# Patient Record
Sex: FEMALE | Race: WHITE | NOT HISPANIC OR LATINO | Employment: OTHER | URBAN - METROPOLITAN AREA
[De-identification: names, ages, dates, MRNs, and addresses within clinical notes are randomized per-mention and may not be internally consistent; named-entity substitution may affect disease eponyms.]

---

## 2019-08-26 ENCOUNTER — TELEPHONE (OUTPATIENT)
Dept: FAMILY MEDICINE | Age: 84
End: 2019-08-26

## 2019-12-25 ENCOUNTER — HOSPITAL ENCOUNTER (OUTPATIENT)
Facility: HOSPITAL | Age: 84
Setting detail: OBSERVATION
LOS: 2 days | Discharge: HOME | End: 2019-12-27
Attending: EMERGENCY MEDICINE | Admitting: HOSPITALIST
Payer: MEDICARE

## 2019-12-25 ENCOUNTER — APPOINTMENT (OUTPATIENT)
Dept: CT IMAGING | Facility: HOSPITAL | Age: 84
End: 2019-12-25
Attending: EMERGENCY MEDICINE
Payer: MEDICARE

## 2019-12-25 DIAGNOSIS — R77.8 ELEVATED TROPONIN: ICD-10-CM

## 2019-12-25 DIAGNOSIS — R47.81 SLURRED SPEECH: Primary | ICD-10-CM

## 2019-12-25 DIAGNOSIS — I10 BENIGN ESSENTIAL HTN: ICD-10-CM

## 2019-12-25 DIAGNOSIS — Z86.73 HISTORY OF CVA (CEREBROVASCULAR ACCIDENT): ICD-10-CM

## 2019-12-25 PROBLEM — R79.89 ELEVATED TROPONIN: Status: ACTIVE | Noted: 2019-12-25

## 2019-12-25 PROCEDURE — 99220 INITIAL OBSERVATION CARE,LEVL III: CPT | Performed by: HOSPITALIST

## 2019-12-25 PROCEDURE — 70498 CT ANGIOGRAPHY NECK: CPT | Performed by: EMERGENCY MEDICINE

## 2019-12-25 PROCEDURE — 70496 CT ANGIOGRAPHY HEAD: CPT | Performed by: EMERGENCY MEDICINE

## 2019-12-25 PROCEDURE — 70450 CT HEAD/BRAIN W/O DYE: CPT | Performed by: EMERGENCY MEDICINE

## 2019-12-25 RX ORDER — ASPIRIN 300 MG
300 SUPPOSITORY, RECTAL RECTAL DAILY
Status: DISCONTINUED | OUTPATIENT
Start: 2019-12-25 | End: 2019-12-26

## 2019-12-25 RX ORDER — CLOPIDOGREL BISULFATE 75 MG/1
75 TABLET ORAL DAILY
COMMUNITY

## 2019-12-25 RX ORDER — ATENOLOL 50 MG/1
50 TABLET ORAL DAILY
COMMUNITY

## 2019-12-25 RX ORDER — METOCLOPRAMIDE HYDROCHLORIDE 5 MG/ML
5 INJECTION INTRAMUSCULAR; INTRAVENOUS EVERY 8 HOURS PRN
Status: DISCONTINUED | OUTPATIENT
Start: 2019-12-25 | End: 2019-12-27

## 2019-12-25 RX ORDER — LABETALOL HYDROCHLORIDE 5 MG/ML
10 INJECTION, SOLUTION INTRAVENOUS EVERY 10 MIN PRN
Status: DISCONTINUED | OUTPATIENT
Start: 2019-12-25 | End: 2019-12-27

## 2019-12-25 RX ORDER — ACETAMINOPHEN 325 MG/1
650 TABLET ORAL EVERY 4 HOURS PRN
Status: DISCONTINUED | OUTPATIENT
Start: 2019-12-25 | End: 2019-12-27

## 2019-12-25 RX ORDER — HYDROCHLOROTHIAZIDE 50 MG/1
50 TABLET ORAL DAILY
COMMUNITY

## 2019-12-25 RX ORDER — PHENYLEPHRINE HCL IN 0.9% NACL 50MG/250ML
PLASTIC BAG, INJECTION (ML) INTRAVENOUS CONTINUOUS PRN
Status: DISCONTINUED | OUTPATIENT
Start: 2019-12-25 | End: 2019-12-27

## 2019-12-25 RX ORDER — ONDANSETRON 2 MG/ML
4 INJECTION INTRAMUSCULAR; INTRAVENOUS EVERY 6 HOURS PRN
Status: DISCONTINUED | OUTPATIENT
Start: 2019-12-25 | End: 2019-12-27

## 2019-12-25 RX ORDER — ACETAMINOPHEN 650 MG/1
650 SUPPOSITORY RECTAL EVERY 4 HOURS PRN
Status: DISCONTINUED | OUTPATIENT
Start: 2019-12-25 | End: 2019-12-27

## 2019-12-25 RX ORDER — SENNOSIDES 8.6 MG
17.2 TABLET ORAL NIGHTLY
Status: DISCONTINUED | OUTPATIENT
Start: 2019-12-25 | End: 2019-12-27

## 2019-12-25 RX ORDER — ASPIRIN 300 MG
300 SUPPOSITORY, RECTAL RECTAL ONCE
Status: COMPLETED | OUTPATIENT
Start: 2019-12-25 | End: 2019-12-25

## 2019-12-25 RX ORDER — ATORVASTATIN CALCIUM 80 MG/1
80 TABLET, FILM COATED ORAL NIGHTLY
Status: DISCONTINUED | OUTPATIENT
Start: 2019-12-25 | End: 2019-12-27

## 2019-12-25 RX ORDER — FOLIC ACID 1 MG/1
2 TABLET ORAL DAILY
COMMUNITY

## 2019-12-25 RX ORDER — PANTOPRAZOLE SODIUM 40 MG/1
40 TABLET, DELAYED RELEASE ORAL
COMMUNITY

## 2019-12-25 RX ORDER — POLYETHYLENE GLYCOL 3350 17 G/17G
17 POWDER, FOR SOLUTION ORAL DAILY PRN
Status: DISCONTINUED | OUTPATIENT
Start: 2019-12-25 | End: 2019-12-27

## 2019-12-25 RX ORDER — SODIUM CHLORIDE 9 MG/ML
INJECTION, SOLUTION INTRAVENOUS CONTINUOUS
Status: ACTIVE | OUTPATIENT
Start: 2019-12-25 | End: 2019-12-26

## 2019-12-25 RX ORDER — DOCUSATE SODIUM 100 MG/1
100 CAPSULE, LIQUID FILLED ORAL 2 TIMES DAILY
Status: DISCONTINUED | OUTPATIENT
Start: 2019-12-25 | End: 2019-12-27

## 2019-12-25 RX ORDER — SODIUM PHOSPHATE, DIBASIC AND SODIUM PHOSPHATE, MONOBASIC 7; 19 G/133ML; G/133ML
1 ENEMA RECTAL ONCE AS NEEDED
Status: DISCONTINUED | OUTPATIENT
Start: 2019-12-25 | End: 2019-12-27

## 2019-12-25 RX ORDER — LOSARTAN POTASSIUM 50 MG/1
100 TABLET ORAL DAILY
COMMUNITY

## 2019-12-25 RX ORDER — LOSARTAN POTASSIUM 100 MG/1
100 TABLET ORAL DAILY
Status: DISCONTINUED | OUTPATIENT
Start: 2019-12-25 | End: 2019-12-27

## 2019-12-25 RX ORDER — FOLIC ACID 1 MG/1
2 TABLET ORAL DAILY
Status: DISCONTINUED | OUTPATIENT
Start: 2019-12-25 | End: 2019-12-27

## 2019-12-25 RX ORDER — ASPIRIN 325 MG
325 TABLET ORAL DAILY
Status: DISCONTINUED | OUTPATIENT
Start: 2019-12-25 | End: 2019-12-26

## 2019-12-25 RX ORDER — CLOPIDOGREL BISULFATE 75 MG/1
75 TABLET ORAL DAILY
Status: DISCONTINUED | OUTPATIENT
Start: 2019-12-25 | End: 2019-12-27

## 2019-12-25 RX ORDER — SODIUM CHLORIDE 9 MG/ML
INJECTION, SOLUTION INTRAVENOUS CONTINUOUS
Status: DISCONTINUED | OUTPATIENT
Start: 2019-12-25 | End: 2019-12-27

## 2019-12-25 RX ORDER — POTASSIUM CITRATE 10 MEQ/1
10 TABLET, EXTENDED RELEASE ORAL DAILY
COMMUNITY

## 2019-12-25 RX ORDER — ATENOLOL 50 MG/1
50 TABLET ORAL
Status: DISCONTINUED | OUTPATIENT
Start: 2019-12-26 | End: 2019-12-27

## 2019-12-25 RX ORDER — BISACODYL 10 MG
10 SUPPOSITORY, RECTAL RECTAL
Status: DISCONTINUED | OUTPATIENT
Start: 2019-12-25 | End: 2019-12-27

## 2019-12-25 RX ORDER — PANTOPRAZOLE SODIUM 40 MG/1
40 TABLET, DELAYED RELEASE ORAL
Status: DISCONTINUED | OUTPATIENT
Start: 2019-12-26 | End: 2019-12-27

## 2019-12-25 RX ORDER — HEPARIN SODIUM 5000 [USP'U]/ML
5000 INJECTION, SOLUTION INTRAVENOUS; SUBCUTANEOUS EVERY 12 HOURS SCHEDULED
Status: DISCONTINUED | OUTPATIENT
Start: 2019-12-25 | End: 2019-12-27

## 2019-12-26 ENCOUNTER — APPOINTMENT (OUTPATIENT)
Dept: CV DIAGNOSTICS | Facility: HOSPITAL | Age: 84
End: 2019-12-26
Attending: INTERNAL MEDICINE
Payer: MEDICARE

## 2019-12-26 ENCOUNTER — APPOINTMENT (OUTPATIENT)
Dept: MRI IMAGING | Facility: HOSPITAL | Age: 84
End: 2019-12-26
Attending: Other
Payer: MEDICARE

## 2019-12-26 PROCEDURE — 95816 EEG AWAKE AND DROWSY: CPT | Performed by: OTHER

## 2019-12-26 PROCEDURE — 99203 OFFICE O/P NEW LOW 30 MIN: CPT | Performed by: INTERNAL MEDICINE

## 2019-12-26 PROCEDURE — 70551 MRI BRAIN STEM W/O DYE: CPT | Performed by: OTHER

## 2019-12-26 PROCEDURE — 99225 SUBSEQUENT OBSERVATION CARE: CPT | Performed by: HOSPITALIST

## 2019-12-26 PROCEDURE — 99223 1ST HOSP IP/OBS HIGH 75: CPT | Performed by: OTHER

## 2019-12-26 PROCEDURE — 93306 TTE W/DOPPLER COMPLETE: CPT | Performed by: INTERNAL MEDICINE

## 2019-12-26 RX ORDER — MAGNESIUM SULFATE HEPTAHYDRATE 40 MG/ML
2 INJECTION, SOLUTION INTRAVENOUS ONCE
Status: COMPLETED | OUTPATIENT
Start: 2019-12-26 | End: 2019-12-26

## 2019-12-26 RX ORDER — HYDRALAZINE HYDROCHLORIDE 20 MG/ML
10 INJECTION INTRAMUSCULAR; INTRAVENOUS EVERY 4 HOURS PRN
Status: DISCONTINUED | OUTPATIENT
Start: 2019-12-26 | End: 2019-12-27

## 2019-12-26 RX ORDER — HYDRALAZINE HYDROCHLORIDE 20 MG/ML
5 INJECTION INTRAMUSCULAR; INTRAVENOUS ONCE
Status: COMPLETED | OUTPATIENT
Start: 2019-12-26 | End: 2019-12-26

## 2019-12-26 RX ORDER — FAMOTIDINE 10 MG/ML
20 INJECTION, SOLUTION INTRAVENOUS DAILY
Status: DISCONTINUED | OUTPATIENT
Start: 2019-12-26 | End: 2019-12-26

## 2019-12-26 RX ORDER — FAMOTIDINE 20 MG/1
20 TABLET ORAL DAILY
Status: DISCONTINUED | OUTPATIENT
Start: 2019-12-26 | End: 2019-12-26

## 2019-12-26 RX ORDER — ASPIRIN 81 MG/1
81 TABLET, CHEWABLE ORAL DAILY
Status: DISCONTINUED | OUTPATIENT
Start: 2019-12-26 | End: 2019-12-27

## 2019-12-26 NOTE — CM/SW NOTE
12/26/19 1200   CM/SW Referral Data   Referral Source Physician   Reason for Referral Discharge planning;Protocol order set   Specify order set Stroke   Informant Patient   Patient Info   Patient's Mental Status Alert;Oriented   Patient's Home Environme

## 2019-12-26 NOTE — CONSULTS
[facility]    PATIENT'S NAME: Kathryn Wheeler   ATTENDING PHYSICIAN: ZACHARY Pitt: Yazan Purdy M.D.    PATIENT ACCOUNT#:   [de-identified]    LOCATION:  [address] [place] [address]  MEDICAL RECORD #:   [id number]       DATE OF BIR nonsmoker. She is active day to day using a cane. PAST MEDICAL HISTORY:  History of cerebrovascular accident ? 2012 with some left-sided weakness. History of esophageal reflux. History of nephrolithiasis. Hypertension.   Diabetes mellitus, diet contro has been no history of seizure disorder. He does have diabetic neuropathy. ENDOCRINE:  Patient has no history of thyroid disease. She does have diabetes which presently is being managed with diet alone.       PHYSICAL EXAMINATION:    GENERAL:  A pleasant disease. ECG personally reviewed from 12/25 shows apparent accelerated junctional rhythm. P-waves are not clearly distinguished. There are nonspecific ST-T wave changes. IMPRESSION:    1.    Acute cerebrovascular accident manifest by slurred speec

## 2019-12-26 NOTE — PLAN OF CARE
Pt is alert and oriented x4, NSR, on room air. Speech slightly slurred, delayed responses, NIH 1. Echo complete, EF 65-70%, pulm pressure slightly increased. EEG complete, results pending. BP elevated this AM, much better this after. MRI pending.  Pt and fa

## 2019-12-26 NOTE — CONSULTS
[address]  Neurology Consultation    Date of consult: 12/26/2019    Reason for consult: altered mental status    HPI: Joan Mart is a 81year old female with past medical history of previous CVA dementia GERD who presents to emergency room a 17.2 mg, 17.2 mg, Oral, Nightly  docusate sodium (COLACE) cap 100 mg, 100 mg, Oral, BID  PEG 3350 (MIRALAX) powder packet 17 g, 17 g, Oral, Daily PRN  magnesium hydroxide (MILK OF MAGNESIA) 400 MG/5ML suspension 30 mL, 30 mL, Oral, Daily PRN  bisacodyl (DU repetition, comprehension normal  Speech: no dysarthria  CN: II-XII    pupils 2-3 mm equal and reactive to light  III, IV, VI extraocular movements intact, no nystagmus, no ptosis  V face sensation normal  VII face symmetry  VIII hearing normal  IX, X, XI

## 2019-12-26 NOTE — PROGRESS NOTES
12/26/19 0911   Clinical Encounter Type   Visited With Patient   Routine Visit   (Responded to request/consult)   Patient's Supportive Strategies/Resources Identified Jamee's family support that includes her spouse, daughter and two sons.  Explored her in

## 2019-12-26 NOTE — PLAN OF CARE
Received pt from ER, oriented to [address],  and daughter at bedside. Admission navigator completed. Pt seen by Cardiology on the floor. Stroke protocol/admission orders placed by hospitalist. No BP parameter specified.      On neuro assessment

## 2019-12-26 NOTE — ED INITIAL ASSESSMENT (HPI)
Pt presents to ed with difficulty speaking and slurred speech that started 45 minutes pta. Pt has hx of previous cva. On arrival pt is a&ox4, moves all extremities well, resps easy.

## 2019-12-26 NOTE — PAYOR COMM NOTE
--------------  ADMISSION REVIEW     Payor: [employer] MEDICARE ADV PPO  Subscriber #:  [id number]  Authorization Number: [id number]    ED Provider Notes             Patient Seen in: [facility] Emergency Department      History   Patient presents with:  Str 12/25/19 2131 Temporal   SpO2 12/25/19 2110 99 %   O2 Device 12/25/19 2110 None (Room air)       Current:/74 (BP Location: Right arm)   Pulse 65   Temp 97.6 °F (36.4 °C) (Oral)   Resp 19   Ht 154.9 cm (5' 1\")   Wt 74.9 kg   SpO2 97%   BMI 31.20 kg/m (*)     All other components within normal limits   POCT GLUCOSE - Abnormal; Notable for the following components:    POC Glucose 160 (*)     All other components within normal limits   POCT GLUCOSE - Abnormal; Notable for the following components:    POC 12/25/2019 at 21:40                                         CT STROKE BRAIN (NO IV)(CPT=70450) (Final result)   Result time 12/25/19 21:26:23   Final result by Keegan Solano DO (12/25/19 46:39:56)                Impression:    CONCLUSION:    1.  No acute pr Clinical Impression:  Slurred speech  (primary encounter diagnosis)  Benign essential HTN  History of CVA (cerebrovascular accident)  Elevated troponin    Disposition:  Admit  12/25/2019  9:33 pm                 H&P - H&P Note          Chief Complaint: 0.97       Recent Labs   Lab 12/25/19 2109   *   BUN 24*   CREATSERUM 1.57*   GFRAA 35*   GFRNAA 30*   CA 9.0   ALB 3.9      K 3.3*      CO2 30.0   ALKPHO 55   AST 20   ALT 26   BILT 0.4   TP 7.5       CrCl cannot be calculated (Unknown Units Subcutaneous (Right Lower Abdomen) Blank Rose, RN      hydrALAzine HCl (APRESOLINE) injection 10 mg     Date Action Dose Route User    12/26/2019 0442 Given 10 mg Intravenous Marilee Vega RN      hydrALAzine HCl (APRESOLINE) injection 5 mg     D monitoring  Avoid hypotensive events for a better cerebral perfusion  MRI brain  echocardigram  Add ASA 81 mg  Cont plavix 75 mg  Pt is allergic to statin  DVT prophylaxis  EEG  PT/OT/speech            12/26 HOSPITALIST       1. Acute CVA vs TIA  1.  Stroke

## 2019-12-26 NOTE — ED PROVIDER NOTES
Patient Seen in: [facility] Emergency Department      History   Patient presents with:  Stroke    Stated Complaint: stroke    HPI    81-year-old female with a prior history of a stroke, history gastroesophageal reflux, history of hypertension,presen (36.7 °C)   Temp src 12/25/19 2131 Temporal   SpO2 12/25/19 2110 99 %   O2 Device 12/25/19 2110 None (Room air)       Current:/74 (BP Location: Right arm)   Pulse 65   Temp 97.6 °F (36.4 °C) (Oral)   Resp 19   Ht 154.9 cm (5' 1\")   Wt 74.9 kg   SpO2 Non HDL Chol 231 (*)     All other components within normal limits   POCT GLUCOSE - Abnormal; Notable for the following components:    POC Glucose 160 (*)     All other components within normal limits   POCT GLUCOSE - Abnormal; Notable for the following co hours.  Read back was performed.        Dictated by: Cami Abdul DO on 12/25/2019 at 21:30       Approved by:  Cami Abdul DO on 12/25/2019 at 21:40                                         CT STROKE BRAIN (NO IV)(CPT=70450) (Final result)   Result time     No evidence for fracture or osseous abnormality. OTHER:             None.                             Patient was brought back to the examination room immediately. The patient was then placed on a cardiac monitor and pulse oximetry was applied.   Maranda Noted POA    * (Principal) Slurred speech R47.81 12/25/2019 Unknown    Benign essential HTN I10 12/25/2019     Elevated troponin R79.89 12/25/2019     History of CVA (cerebrovascular accident) Z86.73 12/25/2019

## 2019-12-26 NOTE — PHYSICAL THERAPY NOTE
Per stroke protocol and stroke committee recommendation, patient is on bedrest for 24 hrs from ADT time of 2102 on 12/25. PT will evaluate the patient once activity level is upgraded.

## 2019-12-26 NOTE — SLP NOTE
ADULT SWALLOWING EVALUATION    ASSESSMENT    ASSESSMENT/OVERALL IMPRESSION:  Orders were received for a bedside swallowing evaluation per stroke protocol. Patient admitted following acute onset of speech difficulty.  Contacted at the bedside and reports spe Results:   CONCLUSION:    CT of Brain:  CONCLUSION:    1. No acute process noted. 2. Mild to moderate diffuse atrophy and white matter disease consistent with chronic small vessel ischemic changes.   3. 2 small, remote lacunar infarcts noted within the lef please contact Hilario Durham

## 2019-12-26 NOTE — OCCUPATIONAL THERAPY NOTE
Per stroke protocol and stroke committee recommendation, patient is on bedrest for 24 hrs from ADT time of 2102 on 12/25. OT will evaluate the patient once activity level is upgraded.

## 2019-12-26 NOTE — PLAN OF CARE
Problem: Impaired Swallowing  Goal: Minimize aspiration risk  Description  Interventions:  - Patient should be alert and upright for all feedings (90 degrees preferred)  - Offer food and liquids at a slow rate  - No straws  - Encourage small bites of trisha

## 2019-12-26 NOTE — H&P
[facility]  History and Physical     Armando Grey Patient Status:  Inpatient    1935 MRN [id number]   [facility] 7NE-A Attending Manuel Perea MD   Hosp Day # 0 PCP PHYSICIAN NONSTAFF     Chief Complaint: Slurred speech ER 10 MEQ (1080 MG) Oral Tab CR, Take 10 mEq by mouth daily. , Disp: , Rfl:   hydrochlorothiazide 50 MG Oral Tab, Take 50 mg by mouth daily. , Disp: , Rfl:         Review of Systems:   A comprehensive 14 point review of systems was completed.     Pertinent po status: full  · Mathis: no    Plan of care discussed with patient and ED physician    José Miguel Garcia MD  20/02/5669          **Certification      PHYSICIAN Certification of Need for Inpatient Hospitalization - Initial Certification    Patient will require inpa

## 2019-12-26 NOTE — PROCEDURES
Date of Procedure: 12/26/2019    Procedure: EEG (ELECTROENCEPHALOGRAM)     HX: PT IS AN 85 YO FEMALE WHO PRESENTED TO ED ON 12/25/2019 POST NEAR SYNCOPAL EPISODE AT HOME. PT BEGAN TO HAVE AMS AND SLURRED SPEECH AROUND 2015 YESTERDAY.  SYMPTOMS RESOLVED UPON

## 2019-12-26 NOTE — CONSULTS
Cardiology Consult Manish Johnson)   #[id number]  [birth year]    81year old female with a remote CVA presents with slurred speech and R sided weakness    IMPRESSION:  Acute CVA  HTN-suboptimal control per the pt.   DM-diet controlled  Diabetic neuropathy  Dys

## 2019-12-26 NOTE — PROGRESS NOTES
Pharmacy Note: Renal dose adjustment for Metoclopramide (Reglan)  Kole Cai has been prescribed Metoclopramide (Reglan) 10 mg every 8 hours scheduled. Estimated Creatinine Clearance: 20.5 mL/min (A) (based on SCr of 1.57 mg/dL (H)).     Her calculat

## 2019-12-26 NOTE — ED NOTES
Pt presents with ED with family. Pt A/ox4. Speech with delayed responses but clear. Pt WEBB x4, right upper extremity weaker than left but has full ROM.  No facial droop noted

## 2019-12-27 VITALS
HEIGHT: 61 IN | HEART RATE: 62 BPM | DIASTOLIC BLOOD PRESSURE: 60 MMHG | OXYGEN SATURATION: 100 % | TEMPERATURE: 98 F | RESPIRATION RATE: 17 BRPM | WEIGHT: 165.13 LBS | BODY MASS INDEX: 31.18 KG/M2 | SYSTOLIC BLOOD PRESSURE: 171 MMHG

## 2019-12-27 PROCEDURE — 99217 OBSERVATION CARE DISCHARGE: CPT | Performed by: HOSPITALIST

## 2019-12-27 PROCEDURE — 99233 SBSQ HOSP IP/OBS HIGH 50: CPT | Performed by: OTHER

## 2019-12-27 PROCEDURE — 99214 OFFICE O/P EST MOD 30 MIN: CPT | Performed by: INTERNAL MEDICINE

## 2019-12-27 RX ORDER — AMLODIPINE BESYLATE 2.5 MG/1
2.5 TABLET ORAL DAILY
Status: DISCONTINUED | OUTPATIENT
Start: 2019-12-27 | End: 2019-12-27

## 2019-12-27 RX ORDER — AMLODIPINE BESYLATE 2.5 MG/1
2.5 TABLET ORAL DAILY
Qty: 30 TABLET | Refills: 3 | Status: SHIPPED | OUTPATIENT
Start: 2019-12-28

## 2019-12-27 RX ORDER — ATORVASTATIN CALCIUM 80 MG/1
80 TABLET, FILM COATED ORAL NIGHTLY
Qty: 30 TABLET | Refills: 3 | Status: SHIPPED | OUTPATIENT
Start: 2019-12-27

## 2019-12-27 RX ORDER — ASPIRIN 81 MG/1
81 TABLET, CHEWABLE ORAL DAILY
Qty: 30 TABLET | Refills: 3 | Status: SHIPPED | OUTPATIENT
Start: 2019-12-28

## 2019-12-27 NOTE — PAYOR COMM NOTE
--------------  STATUS CHANGED TO OBSERVATION ON 12/27/19 PER ORDER BELOW    PLACE IN OBSERVATION (Order #[id number]) on 12/27/19    CONTINUED STAY REVIEW    Payor: [employer] MEDICARE ADV PPO  Subscriber #:  [id number]  Authorization Number: [id number]    Admit Given 100 mg Oral Blank Rose, RN      Pantoprazole Sodium (PROTONIX) EC tab 40 mg     Date Action Dose Route User    12/27/2019 0549 Given 40 mg Oral Brii Lynch RN      0.9% NaCl infusion     Date Action Dose Route User    12/26/2019 2024 New Bag (none

## 2019-12-27 NOTE — PLAN OF CARE
Assumed care of patient at 1500  Neuro check performed at 1700  Patient's chronic cough seemed more productive, noted clear sputum  Patient resting comfortably in bed with no acute issues to report  Report given to charge nurse

## 2019-12-27 NOTE — OCCUPATIONAL THERAPY NOTE
OCCUPATIONAL THERAPY EVALUATION - INPATIENT     Room Number: 0603/3158-L  Evaluation Date: 12/27/2019  Type of Evaluation: Initial  Presenting Problem: CVA    Physician Order: IP Consult to Occupational Therapy  Reason for Therapy: ADL/IADL Dysfunction and lives with her  in [place]. They are visiting their daughter here for holidays. Pt and  are planning to go to their second home in [place] on 1/3/20. They spend 4 months in the winter in [place].   Independent with ADL, except for socks and nato Movement: Symmetrical  Coordination - Finger Opposition: Other (Comment)(difficulty processing instructions)       ACTIVITY TOLERANCE                         O2 SATURATIONS                ACTIVITIES OF DAILY LIVING ASSESSMENT  AM-PAC ‘6-Clicks’ Inpatient D occupational profile noted above. Functional outcome measures completed include AM-PAC, ROM, MMT, and PHQ 9.  In this OT evaluation patient presents with the following performance deficits: impaired safety awareness, impaired ability to process multi-step i reeducation; Fine motor coordination activities; Compensatory technique education  Rehab Potential : Good  Frequency (Obs): Daily  Number of Visits to Meet Established Goals: 5    ADL Goals   Patient will perform grooming: with modified independent and while

## 2019-12-27 NOTE — PROGRESS NOTES
[address]  Neurology Progress Note    Kole Cai Patient Status:  Inpatient    1935 MRN [id number]   [facility] 7NE-A Attending Yunior Torre MD   Hosp Day # 2 PCP PHYSICIAN NONSTAFF         Subjective:   Kole Cai No other potential active process. Otherwise, age related changes in the brain are seen as above. CTA brain/neck  CONCLUSION:    1.  Mild atherosclerotic calcifications along the carotid bulbs extending into the proximal internal carotid arteries bilate Sun[place]  12/27/2019  8:12 AM  Spectra [id number]    Neurology Attending Addendum:  I have seen independently, reviewed history, labs and imaging independent of NP and agree with above note with following additions:   S: no acute issues overnight; patient feel which does not show decreased signal on ADC map images. Generally, acute infarct shows increased signal on DWI, and decreased signal on ADC images.   Features are NOT typical for  ACUTE infarct, may reflect subacute infarct, with differential also includin Component Value Date    CREATSERUM 1.20 (H) 12/26/2019    CREATSERUM 1.57 (H) 12/25/2019       CBC:    Lab Results   Component Value Date    WBC 9.0 12/25/2019     Lab Results   Component Value Date    HGB 13.6 12/25/2019      Lab Results   Component Deya normotensive - management as per cardiology / PCP - goal for neurology long term <130/80  - PT/OT/speech therapy - no needs   - smoking cessation education   - SCDs for DVT ppx    Ok for d/c from neurology view with plan of care as noted above and close fo

## 2019-12-27 NOTE — PLAN OF CARE
Patient is alert and oriented, with delayed responses. Continues to report mild left sided weaknesses which patients reports as baseline. Denies pain or nausea. NSR/SB on telemetry. Oxygen saturation remains above 90% on room air.   Patient refused to h arrhythmias or at baseline  Description  INTERVENTIONS:  - Continuous cardiac monitoring, monitor vital signs, obtain 12 lead EKG if indicated  - Evaluate effectiveness of antiarrhythmic and heart rate control medications as ordered  - Initiate emergency m

## 2019-12-27 NOTE — SLP NOTE
SPEECH/LANGUAGE/COGNITIVE EVALUATION - INPATIENT    Admission Date: 12/25/2019  Evaluation Date: 12/27/19    Reason for Referral: Stroke protocol    ASSESSMENT & PLAN   ASSESSMENT & IMPRESSION    Completed [place] Cognitive Assessment (MoCA) completed for the findings and goals. FOLLOW UP  Treatment Plan/Recommendations: No further skilled therapy.    Number of Visits to Meet Established Goals: 2  Follow Up Needed: No  SLP Follow-up Date: 12/27/19    Thank you for your referral.  If you have any questio

## 2019-12-27 NOTE — PHYSICAL THERAPY NOTE
PHYSICAL THERAPY EVALUATION - INPATIENT     Room Number: 8457/2570-L  Evaluation Date: 12/27/2019  Type of Evaluation: Initial  Physician Order: PT Eval and Treat    Presenting Problem: stroke 12/25  Reason for Therapy: Mobility Dysfunction and Disch SURGERY     • REMOVAL GALLBLADDER     • TOTAL ABDOM HYSTERECTOMY         HOME SITUATION  Type of Home: Condo   Home Layout: One level  Stairs to Enter : 0     Stairs to [employer]: 0       Lives With: Spouse  Drives: Yes  Patient Owned Equipment: Rony April sheets and blankets)?: None   -   Sitting down on and standing up from a chair with arms (e.g., wheelchair, bedside commode, etc.): None   -   Moving from lying on back to sitting on the side of the bed?: None   How much help from another person does the p Patient is a 81year old female admitted on 12/25/2019 for slurred speech. Pertinent comorbidities and personal factors impacting therapy includeHx of CVA with residual L sided weakness, HTN, hyperlipidemia, and diabetes.  In this PT evaluation, the kandi

## 2019-12-27 NOTE — PROGRESS NOTES
[facility]  Cardiology Progress Note    Subjective:  No chest pain or shortness of breath.    md exam: no hx of palpitations. No menjivar, cp. May have had stress test in jan.  Had cva with left sided weakness 8 years ago on vacation in [facility]. Was put findings of this critical value study were discussed with Dr. Esthela Waggoner on 12/25/2019 at 2140 hours. ECHO: 12/26/19:  1. Left ventricle: The cavity size was normal. Wall thickness was normal.     Systolic function was vigorous.  The estimated ejection fraction · Continue asa/plavix  · Patient is asking about going to her home in [place] in 1 week. She would like to follow up with an event monitor once she arrives there. She actually lives in [facility] and snowbirds to [place] for the winter time.  She was here

## 2019-12-27 NOTE — PROGRESS NOTES
[facility]  Progress Note     Laquita Ellis Patient Status:  Inpatient    1935 MRN [id number]   [facility] 7NE-A Attending Jackie Ahn MD   Hosp Day # 2 PCP PHYSICIAN NONSTAFF     Chief Complaint: slurred speeach    S: Antonio Perkins Blocker   • Clopidogrel Bisulfate  75 mg Oral Daily   • folic acid  2 mg Oral Daily   • losartan Potassium  100 mg Oral Daily   • Pantoprazole Sodium  40 mg Oral QAM AC   • atorvastatin  80 mg Oral Nightly   • Senna  17.2 mg Oral Nightly   • docusate sodwilliamu

## 2019-12-27 NOTE — PLAN OF CARE
Problem: Diabetes/Glucose Control  Goal: Glucose maintained within prescribed range  Description  INTERVENTIONS:  - Monitor Blood Glucose as ordered  - Assess for signs and symptoms of hyperglycemia and hypoglycemia  - Administer ordered medications to m arrhythmias  - Monitor electrolytes and administer replacement therapy as ordered  Outcome: Adequate for Discharge     Problem: RESPIRATORY - ADULT  Goal: Achieves optimal ventilation and oxygenation  Description  INTERVENTIONS:  - Assess for changes in re for Discharge  Goal: Maintain proper alignment of affected body part  Description  INTERVENTIONS:  - Support and protect limb and body alignment per provider's orders  - Instruct and reinforce with patient and family use of appropriate assistive device and Promote sitting position while performing ADLs such as feeding, grooming, and bathing  - Educate and encourage patient/family in tolerated functional activity level and precautions during self-care  - Encourage patient to incorporate impaired side during d Consider post-discharge preferences of patient/family/discharge partner  - Complete POLST form as appropriate  - Assess patient's ability to be responsible for managing their own health  - Refer to Case Management Department for coordinating discharge plan

## 2019-12-28 NOTE — PLAN OF CARE
NURSING DISCHARGE NOTE    Discharged Home via Wheelchair. Accompanied by Support staff  Belongings Taken by patient/family. BP in the 160s, hospitalist notified, ok for dc. IV removed. Dc instructions and f/u appts discussed with pt and family.  All

## 2019-12-28 NOTE — DISCHARGE SUMMARY
[facility] HOSPITALIST  DISCHARGE SUMMARY     [address] Patient Status:  Observation    1935 MRN [id number]   [facility] 7NE-A Attending No att. providers found   Hosp Day # 2 PCP PHYSICIAN NONSTAFF     Date of Admission: 2019 descriptions):  • As above    Lab/Test results pending at Discharge:   · none    Consultants:  • Neurology, cardiology    Discharge Medication List:     Discharge Medications      START taking these medications      Instructions Prescription details   [place] doctor [place]  please follow up with your established internist that you see when you are living down in [place] during the winter months. follow up in 1 month.  we receommend that you go for a 30 day event monitor at that time and also a lexiscan stres

## 2019-12-30 ENCOUNTER — TELEPHONE (OUTPATIENT)
Dept: CARDIOLOGY | Age: 84
End: 2019-12-30

## 2019-12-30 RX ORDER — AMLODIPINE BESYLATE 2.5 MG/1
2.5 TABLET ORAL DAILY
Qty: 30 TABLET | Refills: 3 | Status: SHIPPED | OUTPATIENT
Start: 2019-12-30 | End: 2020-08-11 | Stop reason: SDUPTHER

## 2019-12-30 RX ORDER — ATORVASTATIN CALCIUM 80 MG/1
80 TABLET, FILM COATED ORAL NIGHTLY
Qty: 30 TABLET | Refills: 3 | Status: SHIPPED | OUTPATIENT
Start: 2019-12-30 | End: 2020-09-30 | Stop reason: SDUPTHER

## 2019-12-31 NOTE — PAYOR COMM NOTE
--------------  DISCHARGE REVIEW    Payor: [employer] MEDICARE ADV PPO  Subscriber #:  [id number]  Authorization Number: [id number]    Admit date: 12/25/19  Admit time:  2255  Discharge Date: 12/27/2019  7:00 PM     Admitting Physician: MD Brian Anderson outpatient in [place] where she resides during the winter for an event monitor as well as a stress test.  She was discharged on dual antiplatelet therapy with aspirin and Plavix for 21 days and then will switch to monotherapy.   She was also discharged on L MG Tbec  Commonly known as:  PROTONIX      Take 40 mg by mouth 2 (two) times daily before meals. Refills:  0     Potassium Citrate ER 10 MEQ (1080 MG) Tbcr  Commonly known as:  UROCIT-K      Take 10 mEq by mouth daily.    Refills:  0     VITAMIN D OR

## 2020-01-01 ENCOUNTER — EXTERNAL RECORD (OUTPATIENT)
Dept: OTHER | Age: 85
End: 2020-01-01

## 2020-01-02 RX ORDER — AMLODIPINE BESYLATE 2.5 MG/1
2.5 TABLET ORAL DAILY
Qty: 90 TABLET | Refills: 0 | OUTPATIENT
Start: 2020-01-02

## 2020-01-02 RX ORDER — ATORVASTATIN CALCIUM 80 MG/1
TABLET, FILM COATED ORAL
Qty: 90 TABLET | Refills: 0 | OUTPATIENT
Start: 2020-01-02

## 2020-06-22 ENCOUNTER — OFFICE VISIT (OUTPATIENT)
Dept: FAMILY MEDICINE | Age: 85
End: 2020-06-22

## 2020-06-22 VITALS
HEIGHT: 61 IN | OXYGEN SATURATION: 99 % | HEART RATE: 68 BPM | BODY MASS INDEX: 30.25 KG/M2 | WEIGHT: 160.2 LBS | DIASTOLIC BLOOD PRESSURE: 74 MMHG | SYSTOLIC BLOOD PRESSURE: 136 MMHG

## 2020-06-22 DIAGNOSIS — N18.30 CKD (CHRONIC KIDNEY DISEASE) STAGE 3, GFR 30-59 ML/MIN (CMD): ICD-10-CM

## 2020-06-22 DIAGNOSIS — G89.29 CHRONIC RIGHT-SIDED LOW BACK PAIN WITHOUT SCIATICA: ICD-10-CM

## 2020-06-22 DIAGNOSIS — M54.50 CHRONIC RIGHT-SIDED LOW BACK PAIN WITHOUT SCIATICA: ICD-10-CM

## 2020-06-22 DIAGNOSIS — J30.1 NON-SEASONAL ALLERGIC RHINITIS DUE TO POLLEN: ICD-10-CM

## 2020-06-22 DIAGNOSIS — I48.91 ATRIAL FIBRILLATION, UNSPECIFIED TYPE (CMD): Primary | ICD-10-CM

## 2020-06-22 DIAGNOSIS — I10 BENIGN ESSENTIAL HTN: ICD-10-CM

## 2020-06-22 DIAGNOSIS — E11.22 CONTROLLED TYPE 2 DIABETES MELLITUS WITH STAGE 3 CHRONIC KIDNEY DISEASE, WITHOUT LONG-TERM CURRENT USE OF INSULIN (CMD): ICD-10-CM

## 2020-06-22 DIAGNOSIS — N18.30 CONTROLLED TYPE 2 DIABETES MELLITUS WITH STAGE 3 CHRONIC KIDNEY DISEASE, WITHOUT LONG-TERM CURRENT USE OF INSULIN (CMD): ICD-10-CM

## 2020-06-22 PROBLEM — M51.37 DEGENERATION OF INTERVERTEBRAL DISC OF LUMBOSACRAL REGION: Status: ACTIVE | Noted: 2017-08-22

## 2020-06-22 PROBLEM — Z90.49 S/P LAPAROSCOPIC CHOLECYSTECTOMY: Status: ACTIVE | Noted: 2018-06-28

## 2020-06-22 PROBLEM — G89.4 CHRONIC PAIN DISORDER: Status: ACTIVE | Noted: 2017-08-22

## 2020-06-22 PROBLEM — M51.379 DEGENERATION OF INTERVERTEBRAL DISC OF LUMBOSACRAL REGION: Status: ACTIVE | Noted: 2017-08-22

## 2020-06-22 PROBLEM — M41.9 SCOLIOSIS/KYPHOSCOLIOSIS: Status: ACTIVE | Noted: 2017-08-22

## 2020-06-22 PROBLEM — I63.9 CVA (CEREBRAL VASCULAR ACCIDENT) (CMD): Status: ACTIVE | Noted: 2020-06-22

## 2020-06-22 PROCEDURE — 99204 OFFICE O/P NEW MOD 45 MIN: CPT | Performed by: FAMILY MEDICINE

## 2020-06-22 RX ORDER — LORATADINE 10 MG/1
10 TABLET ORAL DAILY
Qty: 90 TABLET | Refills: 0 | Status: SHIPPED | OUTPATIENT
Start: 2020-06-22 | End: 2020-08-03 | Stop reason: ALTCHOICE

## 2020-06-22 RX ORDER — FLUTICASONE PROPIONATE 50 MCG
1 SPRAY, SUSPENSION (ML) NASAL DAILY
Qty: 16 G | Refills: 3 | Status: ON HOLD | OUTPATIENT
Start: 2020-06-22 | End: 2021-06-17 | Stop reason: HOSPADM

## 2020-06-22 RX ORDER — LOSARTAN POTASSIUM 100 MG/1
100 TABLET ORAL DAILY
Status: ON HOLD | COMMUNITY
Start: 2019-12-02 | End: 2021-06-14

## 2020-06-22 RX ORDER — FLUTICASONE PROPIONATE 50 MCG
SPRAY, SUSPENSION (ML) NASAL
Qty: 48 G | OUTPATIENT
Start: 2020-06-22

## 2020-06-22 RX ORDER — CETIRIZINE HYDROCHLORIDE 10 MG/1
10 TABLET ORAL DAILY
COMMUNITY

## 2020-06-22 ASSESSMENT — PATIENT HEALTH QUESTIONNAIRE - PHQ9
CLINICAL INTERPRETATION OF PHQ2 SCORE: NO FURTHER SCREENING NEEDED
SUM OF ALL RESPONSES TO PHQ9 QUESTIONS 1 AND 2: 0
CLINICAL INTERPRETATION OF PHQ9 SCORE: NO FURTHER SCREENING NEEDED
SUM OF ALL RESPONSES TO PHQ9 QUESTIONS 1 AND 2: 0
2. FEELING DOWN, DEPRESSED OR HOPELESS: NOT AT ALL
1. LITTLE INTEREST OR PLEASURE IN DOING THINGS: NOT AT ALL

## 2020-07-17 ENCOUNTER — TELEPHONE (OUTPATIENT)
Dept: FAMILY MEDICINE | Age: 85
End: 2020-07-17

## 2020-07-17 DIAGNOSIS — Z20.822 COVID-19 RULED OUT BY LABORATORY TESTING: Primary | ICD-10-CM

## 2020-08-03 ENCOUNTER — OFFICE VISIT (OUTPATIENT)
Dept: CARDIOLOGY | Age: 85
End: 2020-08-03
Attending: FAMILY MEDICINE

## 2020-08-03 VITALS
HEIGHT: 61 IN | WEIGHT: 157.63 LBS | SYSTOLIC BLOOD PRESSURE: 132 MMHG | OXYGEN SATURATION: 99 % | RESPIRATION RATE: 12 BRPM | BODY MASS INDEX: 29.76 KG/M2 | DIASTOLIC BLOOD PRESSURE: 68 MMHG | HEART RATE: 64 BPM

## 2020-08-03 DIAGNOSIS — N18.30 CKD (CHRONIC KIDNEY DISEASE) STAGE 3, GFR 30-59 ML/MIN (CMD): ICD-10-CM

## 2020-08-03 DIAGNOSIS — I63.9 CEREBROVASCULAR ACCIDENT (CVA), UNSPECIFIED MECHANISM (CMD): ICD-10-CM

## 2020-08-03 DIAGNOSIS — I48.91 ATRIAL FIBRILLATION, UNSPECIFIED TYPE (CMD): Primary | ICD-10-CM

## 2020-08-03 DIAGNOSIS — I10 BENIGN ESSENTIAL HTN: ICD-10-CM

## 2020-08-03 PROCEDURE — 99214 OFFICE O/P EST MOD 30 MIN: CPT | Performed by: INTERNAL MEDICINE

## 2020-08-11 DIAGNOSIS — I10 BENIGN ESSENTIAL HTN: Primary | ICD-10-CM

## 2020-08-11 RX ORDER — AMLODIPINE BESYLATE 2.5 MG/1
2.5 TABLET ORAL DAILY
Qty: 30 TABLET | Refills: 2 | Status: SHIPPED | OUTPATIENT
Start: 2020-08-11 | End: 2020-08-13

## 2020-08-11 RX ORDER — POTASSIUM CITRATE 10 MEQ/1
10 TABLET, EXTENDED RELEASE ORAL DAILY
Qty: 90 TABLET | Refills: 0 | Status: SHIPPED | OUTPATIENT
Start: 2020-08-11 | End: 2020-12-01 | Stop reason: SDUPTHER

## 2020-08-13 RX ORDER — AMLODIPINE BESYLATE 5 MG/1
5 TABLET ORAL DAILY
Qty: 90 TABLET | Refills: 3 | Status: SHIPPED | OUTPATIENT
Start: 2020-08-13 | End: 2020-12-01 | Stop reason: ALTCHOICE

## 2020-09-23 ENCOUNTER — TELEPHONE (OUTPATIENT)
Dept: CARDIOLOGY | Age: 85
End: 2020-09-23

## 2020-09-23 ENCOUNTER — LAB SERVICES (OUTPATIENT)
Dept: LAB | Age: 85
End: 2020-09-23

## 2020-09-23 DIAGNOSIS — I48.91 ATRIAL FIBRILLATION, UNSPECIFIED TYPE (CMD): ICD-10-CM

## 2020-09-23 DIAGNOSIS — I48.91 ATRIAL FIBRILLATION, UNSPECIFIED TYPE (CMD): Primary | ICD-10-CM

## 2020-09-23 LAB
ANION GAP SERPL CALC-SCNC: 15 MMOL/L (ref 10–20)
BASOPHILS # BLD: 0.1 K/MCL (ref 0–0.3)
BASOPHILS NFR BLD: 1 %
BUN SERPL-MCNC: 22 MG/DL (ref 6–20)
BUN/CREAT SERPL: 19 (ref 7–25)
CALCIUM SERPL-MCNC: 9.6 MG/DL (ref 8.4–10.2)
CHLORIDE SERPL-SCNC: 100 MMOL/L (ref 98–107)
CO2 SERPL-SCNC: 29 MMOL/L (ref 21–32)
CREAT SERPL-MCNC: 1.15 MG/DL (ref 0.51–0.95)
DEPRECATED RDW RBC: 39.1 FL (ref 39–50)
EOSINOPHIL # BLD: 0.1 K/MCL (ref 0.1–0.5)
EOSINOPHIL NFR BLD: 1 %
ERYTHROCYTE [DISTWIDTH] IN BLOOD: 12.8 % (ref 11–15)
FASTING DURATION TIME PATIENT: ABNORMAL H
GFR SERPLBLD BASED ON 1.73 SQ M-ARVRAT: 44 ML/MIN/1.73M2
GLUCOSE SERPL-MCNC: 113 MG/DL (ref 65–99)
HCT VFR BLD CALC: 44.1 % (ref 36–46.5)
HGB BLD-MCNC: 14.2 G/DL (ref 12–15.5)
IMM GRANULOCYTES # BLD AUTO: 0 K/MCL (ref 0–0.2)
IMM GRANULOCYTES # BLD: 0 %
LYMPHOCYTES # BLD: 3.4 K/MCL (ref 1–4)
LYMPHOCYTES NFR BLD: 34 %
MCH RBC QN AUTO: 27.2 PG (ref 26–34)
MCHC RBC AUTO-ENTMCNC: 32.2 G/DL (ref 32–36.5)
MCV RBC AUTO: 84.3 FL (ref 78–100)
MONOCYTES # BLD: 0.6 K/MCL (ref 0.3–0.9)
MONOCYTES NFR BLD: 6 %
NEUTROPHILS # BLD: 5.7 K/MCL (ref 1.8–7.7)
NEUTROPHILS NFR BLD: 58 %
NRBC BLD MANUAL-RTO: 0 /100 WBC
PLATELET # BLD AUTO: 399 K/MCL (ref 140–450)
POTASSIUM SERPL-SCNC: 3.6 MMOL/L (ref 3.4–5.1)
RBC # BLD: 5.23 MIL/MCL (ref 4–5.2)
SODIUM SERPL-SCNC: 140 MMOL/L (ref 135–145)
WBC # BLD: 9.9 K/MCL (ref 4.2–11)

## 2020-09-23 PROCEDURE — 36415 COLL VENOUS BLD VENIPUNCTURE: CPT | Performed by: CLINICAL MEDICAL LABORATORY

## 2020-09-23 PROCEDURE — 85025 COMPLETE CBC W/AUTO DIFF WBC: CPT | Performed by: CLINICAL MEDICAL LABORATORY

## 2020-09-23 PROCEDURE — 80048 BASIC METABOLIC PNL TOTAL CA: CPT | Performed by: CLINICAL MEDICAL LABORATORY

## 2020-09-29 ENCOUNTER — LAB SERVICES (OUTPATIENT)
Dept: LAB | Age: 85
End: 2020-09-29

## 2020-09-29 DIAGNOSIS — I48.91 ATRIAL FIBRILLATION, UNSPECIFIED TYPE (CMD): ICD-10-CM

## 2020-09-29 DIAGNOSIS — N18.30 CKD (CHRONIC KIDNEY DISEASE) STAGE 3, GFR 30-59 ML/MIN (CMD): ICD-10-CM

## 2020-09-29 DIAGNOSIS — E11.22 CONTROLLED TYPE 2 DIABETES MELLITUS WITH STAGE 3 CHRONIC KIDNEY DISEASE, WITHOUT LONG-TERM CURRENT USE OF INSULIN (CMD): ICD-10-CM

## 2020-09-29 DIAGNOSIS — Z20.822 COVID-19 RULED OUT BY LABORATORY TESTING: ICD-10-CM

## 2020-09-29 DIAGNOSIS — N18.30 CONTROLLED TYPE 2 DIABETES MELLITUS WITH STAGE 3 CHRONIC KIDNEY DISEASE, WITHOUT LONG-TERM CURRENT USE OF INSULIN (CMD): ICD-10-CM

## 2020-09-29 LAB
ALBUMIN SERPL-MCNC: 3.7 G/DL (ref 3.6–5.1)
ALBUMIN/GLOB SERPL: 1 {RATIO} (ref 1–2.4)
ALP SERPL-CCNC: 66 UNITS/L (ref 45–117)
ALT SERPL-CCNC: 20 UNITS/L
ANION GAP SERPL CALC-SCNC: 13 MMOL/L (ref 10–20)
AST SERPL-CCNC: 13 UNITS/L
BILIRUB SERPL-MCNC: 0.6 MG/DL (ref 0.2–1)
BUN SERPL-MCNC: 15 MG/DL (ref 6–20)
BUN/CREAT SERPL: 15 (ref 7–25)
CALCIUM SERPL-MCNC: 9.7 MG/DL (ref 8.4–10.2)
CHLORIDE SERPL-SCNC: 101 MMOL/L (ref 98–107)
CHOLEST SERPL-MCNC: 276 MG/DL
CHOLEST/HDLC SERPL: 5.3 {RATIO}
CO2 SERPL-SCNC: 30 MMOL/L (ref 21–32)
CREAT SERPL-MCNC: 1.02 MG/DL (ref 0.51–0.95)
CREAT UR-MCNC: 77.5 MG/DL
FASTING DURATION TIME PATIENT: ABNORMAL H
FASTING DURATION TIME PATIENT: ABNORMAL H
GFR SERPLBLD BASED ON 1.73 SQ M-ARVRAT: 51 ML/MIN/1.73M2
GLOBULIN SER-MCNC: 3.8 G/DL (ref 2–4)
GLUCOSE SERPL-MCNC: 123 MG/DL (ref 65–99)
HBA1C MFR BLD: 7.3 % (ref 4.5–5.6)
HDLC SERPL-MCNC: 52 MG/DL
LDLC SERPL CALC-MCNC: 191 MG/DL
MAGNESIUM SERPL-MCNC: 1.5 MG/DL (ref 1.7–2.4)
MICROALBUMIN UR-MCNC: 0.61 MG/DL
MICROALBUMIN/CREAT UR: 7.9 MG/G
NONHDLC SERPL-MCNC: 224 MG/DL
POTASSIUM SERPL-SCNC: 3.5 MMOL/L (ref 3.4–5.1)
PROT SERPL-MCNC: 7.5 G/DL (ref 6.4–8.2)
SODIUM SERPL-SCNC: 140 MMOL/L (ref 135–145)
T4 FREE SERPL-MCNC: 1.1 NG/DL (ref 0.8–1.5)
TRIGL SERPL-MCNC: 167 MG/DL
TSH SERPL-ACNC: 5.12 MCUNITS/ML (ref 0.35–5)

## 2020-09-29 PROCEDURE — 84443 ASSAY THYROID STIM HORMONE: CPT | Performed by: CLINICAL MEDICAL LABORATORY

## 2020-09-29 PROCEDURE — 36415 COLL VENOUS BLD VENIPUNCTURE: CPT | Performed by: CLINICAL MEDICAL LABORATORY

## 2020-09-29 PROCEDURE — 80061 LIPID PANEL: CPT | Performed by: CLINICAL MEDICAL LABORATORY

## 2020-09-29 PROCEDURE — 82570 ASSAY OF URINE CREATININE: CPT | Performed by: CLINICAL MEDICAL LABORATORY

## 2020-09-29 PROCEDURE — 83036 HEMOGLOBIN GLYCOSYLATED A1C: CPT | Performed by: CLINICAL MEDICAL LABORATORY

## 2020-09-29 PROCEDURE — 84439 ASSAY OF FREE THYROXINE: CPT | Performed by: CLINICAL MEDICAL LABORATORY

## 2020-09-29 PROCEDURE — 83735 ASSAY OF MAGNESIUM: CPT | Performed by: CLINICAL MEDICAL LABORATORY

## 2020-09-29 PROCEDURE — 82043 UR ALBUMIN QUANTITATIVE: CPT | Performed by: CLINICAL MEDICAL LABORATORY

## 2020-09-29 PROCEDURE — 80053 COMPREHEN METABOLIC PANEL: CPT | Performed by: CLINICAL MEDICAL LABORATORY

## 2020-09-30 ENCOUNTER — TELEPHONE (OUTPATIENT)
Dept: FAMILY MEDICINE | Age: 85
End: 2020-09-30

## 2020-09-30 DIAGNOSIS — N18.30 CONTROLLED TYPE 2 DIABETES MELLITUS WITH STAGE 3 CHRONIC KIDNEY DISEASE, WITHOUT LONG-TERM CURRENT USE OF INSULIN (CMD): Primary | ICD-10-CM

## 2020-09-30 DIAGNOSIS — E11.22 CONTROLLED TYPE 2 DIABETES MELLITUS WITH STAGE 3 CHRONIC KIDNEY DISEASE, WITHOUT LONG-TERM CURRENT USE OF INSULIN (CMD): Primary | ICD-10-CM

## 2020-09-30 DIAGNOSIS — R79.89 ELEVATED TSH: ICD-10-CM

## 2020-09-30 DIAGNOSIS — Z86.73 HISTORY OF CVA (CEREBROVASCULAR ACCIDENT): ICD-10-CM

## 2020-09-30 DIAGNOSIS — E83.42 HYPOMAGNESEMIA: Primary | ICD-10-CM

## 2020-09-30 RX ORDER — ATORVASTATIN CALCIUM 80 MG/1
80 TABLET, FILM COATED ORAL NIGHTLY
Qty: 90 TABLET | Refills: 1 | Status: SHIPPED | OUTPATIENT
Start: 2020-09-30 | End: 2020-12-16 | Stop reason: ALTCHOICE

## 2020-11-03 ENCOUNTER — APPOINTMENT (OUTPATIENT)
Dept: CARDIOLOGY | Age: 85
End: 2020-11-03

## 2020-11-09 RX ORDER — AMLODIPINE BESYLATE 2.5 MG/1
2.5 TABLET ORAL DAILY
Qty: 30 TABLET | Refills: 2 | OUTPATIENT
Start: 2020-11-09

## 2020-11-20 ENCOUNTER — TELEPHONE (OUTPATIENT)
Dept: FAMILY MEDICINE | Age: 85
End: 2020-11-20

## 2020-11-24 ENCOUNTER — LAB SERVICES (OUTPATIENT)
Dept: LAB | Age: 85
End: 2020-11-24

## 2020-11-24 DIAGNOSIS — R79.89 ELEVATED TSH: ICD-10-CM

## 2020-11-24 DIAGNOSIS — E11.22 CONTROLLED TYPE 2 DIABETES MELLITUS WITH STAGE 3 CHRONIC KIDNEY DISEASE, WITHOUT LONG-TERM CURRENT USE OF INSULIN (CMD): ICD-10-CM

## 2020-11-24 DIAGNOSIS — N18.30 CONTROLLED TYPE 2 DIABETES MELLITUS WITH STAGE 3 CHRONIC KIDNEY DISEASE, WITHOUT LONG-TERM CURRENT USE OF INSULIN (CMD): ICD-10-CM

## 2020-11-24 DIAGNOSIS — Z86.73 HISTORY OF CVA (CEREBROVASCULAR ACCIDENT): ICD-10-CM

## 2020-11-24 LAB — HBA1C MFR BLD: 7.1 % (ref 4.5–5.6)

## 2020-11-24 PROCEDURE — 84439 ASSAY OF FREE THYROXINE: CPT | Performed by: CLINICAL MEDICAL LABORATORY

## 2020-11-24 PROCEDURE — 84443 ASSAY THYROID STIM HORMONE: CPT | Performed by: CLINICAL MEDICAL LABORATORY

## 2020-11-24 PROCEDURE — 83036 HEMOGLOBIN GLYCOSYLATED A1C: CPT | Performed by: CLINICAL MEDICAL LABORATORY

## 2020-11-24 PROCEDURE — 80061 LIPID PANEL: CPT | Performed by: CLINICAL MEDICAL LABORATORY

## 2020-11-24 PROCEDURE — 36415 COLL VENOUS BLD VENIPUNCTURE: CPT | Performed by: INTERNAL MEDICINE

## 2020-11-25 LAB
CHOLEST SERPL-MCNC: 279 MG/DL
CHOLEST/HDLC SERPL: 6.2 {RATIO}
FASTING DURATION TIME PATIENT: 12 HOURS
HDLC SERPL-MCNC: 45 MG/DL
LDLC SERPL CALC-MCNC: 194 MG/DL
NONHDLC SERPL-MCNC: 234 MG/DL
T4 FREE SERPL-MCNC: 1.1 NG/DL (ref 0.8–1.5)
TRIGL SERPL-MCNC: 200 MG/DL
TSH SERPL-ACNC: 7.07 MCUNITS/ML (ref 0.35–5)

## 2020-12-01 ENCOUNTER — OFFICE VISIT (OUTPATIENT)
Dept: FAMILY MEDICINE | Age: 85
End: 2020-12-01

## 2020-12-01 VITALS
OXYGEN SATURATION: 98 % | BODY MASS INDEX: 29.77 KG/M2 | SYSTOLIC BLOOD PRESSURE: 138 MMHG | HEIGHT: 61 IN | HEART RATE: 66 BPM | WEIGHT: 157.7 LBS | DIASTOLIC BLOOD PRESSURE: 74 MMHG

## 2020-12-01 DIAGNOSIS — N18.30 CONTROLLED TYPE 2 DIABETES MELLITUS WITH STAGE 3 CHRONIC KIDNEY DISEASE, WITHOUT LONG-TERM CURRENT USE OF INSULIN (CMD): ICD-10-CM

## 2020-12-01 DIAGNOSIS — K59.1 FUNCTIONAL DIARRHEA: ICD-10-CM

## 2020-12-01 DIAGNOSIS — E11.22 CONTROLLED TYPE 2 DIABETES MELLITUS WITH STAGE 3 CHRONIC KIDNEY DISEASE, WITHOUT LONG-TERM CURRENT USE OF INSULIN (CMD): ICD-10-CM

## 2020-12-01 DIAGNOSIS — E03.8 SUBCLINICAL HYPOTHYROIDISM: ICD-10-CM

## 2020-12-01 DIAGNOSIS — Z00.00 MEDICARE ANNUAL WELLNESS VISIT, SUBSEQUENT: Primary | ICD-10-CM

## 2020-12-01 DIAGNOSIS — N18.31 STAGE 3A CHRONIC KIDNEY DISEASE (CMD): ICD-10-CM

## 2020-12-01 DIAGNOSIS — I48.91 ATRIAL FIBRILLATION, UNSPECIFIED TYPE (CMD): ICD-10-CM

## 2020-12-01 PROCEDURE — G0439 PPPS, SUBSEQ VISIT: HCPCS | Performed by: FAMILY MEDICINE

## 2020-12-01 RX ORDER — TRAMADOL HYDROCHLORIDE 50 MG/1
50 TABLET ORAL 2 TIMES DAILY PRN
COMMUNITY
End: 2020-12-01 | Stop reason: ALTCHOICE

## 2020-12-01 RX ORDER — AMLODIPINE BESYLATE 5 MG/1
5 TABLET ORAL DAILY
Status: ON HOLD | COMMUNITY
End: 2021-06-17 | Stop reason: HOSPADM

## 2020-12-01 RX ORDER — LEVOTHYROXINE SODIUM 0.03 MG/1
25 TABLET ORAL DAILY
Qty: 90 TABLET | Refills: 0 | Status: SHIPPED | OUTPATIENT
Start: 2020-12-01 | End: 2021-02-26

## 2020-12-01 RX ORDER — POTASSIUM CITRATE 10 MEQ/1
10 TABLET, EXTENDED RELEASE ORAL DAILY
Qty: 90 TABLET | Refills: 1 | Status: SHIPPED | OUTPATIENT
Start: 2020-12-01 | End: 2021-06-01 | Stop reason: SDUPTHER

## 2020-12-01 SDOH — HEALTH STABILITY: MENTAL HEALTH: HOW OFTEN DO YOU HAVE A DRINK CONTAINING ALCOHOL?: NEVER

## 2020-12-01 ASSESSMENT — PATIENT HEALTH QUESTIONNAIRE - PHQ9
SUM OF ALL RESPONSES TO PHQ9 QUESTIONS 1 AND 2: 0
1. LITTLE INTEREST OR PLEASURE IN DOING THINGS: SEVERAL DAYS
1. LITTLE INTEREST OR PLEASURE IN DOING THINGS: NOT AT ALL
CLINICAL INTERPRETATION OF PHQ9 SCORE: NO FURTHER SCREENING NEEDED
SUM OF ALL RESPONSES TO PHQ9 QUESTIONS 1 AND 2: 2
CLINICAL INTERPRETATION OF PHQ2 SCORE: NO FURTHER SCREENING NEEDED
SUM OF ALL RESPONSES TO PHQ9 QUESTIONS 1 AND 2: 2
CLINICAL INTERPRETATION OF PHQ2 SCORE: NO FURTHER SCREENING NEEDED
2. FEELING DOWN, DEPRESSED OR HOPELESS: NOT AT ALL
2. FEELING DOWN, DEPRESSED OR HOPELESS: SEVERAL DAYS

## 2020-12-16 ENCOUNTER — OFFICE VISIT (OUTPATIENT)
Dept: CARDIOLOGY | Age: 85
End: 2020-12-16

## 2020-12-16 VITALS
WEIGHT: 160.6 LBS | OXYGEN SATURATION: 97 % | HEIGHT: 61 IN | DIASTOLIC BLOOD PRESSURE: 56 MMHG | BODY MASS INDEX: 30.32 KG/M2 | HEART RATE: 52 BPM | SYSTOLIC BLOOD PRESSURE: 110 MMHG | RESPIRATION RATE: 12 BRPM

## 2020-12-16 DIAGNOSIS — I35.9 AORTIC VALVE DISEASE: Primary | ICD-10-CM

## 2020-12-16 DIAGNOSIS — I63.9 CEREBROVASCULAR ACCIDENT (CVA), UNSPECIFIED MECHANISM (CMD): ICD-10-CM

## 2020-12-16 DIAGNOSIS — I48.91 ATRIAL FIBRILLATION, UNSPECIFIED TYPE (CMD): ICD-10-CM

## 2020-12-16 PROCEDURE — 99214 OFFICE O/P EST MOD 30 MIN: CPT | Performed by: INTERNAL MEDICINE

## 2020-12-16 RX ORDER — UBIDECARENONE 100 MG
200 CAPSULE ORAL DAILY
Qty: 30 CAPSULE | Refills: 0 | Status: ON HOLD | COMMUNITY
Start: 2020-12-16 | End: 2021-06-14 | Stop reason: ALTCHOICE

## 2020-12-16 RX ORDER — ROSUVASTATIN CALCIUM 10 MG/1
10 TABLET, COATED ORAL DAILY
Qty: 90 TABLET | Refills: 1 | Status: SHIPPED | OUTPATIENT
Start: 2020-12-16 | End: 2021-06-18 | Stop reason: SDUPTHER

## 2020-12-22 ENCOUNTER — HOSPITAL ENCOUNTER (OUTPATIENT)
Dept: CV DIAGNOSTICS | Age: 85
Discharge: HOME | End: 2020-12-22
Attending: INTERNAL MEDICINE

## 2020-12-22 DIAGNOSIS — I35.9 AORTIC VALVE DISEASE: ICD-10-CM

## 2020-12-22 LAB
AORTIC VALVE AREA: 1.8 CM2
ASCENDING AORTA (AAD): 2.7 CM
AV MEAN GRADIENT (AVMG): 4.3 MMHG
AV PEAK GRADIENT (AVPG): 8 MMHG
AV PEAK VELOCITY (AVPV): 1.4 M/S
DOP CALC LVOT PEAK VEL (LVOTPV): 0.8 M/S
EST RIGHT VENT SYSTOLIC PRESSURE BY TRICUSPID REGURGITATION JET (RVSP): 27.3 MMHG
INTERVENTRICULAR SEPTUM IN END DIASTOLE (IVSD): 1.3 CM
LEFT INTERNAL DIMENSION IN SYSTOLE (LVSD): 2.9 CM
LEFT VENTRICULAR INTERNAL DIMENSION IN DIASTOLE (LVDD): 3.5 CM
LEFT VENTRICULAR POSTERIOR WALL IN END DIASTOLE (LVPW): 1.2 CM
LV EF: 75 %
LVOT VTI (LVOTVTI): 15.7 CM
MV E TISSUE VEL LAT (MELV): 12 CM/S
MV E TISSUE VEL MED (MESV): 8.7 CM/S
MV E WAVE VEL/E TISSUE VEL MED(MSR): 9.2
MV PEAK E VELOCITY (MVPEV): 0.8 M/S
RV TISSUE DOPPLER FREE WALL HEART RATE (RVSTV): 0.1 M/S
TRICUSPID VALVE PEAK REGURGITATION VELOCITY (TRPV): 2.2 M/S
TV ESTIMATED RIGHT ARTERIAL PRESSURE (RAP): 8 MMHG

## 2020-12-22 PROCEDURE — 93306 TTE W/DOPPLER COMPLETE: CPT | Performed by: INTERNAL MEDICINE

## 2020-12-22 PROCEDURE — 93307 TTE W/O DOPPLER COMPLETE: CPT

## 2020-12-24 ENCOUNTER — TELEPHONE (OUTPATIENT)
Dept: CARDIOLOGY | Age: 85
End: 2020-12-24

## 2020-12-29 ENCOUNTER — APPOINTMENT (OUTPATIENT)
Dept: CV DIAGNOSTICS | Age: 85
End: 2020-12-29
Attending: INTERNAL MEDICINE

## 2021-02-26 DIAGNOSIS — E03.8 SUBCLINICAL HYPOTHYROIDISM: ICD-10-CM

## 2021-02-26 RX ORDER — LEVOTHYROXINE SODIUM 25 MCG
TABLET ORAL
Qty: 90 TABLET | Refills: 1 | Status: ON HOLD | OUTPATIENT
Start: 2021-02-26 | End: 2021-06-14

## 2021-04-30 ENCOUNTER — NURSE TRIAGE (OUTPATIENT)
Dept: FAMILY MEDICINE | Age: 86
End: 2021-04-30

## 2021-05-11 ENCOUNTER — OFFICE VISIT (OUTPATIENT)
Dept: CARDIOLOGY | Age: 86
End: 2021-05-11

## 2021-05-11 VITALS
RESPIRATION RATE: 12 BRPM | HEART RATE: 84 BPM | DIASTOLIC BLOOD PRESSURE: 78 MMHG | WEIGHT: 157.19 LBS | BODY MASS INDEX: 29.68 KG/M2 | SYSTOLIC BLOOD PRESSURE: 130 MMHG | OXYGEN SATURATION: 98 % | HEIGHT: 61 IN

## 2021-05-11 DIAGNOSIS — I48.91 ATRIAL FIBRILLATION, UNSPECIFIED TYPE (CMD): Primary | ICD-10-CM

## 2021-05-11 PROCEDURE — 93000 ELECTROCARDIOGRAM COMPLETE: CPT | Performed by: INTERNAL MEDICINE

## 2021-05-11 PROCEDURE — 99214 OFFICE O/P EST MOD 30 MIN: CPT | Performed by: INTERNAL MEDICINE

## 2021-05-11 RX ORDER — FUROSEMIDE 40 MG/1
TABLET ORAL
Qty: 30 TABLET | Refills: 0 | Status: SHIPPED | OUTPATIENT
Start: 2021-05-11 | End: 2021-06-01 | Stop reason: ALTCHOICE

## 2021-05-12 LAB
ATRIAL RATE (BPM): 49
QRS-INTERVAL (MSEC): 88
QT-INTERVAL (MSEC): 386
QTC: 464
R AXIS (DEGREES): 41
REPORT TEXT: NORMAL
T AXIS (DEGREES): 60
VENTRICULAR RATE EKG/MIN (BPM): 87

## 2021-06-01 ENCOUNTER — OFFICE VISIT (OUTPATIENT)
Dept: FAMILY MEDICINE | Age: 86
End: 2021-06-01

## 2021-06-01 ENCOUNTER — IMAGING SERVICES (OUTPATIENT)
Dept: GENERAL RADIOLOGY | Age: 86
End: 2021-06-01
Attending: FAMILY MEDICINE

## 2021-06-01 VITALS
SYSTOLIC BLOOD PRESSURE: 126 MMHG | OXYGEN SATURATION: 97 % | DIASTOLIC BLOOD PRESSURE: 78 MMHG | HEART RATE: 134 BPM | WEIGHT: 162 LBS | TEMPERATURE: 97.7 F | BODY MASS INDEX: 30.61 KG/M2

## 2021-06-01 DIAGNOSIS — R60.0 PEDAL EDEMA: ICD-10-CM

## 2021-06-01 DIAGNOSIS — N18.30 CONTROLLED TYPE 2 DIABETES MELLITUS WITH STAGE 3 CHRONIC KIDNEY DISEASE, WITHOUT LONG-TERM CURRENT USE OF INSULIN (CMD): ICD-10-CM

## 2021-06-01 DIAGNOSIS — J01.91 ACUTE RECURRENT SINUSITIS, UNSPECIFIED LOCATION: ICD-10-CM

## 2021-06-01 DIAGNOSIS — I48.91 ATRIAL FIBRILLATION, UNSPECIFIED TYPE (CMD): Primary | ICD-10-CM

## 2021-06-01 DIAGNOSIS — Z00.00 HEALTH MAINTENANCE EXAMINATION: ICD-10-CM

## 2021-06-01 DIAGNOSIS — E11.22 CONTROLLED TYPE 2 DIABETES MELLITUS WITH STAGE 3 CHRONIC KIDNEY DISEASE, WITHOUT LONG-TERM CURRENT USE OF INSULIN (CMD): ICD-10-CM

## 2021-06-01 DIAGNOSIS — E03.8 SUBCLINICAL HYPOTHYROIDISM: ICD-10-CM

## 2021-06-01 PROCEDURE — 71046 X-RAY EXAM CHEST 2 VIEWS: CPT | Performed by: RADIOLOGY

## 2021-06-01 PROCEDURE — 99214 OFFICE O/P EST MOD 30 MIN: CPT | Performed by: FAMILY MEDICINE

## 2021-06-01 RX ORDER — CIPROFLOXACIN 500 MG/1
500 TABLET, FILM COATED ORAL 2 TIMES DAILY
COMMUNITY
Start: 2021-03-24 | End: 2021-06-08 | Stop reason: ALTCHOICE

## 2021-06-01 RX ORDER — FUROSEMIDE 20 MG/1
TABLET ORAL
Qty: 100 TABLET | Refills: 3 | Status: SHIPPED | OUTPATIENT
Start: 2021-06-01 | End: 2021-06-08 | Stop reason: DRUGHIGH

## 2021-06-01 RX ORDER — POTASSIUM CITRATE 10 MEQ/1
10 TABLET, EXTENDED RELEASE ORAL DAILY
Qty: 90 TABLET | Refills: 1 | Status: ON HOLD | OUTPATIENT
Start: 2021-06-01 | End: 2021-06-17 | Stop reason: HOSPADM

## 2021-06-01 RX ORDER — CODEINE PHOSPHATE AND GUAIFENESIN 10; 100 MG/5ML; MG/5ML
5-10 SOLUTION ORAL
COMMUNITY
Start: 2021-03-24 | End: 2021-06-08 | Stop reason: ALTCHOICE

## 2021-06-01 RX ORDER — AZITHROMYCIN 250 MG/1
TABLET, FILM COATED ORAL
Qty: 6 TABLET | Refills: 0 | Status: SHIPPED | OUTPATIENT
Start: 2021-06-01 | End: 2021-06-08 | Stop reason: ALTCHOICE

## 2021-06-01 RX ORDER — OXYBUTYNIN CHLORIDE 5 MG/1
TABLET ORAL
Status: ON HOLD | COMMUNITY
Start: 2021-03-24 | End: 2021-06-17 | Stop reason: HOSPADM

## 2021-06-02 ENCOUNTER — LAB SERVICES (OUTPATIENT)
Dept: LAB | Age: 86
End: 2021-06-02

## 2021-06-02 DIAGNOSIS — Z00.00 HEALTH MAINTENANCE EXAMINATION: ICD-10-CM

## 2021-06-02 DIAGNOSIS — I63.9 CEREBROVASCULAR ACCIDENT (CVA), UNSPECIFIED MECHANISM (CMD): ICD-10-CM

## 2021-06-02 LAB
ALBUMIN SERPL-MCNC: 3.8 G/DL (ref 3.6–5.1)
ALBUMIN/GLOB SERPL: 1.2 {RATIO} (ref 1–2.4)
ALP SERPL-CCNC: 54 UNITS/L (ref 45–117)
ALT SERPL-CCNC: 18 UNITS/L
ANION GAP SERPL CALC-SCNC: 18 MMOL/L (ref 10–20)
AST SERPL-CCNC: 18 UNITS/L
BILIRUB CONJ SERPL-MCNC: 0.2 MG/DL (ref 0–0.2)
BILIRUB SERPL-MCNC: 0.8 MG/DL (ref 0.2–1)
BUN SERPL-MCNC: 15 MG/DL (ref 6–20)
BUN/CREAT SERPL: 13 (ref 7–25)
CALCIUM SERPL-MCNC: 9.3 MG/DL (ref 8.4–10.2)
CHLORIDE SERPL-SCNC: 107 MMOL/L (ref 98–107)
CHOLEST SERPL-MCNC: 133 MG/DL
CHOLEST/HDLC SERPL: 2.6 {RATIO}
CO2 SERPL-SCNC: 26 MMOL/L (ref 21–32)
CREAT SERPL-MCNC: 1.13 MG/DL (ref 0.51–0.95)
FASTING DURATION TIME PATIENT: 12 HOURS
FASTING DURATION TIME PATIENT: 12 HOURS
GFR SERPLBLD BASED ON 1.73 SQ M-ARVRAT: 45 ML/MIN/1.73M2
GLOBULIN SER-MCNC: 3.1 G/DL (ref 2–4)
GLUCOSE SERPL-MCNC: 168 MG/DL (ref 65–99)
HBA1C MFR BLD: 7.6 % (ref 4.5–5.6)
HDLC SERPL-MCNC: 52 MG/DL
LDLC SERPL CALC-MCNC: 57 MG/DL
NONHDLC SERPL-MCNC: 81 MG/DL
POTASSIUM SERPL-SCNC: 3.8 MMOL/L (ref 3.4–5.1)
PROT SERPL-MCNC: 6.9 G/DL (ref 6.4–8.2)
SODIUM SERPL-SCNC: 147 MMOL/L (ref 135–145)
TRIGL SERPL-MCNC: 118 MG/DL
TSH SERPL-ACNC: 4.11 MCUNITS/ML (ref 0.35–5)

## 2021-06-02 PROCEDURE — 80061 LIPID PANEL: CPT | Performed by: CLINICAL MEDICAL LABORATORY

## 2021-06-02 PROCEDURE — 82248 BILIRUBIN DIRECT: CPT | Performed by: INTERNAL MEDICINE

## 2021-06-02 PROCEDURE — 84443 ASSAY THYROID STIM HORMONE: CPT | Performed by: CLINICAL MEDICAL LABORATORY

## 2021-06-02 PROCEDURE — 36415 COLL VENOUS BLD VENIPUNCTURE: CPT | Performed by: INTERNAL MEDICINE

## 2021-06-02 PROCEDURE — 80053 COMPREHEN METABOLIC PANEL: CPT | Performed by: INTERNAL MEDICINE

## 2021-06-02 PROCEDURE — 83036 HEMOGLOBIN GLYCOSYLATED A1C: CPT | Performed by: CLINICAL MEDICAL LABORATORY

## 2021-06-03 ENCOUNTER — TELEPHONE (OUTPATIENT)
Dept: CARDIOLOGY | Age: 86
End: 2021-06-03

## 2021-06-04 ENCOUNTER — ADVANCED DIRECTIVES (OUTPATIENT)
Dept: HEALTH INFORMATION MANAGEMENT | Age: 86
End: 2021-06-04

## 2021-06-04 ENCOUNTER — HOSPITAL ENCOUNTER (OUTPATIENT)
Dept: CV DIAGNOSTICS | Age: 86
Discharge: HOME | End: 2021-06-04
Attending: FAMILY MEDICINE

## 2021-06-04 ENCOUNTER — TELEPHONE (OUTPATIENT)
Dept: FAMILY MEDICINE | Age: 86
End: 2021-06-04

## 2021-06-04 DIAGNOSIS — N18.31 STAGE 3A CHRONIC KIDNEY DISEASE (CMD): ICD-10-CM

## 2021-06-04 DIAGNOSIS — I48.91 ATRIAL FIBRILLATION, UNSPECIFIED TYPE (CMD): ICD-10-CM

## 2021-06-04 DIAGNOSIS — E11.22 CONTROLLED TYPE 2 DIABETES MELLITUS WITH STAGE 3 CHRONIC KIDNEY DISEASE, WITHOUT LONG-TERM CURRENT USE OF INSULIN (CMD): Primary | ICD-10-CM

## 2021-06-04 DIAGNOSIS — N18.30 CONTROLLED TYPE 2 DIABETES MELLITUS WITH STAGE 3 CHRONIC KIDNEY DISEASE, WITHOUT LONG-TERM CURRENT USE OF INSULIN (CMD): Primary | ICD-10-CM

## 2021-06-04 PROCEDURE — 93225 XTRNL ECG REC<48 HRS REC: CPT

## 2021-06-06 DIAGNOSIS — E11.22 CONTROLLED TYPE 2 DIABETES MELLITUS WITH STAGE 3 CHRONIC KIDNEY DISEASE, WITHOUT LONG-TERM CURRENT USE OF INSULIN (CMD): ICD-10-CM

## 2021-06-06 DIAGNOSIS — N18.30 CONTROLLED TYPE 2 DIABETES MELLITUS WITH STAGE 3 CHRONIC KIDNEY DISEASE, WITHOUT LONG-TERM CURRENT USE OF INSULIN (CMD): ICD-10-CM

## 2021-06-07 RX ORDER — LINAGLIPTIN 5 MG/1
TABLET, FILM COATED ORAL
Qty: 90 TABLET | Refills: 0 | OUTPATIENT
Start: 2021-06-07

## 2021-06-08 ENCOUNTER — OFFICE VISIT (OUTPATIENT)
Dept: CARDIOLOGY | Age: 86
End: 2021-06-08

## 2021-06-08 VITALS
HEART RATE: 104 BPM | WEIGHT: 156.86 LBS | SYSTOLIC BLOOD PRESSURE: 110 MMHG | HEIGHT: 61 IN | OXYGEN SATURATION: 99 % | DIASTOLIC BLOOD PRESSURE: 66 MMHG | RESPIRATION RATE: 24 BRPM | BODY MASS INDEX: 29.61 KG/M2

## 2021-06-08 DIAGNOSIS — E11.22 CONTROLLED TYPE 2 DIABETES MELLITUS WITH STAGE 3 CHRONIC KIDNEY DISEASE, WITHOUT LONG-TERM CURRENT USE OF INSULIN (CMD): ICD-10-CM

## 2021-06-08 DIAGNOSIS — I48.91 ATRIAL FIBRILLATION, UNSPECIFIED TYPE (CMD): Primary | ICD-10-CM

## 2021-06-08 DIAGNOSIS — N18.30 CONTROLLED TYPE 2 DIABETES MELLITUS WITH STAGE 3 CHRONIC KIDNEY DISEASE, WITHOUT LONG-TERM CURRENT USE OF INSULIN (CMD): ICD-10-CM

## 2021-06-08 PROCEDURE — 99215 OFFICE O/P EST HI 40 MIN: CPT | Performed by: INTERNAL MEDICINE

## 2021-06-08 PROCEDURE — 93000 ELECTROCARDIOGRAM COMPLETE: CPT | Performed by: INTERNAL MEDICINE

## 2021-06-08 RX ORDER — METOPROLOL TARTRATE 50 MG/1
50 TABLET, FILM COATED ORAL 2 TIMES DAILY
Qty: 180 TABLET | Refills: 3 | Status: ON HOLD | OUTPATIENT
Start: 2021-06-08 | End: 2021-06-17 | Stop reason: HOSPADM

## 2021-06-08 RX ORDER — FUROSEMIDE 20 MG/1
20 TABLET ORAL DAILY
Qty: 92 TABLET | Refills: 3 | Status: ON HOLD | OUTPATIENT
Start: 2021-06-08 | End: 2021-06-17 | Stop reason: HOSPADM

## 2021-06-08 RX ORDER — ATENOLOL 50 MG/1
50 TABLET ORAL DAILY
Qty: 90 TABLET | Refills: 3 | Status: SHIPPED | OUTPATIENT
Start: 2021-06-08 | End: 2021-06-08 | Stop reason: ALTCHOICE

## 2021-06-09 LAB
ATRIAL RATE (BPM): 67
QRS-INTERVAL (MSEC): 82
QT-INTERVAL (MSEC): 360
QTC: 509
R AXIS (DEGREES): 71
REPORT TEXT: NORMAL
T AXIS (DEGREES): 98
VENTRICULAR RATE EKG/MIN (BPM): 120

## 2021-06-14 ENCOUNTER — HOSPITAL ENCOUNTER (INPATIENT)
Age: 86
LOS: 2 days | Discharge: HOME | DRG: 308 | End: 2021-06-17
Attending: EMERGENCY MEDICINE | Admitting: HOSPITALIST

## 2021-06-14 ENCOUNTER — APPOINTMENT (OUTPATIENT)
Dept: GENERAL RADIOLOGY | Age: 86
DRG: 308 | End: 2021-06-14
Attending: EMERGENCY MEDICINE

## 2021-06-14 ENCOUNTER — APPOINTMENT (OUTPATIENT)
Dept: CV DIAGNOSTICS | Age: 86
DRG: 308 | End: 2021-06-14
Attending: HOSPITALIST

## 2021-06-14 DIAGNOSIS — Z79.01 ANTICOAGULANT LONG-TERM USE: ICD-10-CM

## 2021-06-14 DIAGNOSIS — I50.33 ACUTE ON CHRONIC DIASTOLIC HEART FAILURE (CMD): ICD-10-CM

## 2021-06-14 DIAGNOSIS — I48.91 ATRIAL FIBRILLATION, UNSPECIFIED TYPE (CMD): ICD-10-CM

## 2021-06-14 DIAGNOSIS — E83.42 HYPOMAGNESEMIA: ICD-10-CM

## 2021-06-14 DIAGNOSIS — J81.0 ACUTE PULMONARY EDEMA (CMD): ICD-10-CM

## 2021-06-14 DIAGNOSIS — I48.91 ATRIAL FIBRILLATION WITH RAPID VENTRICULAR RESPONSE (CMD): Primary | ICD-10-CM

## 2021-06-14 LAB
ALBUMIN SERPL-MCNC: 3.8 G/DL (ref 3.6–5.1)
ALBUMIN/GLOB SERPL: 1 {RATIO} (ref 1–2.4)
ALP SERPL-CCNC: 58 UNITS/L (ref 45–117)
ALT SERPL-CCNC: 16 UNITS/L
ANION GAP SERPL CALC-SCNC: 14 MMOL/L (ref 10–20)
APPEARANCE UR: CLEAR
AST SERPL-CCNC: 18 UNITS/L
BACTERIA #/AREA URNS HPF: ABNORMAL /HPF
BASOPHILS # BLD: 0 K/MCL (ref 0–0.3)
BASOPHILS NFR BLD: 1 %
BILIRUB SERPL-MCNC: 1.1 MG/DL (ref 0.2–1)
BILIRUB UR QL STRIP: NEGATIVE
BUN SERPL-MCNC: 11 MG/DL (ref 6–20)
BUN/CREAT SERPL: 11 (ref 7–25)
CALCIUM SERPL-MCNC: 9.5 MG/DL (ref 8.4–10.2)
CHLORIDE SERPL-SCNC: 104 MMOL/L (ref 98–107)
CO2 SERPL-SCNC: 30 MMOL/L (ref 21–32)
COLOR UR: YELLOW
CREAT SERPL-MCNC: 0.98 MG/DL (ref 0.51–0.95)
CREAT SERPL-MCNC: 1 MG/DL (ref 0.51–0.95)
D DIMER PPP FEU-MCNC: 0.48 MG/L (FEU)
DEPRECATED RDW RBC: 44.8 FL (ref 39–50)
EOSINOPHIL # BLD: 0.2 K/MCL (ref 0–0.5)
EOSINOPHIL NFR BLD: 3 %
ERYTHROCYTE [DISTWIDTH] IN BLOOD: 14.1 % (ref 11–15)
FASTING DURATION TIME PATIENT: ABNORMAL H
GFR SERPLBLD BASED ON 1.73 SQ M-ARVRAT: 52 ML/MIN/1.73M2
GFR SERPLBLD BASED ON 1.73 SQ M-ARVRAT: 53 ML/MIN/1.73M2
GLOBULIN SER-MCNC: 3.7 G/DL (ref 2–4)
GLUCOSE BLDC GLUCOMTR-MCNC: 118 MG/DL (ref 70–99)
GLUCOSE BLDC GLUCOMTR-MCNC: 128 MG/DL (ref 70–99)
GLUCOSE BLDC GLUCOMTR-MCNC: 144 MG/DL (ref 70–99)
GLUCOSE SERPL-MCNC: 152 MG/DL (ref 65–99)
GLUCOSE UR STRIP-MCNC: NEGATIVE MG/DL
HCT VFR BLD CALC: 42.5 % (ref 36–46.5)
HGB BLD-MCNC: 13.1 G/DL (ref 12–15.5)
HGB UR QL STRIP: NEGATIVE
HYALINE CASTS #/AREA URNS LPF: ABNORMAL /LPF
IMM GRANULOCYTES # BLD AUTO: 0 K/MCL (ref 0–0.2)
IMM GRANULOCYTES # BLD: 0 %
KETONES UR STRIP-MCNC: NEGATIVE MG/DL
LEUKOCYTE ESTERASE UR QL STRIP: ABNORMAL
LYMPHOCYTES # BLD: 2.5 K/MCL (ref 1–4)
LYMPHOCYTES NFR BLD: 31 %
MAGNESIUM SERPL-MCNC: 1.8 MG/DL (ref 1.7–2.4)
MCH RBC QN AUTO: 26.7 PG (ref 26–34)
MCHC RBC AUTO-ENTMCNC: 30.8 G/DL (ref 32–36.5)
MCV RBC AUTO: 86.7 FL (ref 78–100)
MONOCYTES # BLD: 0.6 K/MCL (ref 0.3–0.9)
MONOCYTES NFR BLD: 7 %
NEUTROPHILS # BLD: 4.6 K/MCL (ref 1.8–7.7)
NEUTROPHILS NFR BLD: 58 %
NITRITE UR QL STRIP: NEGATIVE
NRBC BLD MANUAL-RTO: 0 /100 WBC
NT-PROBNP SERPL-MCNC: 2319 PG/ML
PH UR STRIP: 7 [PH] (ref 5–7)
PLATELET # BLD AUTO: 301 K/MCL (ref 140–450)
POTASSIUM SERPL-SCNC: 3.4 MMOL/L (ref 3.4–5.1)
PROT SERPL-MCNC: 7.5 G/DL (ref 6.4–8.2)
PROT UR STRIP-MCNC: NEGATIVE MG/DL
RAINBOW EXTRA TUBES HOLD SPECIMEN: NORMAL
RBC # BLD: 4.9 MIL/MCL (ref 4–5.2)
RBC #/AREA URNS HPF: ABNORMAL /HPF
SODIUM SERPL-SCNC: 145 MMOL/L (ref 135–145)
SP GR UR STRIP: 1.01 (ref 1–1.03)
SQUAMOUS #/AREA URNS HPF: ABNORMAL /HPF
TROPONIN I SERPL HS-MCNC: 0.03 NG/ML
UROBILINOGEN UR STRIP-MCNC: 0.2 MG/DL
WBC # BLD: 7.9 K/MCL (ref 4.2–11)
WBC #/AREA URNS HPF: ABNORMAL /HPF

## 2021-06-14 PROCEDURE — 84484 ASSAY OF TROPONIN QUANT: CPT | Performed by: EMERGENCY MEDICINE

## 2021-06-14 PROCEDURE — 10002801 HB RX 250 W/O HCPCS: Performed by: HOSPITALIST

## 2021-06-14 PROCEDURE — 99220 INITIAL OBSERVATION CARE,LEVL III: CPT | Performed by: INTERNAL MEDICINE

## 2021-06-14 PROCEDURE — 82565 ASSAY OF CREATININE: CPT

## 2021-06-14 PROCEDURE — 99220 INITIAL OBSERVATION CARE,LEVL III: CPT | Performed by: HOSPITALIST

## 2021-06-14 PROCEDURE — 93005 ELECTROCARDIOGRAM TRACING: CPT | Performed by: EMERGENCY MEDICINE

## 2021-06-14 PROCEDURE — 85379 FIBRIN DEGRADATION QUANT: CPT | Performed by: EMERGENCY MEDICINE

## 2021-06-14 PROCEDURE — G0378 HOSPITAL OBSERVATION PER HR: HCPCS

## 2021-06-14 PROCEDURE — 81001 URINALYSIS AUTO W/SCOPE: CPT | Performed by: EMERGENCY MEDICINE

## 2021-06-14 PROCEDURE — 10002803 HB RX 637: Performed by: HOSPITALIST

## 2021-06-14 PROCEDURE — 10002803 HB RX 637: Performed by: INTERNAL MEDICINE

## 2021-06-14 PROCEDURE — 10002800 HB RX 250 W HCPCS: Performed by: HOSPITALIST

## 2021-06-14 PROCEDURE — 83735 ASSAY OF MAGNESIUM: CPT | Performed by: EMERGENCY MEDICINE

## 2021-06-14 PROCEDURE — 10002807 HB RX 258: Performed by: INTERNAL MEDICINE

## 2021-06-14 PROCEDURE — 96366 THER/PROPH/DIAG IV INF ADDON: CPT

## 2021-06-14 PROCEDURE — 93306 TTE W/DOPPLER COMPLETE: CPT | Performed by: INTERNAL MEDICINE

## 2021-06-14 PROCEDURE — 80053 COMPREHEN METABOLIC PANEL: CPT | Performed by: EMERGENCY MEDICINE

## 2021-06-14 PROCEDURE — 71045 X-RAY EXAM CHEST 1 VIEW: CPT | Performed by: RADIOLOGY

## 2021-06-14 PROCEDURE — 96376 TX/PRO/DX INJ SAME DRUG ADON: CPT

## 2021-06-14 PROCEDURE — 71045 X-RAY EXAM CHEST 1 VIEW: CPT

## 2021-06-14 PROCEDURE — 87086 URINE CULTURE/COLONY COUNT: CPT | Performed by: EMERGENCY MEDICINE

## 2021-06-14 PROCEDURE — 83880 ASSAY OF NATRIURETIC PEPTIDE: CPT | Performed by: EMERGENCY MEDICINE

## 2021-06-14 PROCEDURE — 93307 TTE W/O DOPPLER COMPLETE: CPT

## 2021-06-14 PROCEDURE — 96365 THER/PROPH/DIAG IV INF INIT: CPT

## 2021-06-14 PROCEDURE — 96375 TX/PRO/DX INJ NEW DRUG ADDON: CPT

## 2021-06-14 PROCEDURE — 10002800 HB RX 250 W HCPCS: Performed by: EMERGENCY MEDICINE

## 2021-06-14 PROCEDURE — 10002801 HB RX 250 W/O HCPCS: Performed by: EMERGENCY MEDICINE

## 2021-06-14 PROCEDURE — 85025 COMPLETE CBC W/AUTO DIFF WBC: CPT | Performed by: EMERGENCY MEDICINE

## 2021-06-14 PROCEDURE — 10002801 HB RX 250 W/O HCPCS

## 2021-06-14 PROCEDURE — 99291 CRITICAL CARE FIRST HOUR: CPT | Performed by: EMERGENCY MEDICINE

## 2021-06-14 PROCEDURE — 10002800 HB RX 250 W HCPCS: Performed by: INTERNAL MEDICINE

## 2021-06-14 PROCEDURE — 99285 EMERGENCY DEPT VISIT HI MDM: CPT

## 2021-06-14 PROCEDURE — 82962 GLUCOSE BLOOD TEST: CPT

## 2021-06-14 RX ORDER — HUMAN INSULIN 100 [IU]/ML
INJECTION, SOLUTION SUBCUTANEOUS
Status: DISCONTINUED | OUTPATIENT
Start: 2021-06-14 | End: 2021-06-17 | Stop reason: HOSPADM

## 2021-06-14 RX ORDER — ONDANSETRON 2 MG/ML
4 INJECTION INTRAMUSCULAR; INTRAVENOUS ONCE
Status: COMPLETED | OUTPATIENT
Start: 2021-06-14 | End: 2021-06-14

## 2021-06-14 RX ORDER — 0.9 % SODIUM CHLORIDE 0.9 %
2 VIAL (ML) INJECTION EVERY 12 HOURS SCHEDULED
Status: DISCONTINUED | OUTPATIENT
Start: 2021-06-14 | End: 2021-06-14

## 2021-06-14 RX ORDER — ROSUVASTATIN CALCIUM 10 MG/1
10 TABLET, COATED ORAL DAILY
Status: DISCONTINUED | OUTPATIENT
Start: 2021-06-14 | End: 2021-06-17 | Stop reason: HOSPADM

## 2021-06-14 RX ORDER — LORAZEPAM 2 MG/ML
0.5 INJECTION INTRAMUSCULAR ONCE
Status: COMPLETED | OUTPATIENT
Start: 2021-06-14 | End: 2021-06-14

## 2021-06-14 RX ORDER — ACETAMINOPHEN 500 MG
1000 TABLET ORAL DAILY PRN
COMMUNITY

## 2021-06-14 RX ORDER — METOPROLOL TARTRATE 1 MG/ML
5 INJECTION, SOLUTION INTRAVENOUS ONCE
Status: COMPLETED | OUTPATIENT
Start: 2021-06-14 | End: 2021-06-14

## 2021-06-14 RX ORDER — ONDANSETRON 2 MG/ML
4 INJECTION INTRAMUSCULAR; INTRAVENOUS EVERY 6 HOURS PRN
Status: DISCONTINUED | OUTPATIENT
Start: 2021-06-14 | End: 2021-06-17 | Stop reason: HOSPADM

## 2021-06-14 RX ORDER — ADENOSINE 3 MG/ML
INJECTION, SOLUTION INTRAVENOUS
Status: DISCONTINUED
Start: 2021-06-14 | End: 2021-06-14 | Stop reason: WASHOUT

## 2021-06-14 RX ORDER — LEVOTHYROXINE SODIUM 0.03 MG/1
25 TABLET ORAL DAILY
Status: DISCONTINUED | OUTPATIENT
Start: 2021-06-14 | End: 2021-06-17 | Stop reason: HOSPADM

## 2021-06-14 RX ORDER — DEXTROSE MONOHYDRATE 25 G/50ML
12.5 INJECTION, SOLUTION INTRAVENOUS PRN
Status: DISCONTINUED | OUTPATIENT
Start: 2021-06-14 | End: 2021-06-17 | Stop reason: HOSPADM

## 2021-06-14 RX ORDER — FLUTICASONE PROPIONATE 50 MCG
1 SPRAY, SUSPENSION (ML) NASAL DAILY PRN
COMMUNITY
End: 2023-09-21

## 2021-06-14 RX ORDER — PANTOPRAZOLE SODIUM 40 MG/1
40 TABLET, DELAYED RELEASE ORAL 2 TIMES DAILY
Status: DISCONTINUED | OUTPATIENT
Start: 2021-06-14 | End: 2021-06-17 | Stop reason: HOSPADM

## 2021-06-14 RX ORDER — NICOTINE POLACRILEX 4 MG
30 LOZENGE BUCCAL PRN
Status: DISCONTINUED | OUTPATIENT
Start: 2021-06-14 | End: 2021-06-17 | Stop reason: HOSPADM

## 2021-06-14 RX ORDER — ACETAMINOPHEN 325 MG/1
650 TABLET ORAL EVERY 4 HOURS PRN
Status: DISCONTINUED | OUTPATIENT
Start: 2021-06-14 | End: 2021-06-17 | Stop reason: HOSPADM

## 2021-06-14 RX ORDER — DILTIAZEM HYDROCHLORIDE 5 MG/ML
INJECTION INTRAVENOUS
Status: COMPLETED
Start: 2021-06-14 | End: 2021-06-14

## 2021-06-14 RX ORDER — FLUTICASONE PROPIONATE 50 MCG
1 SPRAY, SUSPENSION (ML) NASAL DAILY
Status: DISCONTINUED | OUTPATIENT
Start: 2021-06-14 | End: 2021-06-14

## 2021-06-14 RX ORDER — DEXTROSE MONOHYDRATE 25 G/50ML
25 INJECTION, SOLUTION INTRAVENOUS PRN
Status: DISCONTINUED | OUTPATIENT
Start: 2021-06-14 | End: 2021-06-17 | Stop reason: HOSPADM

## 2021-06-14 RX ORDER — FUROSEMIDE 10 MG/ML
40 INJECTION INTRAMUSCULAR; INTRAVENOUS
Status: DISCONTINUED | OUTPATIENT
Start: 2021-06-14 | End: 2021-06-14

## 2021-06-14 RX ORDER — NICOTINE POLACRILEX 4 MG
15 LOZENGE BUCCAL PRN
Status: DISCONTINUED | OUTPATIENT
Start: 2021-06-14 | End: 2021-06-17 | Stop reason: HOSPADM

## 2021-06-14 RX ORDER — FUROSEMIDE 10 MG/ML
40 INJECTION INTRAMUSCULAR; INTRAVENOUS ONCE
Status: COMPLETED | OUTPATIENT
Start: 2021-06-14 | End: 2021-06-14

## 2021-06-14 RX ORDER — LEVOTHYROXINE SODIUM 0.03 MG/1
50 TABLET ORAL DAILY
COMMUNITY
End: 2021-11-16

## 2021-06-14 RX ADMIN — PANTOPRAZOLE SODIUM 40 MG: 40 TABLET, DELAYED RELEASE ORAL at 13:02

## 2021-06-14 RX ADMIN — ONDANSETRON 4 MG: 2 INJECTION INTRAMUSCULAR; INTRAVENOUS at 07:41

## 2021-06-14 RX ADMIN — PANTOPRAZOLE SODIUM 40 MG: 40 TABLET, DELAYED RELEASE ORAL at 20:59

## 2021-06-14 RX ADMIN — Medication 10 MG/HR: at 07:20

## 2021-06-14 RX ADMIN — METOROPROLOL TARTRATE 5 MG: 5 INJECTION, SOLUTION INTRAVENOUS at 07:40

## 2021-06-14 RX ADMIN — FUROSEMIDE 5 MG/HR: 10 INJECTION, SOLUTION INTRAMUSCULAR; INTRAVENOUS at 16:43

## 2021-06-14 RX ADMIN — LEVOTHYROXINE SODIUM 25 MCG: 0.03 TABLET ORAL at 13:02

## 2021-06-14 RX ADMIN — DILTIAZEM HYDROCHLORIDE 60 MG: 30 TABLET, FILM COATED ORAL at 21:00

## 2021-06-14 RX ADMIN — ROSUVASTATIN CALCIUM 10 MG: 10 TABLET, FILM COATED ORAL at 20:59

## 2021-06-14 RX ADMIN — DILTIAZEM HYDROCHLORIDE 18 MG: 5 INJECTION INTRAVENOUS at 07:21

## 2021-06-14 RX ADMIN — FUROSEMIDE 40 MG: 10 INJECTION, SOLUTION INTRAVENOUS at 08:31

## 2021-06-14 RX ADMIN — DILTIAZEM HYDROCHLORIDE 60 MG: 30 TABLET, FILM COATED ORAL at 15:56

## 2021-06-14 RX ADMIN — APIXABAN 5 MG: 5 TABLET, FILM COATED ORAL at 20:58

## 2021-06-14 RX ADMIN — LORAZEPAM 0.5 MG: 2 INJECTION INTRAMUSCULAR; INTRAVENOUS at 07:43

## 2021-06-14 RX ADMIN — FUROSEMIDE 40 MG: 10 INJECTION, SOLUTION INTRAVENOUS at 12:14

## 2021-06-14 ASSESSMENT — PATIENT HEALTH QUESTIONNAIRE - PHQ9
CLINICAL INTERPRETATION OF PHQ9 SCORE: NO FURTHER SCREENING NEEDED
CLINICAL INTERPRETATION OF PHQ2 SCORE: NO FURTHER SCREENING NEEDED
SUM OF ALL RESPONSES TO PHQ9 QUESTIONS 1 AND 2: 0
1. LITTLE INTEREST OR PLEASURE IN DOING THINGS: NOT AT ALL
IS PATIENT ABLE TO COMPLETE PHQ2 OR PHQ9: YES
SUM OF ALL RESPONSES TO PHQ9 QUESTIONS 1 AND 2: 0
2. FEELING DOWN, DEPRESSED OR HOPELESS: NOT AT ALL

## 2021-06-14 ASSESSMENT — COGNITIVE AND FUNCTIONAL STATUS - GENERAL
DO YOU HAVE SERIOUS DIFFICULTY WALKING OR CLIMBING STAIRS: NO
ARE YOU DEAF OR DO YOU HAVE SERIOUS DIFFICULTY  HEARING: NO
ARE YOU BLIND OR DO YOU HAVE SERIOUS DIFFICULTY SEEING, EVEN WHEN WEARING GLASSES: NO
DO YOU HAVE DIFFICULTY DRESSING OR BATHING: NO
BECAUSE OF A PHYSICAL, MENTAL, OR EMOTIONAL CONDITION, DO YOU HAVE SERIOUS DIFFICULTY CONCENTRATING, REMEMBERING OR MAKING DECISIONS: NO
BECAUSE OF A PHYSICAL, MENTAL, OR EMOTIONAL CONDITION, DO YOU HAVE DIFFICULTY DOING ERRANDS ALONE: NO

## 2021-06-14 ASSESSMENT — ENCOUNTER SYMPTOMS
HEADACHES: 1
COUGH: 1
VOMITING: 0
DIARRHEA: 0
ABDOMINAL PAIN: 0
FATIGUE: 0
ADENOPATHY: 0
SYNCOPE: 0
CHILLS: 0
DIZZINESS: 1
WEAKNESS: 1
SPUTUM PRODUCTION: 0
DIAPHORESIS: 0
NAUSEA: 1
SORE THROAT: 0
EYE PAIN: 0
BACK PAIN: 0
SHORTNESS OF BREATH: 1
FEVER: 0

## 2021-06-14 ASSESSMENT — LIFESTYLE VARIABLES
HOW OFTEN DO YOU HAVE 6 OR MORE DRINKS ON ONE OCCASION: NEVER
HOW MANY STANDARD DRINKS CONTAINING ALCOHOL DO YOU HAVE ON A TYPICAL DAY: 0,1 OR 2
HOW OFTEN DO YOU HAVE A DRINK CONTAINING ALCOHOL: 2 TO 4 TIMES PER MONTH
AUDIT-C TOTAL SCORE: 2
ALCOHOL_USE_STATUS: NO OR LOW RISK WITH VALIDATED TOOL

## 2021-06-14 ASSESSMENT — ACTIVITIES OF DAILY LIVING (ADL)
RECENT_DECLINE_ADL: NO
CHRONIC_PAIN_PRESENT: NO
ADL_SHORT_OF_BREATH: NO
ADL_SCORE: 12
ADL_BEFORE_ADMISSION: INDEPENDENT

## 2021-06-14 ASSESSMENT — PAIN DESCRIPTION - PAIN TYPE: TYPE: CHEST PAIN

## 2021-06-14 ASSESSMENT — PAIN SCALES - GENERAL
PAINLEVEL_OUTOF10: 0
PAINLEVEL_OUTOF10: 5
PAINLEVEL_OUTOF10: 0

## 2021-06-14 ASSESSMENT — COLUMBIA-SUICIDE SEVERITY RATING SCALE - C-SSRS
1. WITHIN THE PAST MONTH, HAVE YOU WISHED YOU WERE DEAD OR WISHED YOU COULD GO TO SLEEP AND NOT WAKE UP?: NO
6. HAVE YOU EVER DONE ANYTHING, STARTED TO DO ANYTHING, OR PREPARED TO DO ANYTHING TO END YOUR LIFE?: NO
2. HAVE YOU ACTUALLY HAD ANY THOUGHTS OF KILLING YOURSELF?: NO
IS THE PATIENT ABLE TO COMPLETE C-SSRS: YES

## 2021-06-15 LAB
ANION GAP SERPL CALC-SCNC: 11 MMOL/L (ref 10–20)
AORTIC VALVE AREA: 1.5 CM2
ATRIAL RATE (BPM): 170
AV MEAN GRADIENT (AVMG): 5.3 MMHG
AV PEAK GRADIENT (AVPG): 8.6 MMHG
AV PEAK VELOCITY (AVPV): 1.5 M/S
BACTERIA UR CULT: NORMAL
BUN SERPL-MCNC: 14 MG/DL (ref 6–20)
BUN/CREAT SERPL: 12 (ref 7–25)
CALCIUM SERPL-MCNC: 8.5 MG/DL (ref 8.4–10.2)
CHLORIDE SERPL-SCNC: 103 MMOL/L (ref 98–107)
CO2 SERPL-SCNC: 31 MMOL/L (ref 21–32)
CREAT SERPL-MCNC: 1.2 MG/DL (ref 0.51–0.95)
DOP CALC LVOT PEAK VEL (LVOTPV): 0.8 M/S
EST RIGHT VENT SYSTOLIC PRESSURE BY TRICUSPID REGURGITATION JET (RVSP): 31.7 MMHG
FASTING DURATION TIME PATIENT: ABNORMAL H
GFR SERPLBLD BASED ON 1.73 SQ M-ARVRAT: 42 ML/MIN/1.73M2
GLUCOSE BLDC GLUCOMTR-MCNC: 132 MG/DL (ref 70–99)
GLUCOSE BLDC GLUCOMTR-MCNC: 138 MG/DL (ref 70–99)
GLUCOSE BLDC GLUCOMTR-MCNC: 142 MG/DL (ref 70–99)
GLUCOSE BLDC GLUCOMTR-MCNC: 186 MG/DL (ref 70–99)
GLUCOSE SERPL-MCNC: 146 MG/DL (ref 65–99)
INTERVENTRICULAR SEPTUM IN END DIASTOLE (IVSD): 1 CM
LEFT INTERNAL DIMENSION IN SYSTOLE (LVSD): 3.6 CM
LEFT VENTRICULAR INTERNAL DIMENSION IN DIASTOLE (LVDD): 4.5 CM
LEFT VENTRICULAR POSTERIOR WALL IN END DIASTOLE (LVPW): 1.2 CM
LV EF: 45 %
LVOT VTI (LVOTVTI): 17.4 CM
MAGNESIUM SERPL-MCNC: 1.5 MG/DL (ref 1.7–2.4)
MV E TISSUE VEL LAT (MELV): 11.9 CM/S
MV E TISSUE VEL MED (MESV): 9 CM/S
MV E WAVE VEL/E TISSUE VEL MED(MSR): 14.2
MV PEAK E VELOCITY (MVPEV): 1.3 M/S
POTASSIUM SERPL-SCNC: 3.1 MMOL/L (ref 3.4–5.1)
PV PEAK VELOCITY (PVPV): 0.6 M/S
QRS-INTERVAL (MSEC): 74
QT-INTERVAL (MSEC): 290
QTC: 469
R AXIS (DEGREES): 44
RAINBOW EXTRA TUBES HOLD SPECIMEN: NORMAL
REPORT TEXT: NORMAL
RV TISSUE DOPPLER FREE WALL HEART RATE (RVSTV): 0.1 M/S
SODIUM SERPL-SCNC: 142 MMOL/L (ref 135–145)
T AXIS (DEGREES): -118
TRICUSPID VALVE PEAK REGURGITATION VELOCITY (TRPV): 2.4 M/S
TV ESTIMATED RIGHT ARTERIAL PRESSURE (RAP): 8 MMHG
VENTRICULAR RATE EKG/MIN (BPM): 157

## 2021-06-15 PROCEDURE — 96372 THER/PROPH/DIAG INJ SC/IM: CPT | Performed by: HOSPITALIST

## 2021-06-15 PROCEDURE — 99232 SBSQ HOSP IP/OBS MODERATE 35: CPT | Performed by: HOSPITALIST

## 2021-06-15 PROCEDURE — 10002801 HB RX 250 W/O HCPCS: Performed by: HOSPITALIST

## 2021-06-15 PROCEDURE — 10002807 HB RX 258: Performed by: INTERNAL MEDICINE

## 2021-06-15 PROCEDURE — 83735 ASSAY OF MAGNESIUM: CPT | Performed by: HOSPITALIST

## 2021-06-15 PROCEDURE — 10002800 HB RX 250 W HCPCS: Performed by: HOSPITALIST

## 2021-06-15 PROCEDURE — 10002803 HB RX 637: Performed by: INTERNAL MEDICINE

## 2021-06-15 PROCEDURE — 10000008 HB ROOM CHARGE ICU OR CCU

## 2021-06-15 PROCEDURE — 10002800 HB RX 250 W HCPCS: Performed by: INTERNAL MEDICINE

## 2021-06-15 PROCEDURE — 80048 BASIC METABOLIC PNL TOTAL CA: CPT | Performed by: HOSPITALIST

## 2021-06-15 PROCEDURE — G0378 HOSPITAL OBSERVATION PER HR: HCPCS

## 2021-06-15 PROCEDURE — 10002803 HB RX 637: Performed by: HOSPITALIST

## 2021-06-15 PROCEDURE — 99233 SBSQ HOSP IP/OBS HIGH 50: CPT | Performed by: INTERNAL MEDICINE

## 2021-06-15 PROCEDURE — 82962 GLUCOSE BLOOD TEST: CPT

## 2021-06-15 RX ORDER — POTASSIUM CHLORIDE 20 MEQ/1
40 TABLET, EXTENDED RELEASE ORAL 2 TIMES DAILY WITH MEALS
Status: COMPLETED | OUTPATIENT
Start: 2021-06-15 | End: 2021-06-15

## 2021-06-15 RX ORDER — ATENOLOL 25 MG/1
12.5 TABLET ORAL EVERY 12 HOURS SCHEDULED
Status: DISCONTINUED | OUTPATIENT
Start: 2021-06-15 | End: 2021-06-17 | Stop reason: HOSPADM

## 2021-06-15 RX ADMIN — MAGNESIUM SULFATE HEPTAHYDRATE 2 G: 40 INJECTION, SOLUTION INTRAVENOUS at 09:18

## 2021-06-15 RX ADMIN — POTASSIUM CHLORIDE 40 MEQ: 1500 TABLET, EXTENDED RELEASE ORAL at 17:22

## 2021-06-15 RX ADMIN — APIXABAN 5 MG: 5 TABLET, FILM COATED ORAL at 20:51

## 2021-06-15 RX ADMIN — ROSUVASTATIN CALCIUM 10 MG: 10 TABLET, FILM COATED ORAL at 20:51

## 2021-06-15 RX ADMIN — ATENOLOL 12.5 MG: 25 TABLET ORAL at 20:51

## 2021-06-15 RX ADMIN — LEVOTHYROXINE SODIUM 25 MCG: 0.03 TABLET ORAL at 06:05

## 2021-06-15 RX ADMIN — INSULIN HUMAN 2 UNITS: 100 INJECTION, SOLUTION PARENTERAL at 17:21

## 2021-06-15 RX ADMIN — PANTOPRAZOLE SODIUM 40 MG: 40 TABLET, DELAYED RELEASE ORAL at 20:51

## 2021-06-15 RX ADMIN — APIXABAN 5 MG: 5 TABLET, FILM COATED ORAL at 08:26

## 2021-06-15 RX ADMIN — POTASSIUM CHLORIDE 40 MEQ: 1500 TABLET, EXTENDED RELEASE ORAL at 09:18

## 2021-06-15 RX ADMIN — AMIODARONE HYDROCHLORIDE 1 MG/MIN: 50 INJECTION, SOLUTION INTRAVENOUS at 10:08

## 2021-06-15 RX ADMIN — AMIODARONE HYDROCHLORIDE 150 MG: 1.5 INJECTION, SOLUTION INTRAVENOUS at 01:40

## 2021-06-15 RX ADMIN — AMIODARONE HYDROCHLORIDE 1 MG/MIN: 50 INJECTION, SOLUTION INTRAVENOUS at 18:30

## 2021-06-15 RX ADMIN — PANTOPRAZOLE SODIUM 40 MG: 40 TABLET, DELAYED RELEASE ORAL at 08:26

## 2021-06-15 RX ADMIN — FUROSEMIDE 5 MG/HR: 10 INJECTION, SOLUTION INTRAMUSCULAR; INTRAVENOUS at 17:23

## 2021-06-15 RX ADMIN — AMIODARONE HYDROCHLORIDE 1 MG/MIN: 50 INJECTION, SOLUTION INTRAVENOUS at 01:52

## 2021-06-15 RX ADMIN — ATENOLOL 12.5 MG: 25 TABLET ORAL at 10:19

## 2021-06-15 ASSESSMENT — PAIN SCALES - GENERAL
PAINLEVEL_OUTOF10: 0

## 2021-06-16 ENCOUNTER — IMAGING SERVICES (OUTPATIENT)
Dept: CARDIOLOGY | Age: 86
End: 2021-06-16

## 2021-06-16 LAB
ANION GAP SERPL CALC-SCNC: 10 MMOL/L (ref 10–20)
BUN SERPL-MCNC: 17 MG/DL (ref 6–20)
BUN/CREAT SERPL: 13 (ref 7–25)
CALCIUM SERPL-MCNC: 8.6 MG/DL (ref 8.4–10.2)
CHLORIDE SERPL-SCNC: 104 MMOL/L (ref 98–107)
CO2 SERPL-SCNC: 31 MMOL/L (ref 21–32)
CREAT SERPL-MCNC: 1.35 MG/DL (ref 0.51–0.95)
FASTING DURATION TIME PATIENT: ABNORMAL H
GFR SERPLBLD BASED ON 1.73 SQ M-ARVRAT: 36 ML/MIN/1.73M2
GLUCOSE BLDC GLUCOMTR-MCNC: 101 MG/DL (ref 70–99)
GLUCOSE BLDC GLUCOMTR-MCNC: 143 MG/DL (ref 70–99)
GLUCOSE BLDC GLUCOMTR-MCNC: 157 MG/DL (ref 70–99)
GLUCOSE BLDC GLUCOMTR-MCNC: 223 MG/DL (ref 70–99)
GLUCOSE BLDC GLUCOMTR-MCNC: 232 MG/DL (ref 70–99)
GLUCOSE SERPL-MCNC: 139 MG/DL (ref 65–99)
MAGNESIUM SERPL-MCNC: 1.8 MG/DL (ref 1.7–2.4)
POTASSIUM SERPL-SCNC: 3.7 MMOL/L (ref 3.4–5.1)
SODIUM SERPL-SCNC: 141 MMOL/L (ref 135–145)

## 2021-06-16 PROCEDURE — 82962 GLUCOSE BLOOD TEST: CPT

## 2021-06-16 PROCEDURE — 10002803 HB RX 637: Performed by: INTERNAL MEDICINE

## 2021-06-16 PROCEDURE — 10002800 HB RX 250 W HCPCS: Performed by: HOSPITALIST

## 2021-06-16 PROCEDURE — 10002807 HB RX 258: Performed by: INTERNAL MEDICINE

## 2021-06-16 PROCEDURE — 93227 XTRNL ECG REC<48 HR R&I: CPT | Performed by: INTERNAL MEDICINE

## 2021-06-16 PROCEDURE — 36415 COLL VENOUS BLD VENIPUNCTURE: CPT | Performed by: HOSPITALIST

## 2021-06-16 PROCEDURE — 10002801 HB RX 250 W/O HCPCS: Performed by: HOSPITALIST

## 2021-06-16 PROCEDURE — 99233 SBSQ HOSP IP/OBS HIGH 50: CPT | Performed by: INTERNAL MEDICINE

## 2021-06-16 PROCEDURE — 83735 ASSAY OF MAGNESIUM: CPT | Performed by: HOSPITALIST

## 2021-06-16 PROCEDURE — 99232 SBSQ HOSP IP/OBS MODERATE 35: CPT | Performed by: HOSPITALIST

## 2021-06-16 PROCEDURE — 80048 BASIC METABOLIC PNL TOTAL CA: CPT | Performed by: HOSPITALIST

## 2021-06-16 PROCEDURE — 10002803 HB RX 637: Performed by: HOSPITALIST

## 2021-06-16 PROCEDURE — 10002800 HB RX 250 W HCPCS: Performed by: INTERNAL MEDICINE

## 2021-06-16 PROCEDURE — 10003585 HB ROOM CHARGE INTERMEDIATE CARE

## 2021-06-16 RX ORDER — AMIODARONE HYDROCHLORIDE 200 MG/1
400 TABLET ORAL EVERY 12 HOURS SCHEDULED
Status: DISCONTINUED | OUTPATIENT
Start: 2021-06-16 | End: 2021-06-17 | Stop reason: HOSPADM

## 2021-06-16 RX ORDER — FUROSEMIDE 10 MG/ML
40 INJECTION INTRAMUSCULAR; INTRAVENOUS
Status: DISCONTINUED | OUTPATIENT
Start: 2021-06-16 | End: 2021-06-17 | Stop reason: HOSPADM

## 2021-06-16 RX ADMIN — LEVOTHYROXINE SODIUM 25 MCG: 0.03 TABLET ORAL at 06:30

## 2021-06-16 RX ADMIN — FUROSEMIDE 40 MG: 10 INJECTION, SOLUTION INTRAVENOUS at 10:08

## 2021-06-16 RX ADMIN — APIXABAN 5 MG: 5 TABLET, FILM COATED ORAL at 21:19

## 2021-06-16 RX ADMIN — FUROSEMIDE 40 MG: 10 INJECTION, SOLUTION INTRAVENOUS at 16:06

## 2021-06-16 RX ADMIN — ROSUVASTATIN CALCIUM 10 MG: 10 TABLET, FILM COATED ORAL at 21:19

## 2021-06-16 RX ADMIN — ATENOLOL 12.5 MG: 25 TABLET ORAL at 08:01

## 2021-06-16 RX ADMIN — AMIODARONE HYDROCHLORIDE 1 MG/MIN: 50 INJECTION, SOLUTION INTRAVENOUS at 03:50

## 2021-06-16 RX ADMIN — ATENOLOL 12.5 MG: 25 TABLET ORAL at 21:19

## 2021-06-16 RX ADMIN — FUROSEMIDE 5 MG/HR: 10 INJECTION, SOLUTION INTRAMUSCULAR; INTRAVENOUS at 08:02

## 2021-06-16 RX ADMIN — APIXABAN 5 MG: 5 TABLET, FILM COATED ORAL at 08:01

## 2021-06-16 RX ADMIN — PANTOPRAZOLE SODIUM 40 MG: 40 TABLET, DELAYED RELEASE ORAL at 08:01

## 2021-06-16 RX ADMIN — PANTOPRAZOLE SODIUM 40 MG: 40 TABLET, DELAYED RELEASE ORAL at 21:19

## 2021-06-16 RX ADMIN — INSULIN HUMAN 4 UNITS: 100 INJECTION, SOLUTION PARENTERAL at 21:38

## 2021-06-16 RX ADMIN — INSULIN HUMAN 2 UNITS: 100 INJECTION, SOLUTION PARENTERAL at 11:24

## 2021-06-16 RX ADMIN — AMIODARONE HYDROCHLORIDE 400 MG: 200 TABLET ORAL at 10:08

## 2021-06-16 RX ADMIN — AMIODARONE HYDROCHLORIDE 400 MG: 200 TABLET ORAL at 21:19

## 2021-06-16 ASSESSMENT — PAIN SCALES - GENERAL
PAINLEVEL_OUTOF10: 0

## 2021-06-17 VITALS
HEART RATE: 80 BPM | HEIGHT: 61 IN | SYSTOLIC BLOOD PRESSURE: 137 MMHG | BODY MASS INDEX: 29.39 KG/M2 | WEIGHT: 155.65 LBS | RESPIRATION RATE: 20 BRPM | DIASTOLIC BLOOD PRESSURE: 81 MMHG | OXYGEN SATURATION: 97 % | TEMPERATURE: 96.8 F

## 2021-06-17 LAB
ANION GAP SERPL CALC-SCNC: 12 MMOL/L (ref 10–20)
BUN SERPL-MCNC: 22 MG/DL (ref 6–20)
BUN/CREAT SERPL: 18 (ref 7–25)
CALCIUM SERPL-MCNC: 8.9 MG/DL (ref 8.4–10.2)
CHLORIDE SERPL-SCNC: 102 MMOL/L (ref 98–107)
CO2 SERPL-SCNC: 33 MMOL/L (ref 21–32)
CREAT SERPL-MCNC: 1.2 MG/DL (ref 0.51–0.95)
FASTING DURATION TIME PATIENT: ABNORMAL H
GFR SERPLBLD BASED ON 1.73 SQ M-ARVRAT: 42 ML/MIN/1.73M2
GLUCOSE BLDC GLUCOMTR-MCNC: 124 MG/DL (ref 70–99)
GLUCOSE BLDC GLUCOMTR-MCNC: 143 MG/DL (ref 70–99)
GLUCOSE SERPL-MCNC: 116 MG/DL (ref 65–99)
MAGNESIUM SERPL-MCNC: 1.5 MG/DL (ref 1.7–2.4)
MAGNESIUM SERPL-MCNC: 2 MG/DL (ref 1.7–2.4)
POTASSIUM SERPL-SCNC: 2.9 MMOL/L (ref 3.4–5.1)
POTASSIUM SERPL-SCNC: 3.8 MMOL/L (ref 3.4–5.1)
RAINBOW EXTRA TUBES HOLD SPECIMEN: NORMAL
SODIUM SERPL-SCNC: 144 MMOL/L (ref 135–145)

## 2021-06-17 PROCEDURE — 80048 BASIC METABOLIC PNL TOTAL CA: CPT | Performed by: HOSPITALIST

## 2021-06-17 PROCEDURE — 83735 ASSAY OF MAGNESIUM: CPT | Performed by: HOSPITALIST

## 2021-06-17 PROCEDURE — 84132 ASSAY OF SERUM POTASSIUM: CPT | Performed by: HOSPITALIST

## 2021-06-17 PROCEDURE — 82962 GLUCOSE BLOOD TEST: CPT

## 2021-06-17 PROCEDURE — 10002803 HB RX 637: Performed by: STUDENT IN AN ORGANIZED HEALTH CARE EDUCATION/TRAINING PROGRAM

## 2021-06-17 PROCEDURE — 10002803 HB RX 637: Performed by: HOSPITALIST

## 2021-06-17 PROCEDURE — 10002800 HB RX 250 W HCPCS: Performed by: INTERNAL MEDICINE

## 2021-06-17 PROCEDURE — 99239 HOSP IP/OBS DSCHRG MGMT >30: CPT | Performed by: HOSPITALIST

## 2021-06-17 PROCEDURE — 36415 COLL VENOUS BLD VENIPUNCTURE: CPT | Performed by: HOSPITALIST

## 2021-06-17 PROCEDURE — 10002800 HB RX 250 W HCPCS: Performed by: STUDENT IN AN ORGANIZED HEALTH CARE EDUCATION/TRAINING PROGRAM

## 2021-06-17 PROCEDURE — 10002803 HB RX 637: Performed by: INTERNAL MEDICINE

## 2021-06-17 RX ORDER — POTASSIUM CHLORIDE 20 MEQ/1
40 TABLET, EXTENDED RELEASE ORAL
Status: COMPLETED | OUTPATIENT
Start: 2021-06-17 | End: 2021-06-17

## 2021-06-17 RX ORDER — TORSEMIDE 20 MG/1
20 TABLET ORAL DAILY
Qty: 30 TABLET | Refills: 0 | Status: SHIPPED | OUTPATIENT
Start: 2021-06-17 | End: 2021-07-21 | Stop reason: SDUPTHER

## 2021-06-17 RX ORDER — POTASSIUM CHLORIDE 20 MEQ/1
10 TABLET, EXTENDED RELEASE ORAL 2 TIMES DAILY
Qty: 60 TABLET | Refills: 0 | Status: SHIPPED | OUTPATIENT
Start: 2021-06-17 | End: 2021-09-16 | Stop reason: SDUPTHER

## 2021-06-17 RX ORDER — ATENOLOL 25 MG/1
12.5 TABLET ORAL EVERY 12 HOURS SCHEDULED
Qty: 30 TABLET | Refills: 0 | Status: SHIPPED | OUTPATIENT
Start: 2021-06-17 | End: 2021-06-23 | Stop reason: DRUGHIGH

## 2021-06-17 RX ORDER — POTASSIUM CHLORIDE 20 MEQ/1
40 TABLET, EXTENDED RELEASE ORAL ONCE
Status: COMPLETED | OUTPATIENT
Start: 2021-06-17 | End: 2021-06-17

## 2021-06-17 RX ORDER — AMIODARONE HYDROCHLORIDE 400 MG/1
400 TABLET ORAL EVERY 12 HOURS SCHEDULED
Qty: 30 TABLET | Refills: 0 | Status: SHIPPED | OUTPATIENT
Start: 2021-06-17 | End: 2021-06-23 | Stop reason: DRUGHIGH

## 2021-06-17 RX ADMIN — AMIODARONE HYDROCHLORIDE 400 MG: 200 TABLET ORAL at 09:12

## 2021-06-17 RX ADMIN — POTASSIUM CHLORIDE 40 MEQ: 1500 TABLET, EXTENDED RELEASE ORAL at 05:50

## 2021-06-17 RX ADMIN — POTASSIUM CHLORIDE 40 MEQ: 1500 TABLET, EXTENDED RELEASE ORAL at 09:44

## 2021-06-17 RX ADMIN — FUROSEMIDE 40 MG: 10 INJECTION, SOLUTION INTRAVENOUS at 09:13

## 2021-06-17 RX ADMIN — POTASSIUM CHLORIDE 40 MEQ: 1500 TABLET, EXTENDED RELEASE ORAL at 12:09

## 2021-06-17 RX ADMIN — APIXABAN 5 MG: 5 TABLET, FILM COATED ORAL at 09:12

## 2021-06-17 RX ADMIN — ATENOLOL 12.5 MG: 25 TABLET ORAL at 09:13

## 2021-06-17 RX ADMIN — MAGNESIUM SULFATE HEPTAHYDRATE 2 G: 40 INJECTION, SOLUTION INTRAVENOUS at 05:51

## 2021-06-17 RX ADMIN — PANTOPRAZOLE SODIUM 40 MG: 40 TABLET, DELAYED RELEASE ORAL at 09:12

## 2021-06-17 RX ADMIN — LEVOTHYROXINE SODIUM 25 MCG: 0.03 TABLET ORAL at 05:50

## 2021-06-17 ASSESSMENT — PAIN SCALES - GENERAL
PAINLEVEL_OUTOF10: 0
PAINLEVEL_OUTOF10: 0

## 2021-06-18 ENCOUNTER — TELEPHONE (OUTPATIENT)
Dept: FAMILY MEDICINE | Age: 86
End: 2021-06-18

## 2021-06-18 ENCOUNTER — TELEPHONE (OUTPATIENT)
Dept: CARDIOLOGY | Age: 86
End: 2021-06-18

## 2021-06-18 RX ORDER — ROSUVASTATIN CALCIUM 10 MG/1
10 TABLET, COATED ORAL DAILY
Qty: 90 TABLET | Refills: 2 | Status: SHIPPED | OUTPATIENT
Start: 2021-06-18 | End: 2021-06-23 | Stop reason: SDUPTHER

## 2021-06-23 ENCOUNTER — OFFICE VISIT (OUTPATIENT)
Dept: CARDIOLOGY | Age: 86
End: 2021-06-23

## 2021-06-23 ENCOUNTER — OFFICE VISIT (OUTPATIENT)
Dept: FAMILY MEDICINE | Age: 86
End: 2021-06-23

## 2021-06-23 VITALS
BODY MASS INDEX: 28.12 KG/M2 | TEMPERATURE: 98.8 F | HEART RATE: 91 BPM | DIASTOLIC BLOOD PRESSURE: 80 MMHG | OXYGEN SATURATION: 97 % | WEIGHT: 148.8 LBS | SYSTOLIC BLOOD PRESSURE: 114 MMHG

## 2021-06-23 VITALS
BODY MASS INDEX: 28.18 KG/M2 | OXYGEN SATURATION: 97 % | DIASTOLIC BLOOD PRESSURE: 60 MMHG | HEART RATE: 100 BPM | WEIGHT: 149.25 LBS | SYSTOLIC BLOOD PRESSURE: 122 MMHG | RESPIRATION RATE: 12 BRPM | HEIGHT: 61 IN

## 2021-06-23 DIAGNOSIS — E11.22 CONTROLLED TYPE 2 DIABETES MELLITUS WITH STAGE 3 CHRONIC KIDNEY DISEASE, WITHOUT LONG-TERM CURRENT USE OF INSULIN (CMD): ICD-10-CM

## 2021-06-23 DIAGNOSIS — Z86.73 HISTORY OF CVA (CEREBROVASCULAR ACCIDENT): ICD-10-CM

## 2021-06-23 DIAGNOSIS — N18.30 CONTROLLED TYPE 2 DIABETES MELLITUS WITH STAGE 3 CHRONIC KIDNEY DISEASE, WITHOUT LONG-TERM CURRENT USE OF INSULIN (CMD): ICD-10-CM

## 2021-06-23 DIAGNOSIS — I48.20 CHRONIC ATRIAL FIBRILLATION (CMD): Primary | ICD-10-CM

## 2021-06-23 DIAGNOSIS — I10 BENIGN ESSENTIAL HTN: ICD-10-CM

## 2021-06-23 DIAGNOSIS — I48.0 PAROXYSMAL ATRIAL FIBRILLATION (CMD): Primary | ICD-10-CM

## 2021-06-23 DIAGNOSIS — I50.22 CHRONIC SYSTOLIC CONGESTIVE HEART FAILURE (CMD): ICD-10-CM

## 2021-06-23 PROCEDURE — 99496 TRANSJ CARE MGMT HIGH F2F 7D: CPT | Performed by: FAMILY MEDICINE

## 2021-06-23 PROCEDURE — 99214 OFFICE O/P EST MOD 30 MIN: CPT | Performed by: INTERNAL MEDICINE

## 2021-06-23 RX ORDER — ATENOLOL 25 MG/1
25 TABLET ORAL EVERY 12 HOURS SCHEDULED
Qty: 30 TABLET | Refills: 0 | Status: SHIPPED | COMMUNITY
Start: 2021-06-23 | End: 2021-07-07 | Stop reason: SDUPTHER

## 2021-06-23 RX ORDER — ROSUVASTATIN CALCIUM 10 MG/1
10 TABLET, COATED ORAL DAILY
Qty: 90 TABLET | Refills: 2 | Status: SHIPPED | OUTPATIENT
Start: 2021-06-23 | End: 2021-12-15 | Stop reason: CLARIF

## 2021-06-23 RX ORDER — AMIODARONE HYDROCHLORIDE 400 MG/1
400 TABLET ORAL DAILY
Qty: 30 TABLET | Refills: 0 | Status: SHIPPED | COMMUNITY
Start: 2021-06-23 | End: 2021-07-21 | Stop reason: SDUPTHER

## 2021-07-07 ENCOUNTER — OFFICE VISIT (OUTPATIENT)
Dept: CARDIOLOGY | Age: 86
End: 2021-07-07

## 2021-07-07 VITALS
BODY MASS INDEX: 28.16 KG/M2 | SYSTOLIC BLOOD PRESSURE: 128 MMHG | DIASTOLIC BLOOD PRESSURE: 70 MMHG | HEIGHT: 61 IN | HEART RATE: 76 BPM | OXYGEN SATURATION: 99 % | RESPIRATION RATE: 12 BRPM | WEIGHT: 149.14 LBS

## 2021-07-07 DIAGNOSIS — I48.0 PAROXYSMAL ATRIAL FIBRILLATION (CMD): ICD-10-CM

## 2021-07-07 DIAGNOSIS — I10 BENIGN ESSENTIAL HTN: Primary | ICD-10-CM

## 2021-07-07 DIAGNOSIS — N18.31 STAGE 3A CHRONIC KIDNEY DISEASE (CMD): ICD-10-CM

## 2021-07-07 PROCEDURE — 93000 ELECTROCARDIOGRAM COMPLETE: CPT | Performed by: INTERNAL MEDICINE

## 2021-07-07 PROCEDURE — 99214 OFFICE O/P EST MOD 30 MIN: CPT | Performed by: INTERNAL MEDICINE

## 2021-07-07 RX ORDER — ATENOLOL 25 MG/1
25 TABLET ORAL EVERY 12 HOURS SCHEDULED
Qty: 180 TABLET | Refills: 3 | Status: SHIPPED | OUTPATIENT
Start: 2021-07-07 | End: 2021-11-22 | Stop reason: ALTCHOICE

## 2021-07-09 LAB
ATRIAL RATE (BPM): 250
QRS-INTERVAL (MSEC): 86
QT-INTERVAL (MSEC): 450
QTC: 519
R AXIS (DEGREES): 18
REPORT TEXT: NORMAL
T AXIS (DEGREES): 75
VENTRICULAR RATE EKG/MIN (BPM): 80

## 2021-07-11 PROBLEM — I50.22 CHRONIC SYSTOLIC CONGESTIVE HEART FAILURE  (CMD): Status: ACTIVE | Noted: 2021-07-11

## 2021-07-21 ENCOUNTER — TELEPHONE (OUTPATIENT)
Dept: CARDIOLOGY | Age: 86
End: 2021-07-21

## 2021-07-21 DIAGNOSIS — I48.0 PAROXYSMAL ATRIAL FIBRILLATION (CMD): Primary | ICD-10-CM

## 2021-07-21 RX ORDER — TORSEMIDE 20 MG/1
20 TABLET ORAL DAILY
Qty: 90 TABLET | Refills: 3 | Status: SHIPPED | OUTPATIENT
Start: 2021-07-21 | End: 2021-09-16 | Stop reason: SDUPTHER

## 2021-07-21 RX ORDER — AMIODARONE HYDROCHLORIDE 400 MG/1
400 TABLET ORAL DAILY
Qty: 90 TABLET | Refills: 2 | Status: SHIPPED | OUTPATIENT
Start: 2021-07-21 | End: 2021-11-22 | Stop reason: ALTCHOICE

## 2021-09-07 ENCOUNTER — OFFICE VISIT (OUTPATIENT)
Dept: FAMILY MEDICINE | Age: 86
End: 2021-09-07

## 2021-09-07 ENCOUNTER — LAB SERVICES (OUTPATIENT)
Dept: LAB | Age: 86
End: 2021-09-07

## 2021-09-07 VITALS
BODY MASS INDEX: 28.05 KG/M2 | DIASTOLIC BLOOD PRESSURE: 90 MMHG | WEIGHT: 148.6 LBS | SYSTOLIC BLOOD PRESSURE: 146 MMHG | HEIGHT: 61 IN | OXYGEN SATURATION: 99 % | RESPIRATION RATE: 18 BRPM | HEART RATE: 67 BPM | TEMPERATURE: 96.5 F

## 2021-09-07 DIAGNOSIS — I50.22 CHRONIC SYSTOLIC CONGESTIVE HEART FAILURE (CMD): ICD-10-CM

## 2021-09-07 DIAGNOSIS — E11.22 CONTROLLED TYPE 2 DIABETES MELLITUS WITH STAGE 3 CHRONIC KIDNEY DISEASE, WITHOUT LONG-TERM CURRENT USE OF INSULIN (CMD): ICD-10-CM

## 2021-09-07 DIAGNOSIS — N18.31 STAGE 3A CHRONIC KIDNEY DISEASE (CMD): ICD-10-CM

## 2021-09-07 DIAGNOSIS — R53.83 FATIGUE, UNSPECIFIED TYPE: Primary | ICD-10-CM

## 2021-09-07 DIAGNOSIS — J30.1 NON-SEASONAL ALLERGIC RHINITIS DUE TO POLLEN: ICD-10-CM

## 2021-09-07 DIAGNOSIS — E03.8 SUBCLINICAL HYPOTHYROIDISM: ICD-10-CM

## 2021-09-07 DIAGNOSIS — R26.89 POOR BALANCE: ICD-10-CM

## 2021-09-07 DIAGNOSIS — N18.30 CONTROLLED TYPE 2 DIABETES MELLITUS WITH STAGE 3 CHRONIC KIDNEY DISEASE, WITHOUT LONG-TERM CURRENT USE OF INSULIN (CMD): ICD-10-CM

## 2021-09-07 DIAGNOSIS — I48.20 CHRONIC ATRIAL FIBRILLATION (CMD): ICD-10-CM

## 2021-09-07 LAB
ALBUMIN SERPL-MCNC: 4 G/DL (ref 3.6–5.1)
ALBUMIN/GLOB SERPL: 1.1 {RATIO} (ref 1–2.4)
ALP SERPL-CCNC: 55 UNITS/L (ref 45–117)
ALT SERPL-CCNC: 26 UNITS/L
ANION GAP SERPL CALC-SCNC: 14 MMOL/L (ref 10–20)
AST SERPL-CCNC: 24 UNITS/L
BILIRUB SERPL-MCNC: 0.9 MG/DL (ref 0.2–1)
BUN SERPL-MCNC: 25 MG/DL (ref 6–20)
BUN/CREAT SERPL: 16 (ref 7–25)
CALCIUM SERPL-MCNC: 9.8 MG/DL (ref 8.4–10.2)
CHLORIDE SERPL-SCNC: 97 MMOL/L (ref 98–107)
CO2 SERPL-SCNC: 34 MMOL/L (ref 21–32)
CREAT SERPL-MCNC: 1.53 MG/DL (ref 0.51–0.95)
FASTING DURATION TIME PATIENT: 12 HOURS (ref 0–999)
GFR SERPLBLD BASED ON 1.73 SQ M-ARVRAT: 31 ML/MIN
GLOBULIN SER-MCNC: 3.5 G/DL (ref 2–4)
GLUCOSE SERPL-MCNC: 122 MG/DL (ref 65–99)
HBA1C MFR BLD: 6.9 % (ref 4.5–5.6)
POTASSIUM SERPL-SCNC: 3.7 MMOL/L (ref 3.4–5.1)
PROT SERPL-MCNC: 7.5 G/DL (ref 6.4–8.2)
SODIUM SERPL-SCNC: 141 MMOL/L (ref 135–145)
TSH SERPL-ACNC: 4.18 MCUNITS/ML (ref 0.35–5)

## 2021-09-07 PROCEDURE — 84443 ASSAY THYROID STIM HORMONE: CPT | Performed by: CLINICAL MEDICAL LABORATORY

## 2021-09-07 PROCEDURE — 36415 COLL VENOUS BLD VENIPUNCTURE: CPT | Performed by: INTERNAL MEDICINE

## 2021-09-07 PROCEDURE — 83036 HEMOGLOBIN GLYCOSYLATED A1C: CPT | Performed by: CLINICAL MEDICAL LABORATORY

## 2021-09-07 PROCEDURE — 99214 OFFICE O/P EST MOD 30 MIN: CPT | Performed by: FAMILY MEDICINE

## 2021-09-07 PROCEDURE — 80053 COMPREHEN METABOLIC PANEL: CPT | Performed by: INTERNAL MEDICINE

## 2021-09-07 RX ORDER — PANTOPRAZOLE SODIUM 40 MG/1
40 TABLET, DELAYED RELEASE ORAL 2 TIMES DAILY
COMMUNITY
Start: 2021-09-01 | End: 2022-07-14 | Stop reason: CLARIF

## 2021-09-07 RX ORDER — FUROSEMIDE 20 MG/1
TABLET ORAL
COMMUNITY
Start: 2021-08-28 | End: 2021-11-15 | Stop reason: ALTCHOICE

## 2021-09-07 RX ORDER — AMLODIPINE BESYLATE 5 MG/1
TABLET ORAL
COMMUNITY
Start: 2021-06-26 | End: 2021-12-15

## 2021-09-08 ENCOUNTER — TELEPHONE (OUTPATIENT)
Dept: FAMILY MEDICINE | Age: 86
End: 2021-09-08

## 2021-09-08 DIAGNOSIS — E03.8 SUBCLINICAL HYPOTHYROIDISM: Primary | ICD-10-CM

## 2021-09-16 ENCOUNTER — OFFICE VISIT (OUTPATIENT)
Dept: CARDIOLOGY | Age: 86
End: 2021-09-16

## 2021-09-16 VITALS
DIASTOLIC BLOOD PRESSURE: 80 MMHG | BODY MASS INDEX: 28.93 KG/M2 | HEIGHT: 61 IN | OXYGEN SATURATION: 99 % | WEIGHT: 153.22 LBS | RESPIRATION RATE: 16 BRPM | HEART RATE: 70 BPM | SYSTOLIC BLOOD PRESSURE: 130 MMHG

## 2021-09-16 DIAGNOSIS — I10 BENIGN ESSENTIAL HTN: Primary | ICD-10-CM

## 2021-09-16 DIAGNOSIS — I48.20 CHRONIC ATRIAL FIBRILLATION (CMD): ICD-10-CM

## 2021-09-16 DIAGNOSIS — N18.30 CONTROLLED TYPE 2 DIABETES MELLITUS WITH STAGE 3 CHRONIC KIDNEY DISEASE, WITHOUT LONG-TERM CURRENT USE OF INSULIN (CMD): ICD-10-CM

## 2021-09-16 DIAGNOSIS — E11.22 CONTROLLED TYPE 2 DIABETES MELLITUS WITH STAGE 3 CHRONIC KIDNEY DISEASE, WITHOUT LONG-TERM CURRENT USE OF INSULIN (CMD): ICD-10-CM

## 2021-09-16 PROCEDURE — 99214 OFFICE O/P EST MOD 30 MIN: CPT | Performed by: INTERNAL MEDICINE

## 2021-09-16 RX ORDER — TORSEMIDE 20 MG/1
20 TABLET ORAL
Qty: 40 TABLET | Refills: 1 | Status: ON HOLD | OUTPATIENT
Start: 2021-09-17 | End: 2022-01-31 | Stop reason: HOSPADM

## 2021-09-16 RX ORDER — POTASSIUM CHLORIDE 20 MEQ/1
20 TABLET, EXTENDED RELEASE ORAL
Qty: 40 TABLET | Refills: 1 | Status: SHIPPED | OUTPATIENT
Start: 2021-09-17 | End: 2022-03-29 | Stop reason: SDUPTHER

## 2021-11-01 ENCOUNTER — LAB SERVICES (OUTPATIENT)
Dept: LAB | Age: 86
End: 2021-11-01

## 2021-11-01 DIAGNOSIS — E03.8 SUBCLINICAL HYPOTHYROIDISM: ICD-10-CM

## 2021-11-01 LAB
T4 FREE SERPL-MCNC: 1.8 NG/DL (ref 0.8–1.5)
TSH SERPL-ACNC: 5.01 MCUNITS/ML (ref 0.35–5)

## 2021-11-01 PROCEDURE — 84443 ASSAY THYROID STIM HORMONE: CPT | Performed by: CLINICAL MEDICAL LABORATORY

## 2021-11-01 PROCEDURE — 36415 COLL VENOUS BLD VENIPUNCTURE: CPT | Performed by: INTERNAL MEDICINE

## 2021-11-01 PROCEDURE — 84439 ASSAY OF FREE THYROXINE: CPT | Performed by: CLINICAL MEDICAL LABORATORY

## 2021-11-15 ENCOUNTER — OFFICE VISIT (OUTPATIENT)
Dept: FAMILY MEDICINE | Age: 86
End: 2021-11-15

## 2021-11-15 VITALS
DIASTOLIC BLOOD PRESSURE: 82 MMHG | OXYGEN SATURATION: 96 % | WEIGHT: 150.7 LBS | TEMPERATURE: 96.8 F | HEART RATE: 74 BPM | BODY MASS INDEX: 28.47 KG/M2 | SYSTOLIC BLOOD PRESSURE: 124 MMHG

## 2021-11-15 DIAGNOSIS — J30.1 NON-SEASONAL ALLERGIC RHINITIS DUE TO POLLEN: ICD-10-CM

## 2021-11-15 DIAGNOSIS — R06.81 APNEA: ICD-10-CM

## 2021-11-15 DIAGNOSIS — R53.83 FATIGUE, UNSPECIFIED TYPE: Primary | ICD-10-CM

## 2021-11-15 DIAGNOSIS — I48.20 CHRONIC ATRIAL FIBRILLATION (CMD): ICD-10-CM

## 2021-11-15 DIAGNOSIS — F34.1 DYSTHYMIA: ICD-10-CM

## 2021-11-15 PROCEDURE — 99214 OFFICE O/P EST MOD 30 MIN: CPT | Performed by: FAMILY MEDICINE

## 2021-11-15 RX ORDER — MONTELUKAST SODIUM 10 MG/1
10 TABLET ORAL NIGHTLY
Qty: 90 TABLET | Refills: 0 | Status: SHIPPED | OUTPATIENT
Start: 2021-11-15 | End: 2022-02-14

## 2021-11-15 RX ORDER — FLUOXETINE HYDROCHLORIDE 20 MG/1
20 CAPSULE ORAL DAILY
Qty: 30 CAPSULE | Refills: 0 | Status: SHIPPED | OUTPATIENT
Start: 2021-11-15 | End: 2021-12-16

## 2021-11-16 ENCOUNTER — TELEPHONE (OUTPATIENT)
Dept: FAMILY MEDICINE | Age: 86
End: 2021-11-16

## 2021-11-16 DIAGNOSIS — I10 BENIGN ESSENTIAL HTN: Primary | ICD-10-CM

## 2021-11-16 DIAGNOSIS — E03.8 SUBCLINICAL HYPOTHYROIDISM: Primary | ICD-10-CM

## 2021-11-16 RX ORDER — LEVOTHYROXINE SODIUM 0.05 MG/1
50 TABLET ORAL DAILY
Qty: 30 TABLET | Refills: 1 | Status: SHIPPED | OUTPATIENT
Start: 2021-11-16 | End: 2021-11-16

## 2021-11-16 RX ORDER — LEVOTHYROXINE SODIUM 0.05 MG/1
50 TABLET ORAL DAILY
Qty: 90 TABLET | Refills: 0 | Status: SHIPPED | OUTPATIENT
Start: 2021-11-16 | End: 2022-02-14

## 2021-11-22 DIAGNOSIS — I10 BENIGN ESSENTIAL HTN: ICD-10-CM

## 2021-11-22 RX ORDER — DILTIAZEM HYDROCHLORIDE 120 MG/1
120 CAPSULE, EXTENDED RELEASE ORAL DAILY
Qty: 90 CAPSULE | OUTPATIENT
Start: 2021-11-22

## 2021-11-22 RX ORDER — DILTIAZEM HYDROCHLORIDE 120 MG/1
120 CAPSULE, EXTENDED RELEASE ORAL DAILY
Qty: 30 CAPSULE | Refills: 0 | OUTPATIENT
Start: 2021-11-22 | End: 2021-12-03

## 2021-12-03 ENCOUNTER — OFFICE VISIT (OUTPATIENT)
Dept: CARDIOLOGY | Age: 86
End: 2021-12-03

## 2021-12-03 ENCOUNTER — APPOINTMENT (OUTPATIENT)
Dept: GENERAL RADIOLOGY | Age: 86
End: 2021-12-03
Attending: EMERGENCY MEDICINE

## 2021-12-03 ENCOUNTER — HOSPITAL ENCOUNTER (EMERGENCY)
Age: 86
Discharge: HOME | End: 2021-12-03
Attending: EMERGENCY MEDICINE

## 2021-12-03 ENCOUNTER — APPOINTMENT (OUTPATIENT)
Dept: CT IMAGING | Age: 86
End: 2021-12-03
Attending: STUDENT IN AN ORGANIZED HEALTH CARE EDUCATION/TRAINING PROGRAM

## 2021-12-03 ENCOUNTER — APPOINTMENT (OUTPATIENT)
Dept: FAMILY MEDICINE | Age: 86
End: 2021-12-03

## 2021-12-03 VITALS
SYSTOLIC BLOOD PRESSURE: 136 MMHG | DIASTOLIC BLOOD PRESSURE: 87 MMHG | RESPIRATION RATE: 20 BRPM | HEART RATE: 90 BPM | OXYGEN SATURATION: 98 %

## 2021-12-03 VITALS
HEART RATE: 120 BPM | SYSTOLIC BLOOD PRESSURE: 126 MMHG | DIASTOLIC BLOOD PRESSURE: 76 MMHG | WEIGHT: 149.58 LBS | HEIGHT: 61 IN | OXYGEN SATURATION: 99 % | RESPIRATION RATE: 24 BRPM | BODY MASS INDEX: 28.24 KG/M2

## 2021-12-03 DIAGNOSIS — I63.9 CEREBROVASCULAR ACCIDENT (CVA), UNSPECIFIED MECHANISM (CMD): ICD-10-CM

## 2021-12-03 DIAGNOSIS — I50.23 ACUTE ON CHRONIC CLINICAL SYSTOLIC HEART FAILURE (CMD): ICD-10-CM

## 2021-12-03 DIAGNOSIS — J01.10 ACUTE NON-RECURRENT FRONTAL SINUSITIS: ICD-10-CM

## 2021-12-03 DIAGNOSIS — I10 BENIGN ESSENTIAL HTN: ICD-10-CM

## 2021-12-03 DIAGNOSIS — H65.92 LEFT SEROUS OTITIS MEDIA, UNSPECIFIED CHRONICITY: ICD-10-CM

## 2021-12-03 DIAGNOSIS — N39.0 ACUTE UTI: ICD-10-CM

## 2021-12-03 DIAGNOSIS — H61.21 IMPACTED CERUMEN OF RIGHT EAR: ICD-10-CM

## 2021-12-03 DIAGNOSIS — I48.91 ATRIAL FIBRILLATION, UNSPECIFIED TYPE (CMD): Primary | ICD-10-CM

## 2021-12-03 DIAGNOSIS — E87.6 HYPOKALEMIA: ICD-10-CM

## 2021-12-03 DIAGNOSIS — I48.21 PERMANENT ATRIAL FIBRILLATION (CMD): Primary | ICD-10-CM

## 2021-12-03 LAB
ALBUMIN SERPL-MCNC: 3.4 G/DL (ref 3.6–5.1)
ALBUMIN/GLOB SERPL: 0.9 {RATIO} (ref 1–2.4)
ALP SERPL-CCNC: 67 UNITS/L (ref 45–117)
ALT SERPL-CCNC: 18 UNITS/L
AMORPH SED URNS QL MICRO: PRESENT
ANION GAP SERPL CALC-SCNC: 12 MMOL/L (ref 10–20)
APPEARANCE UR: ABNORMAL
APTT PPP: 29 SEC (ref 22–30)
AST SERPL-CCNC: 16 UNITS/L
BACTERIA #/AREA URNS HPF: ABNORMAL /HPF
BASOPHILS # BLD: 0.1 K/MCL (ref 0–0.3)
BASOPHILS NFR BLD: 1 %
BILIRUB SERPL-MCNC: 0.6 MG/DL (ref 0.2–1)
BILIRUB UR QL STRIP: NEGATIVE
BUN SERPL-MCNC: 20 MG/DL (ref 6–20)
BUN/CREAT SERPL: 14 (ref 7–25)
CALCIUM SERPL-MCNC: 9.3 MG/DL (ref 8.4–10.2)
CHLORIDE SERPL-SCNC: 103 MMOL/L (ref 98–107)
CO2 SERPL-SCNC: 29 MMOL/L (ref 21–32)
COLOR UR: YELLOW
CREAT SERPL-MCNC: 1.42 MG/DL (ref 0.51–0.95)
DEPRECATED RDW RBC: 43.3 FL (ref 39–50)
EOSINOPHIL # BLD: 0.3 K/MCL (ref 0–0.5)
EOSINOPHIL NFR BLD: 3 %
ERYTHROCYTE [DISTWIDTH] IN BLOOD: 13.6 % (ref 11–15)
FASTING DURATION TIME PATIENT: ABNORMAL H
GFR SERPLBLD BASED ON 1.73 SQ M-ARVRAT: 34 ML/MIN
GLOBULIN SER-MCNC: 3.7 G/DL (ref 2–4)
GLUCOSE SERPL-MCNC: 110 MG/DL (ref 70–99)
GLUCOSE UR STRIP-MCNC: NEGATIVE MG/DL
HCT VFR BLD CALC: 41.4 % (ref 36–46.5)
HGB BLD-MCNC: 13 G/DL (ref 12–15.5)
HGB UR QL STRIP: ABNORMAL
HYALINE CASTS #/AREA URNS LPF: ABNORMAL /LPF
IMM GRANULOCYTES # BLD AUTO: 0 K/MCL (ref 0–0.2)
IMM GRANULOCYTES # BLD: 1 %
INR PPP: 1.1
KETONES UR STRIP-MCNC: NEGATIVE MG/DL
LACTATE BLDV-SCNC: 1.5 MMOL/L (ref 0–2)
LEUKOCYTE ESTERASE UR QL STRIP: ABNORMAL
LYMPHOCYTES # BLD: 2.1 K/MCL (ref 1–4)
LYMPHOCYTES NFR BLD: 24 %
MAGNESIUM SERPL-MCNC: 1.8 MG/DL (ref 1.7–2.4)
MCH RBC QN AUTO: 27.4 PG (ref 26–34)
MCHC RBC AUTO-ENTMCNC: 31.4 G/DL (ref 32–36.5)
MCV RBC AUTO: 87.2 FL (ref 78–100)
MONOCYTES # BLD: 0.6 K/MCL (ref 0.3–0.9)
MONOCYTES NFR BLD: 7 %
MUCOUS THREADS URNS QL MICRO: PRESENT
NEUTROPHILS # BLD: 5.4 K/MCL (ref 1.8–7.7)
NEUTROPHILS NFR BLD: 64 %
NITRITE UR QL STRIP: NEGATIVE
NRBC BLD MANUAL-RTO: 0 /100 WBC
NT-PROBNP SERPL-MCNC: 6327 PG/ML
PH UR STRIP: 6 [PH] (ref 5–7)
PLATELET # BLD AUTO: 370 K/MCL (ref 140–450)
POTASSIUM SERPL-SCNC: 3.3 MMOL/L (ref 3.4–5.1)
PROCALCITONIN SERPL IA-MCNC: <0.05 NG/ML
PROT SERPL-MCNC: 7.1 G/DL (ref 6.4–8.2)
PROT UR STRIP-MCNC: ABNORMAL MG/DL
PROTHROMBIN TIME: 12 SEC (ref 9.7–11.8)
RAINBOW EXTRA TUBES HOLD SPECIMEN: NORMAL
RBC # BLD: 4.75 MIL/MCL (ref 4–5.2)
RBC #/AREA URNS HPF: ABNORMAL /HPF
SARS-COV-2 RNA RESP QL NAA+PROBE: NOT DETECTED
SERVICE CMNT-IMP: NORMAL
SERVICE CMNT-IMP: NORMAL
SODIUM SERPL-SCNC: 141 MMOL/L (ref 135–145)
SP GR UR STRIP: 1.02 (ref 1–1.03)
SQUAMOUS #/AREA URNS HPF: ABNORMAL /HPF
TRANS CELLS #/AREA URNS HPF: ABNORMAL /HPF
TROPONIN I SERPL DL<=0.01 NG/ML-MCNC: 77 NG/L
TROPONIN I SERPL DL<=0.01 NG/ML-MCNC: 78 NG/L
UROBILINOGEN UR STRIP-MCNC: 0.2 MG/DL
WBC # BLD: 8.5 K/MCL (ref 4.2–11)
WBC #/AREA URNS HPF: ABNORMAL /HPF

## 2021-12-03 PROCEDURE — 96374 THER/PROPH/DIAG INJ IV PUSH: CPT

## 2021-12-03 PROCEDURE — 71045 X-RAY EXAM CHEST 1 VIEW: CPT | Performed by: RADIOLOGY

## 2021-12-03 PROCEDURE — 80053 COMPREHEN METABOLIC PANEL: CPT | Performed by: STUDENT IN AN ORGANIZED HEALTH CARE EDUCATION/TRAINING PROGRAM

## 2021-12-03 PROCEDURE — 84484 ASSAY OF TROPONIN QUANT: CPT | Performed by: STUDENT IN AN ORGANIZED HEALTH CARE EDUCATION/TRAINING PROGRAM

## 2021-12-03 PROCEDURE — 87040 BLOOD CULTURE FOR BACTERIA: CPT | Performed by: EMERGENCY MEDICINE

## 2021-12-03 PROCEDURE — 10002800 HB RX 250 W HCPCS: Performed by: EMERGENCY MEDICINE

## 2021-12-03 PROCEDURE — 87635 SARS-COV-2 COVID-19 AMP PRB: CPT | Performed by: EMERGENCY MEDICINE

## 2021-12-03 PROCEDURE — 99285 EMERGENCY DEPT VISIT HI MDM: CPT

## 2021-12-03 PROCEDURE — G1004 CDSM NDSC: HCPCS | Performed by: RADIOLOGY

## 2021-12-03 PROCEDURE — 83735 ASSAY OF MAGNESIUM: CPT | Performed by: STUDENT IN AN ORGANIZED HEALTH CARE EDUCATION/TRAINING PROGRAM

## 2021-12-03 PROCEDURE — 70450 CT HEAD/BRAIN W/O DYE: CPT

## 2021-12-03 PROCEDURE — 93000 ELECTROCARDIOGRAM COMPLETE: CPT | Performed by: INTERNAL MEDICINE

## 2021-12-03 PROCEDURE — 85610 PROTHROMBIN TIME: CPT | Performed by: STUDENT IN AN ORGANIZED HEALTH CARE EDUCATION/TRAINING PROGRAM

## 2021-12-03 PROCEDURE — 85025 COMPLETE CBC W/AUTO DIFF WBC: CPT | Performed by: STUDENT IN AN ORGANIZED HEALTH CARE EDUCATION/TRAINING PROGRAM

## 2021-12-03 PROCEDURE — 85730 THROMBOPLASTIN TIME PARTIAL: CPT | Performed by: STUDENT IN AN ORGANIZED HEALTH CARE EDUCATION/TRAINING PROGRAM

## 2021-12-03 PROCEDURE — 83880 ASSAY OF NATRIURETIC PEPTIDE: CPT | Performed by: STUDENT IN AN ORGANIZED HEALTH CARE EDUCATION/TRAINING PROGRAM

## 2021-12-03 PROCEDURE — 10002803 HB RX 637: Performed by: EMERGENCY MEDICINE

## 2021-12-03 PROCEDURE — 93010 ELECTROCARDIOGRAM REPORT: CPT | Performed by: INTERNAL MEDICINE

## 2021-12-03 PROCEDURE — 70450 CT HEAD/BRAIN W/O DYE: CPT | Performed by: RADIOLOGY

## 2021-12-03 PROCEDURE — 84484 ASSAY OF TROPONIN QUANT: CPT | Performed by: EMERGENCY MEDICINE

## 2021-12-03 PROCEDURE — 81001 URINALYSIS AUTO W/SCOPE: CPT | Performed by: STUDENT IN AN ORGANIZED HEALTH CARE EDUCATION/TRAINING PROGRAM

## 2021-12-03 PROCEDURE — 93005 ELECTROCARDIOGRAM TRACING: CPT | Performed by: EMERGENCY MEDICINE

## 2021-12-03 PROCEDURE — 83605 ASSAY OF LACTIC ACID: CPT | Performed by: STUDENT IN AN ORGANIZED HEALTH CARE EDUCATION/TRAINING PROGRAM

## 2021-12-03 PROCEDURE — G1004 CDSM NDSC: HCPCS

## 2021-12-03 PROCEDURE — 96375 TX/PRO/DX INJ NEW DRUG ADDON: CPT

## 2021-12-03 PROCEDURE — 84145 PROCALCITONIN (PCT): CPT | Performed by: STUDENT IN AN ORGANIZED HEALTH CARE EDUCATION/TRAINING PROGRAM

## 2021-12-03 PROCEDURE — 10002801 HB RX 250 W/O HCPCS: Performed by: EMERGENCY MEDICINE

## 2021-12-03 PROCEDURE — 36415 COLL VENOUS BLD VENIPUNCTURE: CPT | Performed by: STUDENT IN AN ORGANIZED HEALTH CARE EDUCATION/TRAINING PROGRAM

## 2021-12-03 PROCEDURE — 87086 URINE CULTURE/COLONY COUNT: CPT | Performed by: STUDENT IN AN ORGANIZED HEALTH CARE EDUCATION/TRAINING PROGRAM

## 2021-12-03 PROCEDURE — 99284 EMERGENCY DEPT VISIT MOD MDM: CPT | Performed by: EMERGENCY MEDICINE

## 2021-12-03 PROCEDURE — 99215 OFFICE O/P EST HI 40 MIN: CPT | Performed by: INTERNAL MEDICINE

## 2021-12-03 PROCEDURE — 71045 X-RAY EXAM CHEST 1 VIEW: CPT

## 2021-12-03 RX ORDER — POTASSIUM CHLORIDE 20 MEQ/1
40 TABLET, EXTENDED RELEASE ORAL ONCE
Status: COMPLETED | OUTPATIENT
Start: 2021-12-03 | End: 2021-12-03

## 2021-12-03 RX ORDER — METOPROLOL TARTRATE 1 MG/ML
5 INJECTION, SOLUTION INTRAVENOUS ONCE
Status: COMPLETED | OUTPATIENT
Start: 2021-12-03 | End: 2021-12-03

## 2021-12-03 RX ORDER — FUROSEMIDE 40 MG/1
40 TABLET ORAL DAILY
Qty: 3 TABLET | Refills: 0 | Status: ON HOLD | OUTPATIENT
Start: 2021-12-03 | End: 2022-01-31 | Stop reason: HOSPADM

## 2021-12-03 RX ORDER — FUROSEMIDE 10 MG/ML
40 INJECTION INTRAMUSCULAR; INTRAVENOUS ONCE
Status: COMPLETED | OUTPATIENT
Start: 2021-12-03 | End: 2021-12-03

## 2021-12-03 RX ORDER — CEFPODOXIME PROXETIL 200 MG/1
200 TABLET, FILM COATED ORAL 2 TIMES DAILY
Qty: 20 TABLET | Refills: 0 | Status: SHIPPED | OUTPATIENT
Start: 2021-12-03 | End: 2021-12-13

## 2021-12-03 RX ORDER — POTASSIUM CHLORIDE 1.5 G/1.58G
20 POWDER, FOR SOLUTION ORAL DAILY
Qty: 3 PACKET | Refills: 0 | Status: SHIPPED | OUTPATIENT
Start: 2021-12-03 | End: 2021-12-15 | Stop reason: SDUPTHER

## 2021-12-03 RX ORDER — METOPROLOL SUCCINATE 25 MG/1
25 TABLET, EXTENDED RELEASE ORAL NIGHTLY
Qty: 30 TABLET | Refills: 0 | Status: SHIPPED | OUTPATIENT
Start: 2021-12-03 | End: 2021-12-15

## 2021-12-03 RX ADMIN — METOROPROLOL TARTRATE 5 MG: 5 INJECTION, SOLUTION INTRAVENOUS at 12:49

## 2021-12-03 RX ADMIN — POTASSIUM CHLORIDE 40 MEQ: 1500 TABLET, EXTENDED RELEASE ORAL at 12:32

## 2021-12-03 RX ADMIN — FUROSEMIDE 40 MG: 10 INJECTION, SOLUTION INTRAVENOUS at 12:32

## 2021-12-03 ASSESSMENT — ENCOUNTER SYMPTOMS
SORE THROAT: 0
NUMBNESS: 0
NERVOUS/ANXIOUS: 0
CONFUSION: 0
DIARRHEA: 0
WEAKNESS: 1
ABDOMINAL PAIN: 0
VOMITING: 0
NAUSEA: 0
FATIGUE: 1
SHORTNESS OF BREATH: 1
RHINORRHEA: 1
HEADACHES: 1
LIGHT-HEADEDNESS: 1
SINUS PRESSURE: 1
DIAPHORESIS: 0
COUGH: 0
APPETITE CHANGE: 1
DIZZINESS: 1

## 2021-12-03 ASSESSMENT — PAIN SCALES - GENERAL: PAINLEVEL_OUTOF10: 0

## 2021-12-04 LAB
ATRIAL RATE (BPM): 115
ATRIAL RATE (BPM): 138
BACTERIA UR CULT: NORMAL
DIASTOLIC BLOOD PRESSURE: 109
QRS-INTERVAL (MSEC): 94
QRS-INTERVAL (MSEC): 94
QT-INTERVAL (MSEC): 378
QT-INTERVAL (MSEC): 412
QTC: 500
QTC: 531
R AXIS (DEGREES): 81
R AXIS (DEGREES): 9
REPORT TEXT: NORMAL
REPORT TEXT: NORMAL
SYSTOLIC BLOOD PRESSURE: 176
T AXIS (DEGREES): 161
T AXIS (DEGREES): 172
VENTRICULAR RATE EKG/MIN (BPM): 100
VENTRICULAR RATE EKG/MIN (BPM): 105

## 2021-12-06 RX ORDER — POTASSIUM CHLORIDE 1.5 G/1
POWDER, FOR SOLUTION ORAL
Qty: 3 EACH | OUTPATIENT
Start: 2021-12-06

## 2021-12-06 RX ORDER — FUROSEMIDE 40 MG/1
TABLET ORAL
Qty: 3 TABLET | Refills: 0 | OUTPATIENT
Start: 2021-12-06

## 2021-12-08 ENCOUNTER — OFFICE VISIT (OUTPATIENT)
Dept: OTOLARYNGOLOGY | Age: 86
End: 2021-12-08
Attending: EMERGENCY MEDICINE

## 2021-12-08 VITALS
TEMPERATURE: 98.2 F | DIASTOLIC BLOOD PRESSURE: 80 MMHG | SYSTOLIC BLOOD PRESSURE: 128 MMHG | BODY MASS INDEX: 28.62 KG/M2 | WEIGHT: 151.46 LBS

## 2021-12-08 DIAGNOSIS — H61.23 BILATERAL IMPACTED CERUMEN: ICD-10-CM

## 2021-12-08 DIAGNOSIS — H91.90 HEARING LOSS, UNSPECIFIED HEARING LOSS TYPE, UNSPECIFIED LATERALITY: Primary | ICD-10-CM

## 2021-12-08 DIAGNOSIS — R51.9 NONINTRACTABLE HEADACHE, UNSPECIFIED CHRONICITY PATTERN, UNSPECIFIED HEADACHE TYPE: ICD-10-CM

## 2021-12-08 LAB
BACTERIA BLD CULT: NORMAL
BACTERIA BLD CULT: NORMAL

## 2021-12-08 PROCEDURE — 99204 OFFICE O/P NEW MOD 45 MIN: CPT | Performed by: OTOLARYNGOLOGY

## 2021-12-10 ENCOUNTER — OFFICE VISIT (OUTPATIENT)
Dept: FAMILY MEDICINE | Age: 86
End: 2021-12-10

## 2021-12-10 VITALS
WEIGHT: 145.6 LBS | HEIGHT: 61 IN | SYSTOLIC BLOOD PRESSURE: 130 MMHG | OXYGEN SATURATION: 98 % | TEMPERATURE: 98.3 F | DIASTOLIC BLOOD PRESSURE: 80 MMHG | BODY MASS INDEX: 27.49 KG/M2 | HEART RATE: 104 BPM

## 2021-12-10 DIAGNOSIS — J30.1 ALLERGIC RHINITIS DUE TO POLLEN, UNSPECIFIED SEASONALITY: ICD-10-CM

## 2021-12-10 DIAGNOSIS — R26.89 POOR BALANCE: ICD-10-CM

## 2021-12-10 DIAGNOSIS — I10 BENIGN ESSENTIAL HTN: ICD-10-CM

## 2021-12-10 DIAGNOSIS — Z00.00 MEDICARE ANNUAL WELLNESS VISIT, SUBSEQUENT: Primary | ICD-10-CM

## 2021-12-10 DIAGNOSIS — N18.30 CONTROLLED TYPE 2 DIABETES MELLITUS WITH STAGE 3 CHRONIC KIDNEY DISEASE, WITHOUT LONG-TERM CURRENT USE OF INSULIN (CMD): ICD-10-CM

## 2021-12-10 DIAGNOSIS — E11.22 CONTROLLED TYPE 2 DIABETES MELLITUS WITH STAGE 3 CHRONIC KIDNEY DISEASE, WITHOUT LONG-TERM CURRENT USE OF INSULIN (CMD): ICD-10-CM

## 2021-12-10 DIAGNOSIS — Z87.440 PERSONAL HISTORY OF URINARY (TRACT) INFECTION: ICD-10-CM

## 2021-12-10 DIAGNOSIS — I48.20 CHRONIC ATRIAL FIBRILLATION (CMD): ICD-10-CM

## 2021-12-10 DIAGNOSIS — I50.22 CHRONIC SYSTOLIC CONGESTIVE HEART FAILURE (CMD): ICD-10-CM

## 2021-12-10 PROCEDURE — 99214 OFFICE O/P EST MOD 30 MIN: CPT | Performed by: FAMILY MEDICINE

## 2021-12-10 PROCEDURE — G0439 PPPS, SUBSEQ VISIT: HCPCS | Performed by: FAMILY MEDICINE

## 2021-12-10 RX ORDER — LORATADINE 10 MG/1
10 TABLET ORAL NIGHTLY
Qty: 30 TABLET | Refills: 1 | Status: ON HOLD | OUTPATIENT
Start: 2021-12-10 | End: 2022-03-07

## 2021-12-10 RX ORDER — AZELASTINE 1 MG/ML
1 SPRAY, METERED NASAL NIGHTLY
Qty: 30 ML | Refills: 2 | Status: ON HOLD | OUTPATIENT
Start: 2021-12-10 | End: 2022-01-27

## 2021-12-10 ASSESSMENT — PATIENT HEALTH QUESTIONNAIRE - PHQ9
CLINICAL INTERPRETATION OF PHQ2 SCORE: FURTHER SCREENING NEEDED
SUM OF ALL RESPONSES TO PHQ QUESTIONS 1-9: 18
5. POOR APPETITE, WEIGHT LOSS, OR OVEREATING: NEARLY EVERY DAY
1. LITTLE INTEREST OR PLEASURE IN DOING THINGS: MORE THAN HALF THE DAYS
6. FEELING BAD ABOUT YOURSELF - OR THAT YOU ARE A FAILURE OR HAVE LET YOURSELF OR YOUR FAMILY DOWN: MORE THAN HALF THE DAYS
3. TROUBLE FALLING OR STAYING ASLEEP OR SLEEPING TOO MUCH: NEARLY EVERY DAY
SUM OF ALL RESPONSES TO PHQ9 QUESTIONS 1 AND 2: 3
8. MOVING OR SPEAKING SO SLOWLY THAT OTHER PEOPLE COULD HAVE NOTICED. OR THE OPPOSITE, BEING SO FIGETY OR RESTLESS THAT YOU HAVE BEEN MOVING AROUND A LOT MORE THAN USUAL: MORE THAN HALF THE DAYS
7. TROUBLE CONCENTRATING ON THINGS, SUCH AS READING THE NEWSPAPER OR WATCHING TELEVISION: MORE THAN HALF THE DAYS
2. FEELING DOWN, DEPRESSED OR HOPELESS: SEVERAL DAYS
CLINICAL INTERPRETATION OF PHQ9 SCORE: MODERATELY SEVERE DEPRESSION
9. THOUGHTS THAT YOU WOULD BE BETTER OFF DEAD, OR OF HURTING YOURSELF: NOT AT ALL
4. FEELING TIRED OR HAVING LITTLE ENERGY: NEARLY EVERY DAY
SUM OF ALL RESPONSES TO PHQ9 QUESTIONS 1 AND 2: 3
10. IF YOU CHECKED OFF ANY PROBLEMS, HOW DIFFICULT HAVE THESE PROBLEMS MADE IT FOR YOU TO DO YOUR WORK, TAKE CARE OF THINGS AT HOME, OR GET ALONG WITH OTHER PEOPLE: SOMEWHAT DIFFICULT

## 2021-12-15 ENCOUNTER — OFFICE VISIT (OUTPATIENT)
Dept: CARDIOLOGY | Age: 86
End: 2021-12-15

## 2021-12-15 VITALS
HEART RATE: 112 BPM | RESPIRATION RATE: 24 BRPM | OXYGEN SATURATION: 99 % | BODY MASS INDEX: 28.51 KG/M2 | WEIGHT: 151.01 LBS | SYSTOLIC BLOOD PRESSURE: 126 MMHG | HEIGHT: 61 IN | DIASTOLIC BLOOD PRESSURE: 74 MMHG

## 2021-12-15 DIAGNOSIS — I50.22 CHRONIC SYSTOLIC CONGESTIVE HEART FAILURE (CMD): ICD-10-CM

## 2021-12-15 DIAGNOSIS — N18.30 CONTROLLED TYPE 2 DIABETES MELLITUS WITH STAGE 3 CHRONIC KIDNEY DISEASE, WITHOUT LONG-TERM CURRENT USE OF INSULIN (CMD): ICD-10-CM

## 2021-12-15 DIAGNOSIS — I10 BENIGN ESSENTIAL HTN: Primary | ICD-10-CM

## 2021-12-15 DIAGNOSIS — E11.22 CONTROLLED TYPE 2 DIABETES MELLITUS WITH STAGE 3 CHRONIC KIDNEY DISEASE, WITHOUT LONG-TERM CURRENT USE OF INSULIN (CMD): ICD-10-CM

## 2021-12-15 DIAGNOSIS — N18.31 STAGE 3A CHRONIC KIDNEY DISEASE (CMD): ICD-10-CM

## 2021-12-15 PROCEDURE — 99214 OFFICE O/P EST MOD 30 MIN: CPT | Performed by: INTERNAL MEDICINE

## 2021-12-15 RX ORDER — METOPROLOL SUCCINATE 25 MG/1
12.5 TABLET, EXTENDED RELEASE ORAL NIGHTLY
Qty: 15 TABLET | Refills: 11 | Status: ON HOLD | OUTPATIENT
Start: 2021-12-15 | End: 2022-01-31 | Stop reason: HOSPADM

## 2021-12-16 DIAGNOSIS — F34.1 DYSTHYMIA: ICD-10-CM

## 2021-12-16 RX ORDER — FLUOXETINE HYDROCHLORIDE 20 MG/1
20 CAPSULE ORAL DAILY
Qty: 90 CAPSULE | Refills: 0 | Status: SHIPPED | OUTPATIENT
Start: 2021-12-16 | End: 2022-01-27

## 2021-12-17 ENCOUNTER — LAB SERVICES (OUTPATIENT)
Dept: LAB | Age: 86
End: 2021-12-17

## 2021-12-17 DIAGNOSIS — Z87.440 PERSONAL HISTORY OF URINARY (TRACT) INFECTION: ICD-10-CM

## 2021-12-17 DIAGNOSIS — I50.22 CHRONIC SYSTOLIC CONGESTIVE HEART FAILURE (CMD): ICD-10-CM

## 2021-12-17 DIAGNOSIS — E11.22 CONTROLLED TYPE 2 DIABETES MELLITUS WITH STAGE 3 CHRONIC KIDNEY DISEASE, WITHOUT LONG-TERM CURRENT USE OF INSULIN (CMD): ICD-10-CM

## 2021-12-17 DIAGNOSIS — N18.30 CONTROLLED TYPE 2 DIABETES MELLITUS WITH STAGE 3 CHRONIC KIDNEY DISEASE, WITHOUT LONG-TERM CURRENT USE OF INSULIN (CMD): ICD-10-CM

## 2021-12-17 LAB
HBA1C MFR BLD: 6.7 % (ref 4.5–5.6)
NT-PROBNP SERPL-MCNC: 5588 PG/ML

## 2021-12-17 PROCEDURE — 36415 COLL VENOUS BLD VENIPUNCTURE: CPT | Performed by: INTERNAL MEDICINE

## 2021-12-17 PROCEDURE — 83880 ASSAY OF NATRIURETIC PEPTIDE: CPT | Performed by: CLINICAL MEDICAL LABORATORY

## 2021-12-17 PROCEDURE — 83036 HEMOGLOBIN GLYCOSYLATED A1C: CPT | Performed by: CLINICAL MEDICAL LABORATORY

## 2021-12-20 ENCOUNTER — TELEPHONE (OUTPATIENT)
Dept: FAMILY MEDICINE | Age: 86
End: 2021-12-20

## 2021-12-20 DIAGNOSIS — I10 BENIGN ESSENTIAL HTN: ICD-10-CM

## 2021-12-20 RX ORDER — DILTIAZEM HYDROCHLORIDE 120 MG/1
120 CAPSULE, EXTENDED RELEASE ORAL DAILY
Qty: 30 CAPSULE | Refills: 0 | OUTPATIENT
Start: 2021-12-20

## 2021-12-28 ENCOUNTER — APPOINTMENT (OUTPATIENT)
Dept: GENERAL RADIOLOGY | Age: 86
End: 2021-12-28
Attending: EMERGENCY MEDICINE

## 2021-12-28 ENCOUNTER — HOSPITAL ENCOUNTER (EMERGENCY)
Age: 86
Discharge: HOME | End: 2021-12-28
Attending: EMERGENCY MEDICINE

## 2021-12-28 VITALS
TEMPERATURE: 97.8 F | BODY MASS INDEX: 28.72 KG/M2 | DIASTOLIC BLOOD PRESSURE: 108 MMHG | HEIGHT: 61 IN | RESPIRATION RATE: 28 BRPM | OXYGEN SATURATION: 95 % | WEIGHT: 152.12 LBS | SYSTOLIC BLOOD PRESSURE: 172 MMHG | HEART RATE: 114 BPM

## 2021-12-28 DIAGNOSIS — R07.9 CHEST PAIN, UNSPECIFIED TYPE: Primary | ICD-10-CM

## 2021-12-28 DIAGNOSIS — I50.9 ACUTE ON CHRONIC CONGESTIVE HEART FAILURE, UNSPECIFIED HEART FAILURE TYPE (CMD): ICD-10-CM

## 2021-12-28 LAB
ALBUMIN SERPL-MCNC: 3.7 G/DL (ref 3.6–5.1)
ALBUMIN/GLOB SERPL: 1 {RATIO} (ref 1–2.4)
ALP SERPL-CCNC: 64 UNITS/L (ref 45–117)
ALT SERPL-CCNC: 17 UNITS/L
ANION GAP SERPL CALC-SCNC: 14 MMOL/L (ref 10–20)
AST SERPL-CCNC: 13 UNITS/L
BASOPHILS # BLD: 0.1 K/MCL (ref 0–0.3)
BASOPHILS NFR BLD: 1 %
BILIRUB SERPL-MCNC: 0.8 MG/DL (ref 0.2–1)
BUN SERPL-MCNC: 16 MG/DL (ref 6–20)
BUN/CREAT SERPL: 13 (ref 7–25)
CALCIUM SERPL-MCNC: 9.5 MG/DL (ref 8.4–10.2)
CHLORIDE SERPL-SCNC: 101 MMOL/L (ref 98–107)
CO2 SERPL-SCNC: 26 MMOL/L (ref 21–32)
CREAT SERPL-MCNC: 1.19 MG/DL (ref 0.51–0.95)
DEPRECATED RDW RBC: 42.4 FL (ref 39–50)
EOSINOPHIL # BLD: 0.2 K/MCL (ref 0–0.5)
EOSINOPHIL NFR BLD: 2 %
ERYTHROCYTE [DISTWIDTH] IN BLOOD: 13.6 % (ref 11–15)
FASTING DURATION TIME PATIENT: ABNORMAL H
GFR SERPLBLD BASED ON 1.73 SQ M-ARVRAT: 42 ML/MIN
GLOBULIN SER-MCNC: 3.8 G/DL (ref 2–4)
GLUCOSE SERPL-MCNC: 170 MG/DL (ref 70–99)
HCT VFR BLD CALC: 41.4 % (ref 36–46.5)
HGB BLD-MCNC: 12.8 G/DL (ref 12–15.5)
IMM GRANULOCYTES # BLD AUTO: 0 K/MCL (ref 0–0.2)
IMM GRANULOCYTES # BLD: 0 %
LACTATE BLDV-SCNC: 1.6 MMOL/L (ref 0–2)
LIPASE SERPL-CCNC: <50 UNITS/L (ref 73–393)
LYMPHOCYTES # BLD: 2.8 K/MCL (ref 1–4)
LYMPHOCYTES NFR BLD: 29 %
MAGNESIUM SERPL-MCNC: 1.7 MG/DL (ref 1.7–2.4)
MCH RBC QN AUTO: 26.4 PG (ref 26–34)
MCHC RBC AUTO-ENTMCNC: 30.9 G/DL (ref 32–36.5)
MCV RBC AUTO: 85.4 FL (ref 78–100)
MONOCYTES # BLD: 0.7 K/MCL (ref 0.3–0.9)
MONOCYTES NFR BLD: 7 %
NEUTROPHILS # BLD: 5.9 K/MCL (ref 1.8–7.7)
NEUTROPHILS NFR BLD: 61 %
NRBC BLD MANUAL-RTO: 0 /100 WBC
NT-PROBNP SERPL-MCNC: 7776 PG/ML
PLATELET # BLD AUTO: 415 K/MCL (ref 140–450)
POTASSIUM SERPL-SCNC: 3.4 MMOL/L (ref 3.4–5.1)
PROT SERPL-MCNC: 7.5 G/DL (ref 6.4–8.2)
RAINBOW EXTRA TUBES HOLD SPECIMEN: NORMAL
RBC # BLD: 4.85 MIL/MCL (ref 4–5.2)
SARS-COV-2 RNA RESP QL NAA+PROBE: NOT DETECTED
SERVICE CMNT-IMP: NORMAL
SERVICE CMNT-IMP: NORMAL
SODIUM SERPL-SCNC: 138 MMOL/L (ref 135–145)
T4 FREE SERPL-MCNC: 1.7 NG/DL (ref 0.8–1.5)
TROPONIN I SERPL DL<=0.01 NG/ML-MCNC: 91 NG/L
TROPONIN I SERPL DL<=0.01 NG/ML-MCNC: 97 NG/L
TSH SERPL-ACNC: 6.23 MCUNITS/ML (ref 0.35–5)
WBC # BLD: 9.7 K/MCL (ref 4.2–11)

## 2021-12-28 PROCEDURE — 10002803 HB RX 637: Performed by: EMERGENCY MEDICINE

## 2021-12-28 PROCEDURE — 99284 EMERGENCY DEPT VISIT MOD MDM: CPT | Performed by: EMERGENCY MEDICINE

## 2021-12-28 PROCEDURE — 71045 X-RAY EXAM CHEST 1 VIEW: CPT | Performed by: RADIOLOGY

## 2021-12-28 PROCEDURE — 84439 ASSAY OF FREE THYROXINE: CPT | Performed by: EMERGENCY MEDICINE

## 2021-12-28 PROCEDURE — 93010 ELECTROCARDIOGRAM REPORT: CPT | Performed by: INTERNAL MEDICINE

## 2021-12-28 PROCEDURE — 93005 ELECTROCARDIOGRAM TRACING: CPT | Performed by: EMERGENCY MEDICINE

## 2021-12-28 PROCEDURE — 83880 ASSAY OF NATRIURETIC PEPTIDE: CPT | Performed by: EMERGENCY MEDICINE

## 2021-12-28 PROCEDURE — 83605 ASSAY OF LACTIC ACID: CPT | Performed by: EMERGENCY MEDICINE

## 2021-12-28 PROCEDURE — 85025 COMPLETE CBC W/AUTO DIFF WBC: CPT | Performed by: EMERGENCY MEDICINE

## 2021-12-28 PROCEDURE — 96374 THER/PROPH/DIAG INJ IV PUSH: CPT

## 2021-12-28 PROCEDURE — 87635 SARS-COV-2 COVID-19 AMP PRB: CPT | Performed by: EMERGENCY MEDICINE

## 2021-12-28 PROCEDURE — 80053 COMPREHEN METABOLIC PANEL: CPT | Performed by: EMERGENCY MEDICINE

## 2021-12-28 PROCEDURE — 10002800 HB RX 250 W HCPCS: Performed by: EMERGENCY MEDICINE

## 2021-12-28 PROCEDURE — 84443 ASSAY THYROID STIM HORMONE: CPT | Performed by: EMERGENCY MEDICINE

## 2021-12-28 PROCEDURE — 99285 EMERGENCY DEPT VISIT HI MDM: CPT

## 2021-12-28 PROCEDURE — 83690 ASSAY OF LIPASE: CPT | Performed by: EMERGENCY MEDICINE

## 2021-12-28 PROCEDURE — 71045 X-RAY EXAM CHEST 1 VIEW: CPT

## 2021-12-28 PROCEDURE — 83735 ASSAY OF MAGNESIUM: CPT | Performed by: EMERGENCY MEDICINE

## 2021-12-28 PROCEDURE — 84484 ASSAY OF TROPONIN QUANT: CPT | Performed by: EMERGENCY MEDICINE

## 2021-12-28 RX ORDER — ONDANSETRON 2 MG/ML
4 INJECTION INTRAMUSCULAR; INTRAVENOUS ONCE
Status: COMPLETED | OUTPATIENT
Start: 2021-12-28 | End: 2021-12-28

## 2021-12-28 RX ORDER — TORSEMIDE 20 MG/1
20 TABLET ORAL ONCE
Status: COMPLETED | OUTPATIENT
Start: 2021-12-28 | End: 2021-12-28

## 2021-12-28 RX ORDER — POTASSIUM CHLORIDE 1.5 G/1.58G
40 POWDER, FOR SOLUTION ORAL ONCE
Status: COMPLETED | OUTPATIENT
Start: 2021-12-28 | End: 2021-12-28

## 2021-12-28 RX ADMIN — POTASSIUM CHLORIDE 40 MEQ: 1.5 FOR SOLUTION ORAL at 12:15

## 2021-12-28 RX ADMIN — TORSEMIDE 20 MG: 20 TABLET ORAL at 12:15

## 2021-12-28 RX ADMIN — ONDANSETRON 4 MG: 2 INJECTION INTRAMUSCULAR; INTRAVENOUS at 11:05

## 2021-12-28 ASSESSMENT — ENCOUNTER SYMPTOMS
ABDOMINAL PAIN: 0
CHILLS: 0
EYE REDNESS: 0
DIARRHEA: 0
RHINORRHEA: 0
BACK PAIN: 0
SORE THROAT: 0
NAUSEA: 0
COUGH: 1
WEAKNESS: 0
FEVER: 0
SHORTNESS OF BREATH: 1
CONSTIPATION: 0
VOMITING: 0
HEADACHES: 0

## 2021-12-28 ASSESSMENT — PAIN SCALES - GENERAL
PAINLEVEL_OUTOF10: 1
PAINLEVEL_OUTOF10: 4

## 2021-12-28 ASSESSMENT — PAIN DESCRIPTION - PAIN TYPE: TYPE: CHEST PAIN

## 2021-12-29 LAB
ATRIAL RATE (BPM): 101
QRS-INTERVAL (MSEC): 90
QT-INTERVAL (MSEC): 336
QTC: 473
R AXIS (DEGREES): 52
REPORT TEXT: NORMAL
T AXIS (DEGREES): 104
VENTRICULAR RATE EKG/MIN (BPM): 119

## 2022-01-04 ENCOUNTER — OFFICE VISIT (OUTPATIENT)
Dept: CARDIOLOGY | Age: 87
End: 2022-01-04

## 2022-01-04 VITALS
HEART RATE: 104 BPM | OXYGEN SATURATION: 99 % | HEIGHT: 61 IN | RESPIRATION RATE: 16 BRPM | DIASTOLIC BLOOD PRESSURE: 76 MMHG | BODY MASS INDEX: 27.93 KG/M2 | WEIGHT: 147.93 LBS | SYSTOLIC BLOOD PRESSURE: 120 MMHG

## 2022-01-04 DIAGNOSIS — I48.91 ATRIAL FIBRILLATION, UNSPECIFIED TYPE (CMD): ICD-10-CM

## 2022-01-04 DIAGNOSIS — I10 BENIGN ESSENTIAL HTN: ICD-10-CM

## 2022-01-04 DIAGNOSIS — R05.9 COUGH: Primary | ICD-10-CM

## 2022-01-04 DIAGNOSIS — I50.22 CHRONIC SYSTOLIC CONGESTIVE HEART FAILURE (CMD): ICD-10-CM

## 2022-01-04 PROCEDURE — 99214 OFFICE O/P EST MOD 30 MIN: CPT | Performed by: INTERNAL MEDICINE

## 2022-01-04 RX ORDER — NITROGLYCERIN 0.4 MG/1
0.4 TABLET SUBLINGUAL EVERY 5 MIN PRN
Qty: 25 TABLET | Refills: 0 | Status: SHIPPED | OUTPATIENT
Start: 2022-01-04 | End: 2022-02-10 | Stop reason: SDUPTHER

## 2022-01-07 ENCOUNTER — APPOINTMENT (OUTPATIENT)
Dept: REHABILITATION | Age: 87
End: 2022-01-07
Attending: FAMILY MEDICINE

## 2022-01-10 ENCOUNTER — APPOINTMENT (OUTPATIENT)
Dept: REHABILITATION | Age: 87
End: 2022-01-10
Attending: FAMILY MEDICINE

## 2022-01-10 RX ORDER — NITROGLYCERIN 0.4 MG/1
TABLET SUBLINGUAL
Qty: 25 TABLET | Refills: 0 | OUTPATIENT
Start: 2022-01-10

## 2022-01-13 DIAGNOSIS — H90.3 SENSORINEURAL HEARING LOSS OF BOTH EARS: Primary | ICD-10-CM

## 2022-01-17 ENCOUNTER — APPOINTMENT (OUTPATIENT)
Dept: SLEEP MEDICINE | Age: 87
End: 2022-01-17
Attending: FAMILY MEDICINE

## 2022-01-18 ENCOUNTER — OFFICE VISIT (OUTPATIENT)
Dept: CARDIOLOGY | Age: 87
End: 2022-01-18

## 2022-01-18 VITALS
HEART RATE: 104 BPM | BODY MASS INDEX: 28.51 KG/M2 | OXYGEN SATURATION: 99 % | WEIGHT: 151.01 LBS | RESPIRATION RATE: 12 BRPM | SYSTOLIC BLOOD PRESSURE: 130 MMHG | DIASTOLIC BLOOD PRESSURE: 70 MMHG | HEIGHT: 61 IN

## 2022-01-18 DIAGNOSIS — I48.20 CHRONIC ATRIAL FIBRILLATION (CMD): Primary | ICD-10-CM

## 2022-01-18 DIAGNOSIS — I50.22 CHRONIC SYSTOLIC CONGESTIVE HEART FAILURE (CMD): ICD-10-CM

## 2022-01-18 DIAGNOSIS — I10 BENIGN ESSENTIAL HTN: ICD-10-CM

## 2022-01-18 PROCEDURE — 99214 OFFICE O/P EST MOD 30 MIN: CPT | Performed by: INTERNAL MEDICINE

## 2022-01-20 ENCOUNTER — APPOINTMENT (OUTPATIENT)
Dept: AUDIOLOGY | Age: 87
End: 2022-01-20

## 2022-01-27 ENCOUNTER — HOSPITAL ENCOUNTER (INPATIENT)
Age: 87
LOS: 7 days | Discharge: HOME HEALTH | DRG: 246 | End: 2022-02-03
Attending: HOSPITALIST | Admitting: STUDENT IN AN ORGANIZED HEALTH CARE EDUCATION/TRAINING PROGRAM

## 2022-01-27 ENCOUNTER — HOSPITAL ENCOUNTER (EMERGENCY)
Age: 87
Discharge: ANOTHER HOSPITAL | End: 2022-01-27
Attending: EMERGENCY MEDICINE

## 2022-01-27 ENCOUNTER — APPOINTMENT (OUTPATIENT)
Dept: GENERAL RADIOLOGY | Age: 87
End: 2022-01-27
Attending: EMERGENCY MEDICINE

## 2022-01-27 VITALS
WEIGHT: 151 LBS | SYSTOLIC BLOOD PRESSURE: 142 MMHG | DIASTOLIC BLOOD PRESSURE: 97 MMHG | HEIGHT: 61 IN | RESPIRATION RATE: 18 BRPM | OXYGEN SATURATION: 98 % | HEART RATE: 107 BPM | BODY MASS INDEX: 28.51 KG/M2

## 2022-01-27 DIAGNOSIS — I21.4 NSTEMI (NON-ST ELEVATED MYOCARDIAL INFARCTION) (CMD): ICD-10-CM

## 2022-01-27 DIAGNOSIS — I48.19 PERSISTENT ATRIAL FIBRILLATION (CMD): ICD-10-CM

## 2022-01-27 DIAGNOSIS — R79.89 ELEVATED TROPONIN: ICD-10-CM

## 2022-01-27 DIAGNOSIS — I20.0 UNSTABLE ANGINA (CMD): Primary | ICD-10-CM

## 2022-01-27 DIAGNOSIS — Z95.820 S/P ANGIOPLASTY WITH STENT: Primary | ICD-10-CM

## 2022-01-27 LAB
ALBUMIN SERPL-MCNC: 3.5 G/DL (ref 3.6–5.1)
ALBUMIN/GLOB SERPL: 1 {RATIO} (ref 1–2.4)
ALP SERPL-CCNC: 55 UNITS/L (ref 45–117)
ALT SERPL-CCNC: 16 UNITS/L
ANION GAP SERPL CALC-SCNC: 11 MMOL/L (ref 10–20)
APTT PPP: 27 SEC (ref 22–30)
AST SERPL-CCNC: 13 UNITS/L
BASOPHILS # BLD: 0.1 K/MCL (ref 0–0.3)
BASOPHILS NFR BLD: 1 %
BILIRUB SERPL-MCNC: 0.7 MG/DL (ref 0.2–1)
BUN SERPL-MCNC: 16 MG/DL (ref 6–20)
BUN/CREAT SERPL: 13 (ref 7–25)
CALCIUM SERPL-MCNC: 9.2 MG/DL (ref 8.4–10.2)
CHLORIDE SERPL-SCNC: 101 MMOL/L (ref 98–107)
CHOLEST SERPL-MCNC: 222 MG/DL
CHOLEST/HDLC SERPL: 4.2 {RATIO}
CO2 SERPL-SCNC: 29 MMOL/L (ref 21–32)
CREAT SERPL-MCNC: 1.27 MG/DL (ref 0.51–0.95)
DEPRECATED RDW RBC: 44.2 FL (ref 39–50)
DEPRECATED RDW RBC: 44.4 FL (ref 39–50)
EOSINOPHIL # BLD: 0.1 K/MCL (ref 0–0.5)
EOSINOPHIL NFR BLD: 1 %
ERYTHROCYTE [DISTWIDTH] IN BLOOD: 14.3 % (ref 11–15)
ERYTHROCYTE [DISTWIDTH] IN BLOOD: 14.5 % (ref 11–15)
FASTING DURATION TIME PATIENT: ABNORMAL H
FASTING DURATION TIME PATIENT: ABNORMAL H
GFR SERPLBLD BASED ON 1.73 SQ M-ARVRAT: 39 ML/MIN
GLOBULIN SER-MCNC: 3.4 G/DL (ref 2–4)
GLUCOSE BLDC GLUCOMTR-MCNC: 157 MG/DL (ref 70–99)
GLUCOSE SERPL-MCNC: 157 MG/DL (ref 70–99)
HCT VFR BLD CALC: 36 % (ref 36–46.5)
HCT VFR BLD CALC: 37.2 % (ref 36–46.5)
HDLC SERPL-MCNC: 53 MG/DL
HGB BLD-MCNC: 10.9 G/DL (ref 12–15.5)
HGB BLD-MCNC: 11.3 G/DL (ref 12–15.5)
IMM GRANULOCYTES # BLD AUTO: 0 K/MCL (ref 0–0.2)
IMM GRANULOCYTES # BLD: 0 %
INR PPP: 1.1
LDLC SERPL CALC-MCNC: 145 MG/DL
LYMPHOCYTES # BLD: 1.7 K/MCL (ref 1–4)
LYMPHOCYTES NFR BLD: 22 %
MAGNESIUM SERPL-MCNC: 1.7 MG/DL (ref 1.7–2.4)
MCH RBC QN AUTO: 25.6 PG (ref 26–34)
MCH RBC QN AUTO: 25.8 PG (ref 26–34)
MCHC RBC AUTO-ENTMCNC: 30.3 G/DL (ref 32–36.5)
MCHC RBC AUTO-ENTMCNC: 30.4 G/DL (ref 32–36.5)
MCV RBC AUTO: 84.7 FL (ref 78–100)
MCV RBC AUTO: 84.9 FL (ref 78–100)
MONOCYTES # BLD: 0.5 K/MCL (ref 0.3–0.9)
MONOCYTES NFR BLD: 7 %
NEUTROPHILS # BLD: 5.3 K/MCL (ref 1.8–7.7)
NEUTROPHILS NFR BLD: 69 %
NONHDLC SERPL-MCNC: 169 MG/DL
NRBC BLD MANUAL-RTO: 0 /100 WBC
NRBC BLD MANUAL-RTO: 0 /100 WBC
NT-PROBNP SERPL-MCNC: 7972 PG/ML
PLATELET # BLD AUTO: 375 K/MCL (ref 140–450)
PLATELET # BLD AUTO: 387 K/MCL (ref 140–450)
POTASSIUM SERPL-SCNC: 3.2 MMOL/L (ref 3.4–5.1)
PROT SERPL-MCNC: 6.9 G/DL (ref 6.4–8.2)
PROTHROMBIN TIME: 11.9 SEC (ref 9.7–11.8)
RAINBOW EXTRA TUBES HOLD SPECIMEN: NORMAL
RBC # BLD: 4.25 MIL/MCL (ref 4–5.2)
RBC # BLD: 4.38 MIL/MCL (ref 4–5.2)
SARS-COV-2 RNA RESP QL NAA+PROBE: NOT DETECTED
SERVICE CMNT-IMP: NORMAL
SERVICE CMNT-IMP: NORMAL
SODIUM SERPL-SCNC: 138 MMOL/L (ref 135–145)
TRIGL SERPL-MCNC: 119 MG/DL
TROPONIN I SERPL DL<=0.01 NG/ML-MCNC: 91 NG/L
TROPONIN I SERPL DL<=0.01 NG/ML-MCNC: 94 NG/L
TSH SERPL-ACNC: 5.35 MCUNITS/ML (ref 0.35–5)
WBC # BLD: 7.2 K/MCL (ref 4.2–11)
WBC # BLD: 7.7 K/MCL (ref 4.2–11)

## 2022-01-27 PROCEDURE — 93010 ELECTROCARDIOGRAM REPORT: CPT | Performed by: INTERNAL MEDICINE

## 2022-01-27 PROCEDURE — 82962 GLUCOSE BLOOD TEST: CPT

## 2022-01-27 PROCEDURE — 10000002 HB ROOM CHARGE MED SURG

## 2022-01-27 PROCEDURE — 10004651 HB RX, NO CHARGE ITEM: Performed by: EMERGENCY MEDICINE

## 2022-01-27 PROCEDURE — 85610 PROTHROMBIN TIME: CPT | Performed by: EMERGENCY MEDICINE

## 2022-01-27 PROCEDURE — 99291 CRITICAL CARE FIRST HOUR: CPT | Performed by: EMERGENCY MEDICINE

## 2022-01-27 PROCEDURE — 93005 ELECTROCARDIOGRAM TRACING: CPT | Performed by: EMERGENCY MEDICINE

## 2022-01-27 PROCEDURE — S0310 HOSPITALIST VISIT: HCPCS | Performed by: HOSPITALIST

## 2022-01-27 PROCEDURE — 83735 ASSAY OF MAGNESIUM: CPT | Performed by: EMERGENCY MEDICINE

## 2022-01-27 PROCEDURE — 96375 TX/PRO/DX INJ NEW DRUG ADDON: CPT

## 2022-01-27 PROCEDURE — 71045 X-RAY EXAM CHEST 1 VIEW: CPT

## 2022-01-27 PROCEDURE — 10004651 HB RX, NO CHARGE ITEM: Performed by: STUDENT IN AN ORGANIZED HEALTH CARE EDUCATION/TRAINING PROGRAM

## 2022-01-27 PROCEDURE — 10002801 HB RX 250 W/O HCPCS: Performed by: STUDENT IN AN ORGANIZED HEALTH CARE EDUCATION/TRAINING PROGRAM

## 2022-01-27 PROCEDURE — 84443 ASSAY THYROID STIM HORMONE: CPT | Performed by: EMERGENCY MEDICINE

## 2022-01-27 PROCEDURE — 85025 COMPLETE CBC W/AUTO DIFF WBC: CPT | Performed by: EMERGENCY MEDICINE

## 2022-01-27 PROCEDURE — 96366 THER/PROPH/DIAG IV INF ADDON: CPT

## 2022-01-27 PROCEDURE — 84484 ASSAY OF TROPONIN QUANT: CPT | Performed by: EMERGENCY MEDICINE

## 2022-01-27 PROCEDURE — 10002803 HB RX 637: Performed by: EMERGENCY MEDICINE

## 2022-01-27 PROCEDURE — 10004125 HB COUNTER-FIRST DAY ADMIT

## 2022-01-27 PROCEDURE — 10002801 HB RX 250 W/O HCPCS: Performed by: FAMILY MEDICINE

## 2022-01-27 PROCEDURE — 96365 THER/PROPH/DIAG IV INF INIT: CPT

## 2022-01-27 PROCEDURE — 80061 LIPID PANEL: CPT | Performed by: STUDENT IN AN ORGANIZED HEALTH CARE EDUCATION/TRAINING PROGRAM

## 2022-01-27 PROCEDURE — 96374 THER/PROPH/DIAG INJ IV PUSH: CPT

## 2022-01-27 PROCEDURE — 85730 THROMBOPLASTIN TIME PARTIAL: CPT | Performed by: EMERGENCY MEDICINE

## 2022-01-27 PROCEDURE — 10002801 HB RX 250 W/O HCPCS

## 2022-01-27 PROCEDURE — 71045 X-RAY EXAM CHEST 1 VIEW: CPT | Performed by: RADIOLOGY

## 2022-01-27 PROCEDURE — 85027 COMPLETE CBC AUTOMATED: CPT | Performed by: EMERGENCY MEDICINE

## 2022-01-27 PROCEDURE — 10002803 HB RX 637: Performed by: STUDENT IN AN ORGANIZED HEALTH CARE EDUCATION/TRAINING PROGRAM

## 2022-01-27 PROCEDURE — 99223 1ST HOSP IP/OBS HIGH 75: CPT | Performed by: STUDENT IN AN ORGANIZED HEALTH CARE EDUCATION/TRAINING PROGRAM

## 2022-01-27 PROCEDURE — 99223 1ST HOSP IP/OBS HIGH 75: CPT | Performed by: INTERNAL MEDICINE

## 2022-01-27 PROCEDURE — 36415 COLL VENOUS BLD VENIPUNCTURE: CPT

## 2022-01-27 PROCEDURE — 96376 TX/PRO/DX INJ SAME DRUG ADON: CPT

## 2022-01-27 PROCEDURE — 93005 ELECTROCARDIOGRAM TRACING: CPT | Performed by: FAMILY MEDICINE

## 2022-01-27 PROCEDURE — 10002800 HB RX 250 W HCPCS: Performed by: EMERGENCY MEDICINE

## 2022-01-27 PROCEDURE — 83880 ASSAY OF NATRIURETIC PEPTIDE: CPT | Performed by: EMERGENCY MEDICINE

## 2022-01-27 PROCEDURE — 80053 COMPREHEN METABOLIC PANEL: CPT | Performed by: EMERGENCY MEDICINE

## 2022-01-27 PROCEDURE — 10002807 HB RX 258: Performed by: STUDENT IN AN ORGANIZED HEALTH CARE EDUCATION/TRAINING PROGRAM

## 2022-01-27 PROCEDURE — 10002800 HB RX 250 W HCPCS: Performed by: STUDENT IN AN ORGANIZED HEALTH CARE EDUCATION/TRAINING PROGRAM

## 2022-01-27 PROCEDURE — 10002803 HB RX 637: Performed by: INTERNAL MEDICINE

## 2022-01-27 PROCEDURE — 99285 EMERGENCY DEPT VISIT HI MDM: CPT

## 2022-01-27 PROCEDURE — 87635 SARS-COV-2 COVID-19 AMP PRB: CPT | Performed by: EMERGENCY MEDICINE

## 2022-01-27 RX ORDER — NITROGLYCERIN 0.4 MG/1
0.4 TABLET SUBLINGUAL EVERY 5 MIN PRN
Status: DISCONTINUED | OUTPATIENT
Start: 2022-01-27 | End: 2022-01-28 | Stop reason: SDUPTHER

## 2022-01-27 RX ORDER — POTASSIUM CHLORIDE 20 MEQ/1
40 TABLET, EXTENDED RELEASE ORAL ONCE
Status: COMPLETED | OUTPATIENT
Start: 2022-01-27 | End: 2022-01-27

## 2022-01-27 RX ORDER — MAGNESIUM SULFATE 1 G/100ML
1 INJECTION INTRAVENOUS ONCE
Status: COMPLETED | OUTPATIENT
Start: 2022-01-27 | End: 2022-01-28

## 2022-01-27 RX ORDER — HEPARIN SODIUM 10000 [USP'U]/100ML
1-30 INJECTION, SOLUTION INTRAVENOUS CONTINUOUS
Status: DISCONTINUED | OUTPATIENT
Start: 2022-01-27 | End: 2022-01-27 | Stop reason: HOSPADM

## 2022-01-27 RX ORDER — HEPARIN SODIUM 1000 [USP'U]/ML
40 INJECTION, SOLUTION INTRAVENOUS; SUBCUTANEOUS ONCE
Status: COMPLETED | OUTPATIENT
Start: 2022-01-27 | End: 2022-01-27

## 2022-01-27 RX ORDER — HEPARIN SODIUM 1000 [USP'U]/ML
60 INJECTION, SOLUTION INTRAVENOUS; SUBCUTANEOUS ONCE
Status: DISCONTINUED | OUTPATIENT
Start: 2022-01-27 | End: 2022-01-27

## 2022-01-27 RX ORDER — HEPARIN SODIUM 1000 [USP'U]/ML
30 INJECTION, SOLUTION INTRAVENOUS; SUBCUTANEOUS PRN
Status: DISCONTINUED | OUTPATIENT
Start: 2022-01-27 | End: 2022-01-29 | Stop reason: ALTCHOICE

## 2022-01-27 RX ORDER — TORSEMIDE 20 MG/1
20 TABLET ORAL DAILY
Status: DISCONTINUED | OUTPATIENT
Start: 2022-01-28 | End: 2022-01-28

## 2022-01-27 RX ORDER — ONDANSETRON 2 MG/ML
4 INJECTION INTRAMUSCULAR; INTRAVENOUS 2 TIMES DAILY PRN
Status: DISCONTINUED | OUTPATIENT
Start: 2022-01-27 | End: 2022-02-03 | Stop reason: HOSPADM

## 2022-01-27 RX ORDER — HEPARIN SODIUM 10000 [USP'U]/100ML
1-30 INJECTION, SOLUTION INTRAVENOUS CONTINUOUS
Status: DISCONTINUED | OUTPATIENT
Start: 2022-01-27 | End: 2022-01-28

## 2022-01-27 RX ORDER — ASPIRIN 81 MG/1
81 TABLET, CHEWABLE ORAL ONCE
Status: COMPLETED | OUTPATIENT
Start: 2022-01-27 | End: 2022-01-27

## 2022-01-27 RX ORDER — 0.9 % SODIUM CHLORIDE 0.9 %
2 VIAL (ML) INJECTION EVERY 12 HOURS SCHEDULED
Status: DISCONTINUED | OUTPATIENT
Start: 2022-01-27 | End: 2022-02-03 | Stop reason: HOSPADM

## 2022-01-27 RX ORDER — HEPARIN SODIUM 1000 [USP'U]/ML
4000 INJECTION, SOLUTION INTRAVENOUS; SUBCUTANEOUS PRN
Status: DISCONTINUED | OUTPATIENT
Start: 2022-01-27 | End: 2022-01-29 | Stop reason: ALTCHOICE

## 2022-01-27 RX ORDER — METOPROLOL TARTRATE 1 MG/ML
5 INJECTION, SOLUTION INTRAVENOUS ONCE
Status: COMPLETED | OUTPATIENT
Start: 2022-01-27 | End: 2022-01-27

## 2022-01-27 RX ORDER — BISACODYL 10 MG
10 SUPPOSITORY, RECTAL RECTAL DAILY PRN
Status: DISCONTINUED | OUTPATIENT
Start: 2022-01-27 | End: 2022-02-03 | Stop reason: HOSPADM

## 2022-01-27 RX ORDER — ACETAMINOPHEN 325 MG/1
650 TABLET ORAL EVERY 4 HOURS PRN
Status: DISCONTINUED | OUTPATIENT
Start: 2022-01-27 | End: 2022-02-03 | Stop reason: HOSPADM

## 2022-01-27 RX ORDER — PROCHLORPERAZINE MALEATE 5 MG/1
5 TABLET ORAL EVERY 4 HOURS PRN
Status: DISCONTINUED | OUTPATIENT
Start: 2022-01-27 | End: 2022-02-03 | Stop reason: HOSPADM

## 2022-01-27 RX ORDER — HEPARIN SODIUM 1000 [USP'U]/ML
4000 INJECTION, SOLUTION INTRAVENOUS; SUBCUTANEOUS PRN
Status: DISCONTINUED | OUTPATIENT
Start: 2022-01-27 | End: 2022-01-27 | Stop reason: HOSPADM

## 2022-01-27 RX ORDER — 0.9 % SODIUM CHLORIDE 0.9 %
2 VIAL (ML) INJECTION EVERY 12 HOURS SCHEDULED
Status: DISCONTINUED | OUTPATIENT
Start: 2022-01-27 | End: 2022-01-27 | Stop reason: HOSPADM

## 2022-01-27 RX ORDER — AMOXICILLIN 250 MG
2 CAPSULE ORAL DAILY PRN
Status: DISCONTINUED | OUTPATIENT
Start: 2022-01-27 | End: 2022-02-03 | Stop reason: HOSPADM

## 2022-01-27 RX ORDER — MAGNESIUM HYDROXIDE/ALUMINUM HYDROXICE/SIMETHICONE 120; 1200; 1200 MG/30ML; MG/30ML; MG/30ML
30 SUSPENSION ORAL EVERY 4 HOURS PRN
Status: DISCONTINUED | OUTPATIENT
Start: 2022-01-27 | End: 2022-02-03 | Stop reason: HOSPADM

## 2022-01-27 RX ORDER — PANTOPRAZOLE SODIUM 40 MG/1
40 TABLET, DELAYED RELEASE ORAL 2 TIMES DAILY
Status: DISCONTINUED | OUTPATIENT
Start: 2022-01-27 | End: 2022-02-03 | Stop reason: HOSPADM

## 2022-01-27 RX ORDER — ATORVASTATIN CALCIUM 80 MG/1
80 TABLET, FILM COATED ORAL NIGHTLY
Status: DISCONTINUED | OUTPATIENT
Start: 2022-01-27 | End: 2022-01-27

## 2022-01-27 RX ORDER — HEPARIN SODIUM 1000 [USP'U]/ML
2000 INJECTION, SOLUTION INTRAVENOUS; SUBCUTANEOUS PRN
Status: DISCONTINUED | OUTPATIENT
Start: 2022-01-27 | End: 2022-01-27 | Stop reason: HOSPADM

## 2022-01-27 RX ORDER — CLOPIDOGREL BISULFATE 75 MG/1
75 TABLET ORAL DAILY
Status: DISCONTINUED | OUTPATIENT
Start: 2022-01-28 | End: 2022-02-03 | Stop reason: HOSPADM

## 2022-01-27 RX ORDER — CLOPIDOGREL BISULFATE 75 MG/1
75 TABLET ORAL DAILY
Status: DISCONTINUED | OUTPATIENT
Start: 2022-01-27 | End: 2022-01-27 | Stop reason: HOSPADM

## 2022-01-27 RX ORDER — METOPROLOL SUCCINATE 25 MG/1
25 TABLET, EXTENDED RELEASE ORAL NIGHTLY
Status: DISCONTINUED | OUTPATIENT
Start: 2022-01-27 | End: 2022-01-29

## 2022-01-27 RX ORDER — METOPROLOL TARTRATE 1 MG/ML
INJECTION, SOLUTION INTRAVENOUS
Status: COMPLETED
Start: 2022-01-27 | End: 2022-01-27

## 2022-01-27 RX ORDER — LEVOTHYROXINE SODIUM 0.05 MG/1
100 TABLET ORAL DAILY
Status: DISCONTINUED | OUTPATIENT
Start: 2022-01-28 | End: 2022-02-03 | Stop reason: HOSPADM

## 2022-01-27 RX ORDER — MONTELUKAST SODIUM 10 MG/1
10 TABLET ORAL NIGHTLY
Status: DISCONTINUED | OUTPATIENT
Start: 2022-01-27 | End: 2022-02-03 | Stop reason: HOSPADM

## 2022-01-27 RX ORDER — METOPROLOL SUCCINATE 50 MG/1
25 TABLET, EXTENDED RELEASE ORAL 2 TIMES DAILY
Status: DISCONTINUED | OUTPATIENT
Start: 2022-01-27 | End: 2022-01-27 | Stop reason: HOSPADM

## 2022-01-27 RX ORDER — ATORVASTATIN CALCIUM 40 MG/1
40 TABLET, FILM COATED ORAL NIGHTLY
Status: DISCONTINUED | OUTPATIENT
Start: 2022-01-27 | End: 2022-02-03 | Stop reason: HOSPADM

## 2022-01-27 RX ORDER — POLYETHYLENE GLYCOL 3350 17 G/17G
17 POWDER, FOR SOLUTION ORAL DAILY PRN
Status: DISCONTINUED | OUTPATIENT
Start: 2022-01-27 | End: 2022-02-03 | Stop reason: HOSPADM

## 2022-01-27 RX ADMIN — MAGNESIUM SULFATE IN DEXTROSE 1 G: 10 INJECTION, SOLUTION INTRAVENOUS at 22:24

## 2022-01-27 RX ADMIN — ONDANSETRON 4 MG: 2 INJECTION INTRAMUSCULAR; INTRAVENOUS at 21:16

## 2022-01-27 RX ADMIN — SODIUM CHLORIDE, PRESERVATIVE FREE 2 ML: 5 INJECTION INTRAVENOUS at 22:33

## 2022-01-27 RX ADMIN — ASPIRIN 81 MG CHEWABLE TABLET 81 MG: 81 TABLET CHEWABLE at 10:15

## 2022-01-27 RX ADMIN — HEPARIN SODIUM AND DEXTROSE 12 UNITS/KG/HR: 10000; 5 INJECTION INTRAVENOUS at 13:53

## 2022-01-27 RX ADMIN — HEPARIN SODIUM 2700 UNITS: 1000 INJECTION, SOLUTION INTRAVENOUS; SUBCUTANEOUS at 13:47

## 2022-01-27 RX ADMIN — DESMOPRESSIN ACETATE 40 MG: 0.2 TABLET ORAL at 22:24

## 2022-01-27 RX ADMIN — METOPROLOL TARTRATE 5 MG: 5 INJECTION INTRAVENOUS at 12:14

## 2022-01-27 RX ADMIN — POTASSIUM CHLORIDE 40 MEQ: 1500 TABLET, EXTENDED RELEASE ORAL at 11:33

## 2022-01-27 RX ADMIN — MONTELUKAST SODIUM 10 MG: 10 TABLET, FILM COATED ORAL at 22:24

## 2022-01-27 RX ADMIN — SODIUM CHLORIDE 2 ML: 9 INJECTION, SOLUTION INTRAMUSCULAR; INTRAVENOUS; SUBCUTANEOUS at 10:18

## 2022-01-27 RX ADMIN — METOROPROLOL TARTRATE 5 MG: 5 INJECTION, SOLUTION INTRAVENOUS at 14:50

## 2022-01-27 RX ADMIN — NITROGLYCERIN 1 INCH: 20 OINTMENT TOPICAL at 10:23

## 2022-01-27 RX ADMIN — METOPROLOL SUCCINATE 25 MG: 25 TABLET, EXTENDED RELEASE ORAL at 22:24

## 2022-01-27 RX ADMIN — PANTOPRAZOLE SODIUM 40 MG: 40 TABLET, DELAYED RELEASE ORAL at 22:24

## 2022-01-27 RX ADMIN — CLOPIDOGREL BISULFATE 75 MG: 75 TABLET, FILM COATED ORAL at 14:37

## 2022-01-27 RX ADMIN — METOPROLOL TARTRATE 5 MG: 1 INJECTION, SOLUTION INTRAVENOUS at 14:50

## 2022-01-27 ASSESSMENT — COGNITIVE AND FUNCTIONAL STATUS - GENERAL
BECAUSE OF A PHYSICAL, MENTAL, OR EMOTIONAL CONDITION, DO YOU HAVE DIFFICULTY DOING ERRANDS ALONE: YES
ARE YOU BLIND OR DO YOU HAVE SERIOUS DIFFICULTY SEEING, EVEN WHEN WEARING GLASSES: NO
BECAUSE OF A PHYSICAL, MENTAL, OR EMOTIONAL CONDITION, DO YOU HAVE SERIOUS DIFFICULTY CONCENTRATING, REMEMBERING OR MAKING DECISIONS: NO
DO YOU HAVE SERIOUS DIFFICULTY WALKING OR CLIMBING STAIRS: YES
DO YOU HAVE DIFFICULTY DRESSING OR BATHING: NO
ARE YOU DEAF OR DO YOU HAVE SERIOUS DIFFICULTY  HEARING: NO

## 2022-01-27 ASSESSMENT — ENCOUNTER SYMPTOMS
AGITATION: 0
LIGHT-HEADEDNESS: 1
WEAKNESS: 1
FEVER: 0
VOMITING: 1
ABDOMINAL DISTENTION: 0
BLOOD IN STOOL: 0
COLOR CHANGE: 0
COUGH: 1
CONFUSION: 0
CHEST TIGHTNESS: 1
SHORTNESS OF BREATH: 1
DIARRHEA: 0
HEADACHES: 1
STRIDOR: 0
ABDOMINAL PAIN: 0
FACIAL SWELLING: 0
NAUSEA: 1
DIAPHORESIS: 1
WHEEZING: 0
DIZZINESS: 1
CONSTIPATION: 0
NERVOUS/ANXIOUS: 0

## 2022-01-27 ASSESSMENT — HEART SCORE
RISK FACTORS: EQUAL OR GREATER  THAN 3 RISK FACTORS OR HISTORY OF ATHEROSCLEROTIC DISEASE
HEART SCORE: 8
EKG: NON SPECIFIC REPOLARIZATION DISTURBANCE
AGE: EQUAL OR GREATER THAN 65
TROPONIN: 1-3X NORMAL LIMIT
HISTORY: HIGHLY SUSPICIOUS

## 2022-01-27 ASSESSMENT — ACTIVITIES OF DAILY LIVING (ADL)
CHRONIC_PAIN_PRESENT: YES, CHRONIC
DESCRIBE HOW PAIN IMPACTS YOUR LIFE: PHYSICAL ACTIVITY;MOBILITY
ADL_SCORE: 12
ADL_BEFORE_ADMISSION: INDEPENDENT
MOBILITY_ASSIST_DEVICES: CANE;FRONT-WHEELED WALKER
ADL_SHORT_OF_BREATH: YES
RECENT_DECLINE_ADL: YES, ACUTE ILLNESS WITHOUT THERAPY NEEDS

## 2022-01-27 ASSESSMENT — PATIENT HEALTH QUESTIONNAIRE - PHQ9
IS PATIENT ABLE TO COMPLETE PHQ2 OR PHQ9: YES
CLINICAL INTERPRETATION OF PHQ2 SCORE: NO FURTHER SCREENING NEEDED
1. LITTLE INTEREST OR PLEASURE IN DOING THINGS: NOT AT ALL
2. FEELING DOWN, DEPRESSED OR HOPELESS: NOT AT ALL
SUM OF ALL RESPONSES TO PHQ9 QUESTIONS 1 AND 2: 0
SUM OF ALL RESPONSES TO PHQ9 QUESTIONS 1 AND 2: 0

## 2022-01-27 ASSESSMENT — PAIN DESCRIPTION - PAIN TYPE
TYPE: CHEST PAIN
TYPE: CHEST PAIN
TYPE: ACUTE PAIN

## 2022-01-27 ASSESSMENT — PAIN SCALES - GENERAL
PAINLEVEL_OUTOF10: 1
PAINLEVEL_OUTOF10: 2
PAINLEVEL_OUTOF10: 1
PAINLEVEL_OUTOF10: 2
PAINLEVEL_OUTOF10: 0

## 2022-01-27 ASSESSMENT — LIFESTYLE VARIABLES
AUDIT-C TOTAL SCORE: 0
HOW OFTEN DO YOU HAVE 6 OR MORE DRINKS ON ONE OCCASION: NEVER
HOW OFTEN DO YOU HAVE A DRINK CONTAINING ALCOHOL: NEVER
HOW OFTEN DO YOU HAVE A DRINK CONTAINING ALCOHOL: NEVER
HOW MANY STANDARD DRINKS CONTAINING ALCOHOL DO YOU HAVE ON A TYPICAL DAY: 0,1 OR 2
HOW OFTEN DO YOU HAVE 6 OR MORE DRINKS ON ONE OCCASION: NEVER
ALCOHOL_USE_STATUS: NO OR LOW RISK WITH VALIDATED TOOL
ALCOHOL_USE_STATUS: NO OR LOW RISK WITH VALIDATED TOOL
AUDIT-C TOTAL SCORE: 0
HOW MANY STANDARD DRINKS CONTAINING ALCOHOL DO YOU HAVE ON A TYPICAL DAY: 0,1 OR 2

## 2022-01-27 ASSESSMENT — COLUMBIA-SUICIDE SEVERITY RATING SCALE - C-SSRS
IS THE PATIENT ABLE TO COMPLETE C-SSRS: YES
2. HAVE YOU ACTUALLY HAD ANY THOUGHTS OF KILLING YOURSELF?: NO
6. HAVE YOU EVER DONE ANYTHING, STARTED TO DO ANYTHING, OR PREPARED TO DO ANYTHING TO END YOUR LIFE?: NO
1. WITHIN THE PAST MONTH, HAVE YOU WISHED YOU WERE DEAD OR WISHED YOU COULD GO TO SLEEP AND NOT WAKE UP?: NO

## 2022-01-28 ENCOUNTER — APPOINTMENT (OUTPATIENT)
Dept: CV DIAGNOSTICS | Age: 87
DRG: 246 | End: 2022-01-28
Attending: STUDENT IN AN ORGANIZED HEALTH CARE EDUCATION/TRAINING PROGRAM

## 2022-01-28 LAB
ACTIVATED CLOTTING TIME LOW RANGE - POINT OF CARE: 171 SEC
ACTIVATED CLOTTING TIME LOW RANGE - POINT OF CARE: 184 SEC
ACTIVATED CLOTTING TIME LOW RANGE - POINT OF CARE: 206 SEC
ACTIVATED CLOTTING TIME LOW RANGE - POINT OF CARE: 242 SEC
ACTIVATED CLOTTING TIME LOW RANGE - POINT OF CARE: 263 SEC
ACTIVATED CLOTTING TIME LOW RANGE - POINT OF CARE: 265 SEC
ACTIVATED CLOTTING TIME LOW RANGE - POINT OF CARE: >400 SEC
ANION GAP SERPL CALC-SCNC: 6 MMOL/L (ref 10–20)
AORTIC VALVE AREA: 1.48 CMÂ²
APTT PPP: 108 SEC (ref 22–30)
APTT PPP: 44 SEC (ref 22–30)
ASCENDING AORTA (AAD): 3.16 CM
ATRIAL RATE (BPM): 117
ATRIAL RATE (BPM): 72
ATRIAL RATE (BPM): 79
ATRIAL RATE (BPM): 82
AV MEAN GRADIENT (AVMG): 3.33 MMHG
AV PEAK GRADIENT (AVPG): 7.77 MMHG
AV PEAK VELOCITY (AVPV): 1.3 M/S
AVI LVOT PEAK GRADIENT (LVOTMG): 1.02 MMHG
BUN SERPL-MCNC: 18 MG/DL (ref 6–20)
BUN/CREAT SERPL: 14 (ref 7–25)
CALCIUM SERPL-MCNC: 9.2 MG/DL (ref 8.4–10.2)
CHLORIDE SERPL-SCNC: 107 MMOL/L (ref 98–107)
CO2 SERPL-SCNC: 29 MMOL/L (ref 21–32)
CREAT SERPL-MCNC: 1.27 MG/DL (ref 0.51–0.95)
DEPRECATED RDW RBC: 45.2 FL (ref 39–50)
DIASTOLIC BLOOD PRESSURE: 110
DIASTOLIC BLOOD PRESSURE: 97
DOP CALC LVOT PEAK VEL (LVOTPV): 0.7 M/S
ERYTHROCYTE [DISTWIDTH] IN BLOOD: 14.5 % (ref 11–15)
EST RIGHT VENT SYSTOLIC PRESSURE BY TRICUSPID REGURGITATION JET (RVSP): 57.23 MMHG
FASTING DURATION TIME PATIENT: ABNORMAL H
GFR SERPLBLD BASED ON 1.73 SQ M-ARVRAT: 39 ML/MIN
GLUCOSE BLDC GLUCOMTR-MCNC: 108 MG/DL (ref 70–99)
GLUCOSE BLDC GLUCOMTR-MCNC: 184 MG/DL (ref 70–99)
GLUCOSE SERPL-MCNC: 138 MG/DL (ref 70–99)
HCT VFR BLD CALC: 36.7 % (ref 36–46.5)
HGB BLD-MCNC: 10.9 G/DL (ref 12–15.5)
INTERVENTRICULAR SEPTUM IN END DIASTOLE (IVSD): 0.95 CM
LEFT VENTRICULAR INTERNAL DIMENSION IN DIASTOLE (LVDD): 4.58 CM
LEFT VENTRICULAR POSTERIOR WALL IN END DIASTOLE (LVPW): 0.99 CM
LV EF: 30 %
LV END SYSTOLIC LONGITUDINAL STRAIN GLOBAL (LVGS): -9 %
LVOT 2D (LVOTD): 1.94 CM
LVOT VTI (LVOTVTI): 12.59 CM
MAGNESIUM SERPL-MCNC: 2.2 MG/DL (ref 1.7–2.4)
MCH RBC QN AUTO: 25.6 PG (ref 26–34)
MCHC RBC AUTO-ENTMCNC: 29.7 G/DL (ref 32–36.5)
MCV RBC AUTO: 86.2 FL (ref 78–100)
MV E TISSUE VEL LAT (MELV): 0 M/S
MV E TISSUE VEL MED (MESV): 0 M/S
MV E WAVE VEL/E TISSUE VEL MED(MSR): 18.45 UNITLESS
MV PEAK E VELOCITY (MVPEV): 1.1 M/S
NRBC BLD MANUAL-RTO: 0 /100 WBC
PLATELET # BLD AUTO: 336 K/MCL (ref 140–450)
POTASSIUM SERPL-SCNC: 3.7 MMOL/L (ref 3.4–5.1)
PV PEAK VELOCITY (PVPV): 0.4 M/S
QRS-INTERVAL (MSEC): 82
QRS-INTERVAL (MSEC): 86
QRS-INTERVAL (MSEC): 86
QRS-INTERVAL (MSEC): 88
QT-INTERVAL (MSEC): 362
QT-INTERVAL (MSEC): 366
QT-INTERVAL (MSEC): 382
QT-INTERVAL (MSEC): 408
QTC: 488
QTC: 495
QTC: 496
QTC: 502
R AXIS (DEGREES): -5
R AXIS (DEGREES): 2
R AXIS (DEGREES): 4
R AXIS (DEGREES): 9
RBC # BLD: 4.26 MIL/MCL (ref 4–5.2)
REPORT TEXT: NORMAL
RV END SYSTOLIC LONGITUDINAL STRAIN FREE WALL (RVGS): -7 %
SODIUM SERPL-SCNC: 138 MMOL/L (ref 135–145)
SYSTOLIC BLOOD PRESSURE: 142
SYSTOLIC BLOOD PRESSURE: 147
T AXIS (DEGREES): 103
T AXIS (DEGREES): 124
T AXIS (DEGREES): 178
T AXIS (DEGREES): 96
TRICUSPID ANNULAR PLANE SYSTOLIC EXCURSION (TAPSE): 1.19 CM
TRICUSPID VALVE ANNULAR PEAK VELOCITY (TVAPV): 0 M/S
TRICUSPID VALVE PEAK REGURGITATION VELOCITY (TRPV): 3.2 M/S
TROPONIN I SERPL DL<=0.01 NG/ML-MCNC: 88 NG/L
TV ESTIMATED RIGHT ARTERIAL PRESSURE (RAP): 15 MMHG
VENTRICULAR RATE EKG/MIN (BPM): 101
VENTRICULAR RATE EKG/MIN (BPM): 113
VENTRICULAR RATE EKG/MIN (BPM): 113
VENTRICULAR RATE EKG/MIN (BPM): 86
WBC # BLD: 7.1 K/MCL (ref 4.2–11)

## 2022-01-28 PROCEDURE — 85347 COAGULATION TIME ACTIVATED: CPT

## 2022-01-28 PROCEDURE — 10000002 HB ROOM CHARGE MED SURG

## 2022-01-28 PROCEDURE — 10004651 HB RX, NO CHARGE ITEM: Performed by: STUDENT IN AN ORGANIZED HEALTH CARE EDUCATION/TRAINING PROGRAM

## 2022-01-28 PROCEDURE — 76376 3D RENDER W/INTRP POSTPROCES: CPT | Performed by: INTERNAL MEDICINE

## 2022-01-28 PROCEDURE — 10006023 HB SUPPLY 272: Performed by: INTERNAL MEDICINE

## 2022-01-28 PROCEDURE — 85730 THROMBOPLASTIN TIME PARTIAL: CPT | Performed by: STUDENT IN AN ORGANIZED HEALTH CARE EDUCATION/TRAINING PROGRAM

## 2022-01-28 PROCEDURE — 93005 ELECTROCARDIOGRAM TRACING: CPT | Performed by: STUDENT IN AN ORGANIZED HEALTH CARE EDUCATION/TRAINING PROGRAM

## 2022-01-28 PROCEDURE — 36415 COLL VENOUS BLD VENIPUNCTURE: CPT | Performed by: STUDENT IN AN ORGANIZED HEALTH CARE EDUCATION/TRAINING PROGRAM

## 2022-01-28 PROCEDURE — 93356 MYOCRD STRAIN IMG SPCKL TRCK: CPT

## 2022-01-28 PROCEDURE — 84484 ASSAY OF TROPONIN QUANT: CPT | Performed by: STUDENT IN AN ORGANIZED HEALTH CARE EDUCATION/TRAINING PROGRAM

## 2022-01-28 PROCEDURE — 10002805 HB CONTRAST AGENT: Performed by: INTERNAL MEDICINE

## 2022-01-28 PROCEDURE — C1769 GUIDE WIRE: HCPCS | Performed by: INTERNAL MEDICINE

## 2022-01-28 PROCEDURE — 10002800 HB RX 250 W HCPCS: Performed by: STUDENT IN AN ORGANIZED HEALTH CARE EDUCATION/TRAINING PROGRAM

## 2022-01-28 PROCEDURE — 10003445 HB TELEMETRY PER DAY

## 2022-01-28 PROCEDURE — 10002800 HB RX 250 W HCPCS: Performed by: INTERNAL MEDICINE

## 2022-01-28 PROCEDURE — 99223 1ST HOSP IP/OBS HIGH 75: CPT | Performed by: INTERNAL MEDICINE

## 2022-01-28 PROCEDURE — 80048 BASIC METABOLIC PNL TOTAL CA: CPT | Performed by: STUDENT IN AN ORGANIZED HEALTH CARE EDUCATION/TRAINING PROGRAM

## 2022-01-28 PROCEDURE — 10004370 HB CARDIAC CATH: Performed by: INTERNAL MEDICINE

## 2022-01-28 PROCEDURE — C1887 CATHETER, GUIDING: HCPCS | Performed by: INTERNAL MEDICINE

## 2022-01-28 PROCEDURE — 10002801 HB RX 250 W/O HCPCS: Performed by: INTERNAL MEDICINE

## 2022-01-28 PROCEDURE — 10002803 HB RX 637: Performed by: STUDENT IN AN ORGANIZED HEALTH CARE EDUCATION/TRAINING PROGRAM

## 2022-01-28 PROCEDURE — 10004351 HB COUNTER-CATH LAB CASE: Performed by: INTERNAL MEDICINE

## 2022-01-28 PROCEDURE — C1894 INTRO/SHEATH, NON-LASER: HCPCS | Performed by: INTERNAL MEDICINE

## 2022-01-28 PROCEDURE — 85027 COMPLETE CBC AUTOMATED: CPT | Performed by: STUDENT IN AN ORGANIZED HEALTH CARE EDUCATION/TRAINING PROGRAM

## 2022-01-28 PROCEDURE — 93458 L HRT ARTERY/VENTRICLE ANGIO: CPT | Performed by: INTERNAL MEDICINE

## 2022-01-28 PROCEDURE — 10002803 HB RX 637: Performed by: INTERNAL MEDICINE

## 2022-01-28 PROCEDURE — 99152 MOD SED SAME PHYS/QHP 5/>YRS: CPT | Performed by: INTERNAL MEDICINE

## 2022-01-28 PROCEDURE — 83735 ASSAY OF MAGNESIUM: CPT | Performed by: STUDENT IN AN ORGANIZED HEALTH CARE EDUCATION/TRAINING PROGRAM

## 2022-01-28 PROCEDURE — B2151ZZ FLUOROSCOPY OF LEFT HEART USING LOW OSMOLAR CONTRAST: ICD-10-PCS | Performed by: INTERNAL MEDICINE

## 2022-01-28 PROCEDURE — 10006027 HB SUPPLY 278: Performed by: INTERNAL MEDICINE

## 2022-01-28 PROCEDURE — 027135Z DILATION OF CORONARY ARTERY, TWO ARTERIES WITH TWO DRUG-ELUTING INTRALUMINAL DEVICES, PERCUTANEOUS APPROACH: ICD-10-PCS | Performed by: INTERNAL MEDICINE

## 2022-01-28 PROCEDURE — B2111ZZ FLUOROSCOPY OF MULTIPLE CORONARY ARTERIES USING LOW OSMOLAR CONTRAST: ICD-10-PCS | Performed by: INTERNAL MEDICINE

## 2022-01-28 PROCEDURE — 93306 TTE W/DOPPLER COMPLETE: CPT | Performed by: INTERNAL MEDICINE

## 2022-01-28 PROCEDURE — 4A023N7 MEASUREMENT OF CARDIAC SAMPLING AND PRESSURE, LEFT HEART, PERCUTANEOUS APPROACH: ICD-10-PCS | Performed by: INTERNAL MEDICINE

## 2022-01-28 PROCEDURE — 82962 GLUCOSE BLOOD TEST: CPT

## 2022-01-28 PROCEDURE — 10002801 HB RX 250 W/O HCPCS: Performed by: STUDENT IN AN ORGANIZED HEALTH CARE EDUCATION/TRAINING PROGRAM

## 2022-01-28 PROCEDURE — 10002807 HB RX 258: Performed by: INTERNAL MEDICINE

## 2022-01-28 PROCEDURE — 99233 SBSQ HOSP IP/OBS HIGH 50: CPT | Performed by: STUDENT IN AN ORGANIZED HEALTH CARE EDUCATION/TRAINING PROGRAM

## 2022-01-28 PROCEDURE — 10004399 HB STENT INSERTION CARDIAC: Performed by: INTERNAL MEDICINE

## 2022-01-28 PROCEDURE — 85730 THROMBOPLASTIN TIME PARTIAL: CPT

## 2022-01-28 PROCEDURE — C1725 CATH, TRANSLUMIN NON-LASER: HCPCS | Performed by: INTERNAL MEDICINE

## 2022-01-28 PROCEDURE — 93356 MYOCRD STRAIN IMG SPCKL TRCK: CPT | Performed by: INTERNAL MEDICINE

## 2022-01-28 PROCEDURE — 92928 PRQ TCAT PLMT NTRAC ST 1 LES: CPT | Performed by: INTERNAL MEDICINE

## 2022-01-28 PROCEDURE — 93010 ELECTROCARDIOGRAM REPORT: CPT | Performed by: INTERNAL MEDICINE

## 2022-01-28 PROCEDURE — C1874 STENT, COATED/COV W/DEL SYS: HCPCS | Performed by: INTERNAL MEDICINE

## 2022-01-28 PROCEDURE — 10004651 HB RX, NO CHARGE ITEM: Performed by: INTERNAL MEDICINE

## 2022-01-28 DEVICE — STENT RONYX30022UX RESOLUTE ONYX 3.00X22
Type: IMPLANTABLE DEVICE | Site: CIRCUMFLEX CORONARY ARTERY | Status: FUNCTIONAL
Brand: RESOLUTE ONYX™

## 2022-01-28 DEVICE — STENT RONYX30026UX RESOLUTE ONYX 3.00X26
Type: IMPLANTABLE DEVICE | Site: LEFT ANTERIOR DESCENDING ARTERY | Status: FUNCTIONAL
Brand: RESOLUTE ONYX™

## 2022-01-28 RX ORDER — CLONIDINE HYDROCHLORIDE 0.1 MG/1
0.1 TABLET ORAL EVERY 4 HOURS PRN
Status: ACTIVE | OUTPATIENT
Start: 2022-01-28 | End: 2022-01-29

## 2022-01-28 RX ORDER — MIDAZOLAM HYDROCHLORIDE 1 MG/ML
1 INJECTION, SOLUTION INTRAMUSCULAR; INTRAVENOUS PRN
Status: DISPENSED | OUTPATIENT
Start: 2022-01-28 | End: 2022-01-29

## 2022-01-28 RX ORDER — HYDRALAZINE HYDROCHLORIDE 20 MG/ML
10 INJECTION INTRAMUSCULAR; INTRAVENOUS EVERY 4 HOURS PRN
Status: DISCONTINUED | OUTPATIENT
Start: 2022-01-28 | End: 2022-01-28 | Stop reason: HOSPADM

## 2022-01-28 RX ORDER — ASPIRIN 81 MG/1
81 TABLET, CHEWABLE ORAL DAILY
Status: DISCONTINUED | OUTPATIENT
Start: 2022-01-28 | End: 2022-02-03 | Stop reason: HOSPADM

## 2022-01-28 RX ORDER — ACETAMINOPHEN 650 MG/1
650 SUPPOSITORY RECTAL EVERY 4 HOURS PRN
Status: DISCONTINUED | OUTPATIENT
Start: 2022-01-28 | End: 2022-02-03 | Stop reason: HOSPADM

## 2022-01-28 RX ORDER — NICOTINE POLACRILEX 4 MG
30 LOZENGE BUCCAL PRN
Status: DISCONTINUED | OUTPATIENT
Start: 2022-01-28 | End: 2022-02-03 | Stop reason: HOSPADM

## 2022-01-28 RX ORDER — MIDAZOLAM HYDROCHLORIDE 1 MG/ML
INJECTION, SOLUTION INTRAMUSCULAR; INTRAVENOUS PRN
Status: DISCONTINUED | OUTPATIENT
Start: 2022-01-28 | End: 2022-01-28 | Stop reason: HOSPADM

## 2022-01-28 RX ORDER — HYDRALAZINE HYDROCHLORIDE 20 MG/ML
10 INJECTION INTRAMUSCULAR; INTRAVENOUS EVERY 4 HOURS PRN
Status: ACTIVE | OUTPATIENT
Start: 2022-01-28 | End: 2022-01-29

## 2022-01-28 RX ORDER — NICOTINE POLACRILEX 4 MG
15 LOZENGE BUCCAL PRN
Status: DISCONTINUED | OUTPATIENT
Start: 2022-01-28 | End: 2022-02-03 | Stop reason: HOSPADM

## 2022-01-28 RX ORDER — ISOSORBIDE MONONITRATE 30 MG/1
30 TABLET, EXTENDED RELEASE ORAL DAILY
Status: DISCONTINUED | OUTPATIENT
Start: 2022-01-28 | End: 2022-01-29

## 2022-01-28 RX ORDER — MIDAZOLAM HYDROCHLORIDE 1 MG/ML
1 INJECTION, SOLUTION INTRAMUSCULAR; INTRAVENOUS PRN
Status: DISCONTINUED | OUTPATIENT
Start: 2022-01-28 | End: 2022-01-28 | Stop reason: SDUPTHER

## 2022-01-28 RX ORDER — NITROGLYCERIN 0.4 MG/1
0.4 TABLET SUBLINGUAL EVERY 5 MIN PRN
Status: DISCONTINUED | OUTPATIENT
Start: 2022-01-28 | End: 2022-01-28 | Stop reason: SDUPTHER

## 2022-01-28 RX ORDER — LIDOCAINE HYDROCHLORIDE 20 MG/ML
INJECTION, SOLUTION EPIDURAL; INFILTRATION; INTRACAUDAL; PERINEURAL PRN
Status: DISCONTINUED | OUTPATIENT
Start: 2022-01-28 | End: 2022-01-28 | Stop reason: HOSPADM

## 2022-01-28 RX ORDER — HYDROCODONE BITARTRATE AND ACETAMINOPHEN 5; 325 MG/1; MG/1
1 TABLET ORAL EVERY 4 HOURS PRN
Status: DISCONTINUED | OUTPATIENT
Start: 2022-01-28 | End: 2022-02-03 | Stop reason: HOSPADM

## 2022-01-28 RX ORDER — ASPIRIN 325 MG
325 TABLET ORAL ONCE
Status: COMPLETED | OUTPATIENT
Start: 2022-01-28 | End: 2022-01-28

## 2022-01-28 RX ORDER — SODIUM CHLORIDE 9 MG/ML
INJECTION, SOLUTION INTRAVENOUS CONTINUOUS
Status: DISCONTINUED | OUTPATIENT
Start: 2022-01-28 | End: 2022-01-29

## 2022-01-28 RX ORDER — HEPARIN SODIUM 1000 [USP'U]/ML
INJECTION, SOLUTION INTRAVENOUS; SUBCUTANEOUS PRN
Status: DISCONTINUED | OUTPATIENT
Start: 2022-01-28 | End: 2022-01-28 | Stop reason: HOSPADM

## 2022-01-28 RX ORDER — HYDRALAZINE HYDROCHLORIDE 10 MG/1
10 TABLET, FILM COATED ORAL EVERY 6 HOURS SCHEDULED
Status: DISCONTINUED | OUTPATIENT
Start: 2022-01-28 | End: 2022-01-29

## 2022-01-28 RX ORDER — CLONIDINE HYDROCHLORIDE 0.1 MG/1
0.1 TABLET ORAL EVERY 4 HOURS PRN
Status: DISCONTINUED | OUTPATIENT
Start: 2022-01-28 | End: 2022-01-28 | Stop reason: HOSPADM

## 2022-01-28 RX ORDER — HEPARIN SODIUM 10000 [USP'U]/100ML
1-30 INJECTION, SOLUTION INTRAVENOUS CONTINUOUS
Status: DISCONTINUED | OUTPATIENT
Start: 2022-01-29 | End: 2022-01-29 | Stop reason: ALTCHOICE

## 2022-01-28 RX ORDER — HEPARIN SODIUM 200 [USP'U]/100ML
INJECTION, SOLUTION INTRAVENOUS PRN
Status: DISCONTINUED | OUTPATIENT
Start: 2022-01-28 | End: 2022-01-28 | Stop reason: HOSPADM

## 2022-01-28 RX ORDER — NITROGLYCERIN 0.4 MG/1
0.4 TABLET SUBLINGUAL EVERY 5 MIN PRN
Status: ACTIVE | OUTPATIENT
Start: 2022-01-28 | End: 2022-01-29

## 2022-01-28 RX ORDER — CLOPIDOGREL BISULFATE 75 MG/1
TABLET ORAL PRN
Status: DISCONTINUED | OUTPATIENT
Start: 2022-01-28 | End: 2022-01-28 | Stop reason: HOSPADM

## 2022-01-28 RX ORDER — DEXTROSE MONOHYDRATE 25 G/50ML
25 INJECTION, SOLUTION INTRAVENOUS PRN
Status: DISCONTINUED | OUTPATIENT
Start: 2022-01-28 | End: 2022-02-03 | Stop reason: HOSPADM

## 2022-01-28 RX ORDER — ACETAMINOPHEN 325 MG/1
650 TABLET ORAL EVERY 4 HOURS PRN
Status: DISCONTINUED | OUTPATIENT
Start: 2022-01-28 | End: 2022-02-03 | Stop reason: HOSPADM

## 2022-01-28 RX ORDER — TORSEMIDE 20 MG/1
20 TABLET ORAL DAILY
Status: DISCONTINUED | OUTPATIENT
Start: 2022-01-29 | End: 2022-01-29

## 2022-01-28 RX ORDER — LIDOCAINE HYDROCHLORIDE 10 MG/ML
1 INJECTION, SOLUTION INFILTRATION; PERINEURAL PRN
Status: DISCONTINUED | OUTPATIENT
Start: 2022-01-28 | End: 2022-01-28 | Stop reason: SDUPTHER

## 2022-01-28 RX ORDER — LIDOCAINE HYDROCHLORIDE 10 MG/ML
1 INJECTION, SOLUTION INFILTRATION; PERINEURAL PRN
Status: ACTIVE | OUTPATIENT
Start: 2022-01-28 | End: 2022-01-29

## 2022-01-28 RX ORDER — DEXTROSE MONOHYDRATE 25 G/50ML
12.5 INJECTION, SOLUTION INTRAVENOUS PRN
Status: DISCONTINUED | OUTPATIENT
Start: 2022-01-28 | End: 2022-02-03 | Stop reason: HOSPADM

## 2022-01-28 RX ORDER — SODIUM CHLORIDE 9 MG/ML
INJECTION, SOLUTION INTRAVENOUS CONTINUOUS
Status: DISCONTINUED | OUTPATIENT
Start: 2022-01-28 | End: 2022-01-28

## 2022-01-28 RX ORDER — VERAPAMIL HYDROCHLORIDE 2.5 MG/ML
INJECTION, SOLUTION INTRAVENOUS PRN
Status: DISCONTINUED | OUTPATIENT
Start: 2022-01-28 | End: 2022-01-28 | Stop reason: HOSPADM

## 2022-01-28 RX ADMIN — SODIUM CHLORIDE, PRESERVATIVE FREE 2 ML: 5 INJECTION INTRAVENOUS at 10:38

## 2022-01-28 RX ADMIN — SODIUM CHLORIDE: 9 INJECTION, SOLUTION INTRAVENOUS at 12:41

## 2022-01-28 RX ADMIN — HYDRALAZINE HYDROCHLORIDE 10 MG: 10 TABLET, FILM COATED ORAL at 18:00

## 2022-01-28 RX ADMIN — LEVOTHYROXINE SODIUM 100 MCG: 0.05 TABLET ORAL at 10:36

## 2022-01-28 RX ADMIN — CLOPIDOGREL BISULFATE 75 MG: 75 TABLET, FILM COATED ORAL at 10:36

## 2022-01-28 RX ADMIN — ISOSORBIDE MONONITRATE 30 MG: 30 TABLET, EXTENDED RELEASE ORAL at 18:00

## 2022-01-28 RX ADMIN — HEPARIN SODIUM 2000 UNITS: 1000 INJECTION, SOLUTION INTRAVENOUS; SUBCUTANEOUS at 03:47

## 2022-01-28 RX ADMIN — DESMOPRESSIN ACETATE 40 MG: 0.2 TABLET ORAL at 21:13

## 2022-01-28 RX ADMIN — MIDAZOLAM 1 MG: 1 INJECTION INTRAMUSCULAR; INTRAVENOUS at 21:32

## 2022-01-28 RX ADMIN — MONTELUKAST SODIUM 10 MG: 10 TABLET, FILM COATED ORAL at 21:13

## 2022-01-28 RX ADMIN — METOPROLOL SUCCINATE 25 MG: 25 TABLET, EXTENDED RELEASE ORAL at 21:13

## 2022-01-28 RX ADMIN — ASPIRIN 325 MG ORAL TABLET 325 MG: 325 PILL ORAL at 10:36

## 2022-01-28 RX ADMIN — HYDRALAZINE HYDROCHLORIDE 10 MG: 10 TABLET, FILM COATED ORAL at 10:36

## 2022-01-28 RX ADMIN — PANTOPRAZOLE SODIUM 40 MG: 40 TABLET, DELAYED RELEASE ORAL at 10:36

## 2022-01-28 ASSESSMENT — PAIN SCALES - GENERAL
PAINLEVEL_OUTOF10: 0

## 2022-01-28 ASSESSMENT — NEW YORK HEART ASSOCIATION (NYHA) CLASSIFICATION
NYHA FUNCTIONAL CLASS: III
NYHA FUNCTIONAL CLASS: III
NYHA FUNCTIONAL CLASS: NOT CLASS IV

## 2022-01-29 ENCOUNTER — APPOINTMENT (OUTPATIENT)
Dept: GENERAL RADIOLOGY | Age: 87
DRG: 246 | End: 2022-01-29
Attending: INTERNAL MEDICINE

## 2022-01-29 LAB
ALBUMIN SERPL-MCNC: 3.3 G/DL (ref 3.6–5.1)
ALBUMIN/GLOB SERPL: 1 {RATIO} (ref 1–2.4)
ALP SERPL-CCNC: 92 UNITS/L (ref 45–117)
ALT SERPL-CCNC: 17 UNITS/L
ANION GAP SERPL CALC-SCNC: 11 MMOL/L (ref 10–20)
ANION GAP SERPL CALC-SCNC: 9 MMOL/L (ref 10–20)
APTT PPP: 26 SEC (ref 22–30)
AST SERPL-CCNC: 23 UNITS/L
ATRIAL RATE (BPM): 131
BILIRUB SERPL-MCNC: 0.8 MG/DL (ref 0.2–1)
BUN SERPL-MCNC: 19 MG/DL (ref 6–20)
BUN SERPL-MCNC: 19 MG/DL (ref 6–20)
BUN/CREAT SERPL: 12 (ref 7–25)
BUN/CREAT SERPL: 13 (ref 7–25)
CALCIUM SERPL-MCNC: 8.8 MG/DL (ref 8.4–10.2)
CALCIUM SERPL-MCNC: 9 MG/DL (ref 8.4–10.2)
CHLORIDE SERPL-SCNC: 103 MMOL/L (ref 98–107)
CHLORIDE SERPL-SCNC: 106 MMOL/L (ref 98–107)
CO2 SERPL-SCNC: 24 MMOL/L (ref 21–32)
CO2 SERPL-SCNC: 29 MMOL/L (ref 21–32)
CREAT SERPL-MCNC: 1.42 MG/DL (ref 0.51–0.95)
CREAT SERPL-MCNC: 1.54 MG/DL (ref 0.51–0.95)
DEPRECATED RDW RBC: 46.1 FL (ref 39–50)
DEPRECATED RDW RBC: 46.4 FL (ref 39–50)
ERYTHROCYTE [DISTWIDTH] IN BLOOD: 14.5 % (ref 11–15)
ERYTHROCYTE [DISTWIDTH] IN BLOOD: 14.6 % (ref 11–15)
FASTING DURATION TIME PATIENT: ABNORMAL H
FASTING DURATION TIME PATIENT: ABNORMAL H
GFR SERPLBLD BASED ON 1.73 SQ M-ARVRAT: 31 ML/MIN
GFR SERPLBLD BASED ON 1.73 SQ M-ARVRAT: 34 ML/MIN
GLOBULIN SER-MCNC: 3.3 G/DL (ref 2–4)
GLUCOSE BLDC GLUCOMTR-MCNC: 117 MG/DL (ref 70–99)
GLUCOSE BLDC GLUCOMTR-MCNC: 127 MG/DL (ref 70–99)
GLUCOSE BLDC GLUCOMTR-MCNC: 131 MG/DL (ref 70–99)
GLUCOSE BLDC GLUCOMTR-MCNC: 168 MG/DL (ref 70–99)
GLUCOSE SERPL-MCNC: 117 MG/DL (ref 70–99)
GLUCOSE SERPL-MCNC: 129 MG/DL (ref 70–99)
HCT VFR BLD CALC: 33.6 % (ref 36–46.5)
HCT VFR BLD CALC: 34.7 % (ref 36–46.5)
HGB BLD-MCNC: 10.2 G/DL (ref 12–15.5)
HGB BLD-MCNC: 9.9 G/DL (ref 12–15.5)
MCH RBC QN AUTO: 25.5 PG (ref 26–34)
MCH RBC QN AUTO: 25.5 PG (ref 26–34)
MCHC RBC AUTO-ENTMCNC: 29.4 G/DL (ref 32–36.5)
MCHC RBC AUTO-ENTMCNC: 29.5 G/DL (ref 32–36.5)
MCV RBC AUTO: 86.6 FL (ref 78–100)
MCV RBC AUTO: 86.8 FL (ref 78–100)
NRBC BLD MANUAL-RTO: 0 /100 WBC
NRBC BLD MANUAL-RTO: 0 /100 WBC
NT-PROBNP SERPL-MCNC: ABNORMAL PG/ML
PLATELET # BLD AUTO: 311 K/MCL (ref 140–450)
PLATELET # BLD AUTO: 332 K/MCL (ref 140–450)
POTASSIUM SERPL-SCNC: 3.6 MMOL/L (ref 3.4–5.1)
POTASSIUM SERPL-SCNC: 3.7 MMOL/L (ref 3.4–5.1)
PROT SERPL-MCNC: 6.6 G/DL (ref 6.4–8.2)
QRS-INTERVAL (MSEC): 84
QT-INTERVAL (MSEC): 340
QTC: 441
R AXIS (DEGREES): 1
RBC # BLD: 3.88 MIL/MCL (ref 4–5.2)
RBC # BLD: 4 MIL/MCL (ref 4–5.2)
REPORT TEXT: NORMAL
SODIUM SERPL-SCNC: 137 MMOL/L (ref 135–145)
SODIUM SERPL-SCNC: 137 MMOL/L (ref 135–145)
T AXIS (DEGREES): -130
VENTRICULAR RATE EKG/MIN (BPM): 101
WBC # BLD: 8.2 K/MCL (ref 4.2–11)
WBC # BLD: 9.5 K/MCL (ref 4.2–11)

## 2022-01-29 PROCEDURE — 83880 ASSAY OF NATRIURETIC PEPTIDE: CPT | Performed by: INTERNAL MEDICINE

## 2022-01-29 PROCEDURE — 71045 X-RAY EXAM CHEST 1 VIEW: CPT

## 2022-01-29 PROCEDURE — 10000002 HB ROOM CHARGE MED SURG

## 2022-01-29 PROCEDURE — 10004651 HB RX, NO CHARGE ITEM: Performed by: STUDENT IN AN ORGANIZED HEALTH CARE EDUCATION/TRAINING PROGRAM

## 2022-01-29 PROCEDURE — 85027 COMPLETE CBC AUTOMATED: CPT | Performed by: STUDENT IN AN ORGANIZED HEALTH CARE EDUCATION/TRAINING PROGRAM

## 2022-01-29 PROCEDURE — 36415 COLL VENOUS BLD VENIPUNCTURE: CPT | Performed by: STUDENT IN AN ORGANIZED HEALTH CARE EDUCATION/TRAINING PROGRAM

## 2022-01-29 PROCEDURE — 85027 COMPLETE CBC AUTOMATED: CPT | Performed by: INTERNAL MEDICINE

## 2022-01-29 PROCEDURE — 10004651 HB RX, NO CHARGE ITEM: Performed by: INTERNAL MEDICINE

## 2022-01-29 PROCEDURE — 71045 X-RAY EXAM CHEST 1 VIEW: CPT | Performed by: RADIOLOGY

## 2022-01-29 PROCEDURE — 93010 ELECTROCARDIOGRAM REPORT: CPT | Performed by: INTERNAL MEDICINE

## 2022-01-29 PROCEDURE — 80048 BASIC METABOLIC PNL TOTAL CA: CPT | Performed by: STUDENT IN AN ORGANIZED HEALTH CARE EDUCATION/TRAINING PROGRAM

## 2022-01-29 PROCEDURE — 99233 SBSQ HOSP IP/OBS HIGH 50: CPT | Performed by: INTERNAL MEDICINE

## 2022-01-29 PROCEDURE — 10002800 HB RX 250 W HCPCS: Performed by: INTERNAL MEDICINE

## 2022-01-29 PROCEDURE — 93005 ELECTROCARDIOGRAM TRACING: CPT | Performed by: STUDENT IN AN ORGANIZED HEALTH CARE EDUCATION/TRAINING PROGRAM

## 2022-01-29 PROCEDURE — 10002803 HB RX 637: Performed by: INTERNAL MEDICINE

## 2022-01-29 PROCEDURE — 85730 THROMBOPLASTIN TIME PARTIAL: CPT | Performed by: STUDENT IN AN ORGANIZED HEALTH CARE EDUCATION/TRAINING PROGRAM

## 2022-01-29 PROCEDURE — 10003441 HB CARDIAC REHAB PHASE 1

## 2022-01-29 PROCEDURE — 10002803 HB RX 637: Performed by: STUDENT IN AN ORGANIZED HEALTH CARE EDUCATION/TRAINING PROGRAM

## 2022-01-29 PROCEDURE — 10004450 HB COUNTER-CARDIAC REHAB PHASE I

## 2022-01-29 PROCEDURE — 10002801 HB RX 250 W/O HCPCS: Performed by: STUDENT IN AN ORGANIZED HEALTH CARE EDUCATION/TRAINING PROGRAM

## 2022-01-29 PROCEDURE — 10003445 HB TELEMETRY PER DAY

## 2022-01-29 PROCEDURE — 80053 COMPREHEN METABOLIC PANEL: CPT | Performed by: INTERNAL MEDICINE

## 2022-01-29 PROCEDURE — 82962 GLUCOSE BLOOD TEST: CPT

## 2022-01-29 RX ORDER — METOPROLOL SUCCINATE 25 MG/1
25 TABLET, EXTENDED RELEASE ORAL 2 TIMES DAILY
Status: DISCONTINUED | OUTPATIENT
Start: 2022-01-29 | End: 2022-01-30

## 2022-01-29 RX ORDER — FUROSEMIDE 10 MG/ML
40 INJECTION INTRAMUSCULAR; INTRAVENOUS
Status: DISCONTINUED | OUTPATIENT
Start: 2022-01-30 | End: 2022-02-01

## 2022-01-29 RX ORDER — BENZONATATE 100 MG/1
100 CAPSULE ORAL 3 TIMES DAILY PRN
Status: DISCONTINUED | OUTPATIENT
Start: 2022-01-29 | End: 2022-02-03 | Stop reason: HOSPADM

## 2022-01-29 RX ORDER — HYDRALAZINE HYDROCHLORIDE 10 MG/1
10 TABLET, FILM COATED ORAL EVERY 8 HOURS SCHEDULED
Status: DISCONTINUED | OUTPATIENT
Start: 2022-01-29 | End: 2022-02-03 | Stop reason: HOSPADM

## 2022-01-29 RX ORDER — FUROSEMIDE 10 MG/ML
40 INJECTION INTRAMUSCULAR; INTRAVENOUS DAILY
Status: DISCONTINUED | OUTPATIENT
Start: 2022-01-29 | End: 2022-01-29

## 2022-01-29 RX ORDER — POTASSIUM CHLORIDE 20 MEQ/1
40 TABLET, EXTENDED RELEASE ORAL ONCE
Status: COMPLETED | OUTPATIENT
Start: 2022-01-29 | End: 2022-01-29

## 2022-01-29 RX ADMIN — LEVOTHYROXINE SODIUM 100 MCG: 0.05 TABLET ORAL at 08:44

## 2022-01-29 RX ADMIN — ISOSORBIDE MONONITRATE 30 MG: 30 TABLET, EXTENDED RELEASE ORAL at 08:44

## 2022-01-29 RX ADMIN — GUAIFENESIN AND DEXTROMETHORPHAN HYDROBROMIDE 2 TABLET: 600; 30 TABLET, EXTENDED RELEASE ORAL at 22:15

## 2022-01-29 RX ADMIN — METOPROLOL SUCCINATE 25 MG: 25 TABLET, EXTENDED RELEASE ORAL at 10:13

## 2022-01-29 RX ADMIN — DESMOPRESSIN ACETATE 40 MG: 0.2 TABLET ORAL at 22:16

## 2022-01-29 RX ADMIN — SODIUM CHLORIDE, PRESERVATIVE FREE 2 ML: 5 INJECTION INTRAVENOUS at 22:17

## 2022-01-29 RX ADMIN — SODIUM CHLORIDE, PRESERVATIVE FREE 2 ML: 5 INJECTION INTRAVENOUS at 08:43

## 2022-01-29 RX ADMIN — METOPROLOL SUCCINATE 25 MG: 25 TABLET, EXTENDED RELEASE ORAL at 22:15

## 2022-01-29 RX ADMIN — HYDRALAZINE HYDROCHLORIDE 10 MG: 10 TABLET, FILM COATED ORAL at 11:31

## 2022-01-29 RX ADMIN — APIXABAN 5 MG: 5 TABLET, FILM COATED ORAL at 08:48

## 2022-01-29 RX ADMIN — TORSEMIDE 20 MG: 20 TABLET ORAL at 10:10

## 2022-01-29 RX ADMIN — APIXABAN 5 MG: 5 TABLET, FILM COATED ORAL at 22:15

## 2022-01-29 RX ADMIN — POTASSIUM CHLORIDE 40 MEQ: 1500 TABLET, EXTENDED RELEASE ORAL at 10:10

## 2022-01-29 RX ADMIN — PANTOPRAZOLE SODIUM 40 MG: 40 TABLET, DELAYED RELEASE ORAL at 08:44

## 2022-01-29 RX ADMIN — FUROSEMIDE 40 MG: 10 INJECTION, SOLUTION INTRAMUSCULAR; INTRAVENOUS at 18:00

## 2022-01-29 RX ADMIN — HYDRALAZINE HYDROCHLORIDE 10 MG: 10 TABLET, FILM COATED ORAL at 07:00

## 2022-01-29 RX ADMIN — BENZONATATE 100 MG: 100 CAPSULE ORAL at 17:07

## 2022-01-29 RX ADMIN — CLOPIDOGREL BISULFATE 75 MG: 75 TABLET, FILM COATED ORAL at 08:44

## 2022-01-29 RX ADMIN — ALUMINUM HYDROXIDE, MAGNESIUM HYDROXIDE, SIMETHICONE 30 ML: 400; 400; 40 SUSPENSION ORAL at 17:07

## 2022-01-29 RX ADMIN — ASPIRIN 81 MG CHEWABLE TABLET 81 MG: 81 TABLET CHEWABLE at 08:44

## 2022-01-29 RX ADMIN — MONTELUKAST SODIUM 10 MG: 10 TABLET, FILM COATED ORAL at 22:14

## 2022-01-29 ASSESSMENT — PAIN SCALES - GENERAL
PAINLEVEL_OUTOF10: 0

## 2022-01-30 LAB
ANION GAP SERPL CALC-SCNC: 8 MMOL/L (ref 10–20)
APTT PPP: 29 SEC (ref 22–30)
BUN SERPL-MCNC: 19 MG/DL (ref 6–20)
BUN/CREAT SERPL: 13 (ref 7–25)
CALCIUM SERPL-MCNC: 8.9 MG/DL (ref 8.4–10.2)
CHLORIDE SERPL-SCNC: 103 MMOL/L (ref 98–107)
CO2 SERPL-SCNC: 29 MMOL/L (ref 21–32)
CREAT SERPL-MCNC: 1.51 MG/DL (ref 0.51–0.95)
DEPRECATED RDW RBC: 45.3 FL (ref 39–50)
ERYTHROCYTE [DISTWIDTH] IN BLOOD: 14.5 % (ref 11–15)
FASTING DURATION TIME PATIENT: ABNORMAL H
GFR SERPLBLD BASED ON 1.73 SQ M-ARVRAT: 31 ML/MIN
GLUCOSE BLDC GLUCOMTR-MCNC: 122 MG/DL (ref 70–99)
GLUCOSE BLDC GLUCOMTR-MCNC: 136 MG/DL (ref 70–99)
GLUCOSE BLDC GLUCOMTR-MCNC: 155 MG/DL (ref 70–99)
GLUCOSE BLDC GLUCOMTR-MCNC: 210 MG/DL (ref 70–99)
GLUCOSE SERPL-MCNC: 114 MG/DL (ref 70–99)
HCT VFR BLD CALC: 32.4 % (ref 36–46.5)
HGB BLD-MCNC: 9.5 G/DL (ref 12–15.5)
MCH RBC QN AUTO: 25.2 PG (ref 26–34)
MCHC RBC AUTO-ENTMCNC: 29.3 G/DL (ref 32–36.5)
MCV RBC AUTO: 85.9 FL (ref 78–100)
NRBC BLD MANUAL-RTO: 0 /100 WBC
PLATELET # BLD AUTO: 260 K/MCL (ref 140–450)
POTASSIUM SERPL-SCNC: 3.7 MMOL/L (ref 3.4–5.1)
POTASSIUM SERPL-SCNC: 4.1 MMOL/L (ref 3.4–5.1)
RBC # BLD: 3.77 MIL/MCL (ref 4–5.2)
SODIUM SERPL-SCNC: 136 MMOL/L (ref 135–145)
WBC # BLD: 6.3 K/MCL (ref 4.2–11)

## 2022-01-30 PROCEDURE — 10002803 HB RX 637: Performed by: INTERNAL MEDICINE

## 2022-01-30 PROCEDURE — 36415 COLL VENOUS BLD VENIPUNCTURE: CPT | Performed by: STUDENT IN AN ORGANIZED HEALTH CARE EDUCATION/TRAINING PROGRAM

## 2022-01-30 PROCEDURE — 82962 GLUCOSE BLOOD TEST: CPT

## 2022-01-30 PROCEDURE — 80048 BASIC METABOLIC PNL TOTAL CA: CPT | Performed by: STUDENT IN AN ORGANIZED HEALTH CARE EDUCATION/TRAINING PROGRAM

## 2022-01-30 PROCEDURE — 99232 SBSQ HOSP IP/OBS MODERATE 35: CPT | Performed by: INTERNAL MEDICINE

## 2022-01-30 PROCEDURE — 85027 COMPLETE CBC AUTOMATED: CPT | Performed by: STUDENT IN AN ORGANIZED HEALTH CARE EDUCATION/TRAINING PROGRAM

## 2022-01-30 PROCEDURE — 10002801 HB RX 250 W/O HCPCS: Performed by: STUDENT IN AN ORGANIZED HEALTH CARE EDUCATION/TRAINING PROGRAM

## 2022-01-30 PROCEDURE — 10002803 HB RX 637: Performed by: STUDENT IN AN ORGANIZED HEALTH CARE EDUCATION/TRAINING PROGRAM

## 2022-01-30 PROCEDURE — 85730 THROMBOPLASTIN TIME PARTIAL: CPT | Performed by: STUDENT IN AN ORGANIZED HEALTH CARE EDUCATION/TRAINING PROGRAM

## 2022-01-30 PROCEDURE — 10002800 HB RX 250 W HCPCS: Performed by: STUDENT IN AN ORGANIZED HEALTH CARE EDUCATION/TRAINING PROGRAM

## 2022-01-30 PROCEDURE — 10000002 HB ROOM CHARGE MED SURG

## 2022-01-30 PROCEDURE — 10004651 HB RX, NO CHARGE ITEM: Performed by: INTERNAL MEDICINE

## 2022-01-30 PROCEDURE — 84132 ASSAY OF SERUM POTASSIUM: CPT | Performed by: INTERNAL MEDICINE

## 2022-01-30 PROCEDURE — 10003441 HB CARDIAC REHAB PHASE 1

## 2022-01-30 PROCEDURE — 10003445 HB TELEMETRY PER DAY

## 2022-01-30 PROCEDURE — 10004651 HB RX, NO CHARGE ITEM: Performed by: STUDENT IN AN ORGANIZED HEALTH CARE EDUCATION/TRAINING PROGRAM

## 2022-01-30 PROCEDURE — 10002800 HB RX 250 W HCPCS: Performed by: INTERNAL MEDICINE

## 2022-01-30 RX ORDER — METOPROLOL SUCCINATE 50 MG/1
50 TABLET, EXTENDED RELEASE ORAL 2 TIMES DAILY
Status: DISCONTINUED | OUTPATIENT
Start: 2022-01-30 | End: 2022-02-03 | Stop reason: HOSPADM

## 2022-01-30 RX ORDER — POTASSIUM CHLORIDE 20 MEQ/1
40 TABLET, EXTENDED RELEASE ORAL ONCE
Status: COMPLETED | OUTPATIENT
Start: 2022-01-30 | End: 2022-01-30

## 2022-01-30 RX ADMIN — HYDRALAZINE HYDROCHLORIDE 10 MG: 10 TABLET, FILM COATED ORAL at 13:12

## 2022-01-30 RX ADMIN — DESMOPRESSIN ACETATE 40 MG: 0.2 TABLET ORAL at 20:38

## 2022-01-30 RX ADMIN — GUAIFENESIN AND DEXTROMETHORPHAN HYDROBROMIDE 2 TABLET: 600; 30 TABLET, EXTENDED RELEASE ORAL at 20:38

## 2022-01-30 RX ADMIN — LEVOTHYROXINE SODIUM 100 MCG: 0.05 TABLET ORAL at 09:09

## 2022-01-30 RX ADMIN — APIXABAN 5 MG: 5 TABLET, FILM COATED ORAL at 20:38

## 2022-01-30 RX ADMIN — SODIUM CHLORIDE, PRESERVATIVE FREE 2 ML: 5 INJECTION INTRAVENOUS at 20:38

## 2022-01-30 RX ADMIN — METOPROLOL SUCCINATE 25 MG: 25 TABLET, EXTENDED RELEASE ORAL at 09:09

## 2022-01-30 RX ADMIN — GUAIFENESIN AND DEXTROMETHORPHAN HYDROBROMIDE 2 TABLET: 600; 30 TABLET, EXTENDED RELEASE ORAL at 09:09

## 2022-01-30 RX ADMIN — HYDRALAZINE HYDROCHLORIDE 10 MG: 10 TABLET, FILM COATED ORAL at 07:00

## 2022-01-30 RX ADMIN — INSULIN LISPRO 1 UNITS: 100 INJECTION, SOLUTION INTRAVENOUS; SUBCUTANEOUS at 18:28

## 2022-01-30 RX ADMIN — PANTOPRAZOLE SODIUM 40 MG: 40 TABLET, DELAYED RELEASE ORAL at 20:39

## 2022-01-30 RX ADMIN — APIXABAN 5 MG: 5 TABLET, FILM COATED ORAL at 09:09

## 2022-01-30 RX ADMIN — CLOPIDOGREL BISULFATE 75 MG: 75 TABLET, FILM COATED ORAL at 09:09

## 2022-01-30 RX ADMIN — METOPROLOL SUCCINATE 50 MG: 50 TABLET, EXTENDED RELEASE ORAL at 20:38

## 2022-01-30 RX ADMIN — MONTELUKAST SODIUM 10 MG: 10 TABLET, FILM COATED ORAL at 20:38

## 2022-01-30 RX ADMIN — SODIUM CHLORIDE, PRESERVATIVE FREE 2 ML: 5 INJECTION INTRAVENOUS at 09:08

## 2022-01-30 RX ADMIN — PANTOPRAZOLE SODIUM 40 MG: 40 TABLET, DELAYED RELEASE ORAL at 09:09

## 2022-01-30 RX ADMIN — FUROSEMIDE 40 MG: 10 INJECTION, SOLUTION INTRAMUSCULAR; INTRAVENOUS at 09:08

## 2022-01-30 RX ADMIN — POTASSIUM CHLORIDE 40 MEQ: 1500 TABLET, EXTENDED RELEASE ORAL at 09:09

## 2022-01-30 RX ADMIN — HYDRALAZINE HYDROCHLORIDE 10 MG: 10 TABLET, FILM COATED ORAL at 20:39

## 2022-01-30 RX ADMIN — FUROSEMIDE 40 MG: 10 INJECTION, SOLUTION INTRAMUSCULAR; INTRAVENOUS at 18:20

## 2022-01-30 RX ADMIN — ASPIRIN 81 MG CHEWABLE TABLET 81 MG: 81 TABLET CHEWABLE at 09:09

## 2022-01-30 ASSESSMENT — PAIN SCALES - GENERAL
PAINLEVEL_OUTOF10: 0

## 2022-01-31 ENCOUNTER — TELEPHONE (OUTPATIENT)
Dept: CARDIOLOGY | Age: 87
End: 2022-01-31

## 2022-01-31 LAB
ANION GAP SERPL CALC-SCNC: 11 MMOL/L (ref 10–20)
APTT PPP: 30 SEC (ref 22–30)
BUN SERPL-MCNC: 28 MG/DL (ref 6–20)
BUN/CREAT SERPL: 17 (ref 7–25)
CALCIUM SERPL-MCNC: 8.9 MG/DL (ref 8.4–10.2)
CHLORIDE SERPL-SCNC: 102 MMOL/L (ref 98–107)
CO2 SERPL-SCNC: 27 MMOL/L (ref 21–32)
CREAT SERPL-MCNC: 1.66 MG/DL (ref 0.51–0.95)
DEPRECATED RDW RBC: 44.9 FL (ref 39–50)
ERYTHROCYTE [DISTWIDTH] IN BLOOD: 14.5 % (ref 11–15)
FASTING DURATION TIME PATIENT: ABNORMAL H
GFR SERPLBLD BASED ON 1.73 SQ M-ARVRAT: 28 ML/MIN
GLUCOSE BLDC GLUCOMTR-MCNC: 108 MG/DL (ref 70–99)
GLUCOSE BLDC GLUCOMTR-MCNC: 123 MG/DL (ref 70–99)
GLUCOSE BLDC GLUCOMTR-MCNC: 137 MG/DL (ref 70–99)
GLUCOSE BLDC GLUCOMTR-MCNC: 162 MG/DL (ref 70–99)
GLUCOSE BLDC GLUCOMTR-MCNC: 163 MG/DL (ref 70–99)
GLUCOSE SERPL-MCNC: 161 MG/DL (ref 70–99)
HCT VFR BLD CALC: 34.3 % (ref 36–46.5)
HGB BLD-MCNC: 10.4 G/DL (ref 12–15.5)
MAGNESIUM SERPL-MCNC: 1.9 MG/DL (ref 1.7–2.4)
MCH RBC QN AUTO: 25.9 PG (ref 26–34)
MCHC RBC AUTO-ENTMCNC: 30.3 G/DL (ref 32–36.5)
MCV RBC AUTO: 85.5 FL (ref 78–100)
NRBC BLD MANUAL-RTO: 0 /100 WBC
PLATELET # BLD AUTO: 310 K/MCL (ref 140–450)
POTASSIUM SERPL-SCNC: 4 MMOL/L (ref 3.4–5.1)
RBC # BLD: 4.01 MIL/MCL (ref 4–5.2)
SODIUM SERPL-SCNC: 136 MMOL/L (ref 135–145)
WBC # BLD: 8.7 K/MCL (ref 4.2–11)

## 2022-01-31 PROCEDURE — 10002803 HB RX 637: Performed by: INTERNAL MEDICINE

## 2022-01-31 PROCEDURE — 10002801 HB RX 250 W/O HCPCS: Performed by: STUDENT IN AN ORGANIZED HEALTH CARE EDUCATION/TRAINING PROGRAM

## 2022-01-31 PROCEDURE — 36415 COLL VENOUS BLD VENIPUNCTURE: CPT | Performed by: STUDENT IN AN ORGANIZED HEALTH CARE EDUCATION/TRAINING PROGRAM

## 2022-01-31 PROCEDURE — 97162 PT EVAL MOD COMPLEX 30 MIN: CPT

## 2022-01-31 PROCEDURE — 83735 ASSAY OF MAGNESIUM: CPT | Performed by: NURSE PRACTITIONER

## 2022-01-31 PROCEDURE — 85027 COMPLETE CBC AUTOMATED: CPT | Performed by: STUDENT IN AN ORGANIZED HEALTH CARE EDUCATION/TRAINING PROGRAM

## 2022-01-31 PROCEDURE — 10003445 HB TELEMETRY PER DAY

## 2022-01-31 PROCEDURE — 10004173 HB COUNTER-THERAPY VISIT PT

## 2022-01-31 PROCEDURE — 10004651 HB RX, NO CHARGE ITEM: Performed by: INTERNAL MEDICINE

## 2022-01-31 PROCEDURE — 80048 BASIC METABOLIC PNL TOTAL CA: CPT | Performed by: STUDENT IN AN ORGANIZED HEALTH CARE EDUCATION/TRAINING PROGRAM

## 2022-01-31 PROCEDURE — 10002800 HB RX 250 W HCPCS: Performed by: INTERNAL MEDICINE

## 2022-01-31 PROCEDURE — 97116 GAIT TRAINING THERAPY: CPT

## 2022-01-31 PROCEDURE — 99222 1ST HOSP IP/OBS MODERATE 55: CPT | Performed by: INTERNAL MEDICINE

## 2022-01-31 PROCEDURE — 85730 THROMBOPLASTIN TIME PARTIAL: CPT | Performed by: STUDENT IN AN ORGANIZED HEALTH CARE EDUCATION/TRAINING PROGRAM

## 2022-01-31 PROCEDURE — 97530 THERAPEUTIC ACTIVITIES: CPT

## 2022-01-31 PROCEDURE — 10002803 HB RX 637: Performed by: STUDENT IN AN ORGANIZED HEALTH CARE EDUCATION/TRAINING PROGRAM

## 2022-01-31 PROCEDURE — 10002800 HB RX 250 W HCPCS: Performed by: STUDENT IN AN ORGANIZED HEALTH CARE EDUCATION/TRAINING PROGRAM

## 2022-01-31 PROCEDURE — 10004651 HB RX, NO CHARGE ITEM: Performed by: STUDENT IN AN ORGANIZED HEALTH CARE EDUCATION/TRAINING PROGRAM

## 2022-01-31 PROCEDURE — 10003441 HB CARDIAC REHAB PHASE 1

## 2022-01-31 PROCEDURE — 10000002 HB ROOM CHARGE MED SURG

## 2022-01-31 PROCEDURE — 82962 GLUCOSE BLOOD TEST: CPT

## 2022-01-31 RX ORDER — CLOPIDOGREL BISULFATE 75 MG/1
75 TABLET ORAL DAILY
Qty: 30 TABLET | Refills: 1 | Status: SHIPPED | OUTPATIENT
Start: 2022-02-01 | End: 2022-02-03

## 2022-01-31 RX ORDER — METOPROLOL SUCCINATE 50 MG/1
50 TABLET, EXTENDED RELEASE ORAL 2 TIMES DAILY
Qty: 60 TABLET | Refills: 1 | Status: SHIPPED | OUTPATIENT
Start: 2022-01-31 | End: 2022-05-13

## 2022-01-31 RX ORDER — ASPIRIN 81 MG/1
81 TABLET, CHEWABLE ORAL DAILY
Qty: 30 TABLET | Refills: 1 | Status: SHIPPED | OUTPATIENT
Start: 2022-02-01 | End: 2022-02-02

## 2022-01-31 RX ADMIN — SODIUM CHLORIDE, PRESERVATIVE FREE 2 ML: 5 INJECTION INTRAVENOUS at 20:35

## 2022-01-31 RX ADMIN — BENZONATATE 100 MG: 100 CAPSULE ORAL at 05:03

## 2022-01-31 RX ADMIN — DESMOPRESSIN ACETATE 40 MG: 0.2 TABLET ORAL at 20:35

## 2022-01-31 RX ADMIN — LEVOTHYROXINE SODIUM 100 MCG: 0.05 TABLET ORAL at 08:35

## 2022-01-31 RX ADMIN — APIXABAN 5 MG: 5 TABLET, FILM COATED ORAL at 08:35

## 2022-01-31 RX ADMIN — INSULIN LISPRO 1 UNITS: 100 INJECTION, SOLUTION INTRAVENOUS; SUBCUTANEOUS at 18:37

## 2022-01-31 RX ADMIN — GUAIFENESIN AND DEXTROMETHORPHAN HYDROBROMIDE 2 TABLET: 600; 30 TABLET, EXTENDED RELEASE ORAL at 08:35

## 2022-01-31 RX ADMIN — FUROSEMIDE 40 MG: 10 INJECTION, SOLUTION INTRAMUSCULAR; INTRAVENOUS at 16:16

## 2022-01-31 RX ADMIN — PANTOPRAZOLE SODIUM 40 MG: 40 TABLET, DELAYED RELEASE ORAL at 08:35

## 2022-01-31 RX ADMIN — APIXABAN 5 MG: 5 TABLET, FILM COATED ORAL at 20:35

## 2022-01-31 RX ADMIN — MONTELUKAST SODIUM 10 MG: 10 TABLET, FILM COATED ORAL at 20:35

## 2022-01-31 RX ADMIN — ASPIRIN 81 MG CHEWABLE TABLET 81 MG: 81 TABLET CHEWABLE at 08:35

## 2022-01-31 RX ADMIN — PANTOPRAZOLE SODIUM 40 MG: 40 TABLET, DELAYED RELEASE ORAL at 20:35

## 2022-01-31 RX ADMIN — HYDRALAZINE HYDROCHLORIDE 10 MG: 10 TABLET, FILM COATED ORAL at 20:35

## 2022-01-31 RX ADMIN — HYDRALAZINE HYDROCHLORIDE 10 MG: 10 TABLET, FILM COATED ORAL at 05:03

## 2022-01-31 RX ADMIN — SODIUM CHLORIDE, PRESERVATIVE FREE 2 ML: 5 INJECTION INTRAVENOUS at 08:35

## 2022-01-31 RX ADMIN — HYDRALAZINE HYDROCHLORIDE 10 MG: 10 TABLET, FILM COATED ORAL at 14:05

## 2022-01-31 RX ADMIN — METOPROLOL SUCCINATE 50 MG: 50 TABLET, EXTENDED RELEASE ORAL at 20:35

## 2022-01-31 RX ADMIN — METOPROLOL SUCCINATE 50 MG: 50 TABLET, EXTENDED RELEASE ORAL at 08:35

## 2022-01-31 RX ADMIN — CLOPIDOGREL BISULFATE 75 MG: 75 TABLET, FILM COATED ORAL at 08:35

## 2022-01-31 RX ADMIN — GUAIFENESIN AND DEXTROMETHORPHAN HYDROBROMIDE 2 TABLET: 600; 30 TABLET, EXTENDED RELEASE ORAL at 20:35

## 2022-01-31 RX ADMIN — FUROSEMIDE 40 MG: 10 INJECTION, SOLUTION INTRAMUSCULAR; INTRAVENOUS at 08:35

## 2022-01-31 ASSESSMENT — ACTIVITIES OF DAILY LIVING (ADL)
GROOMING: MODIFIED INDEPENDENT
PRIOR_ADL_TOILETING: MODIFIED INDEPENDENT
PRIOR_ADL_BATHING: MODIFIED INDEPENDENT

## 2022-01-31 ASSESSMENT — COGNITIVE AND FUNCTIONAL STATUS - GENERAL
BASIC_MOBILITY_RAW_SCORE: 17
BASIC_MOBILITY_CONVERTED_SCORE: 39.67

## 2022-01-31 ASSESSMENT — PAIN SCALES - GENERAL
PAINLEVEL_OUTOF10: 0

## 2022-02-01 ENCOUNTER — CASE MANAGEMENT (OUTPATIENT)
Dept: CARE COORDINATION | Age: 87
End: 2022-02-01

## 2022-02-01 LAB
AMORPH SED URNS QL MICRO: PRESENT
ANION GAP SERPL CALC-SCNC: 9 MMOL/L (ref 10–20)
APPEARANCE UR: CLEAR
BACTERIA #/AREA URNS HPF: ABNORMAL /HPF
BILIRUB UR QL STRIP: NEGATIVE
BUN SERPL-MCNC: 26 MG/DL (ref 6–20)
BUN/CREAT SERPL: 17 (ref 7–25)
CALCIUM SERPL-MCNC: 9.4 MG/DL (ref 8.4–10.2)
CHLORIDE SERPL-SCNC: 103 MMOL/L (ref 98–107)
CO2 SERPL-SCNC: 28 MMOL/L (ref 21–32)
COLOR UR: YELLOW
CREAT SERPL-MCNC: 1.57 MG/DL (ref 0.51–0.95)
DEPRECATED RDW RBC: 45 FL (ref 39–50)
ERYTHROCYTE [DISTWIDTH] IN BLOOD: 14.6 % (ref 11–15)
FASTING DURATION TIME PATIENT: ABNORMAL H
GFR SERPLBLD BASED ON 1.73 SQ M-ARVRAT: 30 ML/MIN
GLUCOSE BLDC GLUCOMTR-MCNC: 110 MG/DL (ref 70–99)
GLUCOSE BLDC GLUCOMTR-MCNC: 123 MG/DL (ref 70–99)
GLUCOSE BLDC GLUCOMTR-MCNC: 153 MG/DL (ref 70–99)
GLUCOSE BLDC GLUCOMTR-MCNC: 178 MG/DL (ref 70–99)
GLUCOSE SERPL-MCNC: 115 MG/DL (ref 70–99)
GLUCOSE UR STRIP-MCNC: NEGATIVE MG/DL
HCT VFR BLD CALC: 34.6 % (ref 36–46.5)
HGB BLD-MCNC: 10.3 G/DL (ref 12–15.5)
HGB UR QL STRIP: ABNORMAL
HYALINE CASTS #/AREA URNS LPF: ABNORMAL /LPF
KETONES UR STRIP-MCNC: NEGATIVE MG/DL
LEUKOCYTE ESTERASE UR QL STRIP: ABNORMAL
MCH RBC QN AUTO: 25.1 PG (ref 26–34)
MCHC RBC AUTO-ENTMCNC: 29.8 G/DL (ref 32–36.5)
MCV RBC AUTO: 84.4 FL (ref 78–100)
NITRITE UR QL STRIP: NEGATIVE
NRBC BLD MANUAL-RTO: 0 /100 WBC
PH UR STRIP: 7 [PH] (ref 5–7)
PLATELET # BLD AUTO: 339 K/MCL (ref 140–450)
POTASSIUM SERPL-SCNC: 3.2 MMOL/L (ref 3.4–5.1)
PROT UR STRIP-MCNC: NEGATIVE MG/DL
RBC # BLD: 4.1 MIL/MCL (ref 4–5.2)
RBC #/AREA URNS HPF: ABNORMAL /HPF
SODIUM SERPL-SCNC: 137 MMOL/L (ref 135–145)
SP GR UR STRIP: 1.01 (ref 1–1.03)
SQUAMOUS #/AREA URNS HPF: ABNORMAL /HPF
UROBILINOGEN UR STRIP-MCNC: 0.2 MG/DL
WBC # BLD: 10 K/MCL (ref 4.2–11)
WBC #/AREA URNS HPF: ABNORMAL /HPF

## 2022-02-01 PROCEDURE — 10000002 HB ROOM CHARGE MED SURG

## 2022-02-01 PROCEDURE — 10002803 HB RX 637: Performed by: INTERNAL MEDICINE

## 2022-02-01 PROCEDURE — 10004651 HB RX, NO CHARGE ITEM: Performed by: INTERNAL MEDICINE

## 2022-02-01 PROCEDURE — 10002800 HB RX 250 W HCPCS: Performed by: STUDENT IN AN ORGANIZED HEALTH CARE EDUCATION/TRAINING PROGRAM

## 2022-02-01 PROCEDURE — 82962 GLUCOSE BLOOD TEST: CPT

## 2022-02-01 PROCEDURE — 85027 COMPLETE CBC AUTOMATED: CPT | Performed by: STUDENT IN AN ORGANIZED HEALTH CARE EDUCATION/TRAINING PROGRAM

## 2022-02-01 PROCEDURE — 36415 COLL VENOUS BLD VENIPUNCTURE: CPT | Performed by: STUDENT IN AN ORGANIZED HEALTH CARE EDUCATION/TRAINING PROGRAM

## 2022-02-01 PROCEDURE — 10004172 HB COUNTER-THERAPY VISIT OT

## 2022-02-01 PROCEDURE — 81001 URINALYSIS AUTO W/SCOPE: CPT | Performed by: INTERNAL MEDICINE

## 2022-02-01 PROCEDURE — 97166 OT EVAL MOD COMPLEX 45 MIN: CPT

## 2022-02-01 PROCEDURE — 10002800 HB RX 250 W HCPCS: Performed by: INTERNAL MEDICINE

## 2022-02-01 PROCEDURE — 10004173 HB COUNTER-THERAPY VISIT PT

## 2022-02-01 PROCEDURE — 99232 SBSQ HOSP IP/OBS MODERATE 35: CPT | Performed by: INTERNAL MEDICINE

## 2022-02-01 PROCEDURE — 10002801 HB RX 250 W/O HCPCS: Performed by: STUDENT IN AN ORGANIZED HEALTH CARE EDUCATION/TRAINING PROGRAM

## 2022-02-01 PROCEDURE — 10004651 HB RX, NO CHARGE ITEM: Performed by: STUDENT IN AN ORGANIZED HEALTH CARE EDUCATION/TRAINING PROGRAM

## 2022-02-01 PROCEDURE — 10003445 HB TELEMETRY PER DAY

## 2022-02-01 PROCEDURE — 97530 THERAPEUTIC ACTIVITIES: CPT

## 2022-02-01 PROCEDURE — 97535 SELF CARE MNGMENT TRAINING: CPT

## 2022-02-01 PROCEDURE — 97116 GAIT TRAINING THERAPY: CPT

## 2022-02-01 PROCEDURE — 80048 BASIC METABOLIC PNL TOTAL CA: CPT | Performed by: STUDENT IN AN ORGANIZED HEALTH CARE EDUCATION/TRAINING PROGRAM

## 2022-02-01 PROCEDURE — 87086 URINE CULTURE/COLONY COUNT: CPT | Performed by: INTERNAL MEDICINE

## 2022-02-01 PROCEDURE — 10002803 HB RX 637: Performed by: STUDENT IN AN ORGANIZED HEALTH CARE EDUCATION/TRAINING PROGRAM

## 2022-02-01 RX ORDER — CEFAZOLIN SODIUM/WATER 1 G/10 ML
1000 SYRINGE (ML) INTRAVENOUS DAILY
Status: DISCONTINUED | OUTPATIENT
Start: 2022-02-01 | End: 2022-02-03 | Stop reason: HOSPADM

## 2022-02-01 RX ORDER — POTASSIUM CHLORIDE 20 MEQ/1
40 TABLET, EXTENDED RELEASE ORAL ONCE
Status: COMPLETED | OUTPATIENT
Start: 2022-02-01 | End: 2022-02-01

## 2022-02-01 RX ADMIN — CLOPIDOGREL BISULFATE 75 MG: 75 TABLET, FILM COATED ORAL at 09:04

## 2022-02-01 RX ADMIN — HYDRALAZINE HYDROCHLORIDE 10 MG: 10 TABLET, FILM COATED ORAL at 22:55

## 2022-02-01 RX ADMIN — MONTELUKAST SODIUM 10 MG: 10 TABLET, FILM COATED ORAL at 20:32

## 2022-02-01 RX ADMIN — INSULIN LISPRO 1 UNITS: 100 INJECTION, SOLUTION INTRAVENOUS; SUBCUTANEOUS at 17:55

## 2022-02-01 RX ADMIN — SODIUM CHLORIDE, PRESERVATIVE FREE 2 ML: 5 INJECTION INTRAVENOUS at 09:10

## 2022-02-01 RX ADMIN — PANTOPRAZOLE SODIUM 40 MG: 40 TABLET, DELAYED RELEASE ORAL at 20:33

## 2022-02-01 RX ADMIN — DESMOPRESSIN ACETATE 40 MG: 0.2 TABLET ORAL at 20:33

## 2022-02-01 RX ADMIN — GUAIFENESIN AND DEXTROMETHORPHAN HYDROBROMIDE 2 TABLET: 600; 30 TABLET, EXTENDED RELEASE ORAL at 09:04

## 2022-02-01 RX ADMIN — ASPIRIN 81 MG CHEWABLE TABLET 81 MG: 81 TABLET CHEWABLE at 09:03

## 2022-02-01 RX ADMIN — LEVOTHYROXINE SODIUM 100 MCG: 0.05 TABLET ORAL at 09:05

## 2022-02-01 RX ADMIN — PANTOPRAZOLE SODIUM 40 MG: 40 TABLET, DELAYED RELEASE ORAL at 09:04

## 2022-02-01 RX ADMIN — METOPROLOL SUCCINATE 50 MG: 50 TABLET, EXTENDED RELEASE ORAL at 20:33

## 2022-02-01 RX ADMIN — HYDRALAZINE HYDROCHLORIDE 10 MG: 10 TABLET, FILM COATED ORAL at 13:35

## 2022-02-01 RX ADMIN — CEFTRIAXONE SODIUM 1000 MG: 10 INJECTION, POWDER, FOR SOLUTION INTRAVENOUS at 19:49

## 2022-02-01 RX ADMIN — POTASSIUM CHLORIDE 40 MEQ: 1500 TABLET, EXTENDED RELEASE ORAL at 09:05

## 2022-02-01 RX ADMIN — HYDRALAZINE HYDROCHLORIDE 10 MG: 10 TABLET, FILM COATED ORAL at 04:09

## 2022-02-01 RX ADMIN — SODIUM CHLORIDE, PRESERVATIVE FREE 2 ML: 5 INJECTION INTRAVENOUS at 20:33

## 2022-02-01 RX ADMIN — APIXABAN 5 MG: 5 TABLET, FILM COATED ORAL at 09:04

## 2022-02-01 RX ADMIN — APIXABAN 5 MG: 5 TABLET, FILM COATED ORAL at 20:33

## 2022-02-01 RX ADMIN — FUROSEMIDE 40 MG: 10 INJECTION, SOLUTION INTRAMUSCULAR; INTRAVENOUS at 09:10

## 2022-02-01 RX ADMIN — METOPROLOL SUCCINATE 50 MG: 50 TABLET, EXTENDED RELEASE ORAL at 09:04

## 2022-02-01 RX ADMIN — GUAIFENESIN AND DEXTROMETHORPHAN HYDROBROMIDE 2 TABLET: 600; 30 TABLET, EXTENDED RELEASE ORAL at 20:32

## 2022-02-01 ASSESSMENT — COGNITIVE AND FUNCTIONAL STATUS - GENERAL
HELP NEEDED FOR PERSONAL GROOMING: A LITTLE
HELP NEEDED DRESSING REGULAR LOWER BODY CLOTHING: A LITTLE
HELP NEEDED FOR BATHING: A LITTLE
BASIC_MOBILITY_CONVERTED_SCORE: 39.67
HELP NEEDED FOR TOILETING: A LITTLE
DAILY_ACTIVITY_RAW_SCORE: 19
HELP NEEDED DRESSING REGULAR UPPER BODY CLOTHING: A LITTLE
BASIC_MOBILITY_RAW_SCORE: 17
DAILY_ACTIVITY_CONVERTED_SCORE: 40.22

## 2022-02-01 ASSESSMENT — ACTIVITIES OF DAILY LIVING (ADL): HOME_MANAGEMENT_TIME_ENTRY: 15

## 2022-02-01 ASSESSMENT — PAIN SCALES - GENERAL
PAINLEVEL_OUTOF10: 0

## 2022-02-02 LAB
ANION GAP SERPL CALC-SCNC: 14 MMOL/L (ref 10–20)
BACTERIA UR CULT: NORMAL
BUN SERPL-MCNC: 27 MG/DL (ref 6–20)
BUN/CREAT SERPL: 19 (ref 7–25)
CALCIUM SERPL-MCNC: 9.4 MG/DL (ref 8.4–10.2)
CHLORIDE SERPL-SCNC: 103 MMOL/L (ref 98–107)
CO2 SERPL-SCNC: 24 MMOL/L (ref 21–32)
CREAT SERPL-MCNC: 1.45 MG/DL (ref 0.51–0.95)
DEPRECATED RDW RBC: 44.5 FL (ref 39–50)
ERYTHROCYTE [DISTWIDTH] IN BLOOD: 14.6 % (ref 11–15)
FASTING DURATION TIME PATIENT: ABNORMAL H
GFR SERPLBLD BASED ON 1.73 SQ M-ARVRAT: 33 ML/MIN
GLUCOSE BLDC GLUCOMTR-MCNC: 127 MG/DL (ref 70–99)
GLUCOSE BLDC GLUCOMTR-MCNC: 128 MG/DL (ref 70–99)
GLUCOSE BLDC GLUCOMTR-MCNC: 141 MG/DL (ref 70–99)
GLUCOSE BLDC GLUCOMTR-MCNC: 164 MG/DL (ref 70–99)
GLUCOSE SERPL-MCNC: 133 MG/DL (ref 70–99)
HCT VFR BLD CALC: 34.3 % (ref 36–46.5)
HGB BLD-MCNC: 10.3 G/DL (ref 12–15.5)
MCH RBC QN AUTO: 25 PG (ref 26–34)
MCHC RBC AUTO-ENTMCNC: 30 G/DL (ref 32–36.5)
MCV RBC AUTO: 83.3 FL (ref 78–100)
NRBC BLD MANUAL-RTO: 0 /100 WBC
NT-PROBNP SERPL-MCNC: 9961 PG/ML
PLATELET # BLD AUTO: 333 K/MCL (ref 140–450)
POTASSIUM SERPL-SCNC: 3.2 MMOL/L (ref 3.4–5.1)
RBC # BLD: 4.12 MIL/MCL (ref 4–5.2)
SODIUM SERPL-SCNC: 138 MMOL/L (ref 135–145)
WBC # BLD: 8.4 K/MCL (ref 4.2–11)

## 2022-02-02 PROCEDURE — 10002803 HB RX 637: Performed by: INTERNAL MEDICINE

## 2022-02-02 PROCEDURE — 10004651 HB RX, NO CHARGE ITEM: Performed by: INTERNAL MEDICINE

## 2022-02-02 PROCEDURE — 10002800 HB RX 250 W HCPCS: Performed by: INTERNAL MEDICINE

## 2022-02-02 PROCEDURE — 10002801 HB RX 250 W/O HCPCS: Performed by: STUDENT IN AN ORGANIZED HEALTH CARE EDUCATION/TRAINING PROGRAM

## 2022-02-02 PROCEDURE — 97116 GAIT TRAINING THERAPY: CPT

## 2022-02-02 PROCEDURE — 85027 COMPLETE CBC AUTOMATED: CPT | Performed by: STUDENT IN AN ORGANIZED HEALTH CARE EDUCATION/TRAINING PROGRAM

## 2022-02-02 PROCEDURE — 10003445 HB TELEMETRY PER DAY

## 2022-02-02 PROCEDURE — 10004281 HB COUNTER-STAFF TIME PER 15 MIN

## 2022-02-02 PROCEDURE — 99239 HOSP IP/OBS DSCHRG MGMT >30: CPT | Performed by: INTERNAL MEDICINE

## 2022-02-02 PROCEDURE — 10004651 HB RX, NO CHARGE ITEM: Performed by: STUDENT IN AN ORGANIZED HEALTH CARE EDUCATION/TRAINING PROGRAM

## 2022-02-02 PROCEDURE — 83880 ASSAY OF NATRIURETIC PEPTIDE: CPT | Performed by: NURSE PRACTITIONER

## 2022-02-02 PROCEDURE — 10002803 HB RX 637: Performed by: STUDENT IN AN ORGANIZED HEALTH CARE EDUCATION/TRAINING PROGRAM

## 2022-02-02 PROCEDURE — 97530 THERAPEUTIC ACTIVITIES: CPT

## 2022-02-02 PROCEDURE — 10000002 HB ROOM CHARGE MED SURG

## 2022-02-02 PROCEDURE — 10002800 HB RX 250 W HCPCS: Performed by: STUDENT IN AN ORGANIZED HEALTH CARE EDUCATION/TRAINING PROGRAM

## 2022-02-02 PROCEDURE — 36415 COLL VENOUS BLD VENIPUNCTURE: CPT | Performed by: STUDENT IN AN ORGANIZED HEALTH CARE EDUCATION/TRAINING PROGRAM

## 2022-02-02 PROCEDURE — 80048 BASIC METABOLIC PNL TOTAL CA: CPT | Performed by: STUDENT IN AN ORGANIZED HEALTH CARE EDUCATION/TRAINING PROGRAM

## 2022-02-02 PROCEDURE — 10004173 HB COUNTER-THERAPY VISIT PT

## 2022-02-02 PROCEDURE — 82962 GLUCOSE BLOOD TEST: CPT

## 2022-02-02 PROCEDURE — 10004172 HB COUNTER-THERAPY VISIT OT

## 2022-02-02 PROCEDURE — 97535 SELF CARE MNGMENT TRAINING: CPT

## 2022-02-02 RX ORDER — POTASSIUM CHLORIDE 20 MEQ/1
40 TABLET, EXTENDED RELEASE ORAL ONCE
Status: COMPLETED | OUTPATIENT
Start: 2022-02-02 | End: 2022-02-02

## 2022-02-02 RX ORDER — ATORVASTATIN CALCIUM 40 MG/1
40 TABLET, FILM COATED ORAL NIGHTLY
Qty: 30 TABLET | Refills: 1 | Status: SHIPPED | OUTPATIENT
Start: 2022-02-02 | End: 2023-01-17 | Stop reason: SDUPTHER

## 2022-02-02 RX ORDER — CEPHALEXIN 500 MG/1
500 CAPSULE ORAL 2 TIMES DAILY
Qty: 14 CAPSULE | Refills: 0 | Status: SHIPPED | OUTPATIENT
Start: 2022-02-02 | End: 2022-02-02 | Stop reason: SDUPTHER

## 2022-02-02 RX ORDER — ASPIRIN 81 MG/1
81 TABLET, CHEWABLE ORAL DAILY
Qty: 30 TABLET | Refills: 0 | Status: ON HOLD | OUTPATIENT
Start: 2022-02-02 | End: 2022-03-13 | Stop reason: HOSPADM

## 2022-02-02 RX ORDER — CEPHALEXIN 500 MG/1
500 CAPSULE ORAL 2 TIMES DAILY
Qty: 12 CAPSULE | Refills: 0 | Status: SHIPPED | OUTPATIENT
Start: 2022-02-02 | End: 2022-02-03 | Stop reason: SDUPTHER

## 2022-02-02 RX ADMIN — MONTELUKAST SODIUM 10 MG: 10 TABLET, FILM COATED ORAL at 20:42

## 2022-02-02 RX ADMIN — CLOPIDOGREL BISULFATE 75 MG: 75 TABLET, FILM COATED ORAL at 09:01

## 2022-02-02 RX ADMIN — METOPROLOL SUCCINATE 50 MG: 50 TABLET, EXTENDED RELEASE ORAL at 09:01

## 2022-02-02 RX ADMIN — APIXABAN 5 MG: 5 TABLET, FILM COATED ORAL at 09:01

## 2022-02-02 RX ADMIN — HYDRALAZINE HYDROCHLORIDE 10 MG: 10 TABLET, FILM COATED ORAL at 12:56

## 2022-02-02 RX ADMIN — APIXABAN 5 MG: 5 TABLET, FILM COATED ORAL at 20:40

## 2022-02-02 RX ADMIN — POTASSIUM CHLORIDE 40 MEQ: 1500 TABLET, EXTENDED RELEASE ORAL at 09:05

## 2022-02-02 RX ADMIN — CEFTRIAXONE SODIUM 1000 MG: 10 INJECTION, POWDER, FOR SOLUTION INTRAVENOUS at 09:01

## 2022-02-02 RX ADMIN — GUAIFENESIN AND DEXTROMETHORPHAN HYDROBROMIDE 2 TABLET: 600; 30 TABLET, EXTENDED RELEASE ORAL at 09:01

## 2022-02-02 RX ADMIN — PANTOPRAZOLE SODIUM 40 MG: 40 TABLET, DELAYED RELEASE ORAL at 09:01

## 2022-02-02 RX ADMIN — DESMOPRESSIN ACETATE 40 MG: 0.2 TABLET ORAL at 20:40

## 2022-02-02 RX ADMIN — PANTOPRAZOLE SODIUM 40 MG: 40 TABLET, DELAYED RELEASE ORAL at 20:41

## 2022-02-02 RX ADMIN — ASPIRIN 81 MG CHEWABLE TABLET 81 MG: 81 TABLET CHEWABLE at 09:01

## 2022-02-02 RX ADMIN — SODIUM CHLORIDE, PRESERVATIVE FREE 2 ML: 5 INJECTION INTRAVENOUS at 20:44

## 2022-02-02 RX ADMIN — LEVOTHYROXINE SODIUM 100 MCG: 0.05 TABLET ORAL at 09:01

## 2022-02-02 RX ADMIN — INSULIN LISPRO 1 UNITS: 100 INJECTION, SOLUTION INTRAVENOUS; SUBCUTANEOUS at 12:56

## 2022-02-02 RX ADMIN — SODIUM CHLORIDE, PRESERVATIVE FREE 2 ML: 5 INJECTION INTRAVENOUS at 09:03

## 2022-02-02 RX ADMIN — METOPROLOL SUCCINATE 50 MG: 50 TABLET, EXTENDED RELEASE ORAL at 22:11

## 2022-02-02 RX ADMIN — GUAIFENESIN AND DEXTROMETHORPHAN HYDROBROMIDE 2 TABLET: 600; 30 TABLET, EXTENDED RELEASE ORAL at 20:41

## 2022-02-02 RX ADMIN — HYDRALAZINE HYDROCHLORIDE 10 MG: 10 TABLET, FILM COATED ORAL at 06:15

## 2022-02-02 ASSESSMENT — COGNITIVE AND FUNCTIONAL STATUS - GENERAL
BASIC_MOBILITY_CONVERTED_SCORE: 39.67
HELP NEEDED FOR BATHING: A LITTLE
BASIC_MOBILITY_RAW_SCORE: 17
DAILY_ACTIVITY_RAW_SCORE: 22
DAILY_ACTIVITY_CONVERTED_SCORE: 47.10
HELP NEEDED DRESSING REGULAR LOWER BODY CLOTHING: A LITTLE

## 2022-02-02 ASSESSMENT — PAIN SCALES - GENERAL
PAINLEVEL_OUTOF10: 0

## 2022-02-02 ASSESSMENT — ACTIVITIES OF DAILY LIVING (ADL): HOME_MANAGEMENT_TIME_ENTRY: 30

## 2022-02-03 VITALS
DIASTOLIC BLOOD PRESSURE: 87 MMHG | HEIGHT: 61 IN | RESPIRATION RATE: 15 BRPM | SYSTOLIC BLOOD PRESSURE: 136 MMHG | TEMPERATURE: 98.3 F | BODY MASS INDEX: 28.18 KG/M2 | HEART RATE: 96 BPM | WEIGHT: 149.25 LBS | OXYGEN SATURATION: 96 %

## 2022-02-03 LAB
ANION GAP SERPL CALC-SCNC: 12 MMOL/L (ref 10–20)
BUN SERPL-MCNC: 27 MG/DL (ref 6–20)
BUN/CREAT SERPL: 21 (ref 7–25)
CALCIUM SERPL-MCNC: 9.2 MG/DL (ref 8.4–10.2)
CHLORIDE SERPL-SCNC: 105 MMOL/L (ref 98–107)
CO2 SERPL-SCNC: 26 MMOL/L (ref 21–32)
CREAT SERPL-MCNC: 1.29 MG/DL (ref 0.51–0.95)
DEPRECATED RDW RBC: 45.6 FL (ref 39–50)
ERYTHROCYTE [DISTWIDTH] IN BLOOD: 14.6 % (ref 11–15)
FASTING DURATION TIME PATIENT: ABNORMAL H
GFR SERPLBLD BASED ON 1.73 SQ M-ARVRAT: 38 ML/MIN
GLUCOSE BLDC GLUCOMTR-MCNC: 116 MG/DL (ref 70–99)
GLUCOSE BLDC GLUCOMTR-MCNC: 163 MG/DL (ref 70–99)
GLUCOSE SERPL-MCNC: 121 MG/DL (ref 70–99)
HCT VFR BLD CALC: 32.1 % (ref 36–46.5)
HGB BLD-MCNC: 9.6 G/DL (ref 12–15.5)
MCH RBC QN AUTO: 25.7 PG (ref 26–34)
MCHC RBC AUTO-ENTMCNC: 29.9 G/DL (ref 32–36.5)
MCV RBC AUTO: 86.1 FL (ref 78–100)
NRBC BLD MANUAL-RTO: 0 /100 WBC
PLATELET # BLD AUTO: 268 K/MCL (ref 140–450)
POTASSIUM SERPL-SCNC: 3.4 MMOL/L (ref 3.4–5.1)
RBC # BLD: 3.73 MIL/MCL (ref 4–5.2)
SODIUM SERPL-SCNC: 140 MMOL/L (ref 135–145)
WBC # BLD: 7.3 K/MCL (ref 4.2–11)

## 2022-02-03 PROCEDURE — 10002803 HB RX 637: Performed by: INTERNAL MEDICINE

## 2022-02-03 PROCEDURE — 10004651 HB RX, NO CHARGE ITEM: Performed by: STUDENT IN AN ORGANIZED HEALTH CARE EDUCATION/TRAINING PROGRAM

## 2022-02-03 PROCEDURE — 97530 THERAPEUTIC ACTIVITIES: CPT

## 2022-02-03 PROCEDURE — 10002800 HB RX 250 W HCPCS: Performed by: INTERNAL MEDICINE

## 2022-02-03 PROCEDURE — 36415 COLL VENOUS BLD VENIPUNCTURE: CPT | Performed by: STUDENT IN AN ORGANIZED HEALTH CARE EDUCATION/TRAINING PROGRAM

## 2022-02-03 PROCEDURE — 80048 BASIC METABOLIC PNL TOTAL CA: CPT | Performed by: STUDENT IN AN ORGANIZED HEALTH CARE EDUCATION/TRAINING PROGRAM

## 2022-02-03 PROCEDURE — 10004651 HB RX, NO CHARGE ITEM: Performed by: INTERNAL MEDICINE

## 2022-02-03 PROCEDURE — 10002803 HB RX 637: Performed by: STUDENT IN AN ORGANIZED HEALTH CARE EDUCATION/TRAINING PROGRAM

## 2022-02-03 PROCEDURE — 10004173 HB COUNTER-THERAPY VISIT PT

## 2022-02-03 PROCEDURE — 85027 COMPLETE CBC AUTOMATED: CPT | Performed by: STUDENT IN AN ORGANIZED HEALTH CARE EDUCATION/TRAINING PROGRAM

## 2022-02-03 PROCEDURE — 82962 GLUCOSE BLOOD TEST: CPT

## 2022-02-03 PROCEDURE — 10003445 HB TELEMETRY PER DAY

## 2022-02-03 PROCEDURE — 97116 GAIT TRAINING THERAPY: CPT

## 2022-02-03 PROCEDURE — 10002800 HB RX 250 W HCPCS: Performed by: STUDENT IN AN ORGANIZED HEALTH CARE EDUCATION/TRAINING PROGRAM

## 2022-02-03 RX ORDER — POTASSIUM CHLORIDE 20 MEQ/1
20 TABLET, EXTENDED RELEASE ORAL ONCE
Status: COMPLETED | OUTPATIENT
Start: 2022-02-03 | End: 2022-02-03

## 2022-02-03 RX ORDER — CLOPIDOGREL BISULFATE 75 MG/1
75 TABLET ORAL DAILY
Qty: 30 TABLET | Refills: 1 | Status: SHIPPED | OUTPATIENT
Start: 2022-02-04 | End: 2023-03-21 | Stop reason: SDUPTHER

## 2022-02-03 RX ORDER — FUROSEMIDE 40 MG/1
40 TABLET ORAL DAILY
Qty: 30 TABLET | Refills: 1 | Status: ON HOLD | OUTPATIENT
Start: 2022-02-03 | End: 2022-03-07

## 2022-02-03 RX ORDER — CEPHALEXIN 500 MG/1
500 CAPSULE ORAL 2 TIMES DAILY
Qty: 10 CAPSULE | Refills: 0 | Status: SHIPPED | OUTPATIENT
Start: 2022-02-03 | End: 2022-02-08

## 2022-02-03 RX ADMIN — CEFTRIAXONE SODIUM 1000 MG: 10 INJECTION, POWDER, FOR SOLUTION INTRAVENOUS at 08:13

## 2022-02-03 RX ADMIN — POTASSIUM CHLORIDE 20 MEQ: 1500 TABLET, EXTENDED RELEASE ORAL at 08:12

## 2022-02-03 RX ADMIN — ASPIRIN 81 MG CHEWABLE TABLET 81 MG: 81 TABLET CHEWABLE at 08:12

## 2022-02-03 RX ADMIN — HYDRALAZINE HYDROCHLORIDE 10 MG: 10 TABLET, FILM COATED ORAL at 05:10

## 2022-02-03 RX ADMIN — APIXABAN 5 MG: 5 TABLET, FILM COATED ORAL at 08:12

## 2022-02-03 RX ADMIN — CLOPIDOGREL BISULFATE 75 MG: 75 TABLET, FILM COATED ORAL at 08:12

## 2022-02-03 RX ADMIN — PANTOPRAZOLE SODIUM 40 MG: 40 TABLET, DELAYED RELEASE ORAL at 08:12

## 2022-02-03 RX ADMIN — INSULIN LISPRO 1 UNITS: 100 INJECTION, SOLUTION INTRAVENOUS; SUBCUTANEOUS at 12:40

## 2022-02-03 RX ADMIN — SODIUM CHLORIDE, PRESERVATIVE FREE 2 ML: 5 INJECTION INTRAVENOUS at 08:13

## 2022-02-03 RX ADMIN — METOPROLOL SUCCINATE 50 MG: 50 TABLET, EXTENDED RELEASE ORAL at 08:12

## 2022-02-03 RX ADMIN — GUAIFENESIN AND DEXTROMETHORPHAN HYDROBROMIDE 2 TABLET: 600; 30 TABLET, EXTENDED RELEASE ORAL at 08:12

## 2022-02-03 RX ADMIN — LEVOTHYROXINE SODIUM 100 MCG: 0.05 TABLET ORAL at 08:12

## 2022-02-03 ASSESSMENT — COGNITIVE AND FUNCTIONAL STATUS - GENERAL
BASIC_MOBILITY_CONVERTED_SCORE: 41.05
BASIC_MOBILITY_RAW_SCORE: 18

## 2022-02-03 ASSESSMENT — PAIN SCALES - GENERAL
PAINLEVEL_OUTOF10: 2
PAINLEVEL_OUTOF10: 0

## 2022-02-04 ENCOUNTER — TELEPHONE (OUTPATIENT)
Dept: FAMILY MEDICINE | Age: 87
End: 2022-02-04

## 2022-02-04 DIAGNOSIS — G47.13 RECURRENT HYPERSOMNIA: ICD-10-CM

## 2022-02-04 DIAGNOSIS — G47.30 HYPERSOMNIA WITH SLEEP APNEA: Primary | ICD-10-CM

## 2022-02-04 DIAGNOSIS — G47.10 HYPERSOMNIA WITH SLEEP APNEA: Primary | ICD-10-CM

## 2022-02-07 ENCOUNTER — CASE MANAGEMENT (OUTPATIENT)
Dept: CARE COORDINATION | Age: 87
End: 2022-02-07

## 2022-02-07 SDOH — SOCIAL STABILITY: SOCIAL NETWORK
HOW OFTEN DO YOU SEE OR TALK TO PEOPLE THAT YOU CARE ABOUT AND FEEL CLOSE TO? (FOR EXAMPLE: TALKING TO FRIENDS ON THE PHONE, VISITING FRIENDS OR FAMILY, GOING TO CHURCH OR CLUB MEETINGS): 5 OR MORE TIMES A WEEK

## 2022-02-07 SDOH — ECONOMIC STABILITY: TRANSPORTATION INSECURITY
IN THE PAST 12 MONTHS, HAS LACK OF TRANSPORTATION KEPT YOU FROM MEETINGS, WORK, OR FROM GETTING THINGS NEEDED FOR DAILY LIVING?: NO

## 2022-02-07 SDOH — HEALTH STABILITY: PHYSICAL HEALTH: ON AVERAGE, HOW MANY DAYS PER WEEK DO YOU ENGAGE IN MODERATE TO STRENUOUS EXERCISE (LIKE A BRISK WALK)?: 0 DAYS

## 2022-02-07 SDOH — ECONOMIC STABILITY: HOUSING INSECURITY: ARE YOU WORRIED ABOUT LOSING YOUR HOUSING?: NO

## 2022-02-07 SDOH — ECONOMIC STABILITY: FOOD INSECURITY: HOW OFTEN IN THE PAST 12 MONTHS WERE YOU WORRIED OR STRESSED ABOUT HAVING ENOUGH MONEY TO BUY NUTRITIOUS MEALS?: NEVER

## 2022-02-07 SDOH — ECONOMIC STABILITY: GENERAL

## 2022-02-07 SDOH — ECONOMIC STABILITY: TRANSPORTATION INSECURITY
IN THE PAST 12 MONTHS, HAS THE LACK OF TRANSPORTATION KEPT YOU FROM MEDICAL APPOINTMENTS OR FROM GETTING MEDICATIONS?: NO

## 2022-02-07 SDOH — HEALTH STABILITY: PHYSICAL HEALTH: ON AVERAGE, HOW MANY MINUTES DO YOU ENGAGE IN EXERCISE AT THIS LEVEL?: 0 MIN

## 2022-02-07 ASSESSMENT — SLEEP AND FATIGUE QUESTIONNAIRES: SLEEP DESCRIPTORS: SLEEPS IN RECLINER

## 2022-02-07 ASSESSMENT — ACTIVITIES OF DAILY LIVING (ADL)
FUNCTIONAL_EVALUATION: PERFORMS ADLS WITH ASSISTANCE
TOILETING: INDEPENDENT
GROOMING: INDEPENDENT
EATING: INDEPENDENT
AMBULATION: WITH DEVICE
DME_IN_USE: YES
DRIVING: TOTAL ASSIST
MOBILITY: GAIT IMPAIRMENT
DRESSING: INDEPENDENT
BATHING: INDEPENDENT

## 2022-02-10 ENCOUNTER — TELEPHONE (OUTPATIENT)
Dept: CARDIOLOGY | Age: 87
End: 2022-02-10

## 2022-02-10 ENCOUNTER — OFFICE VISIT (OUTPATIENT)
Dept: CARDIOLOGY | Age: 87
End: 2022-02-10

## 2022-02-10 VITALS
RESPIRATION RATE: 18 BRPM | WEIGHT: 156.31 LBS | BODY MASS INDEX: 29.51 KG/M2 | DIASTOLIC BLOOD PRESSURE: 80 MMHG | HEIGHT: 61 IN | SYSTOLIC BLOOD PRESSURE: 120 MMHG | OXYGEN SATURATION: 95 % | HEART RATE: 72 BPM

## 2022-02-10 DIAGNOSIS — I50.22 CHRONIC SYSTOLIC CONGESTIVE HEART FAILURE (CMD): ICD-10-CM

## 2022-02-10 DIAGNOSIS — R07.9 CHEST PAIN, UNSPECIFIED TYPE: ICD-10-CM

## 2022-02-10 DIAGNOSIS — R07.9 CHEST PAIN, UNSPECIFIED TYPE: Primary | ICD-10-CM

## 2022-02-10 PROCEDURE — 99214 OFFICE O/P EST MOD 30 MIN: CPT | Performed by: INTERNAL MEDICINE

## 2022-02-10 RX ORDER — METOLAZONE 2.5 MG/1
2.5 TABLET ORAL DAILY
Qty: 10 TABLET | Refills: 3 | Status: ON HOLD | OUTPATIENT
Start: 2022-02-10 | End: 2022-03-07

## 2022-02-10 RX ORDER — ISOSORBIDE MONONITRATE 30 MG/1
30 TABLET, EXTENDED RELEASE ORAL DAILY
Qty: 90 TABLET | Refills: 1 | Status: SHIPPED | OUTPATIENT
Start: 2022-02-10 | End: 2022-02-10 | Stop reason: SDUPTHER

## 2022-02-10 RX ORDER — NITROGLYCERIN 0.4 MG/1
0.4 TABLET SUBLINGUAL EVERY 5 MIN PRN
Qty: 25 TABLET | Refills: 3 | Status: SHIPPED | OUTPATIENT
Start: 2022-02-10 | End: 2022-02-10 | Stop reason: SDUPTHER

## 2022-02-10 RX ORDER — METOLAZONE 2.5 MG/1
2.5 TABLET ORAL DAILY
Qty: 10 TABLET | Refills: 3 | Status: SHIPPED | OUTPATIENT
Start: 2022-02-10 | End: 2022-02-10 | Stop reason: SDUPTHER

## 2022-02-10 RX ORDER — NITROGLYCERIN 0.4 MG/1
0.4 TABLET SUBLINGUAL EVERY 5 MIN PRN
Qty: 25 TABLET | Refills: 3 | Status: SHIPPED | OUTPATIENT
Start: 2022-02-10 | End: 2022-08-12

## 2022-02-10 RX ORDER — ISOSORBIDE MONONITRATE 30 MG/1
30 TABLET, EXTENDED RELEASE ORAL DAILY
Qty: 90 TABLET | Refills: 1 | Status: ON HOLD | OUTPATIENT
Start: 2022-02-10 | End: 2022-03-13 | Stop reason: HOSPADM

## 2022-02-11 ENCOUNTER — TELEPHONE (OUTPATIENT)
Dept: CARDIOLOGY | Age: 87
End: 2022-02-11

## 2022-02-14 ENCOUNTER — CASE MANAGEMENT (OUTPATIENT)
Dept: CARE COORDINATION | Age: 87
End: 2022-02-14

## 2022-02-14 ENCOUNTER — TELEPHONE (OUTPATIENT)
Dept: CARDIAC REHAB | Age: 87
End: 2022-02-14

## 2022-02-14 ENCOUNTER — TELEPHONE (OUTPATIENT)
Dept: FAMILY MEDICINE | Age: 87
End: 2022-02-14

## 2022-02-14 DIAGNOSIS — E11.22 CONTROLLED TYPE 2 DIABETES MELLITUS WITH STAGE 3 CHRONIC KIDNEY DISEASE, WITHOUT LONG-TERM CURRENT USE OF INSULIN (CMD): Primary | ICD-10-CM

## 2022-02-14 DIAGNOSIS — E03.8 SUBCLINICAL HYPOTHYROIDISM: ICD-10-CM

## 2022-02-14 DIAGNOSIS — J30.1 NON-SEASONAL ALLERGIC RHINITIS DUE TO POLLEN: ICD-10-CM

## 2022-02-14 DIAGNOSIS — N18.30 CONTROLLED TYPE 2 DIABETES MELLITUS WITH STAGE 3 CHRONIC KIDNEY DISEASE, WITHOUT LONG-TERM CURRENT USE OF INSULIN (CMD): Primary | ICD-10-CM

## 2022-02-14 RX ORDER — LEVOTHYROXINE SODIUM 0.05 MG/1
50 TABLET ORAL DAILY
Qty: 90 TABLET | Refills: 0 | Status: SHIPPED | OUTPATIENT
Start: 2022-02-14 | End: 2022-05-16

## 2022-02-14 RX ORDER — MONTELUKAST SODIUM 10 MG/1
TABLET ORAL
Qty: 90 TABLET | Refills: 0 | Status: SHIPPED | OUTPATIENT
Start: 2022-02-14 | End: 2022-05-16

## 2022-02-14 ASSESSMENT — SLEEP AND FATIGUE QUESTIONNAIRES: SLEEP DESCRIPTORS: SLEEPS IN RECLINER

## 2022-02-15 ENCOUNTER — HOME/GROUP HOME VISIT (OUTPATIENT)
Dept: SCHEDULING | Age: 87
End: 2022-02-15

## 2022-02-15 ENCOUNTER — OFFICE VISIT (OUTPATIENT)
Dept: FAMILY MEDICINE | Age: 87
End: 2022-02-15

## 2022-02-15 VITALS
TEMPERATURE: 98.6 F | HEART RATE: 117 BPM | HEIGHT: 61 IN | BODY MASS INDEX: 28.13 KG/M2 | SYSTOLIC BLOOD PRESSURE: 118 MMHG | WEIGHT: 149 LBS | DIASTOLIC BLOOD PRESSURE: 80 MMHG | OXYGEN SATURATION: 98 %

## 2022-02-15 DIAGNOSIS — Z09 HOSPITAL DISCHARGE FOLLOW-UP: Primary | ICD-10-CM

## 2022-02-15 DIAGNOSIS — I21.4 NSTEMI (NON-ST ELEVATED MYOCARDIAL INFARCTION) (CMD): ICD-10-CM

## 2022-02-15 DIAGNOSIS — R63.0 DECREASE IN APPETITE: ICD-10-CM

## 2022-02-15 DIAGNOSIS — F43.21 ADJUSTMENT REACTION WITH BRIEF DEPRESSIVE REACTION: ICD-10-CM

## 2022-02-15 DIAGNOSIS — G47.00 INSOMNIA, UNSPECIFIED TYPE: ICD-10-CM

## 2022-02-15 PROCEDURE — 99214 OFFICE O/P EST MOD 30 MIN: CPT | Performed by: NURSE PRACTITIONER

## 2022-02-15 RX ORDER — MIRTAZAPINE 7.5 MG/1
7.5 TABLET, FILM COATED ORAL NIGHTLY
Qty: 30 TABLET | Refills: 1 | Status: SHIPPED | OUTPATIENT
Start: 2022-02-15 | End: 2022-04-12

## 2022-02-16 ENCOUNTER — CASE MANAGEMENT (OUTPATIENT)
Dept: CARE COORDINATION | Age: 87
End: 2022-02-16

## 2022-02-21 ENCOUNTER — LAB SERVICES (OUTPATIENT)
Dept: LAB | Age: 87
End: 2022-02-21

## 2022-02-21 ENCOUNTER — OFFICE VISIT (OUTPATIENT)
Dept: CARDIOLOGY | Age: 87
End: 2022-02-21

## 2022-02-21 VITALS
OXYGEN SATURATION: 98 % | WEIGHT: 146.06 LBS | HEIGHT: 61 IN | SYSTOLIC BLOOD PRESSURE: 108 MMHG | DIASTOLIC BLOOD PRESSURE: 62 MMHG | HEART RATE: 114 BPM | BODY MASS INDEX: 27.58 KG/M2

## 2022-02-21 DIAGNOSIS — E11.22 CONTROLLED TYPE 2 DIABETES MELLITUS WITH STAGE 3 CHRONIC KIDNEY DISEASE, WITHOUT LONG-TERM CURRENT USE OF INSULIN (CMD): ICD-10-CM

## 2022-02-21 DIAGNOSIS — I48.19 PERSISTENT ATRIAL FIBRILLATION (CMD): ICD-10-CM

## 2022-02-21 DIAGNOSIS — I10 BENIGN ESSENTIAL HTN: Primary | ICD-10-CM

## 2022-02-21 DIAGNOSIS — I10 BENIGN ESSENTIAL HTN: ICD-10-CM

## 2022-02-21 DIAGNOSIS — N18.30 CONTROLLED TYPE 2 DIABETES MELLITUS WITH STAGE 3 CHRONIC KIDNEY DISEASE, WITHOUT LONG-TERM CURRENT USE OF INSULIN (CMD): ICD-10-CM

## 2022-02-21 LAB
ANION GAP SERPL CALC-SCNC: 10 MMOL/L (ref 10–20)
BUN SERPL-MCNC: 17 MG/DL (ref 6–20)
BUN/CREAT SERPL: 16 (ref 7–25)
CALCIUM SERPL-MCNC: 9.1 MG/DL (ref 8.4–10.2)
CHLORIDE SERPL-SCNC: 97 MMOL/L (ref 98–107)
CO2 SERPL-SCNC: 34 MMOL/L (ref 21–32)
CREAT SERPL-MCNC: 1.04 MG/DL (ref 0.51–0.95)
FASTING DURATION TIME PATIENT: ABNORMAL H
GFR SERPLBLD BASED ON 1.73 SQ M-ARVRAT: 49 ML/MIN
GLUCOSE SERPL-MCNC: 132 MG/DL (ref 70–99)
POTASSIUM SERPL-SCNC: 3.1 MMOL/L (ref 3.4–5.1)
SODIUM SERPL-SCNC: 138 MMOL/L (ref 135–145)

## 2022-02-21 PROCEDURE — 99214 OFFICE O/P EST MOD 30 MIN: CPT | Performed by: INTERNAL MEDICINE

## 2022-02-21 PROCEDURE — 36415 COLL VENOUS BLD VENIPUNCTURE: CPT | Performed by: CLINICAL MEDICAL LABORATORY

## 2022-02-21 PROCEDURE — 80048 BASIC METABOLIC PNL TOTAL CA: CPT | Performed by: CLINICAL MEDICAL LABORATORY

## 2022-02-21 ASSESSMENT — PAIN SCALES - GENERAL: PAINLEVEL: 5

## 2022-02-22 ENCOUNTER — CASE MANAGEMENT (OUTPATIENT)
Dept: CARE COORDINATION | Age: 87
End: 2022-02-22

## 2022-02-22 ASSESSMENT — SLEEP AND FATIGUE QUESTIONNAIRES: SLEEP DESCRIPTORS: SLEEPS IN RECLINER

## 2022-03-02 ENCOUNTER — TELEPHONE (OUTPATIENT)
Dept: CARDIOLOGY | Age: 87
End: 2022-03-02

## 2022-03-02 ENCOUNTER — OFFICE VISIT (OUTPATIENT)
Dept: CARDIOLOGY | Age: 87
End: 2022-03-02

## 2022-03-02 VITALS
HEIGHT: 61 IN | HEART RATE: 116 BPM | DIASTOLIC BLOOD PRESSURE: 66 MMHG | OXYGEN SATURATION: 98 % | BODY MASS INDEX: 28.03 KG/M2 | RESPIRATION RATE: 16 BRPM | SYSTOLIC BLOOD PRESSURE: 104 MMHG | WEIGHT: 148.48 LBS

## 2022-03-02 DIAGNOSIS — I10 BENIGN ESSENTIAL HTN: ICD-10-CM

## 2022-03-02 DIAGNOSIS — I48.19 PERSISTENT ATRIAL FIBRILLATION (CMD): Primary | ICD-10-CM

## 2022-03-02 PROCEDURE — 99214 OFFICE O/P EST MOD 30 MIN: CPT | Performed by: INTERNAL MEDICINE

## 2022-03-02 RX ORDER — SPIRONOLACTONE 25 MG/1
25 TABLET ORAL DAILY
Qty: 90 TABLET | Refills: 3 | Status: SHIPPED | OUTPATIENT
Start: 2022-03-02 | End: 2023-04-17

## 2022-03-03 ENCOUNTER — TELEPHONE (OUTPATIENT)
Dept: CARDIOLOGY | Age: 87
End: 2022-03-03

## 2022-03-03 ENCOUNTER — CASE MANAGEMENT (OUTPATIENT)
Dept: CARE COORDINATION | Age: 87
End: 2022-03-03

## 2022-03-07 ENCOUNTER — APPOINTMENT (OUTPATIENT)
Dept: GENERAL RADIOLOGY | Age: 87
DRG: 242 | End: 2022-03-07
Attending: INTERNAL MEDICINE

## 2022-03-07 ENCOUNTER — HOSPITAL ENCOUNTER (INPATIENT)
Age: 87
LOS: 6 days | Discharge: HOME | DRG: 242 | End: 2022-03-13
Attending: INTERNAL MEDICINE | Admitting: INTERNAL MEDICINE

## 2022-03-07 DIAGNOSIS — I48.19 PERSISTENT ATRIAL FIBRILLATION (CMD): ICD-10-CM

## 2022-03-07 DIAGNOSIS — I34.0 NONRHEUMATIC MITRAL VALVE REGURGITATION: ICD-10-CM

## 2022-03-07 DIAGNOSIS — I50.22 CHRONIC SYSTOLIC CONGESTIVE HEART FAILURE (CMD): Primary | ICD-10-CM

## 2022-03-07 DIAGNOSIS — R07.89 OTHER CHEST PAIN: ICD-10-CM

## 2022-03-07 PROBLEM — R07.9 CHEST PAIN: Status: ACTIVE | Noted: 2022-03-07

## 2022-03-07 LAB
ALBUMIN SERPL-MCNC: 3.5 G/DL (ref 3.6–5.1)
ALBUMIN/GLOB SERPL: 0.9 {RATIO} (ref 1–2.4)
ALP SERPL-CCNC: 135 UNITS/L (ref 45–117)
ALT SERPL-CCNC: 18 UNITS/L
ANION GAP SERPL CALC-SCNC: 10 MMOL/L (ref 10–20)
APTT PPP: 26 SEC (ref 22–30)
AST SERPL-CCNC: 14 UNITS/L
BILIRUB SERPL-MCNC: 0.4 MG/DL (ref 0.2–1)
BUN SERPL-MCNC: 21 MG/DL (ref 6–20)
BUN/CREAT SERPL: 20 (ref 7–25)
CALCIUM SERPL-MCNC: 9.4 MG/DL (ref 8.4–10.2)
CHLORIDE SERPL-SCNC: 104 MMOL/L (ref 98–107)
CO2 SERPL-SCNC: 30 MMOL/L (ref 21–32)
CREAT SERPL-MCNC: 1.06 MG/DL (ref 0.51–0.95)
DEPRECATED RDW RBC: 44.5 FL (ref 39–50)
ERYTHROCYTE [DISTWIDTH] IN BLOOD: 14.9 % (ref 11–15)
FASTING DURATION TIME PATIENT: ABNORMAL H
FERRITIN SERPL-MCNC: 18 NG/ML (ref 8–252)
GFR SERPLBLD BASED ON 1.73 SQ M-ARVRAT: 48 ML/MIN
GLOBULIN SER-MCNC: 3.9 G/DL (ref 2–4)
GLUCOSE SERPL-MCNC: 115 MG/DL (ref 70–99)
HCT VFR BLD CALC: 36.5 % (ref 36–46.5)
HGB BLD-MCNC: 10.5 G/DL (ref 12–15.5)
INR PPP: 1.1
IRON SATN MFR SERPL: 5 % (ref 15–45)
IRON SERPL-MCNC: 24 MCG/DL (ref 50–170)
MCH RBC QN AUTO: 23.5 PG (ref 26–34)
MCHC RBC AUTO-ENTMCNC: 28.8 G/DL (ref 32–36.5)
MCV RBC AUTO: 81.8 FL (ref 78–100)
NRBC BLD MANUAL-RTO: 0 /100 WBC
NT-PROBNP SERPL-MCNC: 3324 PG/ML
PLATELET # BLD AUTO: 395 K/MCL (ref 140–450)
POTASSIUM SERPL-SCNC: 3.4 MMOL/L (ref 3.4–5.1)
PROT SERPL-MCNC: 7.4 G/DL (ref 6.4–8.2)
PROTHROMBIN TIME: 11.8 SEC (ref 9.7–11.8)
RBC # BLD: 4.46 MIL/MCL (ref 4–5.2)
SODIUM SERPL-SCNC: 141 MMOL/L (ref 135–145)
TIBC SERPL-MCNC: 438 MCG/DL (ref 250–450)
TROPONIN I SERPL DL<=0.01 NG/ML-MCNC: 139 NG/L
WBC # BLD: 7.5 K/MCL (ref 4.2–11)

## 2022-03-07 PROCEDURE — 10002800 HB RX 250 W HCPCS: Performed by: NURSE PRACTITIONER

## 2022-03-07 PROCEDURE — 80053 COMPREHEN METABOLIC PANEL: CPT | Performed by: NURSE PRACTITIONER

## 2022-03-07 PROCEDURE — 10002801 HB RX 250 W/O HCPCS: Performed by: NURSE PRACTITIONER

## 2022-03-07 PROCEDURE — 83880 ASSAY OF NATRIURETIC PEPTIDE: CPT | Performed by: NURSE PRACTITIONER

## 2022-03-07 PROCEDURE — 74018 RADEX ABDOMEN 1 VIEW: CPT | Performed by: RADIOLOGY

## 2022-03-07 PROCEDURE — 36415 COLL VENOUS BLD VENIPUNCTURE: CPT | Performed by: NURSE PRACTITIONER

## 2022-03-07 PROCEDURE — 99223 1ST HOSP IP/OBS HIGH 75: CPT | Performed by: INTERNAL MEDICINE

## 2022-03-07 PROCEDURE — 10002803 HB RX 637: Performed by: NURSE PRACTITIONER

## 2022-03-07 PROCEDURE — 84484 ASSAY OF TROPONIN QUANT: CPT | Performed by: NURSE PRACTITIONER

## 2022-03-07 PROCEDURE — 82728 ASSAY OF FERRITIN: CPT | Performed by: NURSE PRACTITIONER

## 2022-03-07 PROCEDURE — 10002803 HB RX 637: Performed by: INTERNAL MEDICINE

## 2022-03-07 PROCEDURE — 85730 THROMBOPLASTIN TIME PARTIAL: CPT | Performed by: INTERNAL MEDICINE

## 2022-03-07 PROCEDURE — 10002800 HB RX 250 W HCPCS: Performed by: INTERNAL MEDICINE

## 2022-03-07 PROCEDURE — 83550 IRON BINDING TEST: CPT | Performed by: NURSE PRACTITIONER

## 2022-03-07 PROCEDURE — 85027 COMPLETE CBC AUTOMATED: CPT | Performed by: NURSE PRACTITIONER

## 2022-03-07 PROCEDURE — 10002807 HB RX 258: Performed by: INTERNAL MEDICINE

## 2022-03-07 PROCEDURE — 93010 ELECTROCARDIOGRAM REPORT: CPT | Performed by: INTERNAL MEDICINE

## 2022-03-07 PROCEDURE — 84155 ASSAY OF PROTEIN SERUM: CPT | Performed by: NURSE PRACTITIONER

## 2022-03-07 PROCEDURE — 93005 ELECTROCARDIOGRAM TRACING: CPT | Performed by: NURSE PRACTITIONER

## 2022-03-07 PROCEDURE — 10003445 HB TELEMETRY PER DAY

## 2022-03-07 PROCEDURE — 10000002 HB ROOM CHARGE MED SURG

## 2022-03-07 PROCEDURE — 85610 PROTHROMBIN TIME: CPT | Performed by: NURSE PRACTITIONER

## 2022-03-07 PROCEDURE — 74018 RADEX ABDOMEN 1 VIEW: CPT

## 2022-03-07 RX ORDER — POTASSIUM CHLORIDE 20 MEQ/1
40 TABLET, EXTENDED RELEASE ORAL ONCE
Status: COMPLETED | OUTPATIENT
Start: 2022-03-07 | End: 2022-03-07

## 2022-03-07 RX ORDER — MONTELUKAST SODIUM 10 MG/1
10 TABLET ORAL NIGHTLY
Status: DISCONTINUED | OUTPATIENT
Start: 2022-03-07 | End: 2022-03-13 | Stop reason: HOSPADM

## 2022-03-07 RX ORDER — HEPARIN SODIUM 1000 [USP'U]/ML
60 INJECTION, SOLUTION INTRAVENOUS; SUBCUTANEOUS ONCE
Status: COMPLETED | OUTPATIENT
Start: 2022-03-07 | End: 2022-03-07

## 2022-03-07 RX ORDER — HEPARIN SODIUM 1000 [USP'U]/ML
60 INJECTION, SOLUTION INTRAVENOUS; SUBCUTANEOUS PRN
Status: DISCONTINUED | OUTPATIENT
Start: 2022-03-07 | End: 2022-03-12

## 2022-03-07 RX ORDER — ATENOLOL 25 MG/1
25 TABLET ORAL 2 TIMES DAILY
Status: ON HOLD | COMMUNITY
Start: 2022-01-10 | End: 2022-03-13 | Stop reason: HOSPADM

## 2022-03-07 RX ORDER — ROSUVASTATIN CALCIUM 10 MG/1
10 TABLET, COATED ORAL DAILY
Status: ON HOLD | COMMUNITY
Start: 2021-12-28 | End: 2022-03-13 | Stop reason: HOSPADM

## 2022-03-07 RX ORDER — HEPARIN SODIUM 5000 [USP'U]/ML
5000 INJECTION, SOLUTION INTRAVENOUS; SUBCUTANEOUS EVERY 8 HOURS SCHEDULED
Status: DISCONTINUED | OUTPATIENT
Start: 2022-03-07 | End: 2022-03-07

## 2022-03-07 RX ORDER — AMIODARONE HYDROCHLORIDE 400 MG/1
400 TABLET ORAL DAILY
Status: ON HOLD | COMMUNITY
Start: 2022-01-21 | End: 2022-03-13 | Stop reason: HOSPADM

## 2022-03-07 RX ORDER — PROCHLORPERAZINE EDISYLATE 5 MG/ML
5 INJECTION INTRAMUSCULAR; INTRAVENOUS EVERY 6 HOURS PRN
Status: DISCONTINUED | OUTPATIENT
Start: 2022-03-07 | End: 2022-03-13 | Stop reason: HOSPADM

## 2022-03-07 RX ORDER — HEPARIN SODIUM 10000 [USP'U]/100ML
1-30 INJECTION, SOLUTION INTRAVENOUS CONTINUOUS
Status: DISCONTINUED | OUTPATIENT
Start: 2022-03-07 | End: 2022-03-12

## 2022-03-07 RX ORDER — AMOXICILLIN 250 MG
2 CAPSULE ORAL NIGHTLY
Status: DISCONTINUED | OUTPATIENT
Start: 2022-03-07 | End: 2022-03-13 | Stop reason: HOSPADM

## 2022-03-07 RX ORDER — PROMETHAZINE HYDROCHLORIDE AND CODEINE PHOSPHATE 6.25; 1 MG/5ML; MG/5ML
5 SYRUP ORAL EVERY 4 HOURS PRN
Status: DISCONTINUED | OUTPATIENT
Start: 2022-03-07 | End: 2022-03-13 | Stop reason: HOSPADM

## 2022-03-07 RX ORDER — LEVOTHYROXINE SODIUM 0.03 MG/1
50 TABLET ORAL DAILY
Status: DISCONTINUED | OUTPATIENT
Start: 2022-03-08 | End: 2022-03-13 | Stop reason: HOSPADM

## 2022-03-07 RX ORDER — ZOLPIDEM TARTRATE 5 MG/1
5 TABLET ORAL NIGHTLY PRN
Status: DISCONTINUED | OUTPATIENT
Start: 2022-03-07 | End: 2022-03-13 | Stop reason: HOSPADM

## 2022-03-07 RX ORDER — DIPHENHYDRAMINE HCL 25 MG
25 CAPSULE ORAL EVERY 4 HOURS PRN
Status: DISCONTINUED | OUTPATIENT
Start: 2022-03-07 | End: 2022-03-13 | Stop reason: HOSPADM

## 2022-03-07 RX ORDER — HEPARIN SODIUM 1000 [USP'U]/ML
30 INJECTION, SOLUTION INTRAVENOUS; SUBCUTANEOUS PRN
Status: DISCONTINUED | OUTPATIENT
Start: 2022-03-07 | End: 2022-03-12

## 2022-03-07 RX ORDER — ACETAMINOPHEN 325 MG/1
650 TABLET ORAL EVERY 4 HOURS PRN
Status: DISCONTINUED | OUTPATIENT
Start: 2022-03-07 | End: 2022-03-07

## 2022-03-07 RX ORDER — BISACODYL 10 MG
10 SUPPOSITORY, RECTAL RECTAL DAILY PRN
Status: DISCONTINUED | OUTPATIENT
Start: 2022-03-07 | End: 2022-03-13 | Stop reason: HOSPADM

## 2022-03-07 RX ORDER — MIRTAZAPINE 7.5 MG/1
7.5 TABLET, FILM COATED ORAL NIGHTLY
Status: DISCONTINUED | OUTPATIENT
Start: 2022-03-07 | End: 2022-03-13 | Stop reason: HOSPADM

## 2022-03-07 RX ORDER — HYDRALAZINE HYDROCHLORIDE 20 MG/ML
5 INJECTION INTRAMUSCULAR; INTRAVENOUS EVERY 4 HOURS PRN
Status: DISCONTINUED | OUTPATIENT
Start: 2022-03-07 | End: 2022-03-13 | Stop reason: HOSPADM

## 2022-03-07 RX ORDER — PANTOPRAZOLE SODIUM 40 MG/1
40 TABLET, DELAYED RELEASE ORAL 2 TIMES DAILY
Status: DISCONTINUED | OUTPATIENT
Start: 2022-03-07 | End: 2022-03-07

## 2022-03-07 RX ORDER — ATORVASTATIN CALCIUM 40 MG/1
40 TABLET, FILM COATED ORAL NIGHTLY
Status: DISCONTINUED | OUTPATIENT
Start: 2022-03-07 | End: 2022-03-13 | Stop reason: HOSPADM

## 2022-03-07 RX ORDER — ISOSORBIDE MONONITRATE 30 MG/1
30 TABLET, EXTENDED RELEASE ORAL DAILY
Status: DISCONTINUED | OUTPATIENT
Start: 2022-03-08 | End: 2022-03-10

## 2022-03-07 RX ORDER — SUCRALFATE ORAL 1 G/10ML
1 SUSPENSION ORAL EVERY 6 HOURS
Status: DISCONTINUED | OUTPATIENT
Start: 2022-03-07 | End: 2022-03-13 | Stop reason: HOSPADM

## 2022-03-07 RX ORDER — CLONIDINE HYDROCHLORIDE 0.1 MG/1
0.1 TABLET ORAL EVERY 4 HOURS PRN
Status: DISCONTINUED | OUTPATIENT
Start: 2022-03-07 | End: 2022-03-13 | Stop reason: HOSPADM

## 2022-03-07 RX ORDER — FUROSEMIDE 10 MG/ML
40 INJECTION INTRAMUSCULAR; INTRAVENOUS
Status: DISCONTINUED | OUTPATIENT
Start: 2022-03-07 | End: 2022-03-07

## 2022-03-07 RX ORDER — METOPROLOL SUCCINATE 50 MG/1
50 TABLET, EXTENDED RELEASE ORAL 2 TIMES DAILY
Status: DISCONTINUED | OUTPATIENT
Start: 2022-03-07 | End: 2022-03-13 | Stop reason: HOSPADM

## 2022-03-07 RX ORDER — SPIRONOLACTONE 25 MG/1
25 TABLET ORAL DAILY
Status: DISCONTINUED | OUTPATIENT
Start: 2022-03-07 | End: 2022-03-13 | Stop reason: HOSPADM

## 2022-03-07 RX ORDER — ONDANSETRON 2 MG/ML
4 INJECTION INTRAMUSCULAR; INTRAVENOUS 4 TIMES DAILY PRN
Status: DISCONTINUED | OUTPATIENT
Start: 2022-03-07 | End: 2022-03-13 | Stop reason: HOSPADM

## 2022-03-07 RX ORDER — OXYCODONE HYDROCHLORIDE AND ACETAMINOPHEN 5; 325 MG/1; MG/1
1 TABLET ORAL EVERY 4 HOURS PRN
Status: DISCONTINUED | OUTPATIENT
Start: 2022-03-07 | End: 2022-03-13 | Stop reason: HOSPADM

## 2022-03-07 RX ORDER — ACETAMINOPHEN 325 MG/1
650 TABLET ORAL EVERY 4 HOURS PRN
Status: DISCONTINUED | OUTPATIENT
Start: 2022-03-07 | End: 2022-03-13 | Stop reason: HOSPADM

## 2022-03-07 RX ORDER — CALCIUM CARBONATE 500 MG/1
500 TABLET, CHEWABLE ORAL EVERY 4 HOURS PRN
Status: DISCONTINUED | OUTPATIENT
Start: 2022-03-07 | End: 2022-03-13 | Stop reason: HOSPADM

## 2022-03-07 RX ORDER — METOPROLOL SUCCINATE 50 MG/1
50 TABLET, EXTENDED RELEASE ORAL ONCE
Status: COMPLETED | OUTPATIENT
Start: 2022-03-07 | End: 2022-03-07

## 2022-03-07 RX ORDER — ALPRAZOLAM 1 MG/1
1 TABLET ORAL EVERY 8 HOURS PRN
Status: DISCONTINUED | OUTPATIENT
Start: 2022-03-07 | End: 2022-03-13 | Stop reason: HOSPADM

## 2022-03-07 RX ORDER — FUROSEMIDE 20 MG/1
20 TABLET ORAL DAILY
Status: ON HOLD | COMMUNITY
Start: 2022-02-28 | End: 2022-03-13 | Stop reason: HOSPADM

## 2022-03-07 RX ORDER — ONDANSETRON 2 MG/ML
4 INJECTION INTRAMUSCULAR; INTRAVENOUS 2 TIMES DAILY PRN
Status: DISCONTINUED | OUTPATIENT
Start: 2022-03-07 | End: 2022-03-07

## 2022-03-07 RX ORDER — PANTOPRAZOLE SODIUM 40 MG/1
40 TABLET, DELAYED RELEASE ORAL NIGHTLY
Status: DISCONTINUED | OUTPATIENT
Start: 2022-03-07 | End: 2022-03-13 | Stop reason: HOSPADM

## 2022-03-07 RX ORDER — 0.9 % SODIUM CHLORIDE 0.9 %
2 VIAL (ML) INJECTION EVERY 12 HOURS SCHEDULED
Status: DISCONTINUED | OUTPATIENT
Start: 2022-03-07 | End: 2022-03-13 | Stop reason: HOSPADM

## 2022-03-07 RX ADMIN — DESMOPRESSIN ACETATE 40 MG: 0.2 TABLET ORAL at 21:24

## 2022-03-07 RX ADMIN — MIRTAZAPINE 7.5 MG: 15 TABLET, FILM COATED ORAL at 21:24

## 2022-03-07 RX ADMIN — FUROSEMIDE 5 MG/HR: 10 INJECTION, SOLUTION INTRAMUSCULAR; INTRAVENOUS at 16:23

## 2022-03-07 RX ADMIN — HEPARIN SODIUM 12 UNITS/KG/HR: 1000 INJECTION, SOLUTION INTRAVENOUS; SUBCUTANEOUS at 18:30

## 2022-03-07 RX ADMIN — SPIRONOLACTONE 25 MG: 25 TABLET ORAL at 21:23

## 2022-03-07 RX ADMIN — PANTOPRAZOLE SODIUM 40 MG: 40 TABLET, DELAYED RELEASE ORAL at 21:24

## 2022-03-07 RX ADMIN — HEPARIN SODIUM 3900 UNITS: 1000 INJECTION, SOLUTION INTRAVENOUS; SUBCUTANEOUS at 18:34

## 2022-03-07 RX ADMIN — POTASSIUM CHLORIDE 40 MEQ: 1500 TABLET, EXTENDED RELEASE ORAL at 21:23

## 2022-03-07 RX ADMIN — IRON SUCROSE 200 MG: 20 INJECTION, SOLUTION INTRAVENOUS at 21:24

## 2022-03-07 RX ADMIN — METOPROLOL SUCCINATE 50 MG: 50 TABLET, EXTENDED RELEASE ORAL at 15:57

## 2022-03-07 RX ADMIN — METOPROLOL SUCCINATE 50 MG: 50 TABLET, EXTENDED RELEASE ORAL at 22:02

## 2022-03-07 RX ADMIN — MONTELUKAST SODIUM 10 MG: 10 TABLET, FILM COATED ORAL at 21:24

## 2022-03-07 ASSESSMENT — COGNITIVE AND FUNCTIONAL STATUS - GENERAL
BECAUSE OF A PHYSICAL, MENTAL, OR EMOTIONAL CONDITION, DO YOU HAVE DIFFICULTY DOING ERRANDS ALONE: NO
BECAUSE OF A PHYSICAL, MENTAL, OR EMOTIONAL CONDITION, DO YOU HAVE SERIOUS DIFFICULTY CONCENTRATING, REMEMBERING OR MAKING DECISIONS: NO
ARE YOU DEAF OR DO YOU HAVE SERIOUS DIFFICULTY  HEARING: NO
ARE YOU BLIND OR DO YOU HAVE SERIOUS DIFFICULTY SEEING, EVEN WHEN WEARING GLASSES: NO
DO YOU HAVE SERIOUS DIFFICULTY WALKING OR CLIMBING STAIRS: YES
DO YOU HAVE DIFFICULTY DRESSING OR BATHING: NO

## 2022-03-07 ASSESSMENT — ACTIVITIES OF DAILY LIVING (ADL)
ADL_SCORE: 12
DRESSING YOURSELF: INDEPENDENT
FEEDING YOURSELF: INDEPENDENT
CONTINENCE: INDEPENDENT
CHRONIC_PAIN_PRESENT: NO
RECENT_DECLINE_ADL: YES, DECLINE IN AMBULATION/TRANSFERRING, COLLABORATE WITH PROVIDER (T)
ADL_BEFORE_ADMISSION: NEEDS/REQUIRES ASSISTANCE
TRANSFERRING: INDEPENDENT
ADL_SHORT_OF_BREATH: YES
TOILETING: INDEPENDENT
BATHING: INDEPENDENT

## 2022-03-07 ASSESSMENT — PAIN SCALES - GENERAL
PAINLEVEL_OUTOF10: 0
PAINLEVEL_OUTOF10: 0

## 2022-03-08 ENCOUNTER — APPOINTMENT (OUTPATIENT)
Dept: CARDIAC REHAB | Age: 87
End: 2022-03-08
Attending: INTERNAL MEDICINE

## 2022-03-08 LAB
ALBUMIN SERPL-MCNC: 3.6 G/DL (ref 3.6–5.1)
ALBUMIN/GLOB SERPL: 0.9 {RATIO} (ref 1–2.4)
ALP SERPL-CCNC: 142 UNITS/L (ref 45–117)
ALT SERPL-CCNC: 16 UNITS/L
ANION GAP SERPL CALC-SCNC: 10 MMOL/L (ref 10–20)
APTT PPP: 48 SEC (ref 22–30)
APTT PPP: 54 SEC (ref 22–30)
AST SERPL-CCNC: 17 UNITS/L
ATRIAL RATE (BPM): 96
BASOPHILS # BLD: 0.1 K/MCL (ref 0–0.3)
BASOPHILS NFR BLD: 1 %
BILIRUB SERPL-MCNC: 0.8 MG/DL (ref 0.2–1)
BUN SERPL-MCNC: 17 MG/DL (ref 6–20)
BUN/CREAT SERPL: 17 (ref 7–25)
CALCIUM SERPL-MCNC: 9.6 MG/DL (ref 8.4–10.2)
CHLORIDE SERPL-SCNC: 101 MMOL/L (ref 98–107)
CO2 SERPL-SCNC: 31 MMOL/L (ref 21–32)
CREAT SERPL-MCNC: 1.03 MG/DL (ref 0.51–0.95)
DEPRECATED RDW RBC: 44.1 FL (ref 39–50)
EOSINOPHIL # BLD: 0.3 K/MCL (ref 0–0.5)
EOSINOPHIL NFR BLD: 3 %
ERYTHROCYTE [DISTWIDTH] IN BLOOD: 14.8 % (ref 11–15)
FASTING DURATION TIME PATIENT: ABNORMAL H
GFR SERPLBLD BASED ON 1.73 SQ M-ARVRAT: 50 ML/MIN
GLOBULIN SER-MCNC: 4 G/DL (ref 2–4)
GLUCOSE SERPL-MCNC: 131 MG/DL (ref 70–99)
HCT VFR BLD CALC: 39.3 % (ref 36–46.5)
HGB BLD-MCNC: 11.3 G/DL (ref 12–15.5)
IMM GRANULOCYTES # BLD AUTO: 0 K/MCL (ref 0–0.2)
IMM GRANULOCYTES # BLD: 0 %
LYMPHOCYTES # BLD: 2 K/MCL (ref 1–4)
LYMPHOCYTES NFR BLD: 26 %
MAGNESIUM SERPL-MCNC: 1.5 MG/DL (ref 1.7–2.4)
MCH RBC QN AUTO: 23.5 PG (ref 26–34)
MCHC RBC AUTO-ENTMCNC: 28.8 G/DL (ref 32–36.5)
MCV RBC AUTO: 81.9 FL (ref 78–100)
MONOCYTES # BLD: 0.6 K/MCL (ref 0.3–0.9)
MONOCYTES NFR BLD: 8 %
NEUTROPHILS # BLD: 4.9 K/MCL (ref 1.8–7.7)
NEUTROPHILS NFR BLD: 62 %
NRBC BLD MANUAL-RTO: 0 /100 WBC
PLATELET # BLD AUTO: 430 K/MCL (ref 140–450)
POTASSIUM SERPL-SCNC: 3.1 MMOL/L (ref 3.4–5.1)
POTASSIUM SERPL-SCNC: 3.4 MMOL/L (ref 3.4–5.1)
POTASSIUM SERPL-SCNC: 3.5 MMOL/L (ref 3.4–5.1)
PROT SERPL-MCNC: 7.6 G/DL (ref 6.4–8.2)
QRS-INTERVAL (MSEC): 84
QT-INTERVAL (MSEC): 324
QTC: 458
R AXIS (DEGREES): -5
RBC # BLD: 4.8 MIL/MCL (ref 4–5.2)
REPORT TEXT: NORMAL
SARS-COV-2 RNA RESP QL NAA+PROBE: NOT DETECTED
SERVICE CMNT-IMP: NORMAL
SERVICE CMNT-IMP: NORMAL
SODIUM SERPL-SCNC: 139 MMOL/L (ref 135–145)
T AXIS (DEGREES): 94
VENTRICULAR RATE EKG/MIN (BPM): 120
WBC # BLD: 7.9 K/MCL (ref 4.2–11)

## 2022-03-08 PROCEDURE — 85730 THROMBOPLASTIN TIME PARTIAL: CPT | Performed by: INTERNAL MEDICINE

## 2022-03-08 PROCEDURE — 85025 COMPLETE CBC W/AUTO DIFF WBC: CPT | Performed by: NURSE PRACTITIONER

## 2022-03-08 PROCEDURE — 10000002 HB ROOM CHARGE MED SURG

## 2022-03-08 PROCEDURE — 10004173 HB COUNTER-THERAPY VISIT PT

## 2022-03-08 PROCEDURE — 10002803 HB RX 637: Performed by: NURSE PRACTITIONER

## 2022-03-08 PROCEDURE — 84132 ASSAY OF SERUM POTASSIUM: CPT | Performed by: NURSE PRACTITIONER

## 2022-03-08 PROCEDURE — 97162 PT EVAL MOD COMPLEX 30 MIN: CPT

## 2022-03-08 PROCEDURE — 83735 ASSAY OF MAGNESIUM: CPT | Performed by: NURSE PRACTITIONER

## 2022-03-08 PROCEDURE — 36415 COLL VENOUS BLD VENIPUNCTURE: CPT | Performed by: INTERNAL MEDICINE

## 2022-03-08 PROCEDURE — 84132 ASSAY OF SERUM POTASSIUM: CPT | Performed by: INTERNAL MEDICINE

## 2022-03-08 PROCEDURE — 10002803 HB RX 637: Performed by: INTERNAL MEDICINE

## 2022-03-08 PROCEDURE — 87635 SARS-COV-2 COVID-19 AMP PRB: CPT | Performed by: NURSE PRACTITIONER

## 2022-03-08 PROCEDURE — 97116 GAIT TRAINING THERAPY: CPT

## 2022-03-08 PROCEDURE — 80053 COMPREHEN METABOLIC PANEL: CPT | Performed by: NURSE PRACTITIONER

## 2022-03-08 PROCEDURE — 10003445 HB TELEMETRY PER DAY

## 2022-03-08 PROCEDURE — 83521 IG LIGHT CHAINS FREE EACH: CPT | Performed by: NURSE PRACTITIONER

## 2022-03-08 PROCEDURE — 99223 1ST HOSP IP/OBS HIGH 75: CPT | Performed by: INTERNAL MEDICINE

## 2022-03-08 PROCEDURE — 97530 THERAPEUTIC ACTIVITIES: CPT

## 2022-03-08 PROCEDURE — 10002800 HB RX 250 W HCPCS: Performed by: INTERNAL MEDICINE

## 2022-03-08 PROCEDURE — 10002801 HB RX 250 W/O HCPCS: Performed by: NURSE PRACTITIONER

## 2022-03-08 RX ORDER — POTASSIUM CHLORIDE 20 MEQ/1
40 TABLET, EXTENDED RELEASE ORAL ONCE
Status: COMPLETED | OUTPATIENT
Start: 2022-03-08 | End: 2022-03-08

## 2022-03-08 RX ORDER — FERROUS SULFATE 325(65) MG
325 TABLET ORAL
Status: DISCONTINUED | OUTPATIENT
Start: 2022-03-09 | End: 2022-03-13 | Stop reason: HOSPADM

## 2022-03-08 RX ORDER — LANOLIN ALCOHOL/MO/W.PET/CERES
400 CREAM (GRAM) TOPICAL ONCE
Status: COMPLETED | OUTPATIENT
Start: 2022-03-08 | End: 2022-03-08

## 2022-03-08 RX ORDER — ASPIRIN 81 MG/1
81 TABLET, CHEWABLE ORAL DAILY
Status: DISCONTINUED | OUTPATIENT
Start: 2022-03-08 | End: 2022-03-08

## 2022-03-08 RX ORDER — CLOPIDOGREL BISULFATE 75 MG/1
75 TABLET ORAL DAILY
Status: DISCONTINUED | OUTPATIENT
Start: 2022-03-08 | End: 2022-03-13 | Stop reason: HOSPADM

## 2022-03-08 RX ADMIN — DESMOPRESSIN ACETATE 40 MG: 0.2 TABLET ORAL at 21:56

## 2022-03-08 RX ADMIN — SUCRALFATE 1 G: 1 SUSPENSION ORAL at 00:46

## 2022-03-08 RX ADMIN — POTASSIUM CHLORIDE 40 MEQ: 1500 TABLET, EXTENDED RELEASE ORAL at 06:39

## 2022-03-08 RX ADMIN — SPIRONOLACTONE 25 MG: 25 TABLET ORAL at 10:17

## 2022-03-08 RX ADMIN — PANTOPRAZOLE SODIUM 40 MG: 40 TABLET, DELAYED RELEASE ORAL at 21:55

## 2022-03-08 RX ADMIN — IRON SUCROSE 200 MG: 20 INJECTION, SOLUTION INTRAVENOUS at 18:08

## 2022-03-08 RX ADMIN — Medication 400 MG: at 14:21

## 2022-03-08 RX ADMIN — SENNOSIDES AND DOCUSATE SODIUM 2 TABLET: 50; 8.6 TABLET ORAL at 21:57

## 2022-03-08 RX ADMIN — MIRTAZAPINE 7.5 MG: 15 TABLET, FILM COATED ORAL at 21:56

## 2022-03-08 RX ADMIN — CLOPIDOGREL BISULFATE 75 MG: 75 TABLET, FILM COATED ORAL at 13:32

## 2022-03-08 RX ADMIN — METOPROLOL SUCCINATE 50 MG: 50 TABLET, EXTENDED RELEASE ORAL at 10:16

## 2022-03-08 RX ADMIN — METOPROLOL SUCCINATE 50 MG: 50 TABLET, EXTENDED RELEASE ORAL at 21:56

## 2022-03-08 RX ADMIN — SUCRALFATE 1 G: 1 SUSPENSION ORAL at 18:15

## 2022-03-08 RX ADMIN — LINACLOTIDE 145 MCG: 145 CAPSULE, GELATIN COATED ORAL at 13:32

## 2022-03-08 RX ADMIN — SUCRALFATE 1 G: 1 SUSPENSION ORAL at 06:39

## 2022-03-08 RX ADMIN — LEVOTHYROXINE SODIUM 50 MCG: 0.03 TABLET ORAL at 10:16

## 2022-03-08 RX ADMIN — ISOSORBIDE MONONITRATE 30 MG: 30 TABLET, EXTENDED RELEASE ORAL at 10:17

## 2022-03-08 RX ADMIN — Medication 400 MG: at 21:56

## 2022-03-08 RX ADMIN — MONTELUKAST SODIUM 10 MG: 10 TABLET, FILM COATED ORAL at 21:56

## 2022-03-08 ASSESSMENT — PAIN SCALES - GENERAL
PAINLEVEL_OUTOF10: 0

## 2022-03-08 ASSESSMENT — COGNITIVE AND FUNCTIONAL STATUS - GENERAL
BASIC_MOBILITY_RAW_SCORE: 20
BASIC_MOBILITY_CONVERTED_SCORE: 43.99

## 2022-03-08 ASSESSMENT — ACTIVITIES OF DAILY LIVING (ADL)
PRIOR_ADL_BATHING: MODIFIED INDEPENDENT
GROOMING: MODIFIED INDEPENDENT
PRIOR_ADL_TOILETING: MODIFIED INDEPENDENT

## 2022-03-09 ENCOUNTER — APPOINTMENT (OUTPATIENT)
Dept: CV DIAGNOSTICS | Age: 87
DRG: 242 | End: 2022-03-09
Attending: NURSE PRACTITIONER

## 2022-03-09 LAB
ALBUMIN SERPL ELPH-MCNC: 3.9 G/DL (ref 3.5–4.9)
ALPHA 1: 0.4 G/DL (ref 0.2–0.4)
ALPHA2 GLOB SERPL ELPH-MCNC: 1 G/DL (ref 0.5–0.9)
ANION GAP SERPL CALC-SCNC: 11 MMOL/L (ref 10–20)
APTT PPP: 56 SEC (ref 22–30)
B-GLOBULIN SERPL ELPH-MCNC: 0.7 G/DL (ref 0.7–1.2)
BUN SERPL-MCNC: 23 MG/DL (ref 6–20)
BUN/CREAT SERPL: 19 (ref 7–25)
CALCIUM SERPL-MCNC: 9.5 MG/DL (ref 8.4–10.2)
CHLORIDE SERPL-SCNC: 102 MMOL/L (ref 98–107)
CO2 SERPL-SCNC: 29 MMOL/L (ref 21–32)
CREAT SERPL-MCNC: 1.22 MG/DL (ref 0.51–0.95)
DEPRECATED RDW RBC: 45.1 FL (ref 39–50)
ERYTHROCYTE [DISTWIDTH] IN BLOOD: 14.9 % (ref 11–15)
FASTING DURATION TIME PATIENT: ABNORMAL H
GAMMA GLOB SERPL ELPH-MCNC: 1 G/DL (ref 0.7–1.7)
GFR SERPLBLD BASED ON 1.73 SQ M-ARVRAT: 40 ML/MIN
GLOBULIN SER-MCNC: 3.1 G/DL (ref 2.1–4.2)
GLUCOSE SERPL-MCNC: 118 MG/DL (ref 70–99)
HCT VFR BLD CALC: 36.5 % (ref 36–46.5)
HGB BLD-MCNC: 10.5 G/DL (ref 12–15.5)
IGA SERPL-MCNC: 120 MG/DL (ref 82–453)
IGG SERPL-MCNC: 987 MG/DL (ref 751–1560)
IGM SERPL-MCNC: 37 MG/DL (ref 46–304)
KAPPA LC FREE SER NEPH-MCNC: 4.58 MG/DL (ref 0.33–1.94)
KAPPA LC/LAMBDA SER: 1.56 {RATIO} (ref 0.26–1.65)
LAMBDA LC FREE SER NEPH-MCNC: 2.93 MG/DL (ref 0.57–2.63)
MCH RBC QN AUTO: 23.8 PG (ref 26–34)
MCHC RBC AUTO-ENTMCNC: 28.8 G/DL (ref 32–36.5)
MCV RBC AUTO: 82.6 FL (ref 78–100)
NRBC BLD MANUAL-RTO: 0 /100 WBC
PATH INTERP BLD-IMP: ABNORMAL
PATH INTERP SPEC-IMP: ABNORMAL
PLATELET # BLD AUTO: 378 K/MCL (ref 140–450)
POTASSIUM SERPL-SCNC: 3.1 MMOL/L (ref 3.4–5.1)
PROT SERPL-MCNC: 7 G/DL (ref 6.4–8.2)
RBC # BLD: 4.42 MIL/MCL (ref 4–5.2)
SODIUM SERPL-SCNC: 139 MMOL/L (ref 135–145)
WBC # BLD: 8.4 K/MCL (ref 4.2–11)

## 2022-03-09 PROCEDURE — B24BZZ4 ULTRASONOGRAPHY OF HEART WITH AORTA, TRANSESOPHAGEAL: ICD-10-PCS | Performed by: INTERNAL MEDICINE

## 2022-03-09 PROCEDURE — 10002801 HB RX 250 W/O HCPCS

## 2022-03-09 PROCEDURE — 10002800 HB RX 250 W HCPCS: Performed by: INTERNAL MEDICINE

## 2022-03-09 PROCEDURE — 93325 DOPPLER ECHO COLOR FLOW MAPG: CPT | Performed by: INTERNAL MEDICINE

## 2022-03-09 PROCEDURE — 10004651 HB RX, NO CHARGE ITEM: Performed by: NURSE PRACTITIONER

## 2022-03-09 PROCEDURE — 10002803 HB RX 637: Performed by: NURSE PRACTITIONER

## 2022-03-09 PROCEDURE — 10003445 HB TELEMETRY PER DAY

## 2022-03-09 PROCEDURE — 80048 BASIC METABOLIC PNL TOTAL CA: CPT | Performed by: NURSE PRACTITIONER

## 2022-03-09 PROCEDURE — 10002803 HB RX 637: Performed by: INTERNAL MEDICINE

## 2022-03-09 PROCEDURE — 10002801 HB RX 250 W/O HCPCS: Performed by: INTERNAL MEDICINE

## 2022-03-09 PROCEDURE — 10002807 HB RX 258: Performed by: INTERNAL MEDICINE

## 2022-03-09 PROCEDURE — 76376 3D RENDER W/INTRP POSTPROCES: CPT | Performed by: INTERNAL MEDICINE

## 2022-03-09 PROCEDURE — 36415 COLL VENOUS BLD VENIPUNCTURE: CPT | Performed by: NURSE PRACTITIONER

## 2022-03-09 PROCEDURE — 99231 SBSQ HOSP IP/OBS SF/LOW 25: CPT | Performed by: INTERNAL MEDICINE

## 2022-03-09 PROCEDURE — 10000002 HB ROOM CHARGE MED SURG

## 2022-03-09 PROCEDURE — 85730 THROMBOPLASTIN TIME PARTIAL: CPT | Performed by: NURSE PRACTITIONER

## 2022-03-09 PROCEDURE — 10002800 HB RX 250 W HCPCS: Performed by: NURSE PRACTITIONER

## 2022-03-09 PROCEDURE — 10004651 HB RX, NO CHARGE ITEM: Performed by: INTERNAL MEDICINE

## 2022-03-09 PROCEDURE — 85027 COMPLETE CBC AUTOMATED: CPT | Performed by: NURSE PRACTITIONER

## 2022-03-09 PROCEDURE — 10002801 HB RX 250 W/O HCPCS: Performed by: NURSE PRACTITIONER

## 2022-03-09 PROCEDURE — 93321 DOPPLER ECHO F-UP/LMTD STD: CPT | Performed by: INTERNAL MEDICINE

## 2022-03-09 PROCEDURE — 93312 ECHO TRANSESOPHAGEAL: CPT

## 2022-03-09 PROCEDURE — 93312 ECHO TRANSESOPHAGEAL: CPT | Performed by: INTERNAL MEDICINE

## 2022-03-09 RX ORDER — LIDOCAINE HYDROCHLORIDE 40 MG/ML
SOLUTION TOPICAL PRN
Status: COMPLETED | OUTPATIENT
Start: 2022-03-09 | End: 2022-03-09

## 2022-03-09 RX ORDER — MIDAZOLAM HYDROCHLORIDE 1 MG/ML
INJECTION, SOLUTION INTRAMUSCULAR; INTRAVENOUS PRN
Status: COMPLETED | OUTPATIENT
Start: 2022-03-09 | End: 2022-03-09

## 2022-03-09 RX ADMIN — SUCRALFATE 1 G: 1 SUSPENSION ORAL at 21:32

## 2022-03-09 RX ADMIN — SUCRALFATE 1 G: 1 SUSPENSION ORAL at 05:21

## 2022-03-09 RX ADMIN — METOPROLOL SUCCINATE 50 MG: 50 TABLET, EXTENDED RELEASE ORAL at 08:58

## 2022-03-09 RX ADMIN — FENTANYL CITRATE 25 MCG: 50 INJECTION, SOLUTION INTRAMUSCULAR; INTRAVENOUS at 10:20

## 2022-03-09 RX ADMIN — FUROSEMIDE 5 MG/HR: 10 INJECTION, SOLUTION INTRAMUSCULAR; INTRAVENOUS at 18:16

## 2022-03-09 RX ADMIN — LIDOCAINE HYDROCHLORIDE 1 APPLICATION.: 40 SOLUTION TOPICAL at 10:08

## 2022-03-09 RX ADMIN — SUCRALFATE 1 G: 1 SUSPENSION ORAL at 14:16

## 2022-03-09 RX ADMIN — METOPROLOL SUCCINATE 50 MG: 50 TABLET, EXTENDED RELEASE ORAL at 21:32

## 2022-03-09 RX ADMIN — MIDAZOLAM HYDROCHLORIDE 1 MG: 1 INJECTION, SOLUTION INTRAMUSCULAR; INTRAVENOUS at 10:16

## 2022-03-09 RX ADMIN — CLOPIDOGREL BISULFATE 75 MG: 75 TABLET, FILM COATED ORAL at 08:58

## 2022-03-09 RX ADMIN — ISOSORBIDE MONONITRATE 30 MG: 30 TABLET, EXTENDED RELEASE ORAL at 08:58

## 2022-03-09 RX ADMIN — SUCRALFATE 1 G: 1 SUSPENSION ORAL at 18:40

## 2022-03-09 RX ADMIN — HEPARIN SODIUM 12 UNITS/KG/HR: 1000 INJECTION, SOLUTION INTRAVENOUS; SUBCUTANEOUS at 01:58

## 2022-03-09 RX ADMIN — SPIRONOLACTONE 25 MG: 25 TABLET ORAL at 08:58

## 2022-03-09 RX ADMIN — LEVOTHYROXINE SODIUM 50 MCG: 0.03 TABLET ORAL at 08:58

## 2022-03-09 RX ADMIN — SODIUM CHLORIDE, PRESERVATIVE FREE 2 ML: 5 INJECTION INTRAVENOUS at 09:00

## 2022-03-09 RX ADMIN — FERROUS SULFATE TAB 325 MG (65 MG ELEMENTAL FE) 325 MG: 325 (65 FE) TAB at 08:58

## 2022-03-09 RX ADMIN — MONTELUKAST SODIUM 10 MG: 10 TABLET, FILM COATED ORAL at 21:32

## 2022-03-09 RX ADMIN — SODIUM CHLORIDE, PRESERVATIVE FREE 2 ML: 5 INJECTION INTRAVENOUS at 21:33

## 2022-03-09 RX ADMIN — SENNOSIDES AND DOCUSATE SODIUM 2 TABLET: 50; 8.6 TABLET ORAL at 21:32

## 2022-03-09 RX ADMIN — ACETAMINOPHEN 650 MG: 325 TABLET ORAL at 22:40

## 2022-03-09 RX ADMIN — FENTANYL CITRATE 25 MCG: 50 INJECTION, SOLUTION INTRAMUSCULAR; INTRAVENOUS at 10:16

## 2022-03-09 RX ADMIN — PANTOPRAZOLE SODIUM 40 MG: 40 TABLET, DELAYED RELEASE ORAL at 21:32

## 2022-03-09 RX ADMIN — DESMOPRESSIN ACETATE 40 MG: 0.2 TABLET ORAL at 21:32

## 2022-03-09 RX ADMIN — MIDAZOLAM HYDROCHLORIDE 1 MG: 1 INJECTION, SOLUTION INTRAMUSCULAR; INTRAVENOUS at 10:20

## 2022-03-09 RX ADMIN — MIRTAZAPINE 7.5 MG: 15 TABLET, FILM COATED ORAL at 21:32

## 2022-03-09 ASSESSMENT — LIFESTYLE VARIABLES: SMOKING_HISTORY: NO

## 2022-03-09 ASSESSMENT — PAIN SCALES - GENERAL
PAINLEVEL_OUTOF10: 0
PAINLEVEL_OUTOF10: 4

## 2022-03-10 ENCOUNTER — TELEPHONE (OUTPATIENT)
Dept: FAMILY MEDICINE | Age: 87
End: 2022-03-10

## 2022-03-10 ENCOUNTER — APPOINTMENT (OUTPATIENT)
Dept: ULTRASOUND IMAGING | Age: 87
DRG: 242 | End: 2022-03-10
Attending: STUDENT IN AN ORGANIZED HEALTH CARE EDUCATION/TRAINING PROGRAM

## 2022-03-10 LAB
AMORPH SED URNS QL MICRO: PRESENT
ANION GAP SERPL CALC-SCNC: 10 MMOL/L (ref 10–20)
APPEARANCE UR: CLEAR
APTT PPP: 54 SEC (ref 22–30)
BACTERIA #/AREA URNS HPF: ABNORMAL /HPF
BILIRUB UR QL STRIP: NEGATIVE
BUN SERPL-MCNC: 30 MG/DL (ref 6–20)
BUN/CREAT SERPL: 23 (ref 7–25)
CALCIUM SERPL-MCNC: 9.6 MG/DL (ref 8.4–10.2)
CHLORIDE SERPL-SCNC: 103 MMOL/L (ref 98–107)
CO2 SERPL-SCNC: 30 MMOL/L (ref 21–32)
COLOR UR: YELLOW
CREAT SERPL-MCNC: 1.31 MG/DL (ref 0.51–0.95)
CREAT UR-MCNC: 47.2 MG/DL
DEPRECATED RDW RBC: 45.3 FL (ref 39–50)
ERYTHROCYTE [DISTWIDTH] IN BLOOD: 15.1 % (ref 11–15)
FASTING DURATION TIME PATIENT: ABNORMAL H
GFR SERPLBLD BASED ON 1.73 SQ M-ARVRAT: 37 ML/MIN
GLUCOSE SERPL-MCNC: 124 MG/DL (ref 70–99)
GLUCOSE UR STRIP-MCNC: NEGATIVE MG/DL
HCT VFR BLD CALC: 38.2 % (ref 36–46.5)
HGB BLD-MCNC: 10.8 G/DL (ref 12–15.5)
HGB UR QL STRIP: NEGATIVE
HYALINE CASTS #/AREA URNS LPF: ABNORMAL /LPF
KETONES UR STRIP-MCNC: NEGATIVE MG/DL
LEUKOCYTE ESTERASE UR QL STRIP: ABNORMAL
MCH RBC QN AUTO: 23.3 PG (ref 26–34)
MCHC RBC AUTO-ENTMCNC: 28.3 G/DL (ref 32–36.5)
MCV RBC AUTO: 82.3 FL (ref 78–100)
MUCOUS THREADS URNS QL MICRO: PRESENT
NITRITE UR QL STRIP: NEGATIVE
NRBC BLD MANUAL-RTO: 0 /100 WBC
PH UR STRIP: 7 [PH] (ref 5–7)
PLATELET # BLD AUTO: 402 K/MCL (ref 140–450)
POTASSIUM SERPL-SCNC: 2.8 MMOL/L (ref 3.4–5.1)
POTASSIUM SERPL-SCNC: 3 MMOL/L (ref 3.4–5.1)
PROT UR STRIP-MCNC: NEGATIVE MG/DL
PROT UR-MCNC: 12 MG/DL
PROT/CREAT UR: 254 MGPR/GCR
RBC # BLD: 4.64 MIL/MCL (ref 4–5.2)
RBC #/AREA URNS HPF: ABNORMAL /HPF
SODIUM SERPL-SCNC: 140 MMOL/L (ref 135–145)
SP GR UR STRIP: 1.01 (ref 1–1.03)
SQUAMOUS #/AREA URNS HPF: ABNORMAL /HPF
TRANS CELLS #/AREA URNS HPF: ABNORMAL /HPF
UROBILINOGEN UR STRIP-MCNC: 0.2 MG/DL
WBC # BLD: 9.4 K/MCL (ref 4.2–11)
WBC #/AREA URNS HPF: ABNORMAL /HPF

## 2022-03-10 PROCEDURE — 84132 ASSAY OF SERUM POTASSIUM: CPT | Performed by: INTERNAL MEDICINE

## 2022-03-10 PROCEDURE — 10000002 HB ROOM CHARGE MED SURG

## 2022-03-10 PROCEDURE — 99233 SBSQ HOSP IP/OBS HIGH 50: CPT | Performed by: INTERNAL MEDICINE

## 2022-03-10 PROCEDURE — 84166 PROTEIN E-PHORESIS/URINE/CSF: CPT | Performed by: NURSE PRACTITIONER

## 2022-03-10 PROCEDURE — 10004173 HB COUNTER-THERAPY VISIT PT

## 2022-03-10 PROCEDURE — 10002801 HB RX 250 W/O HCPCS: Performed by: NURSE PRACTITIONER

## 2022-03-10 PROCEDURE — 10002803 HB RX 637: Performed by: INTERNAL MEDICINE

## 2022-03-10 PROCEDURE — 84156 ASSAY OF PROTEIN URINE: CPT | Performed by: STUDENT IN AN ORGANIZED HEALTH CARE EDUCATION/TRAINING PROGRAM

## 2022-03-10 PROCEDURE — 99223 1ST HOSP IP/OBS HIGH 75: CPT | Performed by: INTERNAL MEDICINE

## 2022-03-10 PROCEDURE — 10002803 HB RX 637: Performed by: NURSE PRACTITIONER

## 2022-03-10 PROCEDURE — 76770 US EXAM ABDO BACK WALL COMP: CPT | Performed by: RADIOLOGY

## 2022-03-10 PROCEDURE — 85027 COMPLETE CBC AUTOMATED: CPT | Performed by: NURSE PRACTITIONER

## 2022-03-10 PROCEDURE — 36415 COLL VENOUS BLD VENIPUNCTURE: CPT | Performed by: NURSE PRACTITIONER

## 2022-03-10 PROCEDURE — 10002800 HB RX 250 W HCPCS: Performed by: NURSE PRACTITIONER

## 2022-03-10 PROCEDURE — 10002800 HB RX 250 W HCPCS: Performed by: INTERNAL MEDICINE

## 2022-03-10 PROCEDURE — 10004651 HB RX, NO CHARGE ITEM: Performed by: NURSE PRACTITIONER

## 2022-03-10 PROCEDURE — 80048 BASIC METABOLIC PNL TOTAL CA: CPT | Performed by: NURSE PRACTITIONER

## 2022-03-10 PROCEDURE — 97116 GAIT TRAINING THERAPY: CPT

## 2022-03-10 PROCEDURE — 97530 THERAPEUTIC ACTIVITIES: CPT

## 2022-03-10 PROCEDURE — 81001 URINALYSIS AUTO W/SCOPE: CPT | Performed by: STUDENT IN AN ORGANIZED HEALTH CARE EDUCATION/TRAINING PROGRAM

## 2022-03-10 PROCEDURE — 76770 US EXAM ABDO BACK WALL COMP: CPT

## 2022-03-10 PROCEDURE — 85730 THROMBOPLASTIN TIME PARTIAL: CPT | Performed by: NURSE PRACTITIONER

## 2022-03-10 PROCEDURE — 84540 ASSAY OF URINE/UREA-N: CPT | Performed by: STUDENT IN AN ORGANIZED HEALTH CARE EDUCATION/TRAINING PROGRAM

## 2022-03-10 PROCEDURE — 10003445 HB TELEMETRY PER DAY

## 2022-03-10 RX ORDER — DIGOXIN 0.25 MG/ML
250 INJECTION INTRAMUSCULAR; INTRAVENOUS ONCE
Status: DISCONTINUED | OUTPATIENT
Start: 2022-03-10 | End: 2022-03-10

## 2022-03-10 RX ORDER — POTASSIUM CHLORIDE 20 MEQ/1
40 TABLET, EXTENDED RELEASE ORAL ONCE
Status: COMPLETED | OUTPATIENT
Start: 2022-03-10 | End: 2022-03-10

## 2022-03-10 RX ORDER — POTASSIUM CHLORIDE 14.9 MG/ML
20 INJECTION INTRAVENOUS DAILY
Status: DISCONTINUED | OUTPATIENT
Start: 2022-03-10 | End: 2022-03-10

## 2022-03-10 RX ORDER — POTASSIUM CHLORIDE 14.9 MG/ML
20 INJECTION INTRAVENOUS DAILY
Status: DISCONTINUED | OUTPATIENT
Start: 2022-03-10 | End: 2022-03-13 | Stop reason: HOSPADM

## 2022-03-10 RX ORDER — POTASSIUM CHLORIDE 14.9 MG/ML
20 INJECTION INTRAVENOUS ONCE
Status: DISCONTINUED | OUTPATIENT
Start: 2022-03-10 | End: 2022-03-10

## 2022-03-10 RX ORDER — FUROSEMIDE 10 MG/ML
40 INJECTION INTRAMUSCULAR; INTRAVENOUS
Status: DISCONTINUED | OUTPATIENT
Start: 2022-03-11 | End: 2022-03-12

## 2022-03-10 RX ORDER — POTASSIUM CHLORIDE 20 MEQ/1
20 TABLET, EXTENDED RELEASE ORAL
Status: DISCONTINUED | OUTPATIENT
Start: 2022-03-10 | End: 2022-03-10

## 2022-03-10 RX ADMIN — METOPROLOL SUCCINATE 50 MG: 50 TABLET, EXTENDED RELEASE ORAL at 08:16

## 2022-03-10 RX ADMIN — LEVOTHYROXINE SODIUM 50 MCG: 0.03 TABLET ORAL at 08:17

## 2022-03-10 RX ADMIN — SPIRONOLACTONE 25 MG: 25 TABLET ORAL at 08:17

## 2022-03-10 RX ADMIN — POTASSIUM CHLORIDE 20 MEQ: 14.9 INJECTION, SOLUTION INTRAVENOUS at 15:18

## 2022-03-10 RX ADMIN — MONTELUKAST SODIUM 10 MG: 10 TABLET, FILM COATED ORAL at 20:50

## 2022-03-10 RX ADMIN — FERROUS SULFATE TAB 325 MG (65 MG ELEMENTAL FE) 325 MG: 325 (65 FE) TAB at 08:17

## 2022-03-10 RX ADMIN — HEPARIN SODIUM 12 UNITS/KG/HR: 1000 INJECTION, SOLUTION INTRAVENOUS; SUBCUTANEOUS at 17:07

## 2022-03-10 RX ADMIN — MIRTAZAPINE 7.5 MG: 15 TABLET, FILM COATED ORAL at 20:49

## 2022-03-10 RX ADMIN — LINACLOTIDE 145 MCG: 145 CAPSULE, GELATIN COATED ORAL at 06:35

## 2022-03-10 RX ADMIN — POTASSIUM CHLORIDE 40 MEQ: 1500 TABLET, EXTENDED RELEASE ORAL at 08:16

## 2022-03-10 RX ADMIN — ISOSORBIDE MONONITRATE 30 MG: 30 TABLET, EXTENDED RELEASE ORAL at 08:17

## 2022-03-10 RX ADMIN — SODIUM CHLORIDE, PRESERVATIVE FREE 2 ML: 5 INJECTION INTRAVENOUS at 08:16

## 2022-03-10 RX ADMIN — PANTOPRAZOLE SODIUM 40 MG: 40 TABLET, DELAYED RELEASE ORAL at 20:50

## 2022-03-10 RX ADMIN — DESMOPRESSIN ACETATE 40 MG: 0.2 TABLET ORAL at 20:50

## 2022-03-10 RX ADMIN — CLOPIDOGREL BISULFATE 75 MG: 75 TABLET, FILM COATED ORAL at 08:17

## 2022-03-10 RX ADMIN — SODIUM CHLORIDE, PRESERVATIVE FREE 2 ML: 5 INJECTION INTRAVENOUS at 20:50

## 2022-03-10 RX ADMIN — METOPROLOL SUCCINATE 50 MG: 50 TABLET, EXTENDED RELEASE ORAL at 20:50

## 2022-03-10 RX ADMIN — SUCRALFATE 1 G: 1 SUSPENSION ORAL at 06:35

## 2022-03-10 ASSESSMENT — PAIN SCALES - GENERAL: PAINLEVEL_OUTOF10: 0

## 2022-03-10 ASSESSMENT — COGNITIVE AND FUNCTIONAL STATUS - GENERAL
BASIC_MOBILITY_CONVERTED_SCORE: 41.05
BASIC_MOBILITY_RAW_SCORE: 18

## 2022-03-11 ENCOUNTER — ANESTHESIA EVENT (OUTPATIENT)
Dept: CARDIOLOGY | Age: 87
DRG: 242 | End: 2022-03-11

## 2022-03-11 ENCOUNTER — CASE MANAGEMENT (OUTPATIENT)
Dept: CARE COORDINATION | Age: 87
End: 2022-03-11

## 2022-03-11 LAB
25(OH)D3+25(OH)D2 SERPL-MCNC: 45 NG/ML (ref 30–100)
ANION GAP SERPL CALC-SCNC: 9 MMOL/L (ref 10–20)
APTT PPP: 46 SEC (ref 22–30)
BUN SERPL-MCNC: 31 MG/DL (ref 6–20)
BUN/CREAT SERPL: 22 (ref 7–25)
CALCIUM SERPL-MCNC: 9.5 MG/DL (ref 8.4–10.2)
CHLORIDE SERPL-SCNC: 104 MMOL/L (ref 98–107)
CO2 SERPL-SCNC: 29 MMOL/L (ref 21–32)
CREAT SERPL-MCNC: 1.38 MG/DL (ref 0.51–0.95)
FASTING DURATION TIME PATIENT: ABNORMAL H
GFR SERPLBLD BASED ON 1.73 SQ M-ARVRAT: 35 ML/MIN
GLUCOSE SERPL-MCNC: 114 MG/DL (ref 70–99)
MAGNESIUM SERPL-MCNC: 1.4 MG/DL (ref 1.7–2.4)
MAGNESIUM SERPL-MCNC: 1.5 MG/DL (ref 1.7–2.4)
MAGNESIUM SERPL-MCNC: 1.7 MG/DL (ref 1.7–2.4)
PATH INTERP SPEC-IMP: NORMAL
PHOSPHATE SERPL-MCNC: 3.5 MG/DL (ref 2.4–4.7)
POTASSIUM SERPL-SCNC: 3.1 MMOL/L (ref 3.4–5.1)
POTASSIUM SERPL-SCNC: 3.1 MMOL/L (ref 3.4–5.1)
POTASSIUM SERPL-SCNC: 3.3 MMOL/L (ref 3.4–5.1)
PROT UR-MCNC: 11 MG/DL
PTH-INTACT SERPL-MCNC: 41 PG/ML (ref 19–88)
RAINBOW EXTRA TUBES HOLD SPECIMEN: NORMAL
SARS-COV-2 RNA RESP QL NAA+PROBE: NOT DETECTED
SERVICE CMNT-IMP: NORMAL
SERVICE CMNT-IMP: NORMAL
SODIUM SERPL-SCNC: 139 MMOL/L (ref 135–145)
UUN UR-MCNC: 294 MG/DL

## 2022-03-11 PROCEDURE — 10002803 HB RX 637: Performed by: NURSE PRACTITIONER

## 2022-03-11 PROCEDURE — 82306 VITAMIN D 25 HYDROXY: CPT | Performed by: STUDENT IN AN ORGANIZED HEALTH CARE EDUCATION/TRAINING PROGRAM

## 2022-03-11 PROCEDURE — 10000002 HB ROOM CHARGE MED SURG

## 2022-03-11 PROCEDURE — 97530 THERAPEUTIC ACTIVITIES: CPT

## 2022-03-11 PROCEDURE — 83970 ASSAY OF PARATHORMONE: CPT | Performed by: STUDENT IN AN ORGANIZED HEALTH CARE EDUCATION/TRAINING PROGRAM

## 2022-03-11 PROCEDURE — 99232 SBSQ HOSP IP/OBS MODERATE 35: CPT | Performed by: STUDENT IN AN ORGANIZED HEALTH CARE EDUCATION/TRAINING PROGRAM

## 2022-03-11 PROCEDURE — 10002807 HB RX 258: Performed by: INTERNAL MEDICINE

## 2022-03-11 PROCEDURE — 87635 SARS-COV-2 COVID-19 AMP PRB: CPT | Performed by: PHYSICIAN ASSISTANT

## 2022-03-11 PROCEDURE — 80048 BASIC METABOLIC PNL TOTAL CA: CPT | Performed by: NURSE PRACTITIONER

## 2022-03-11 PROCEDURE — 83735 ASSAY OF MAGNESIUM: CPT | Performed by: INTERNAL MEDICINE

## 2022-03-11 PROCEDURE — 10002801 HB RX 250 W/O HCPCS: Performed by: NURSE PRACTITIONER

## 2022-03-11 PROCEDURE — 83735 ASSAY OF MAGNESIUM: CPT | Performed by: STUDENT IN AN ORGANIZED HEALTH CARE EDUCATION/TRAINING PROGRAM

## 2022-03-11 PROCEDURE — 99231 SBSQ HOSP IP/OBS SF/LOW 25: CPT | Performed by: INTERNAL MEDICINE

## 2022-03-11 PROCEDURE — 10002800 HB RX 250 W HCPCS: Performed by: PHYSICIAN ASSISTANT

## 2022-03-11 PROCEDURE — 10002803 HB RX 637: Performed by: INTERNAL MEDICINE

## 2022-03-11 PROCEDURE — 10004173 HB COUNTER-THERAPY VISIT PT

## 2022-03-11 PROCEDURE — 10002800 HB RX 250 W HCPCS: Performed by: NURSE PRACTITIONER

## 2022-03-11 PROCEDURE — 10004651 HB RX, NO CHARGE ITEM: Performed by: NURSE PRACTITIONER

## 2022-03-11 PROCEDURE — 36415 COLL VENOUS BLD VENIPUNCTURE: CPT | Performed by: NURSE PRACTITIONER

## 2022-03-11 PROCEDURE — 84100 ASSAY OF PHOSPHORUS: CPT | Performed by: STUDENT IN AN ORGANIZED HEALTH CARE EDUCATION/TRAINING PROGRAM

## 2022-03-11 PROCEDURE — 85730 THROMBOPLASTIN TIME PARTIAL: CPT | Performed by: NURSE PRACTITIONER

## 2022-03-11 PROCEDURE — 10003445 HB TELEMETRY PER DAY

## 2022-03-11 PROCEDURE — 84132 ASSAY OF SERUM POTASSIUM: CPT | Performed by: INTERNAL MEDICINE

## 2022-03-11 RX ORDER — LANOLIN ALCOHOL/MO/W.PET/CERES
400 CREAM (GRAM) TOPICAL ONCE
Status: COMPLETED | OUTPATIENT
Start: 2022-03-11 | End: 2022-03-11

## 2022-03-11 RX ORDER — POTASSIUM CHLORIDE 20 MEQ/1
20 TABLET, EXTENDED RELEASE ORAL ONCE
Status: COMPLETED | OUTPATIENT
Start: 2022-03-11 | End: 2022-03-11

## 2022-03-11 RX ORDER — DIGOXIN 0.25 MG/ML
250 INJECTION INTRAMUSCULAR; INTRAVENOUS ONCE
Status: COMPLETED | OUTPATIENT
Start: 2022-03-11 | End: 2022-03-11

## 2022-03-11 RX ORDER — MAGNESIUM SULFATE 1 G/100ML
1 INJECTION INTRAVENOUS ONCE
Status: COMPLETED | OUTPATIENT
Start: 2022-03-11 | End: 2022-03-12

## 2022-03-11 RX ORDER — POTASSIUM CHLORIDE 20 MEQ/1
40 TABLET, EXTENDED RELEASE ORAL ONCE
Status: COMPLETED | OUTPATIENT
Start: 2022-03-11 | End: 2022-03-11

## 2022-03-11 RX ADMIN — LINACLOTIDE 145 MCG: 145 CAPSULE, GELATIN COATED ORAL at 06:20

## 2022-03-11 RX ADMIN — FERROUS SULFATE TAB 325 MG (65 MG ELEMENTAL FE) 325 MG: 325 (65 FE) TAB at 09:02

## 2022-03-11 RX ADMIN — Medication 400 MG: at 17:01

## 2022-03-11 RX ADMIN — METOPROLOL SUCCINATE 50 MG: 50 TABLET, EXTENDED RELEASE ORAL at 09:01

## 2022-03-11 RX ADMIN — Medication 400 MG: at 10:25

## 2022-03-11 RX ADMIN — PANTOPRAZOLE SODIUM 40 MG: 40 TABLET, DELAYED RELEASE ORAL at 21:28

## 2022-03-11 RX ADMIN — POTASSIUM CHLORIDE 20 MEQ: 1500 TABLET, EXTENDED RELEASE ORAL at 17:01

## 2022-03-11 RX ADMIN — DIGOXIN 250 MCG: 0.25 INJECTION INTRAMUSCULAR; INTRAVENOUS at 10:18

## 2022-03-11 RX ADMIN — CLOPIDOGREL BISULFATE 75 MG: 75 TABLET, FILM COATED ORAL at 09:02

## 2022-03-11 RX ADMIN — FUROSEMIDE 40 MG: 10 INJECTION, SOLUTION INTRAMUSCULAR; INTRAVENOUS at 09:03

## 2022-03-11 RX ADMIN — SODIUM CHLORIDE, PRESERVATIVE FREE 2 ML: 5 INJECTION INTRAVENOUS at 09:03

## 2022-03-11 RX ADMIN — MONTELUKAST SODIUM 10 MG: 10 TABLET, FILM COATED ORAL at 21:28

## 2022-03-11 RX ADMIN — MIRTAZAPINE 7.5 MG: 15 TABLET, FILM COATED ORAL at 21:28

## 2022-03-11 RX ADMIN — DESMOPRESSIN ACETATE 40 MG: 0.2 TABLET ORAL at 21:28

## 2022-03-11 RX ADMIN — METOPROLOL SUCCINATE 50 MG: 50 TABLET, EXTENDED RELEASE ORAL at 21:28

## 2022-03-11 RX ADMIN — FUROSEMIDE 40 MG: 10 INJECTION, SOLUTION INTRAMUSCULAR; INTRAVENOUS at 17:01

## 2022-03-11 RX ADMIN — POTASSIUM CHLORIDE 40 MEQ: 1500 TABLET, EXTENDED RELEASE ORAL at 23:18

## 2022-03-11 RX ADMIN — MAGNESIUM SULFATE IN DEXTROSE 1 G: 10 INJECTION, SOLUTION INTRAVENOUS at 23:20

## 2022-03-11 RX ADMIN — POTASSIUM CHLORIDE 20 MEQ: 1500 TABLET, EXTENDED RELEASE ORAL at 10:25

## 2022-03-11 RX ADMIN — SPIRONOLACTONE 25 MG: 25 TABLET ORAL at 09:02

## 2022-03-11 RX ADMIN — LEVOTHYROXINE SODIUM 50 MCG: 0.03 TABLET ORAL at 09:01

## 2022-03-11 ASSESSMENT — COGNITIVE AND FUNCTIONAL STATUS - GENERAL
BASIC_MOBILITY_RAW_SCORE: 18
BASIC_MOBILITY_CONVERTED_SCORE: 41.05

## 2022-03-11 ASSESSMENT — PAIN SCALES - GENERAL
PAINLEVEL_OUTOF10: 0

## 2022-03-12 ENCOUNTER — APPOINTMENT (OUTPATIENT)
Dept: GENERAL RADIOLOGY | Age: 87
DRG: 242 | End: 2022-03-12
Attending: INTERNAL MEDICINE

## 2022-03-12 ENCOUNTER — ANESTHESIA (OUTPATIENT)
Dept: CARDIOLOGY | Age: 87
DRG: 242 | End: 2022-03-12

## 2022-03-12 LAB
ANION GAP SERPL CALC-SCNC: 11 MMOL/L (ref 10–20)
APTT PPP: 28 SEC (ref 22–30)
BUN SERPL-MCNC: 31 MG/DL (ref 6–20)
BUN/CREAT SERPL: 24 (ref 7–25)
CALCIUM SERPL-MCNC: 9.7 MG/DL (ref 8.4–10.2)
CHLORIDE SERPL-SCNC: 106 MMOL/L (ref 98–107)
CO2 SERPL-SCNC: 27 MMOL/L (ref 21–32)
CREAT SERPL-MCNC: 1.27 MG/DL (ref 0.51–0.95)
FASTING DURATION TIME PATIENT: ABNORMAL H
GFR SERPLBLD BASED ON 1.73 SQ M-ARVRAT: 39 ML/MIN
GLUCOSE SERPL-MCNC: 126 MG/DL (ref 70–99)
MAGNESIUM SERPL-MCNC: 2 MG/DL (ref 1.7–2.4)
POTASSIUM SERPL-SCNC: 4 MMOL/L (ref 3.4–5.1)
SODIUM SERPL-SCNC: 140 MMOL/L (ref 135–145)

## 2022-03-12 PROCEDURE — 10002803 HB RX 637: Performed by: NURSE PRACTITIONER

## 2022-03-12 PROCEDURE — 10002807 HB RX 258: Performed by: NURSE PRACTITIONER

## 2022-03-12 PROCEDURE — 99232 SBSQ HOSP IP/OBS MODERATE 35: CPT | Performed by: INTERNAL MEDICINE

## 2022-03-12 PROCEDURE — 0JH607Z INSERTION OF CARDIAC RESYNCHRONIZATION PACEMAKER PULSE GENERATOR INTO CHEST SUBCUTANEOUS TISSUE AND FASCIA, OPEN APPROACH: ICD-10-PCS | Performed by: INTERNAL MEDICINE

## 2022-03-12 PROCEDURE — C1769 GUIDE WIRE: HCPCS | Performed by: INTERNAL MEDICINE

## 2022-03-12 PROCEDURE — 10000002 HB ROOM CHARGE MED SURG

## 2022-03-12 PROCEDURE — 10002805 HB CONTRAST AGENT: Performed by: INTERNAL MEDICINE

## 2022-03-12 PROCEDURE — 83735 ASSAY OF MAGNESIUM: CPT | Performed by: INTERNAL MEDICINE

## 2022-03-12 PROCEDURE — 10004380 HB EP PROCEDURE LEVEL 2: Performed by: INTERNAL MEDICINE

## 2022-03-12 PROCEDURE — 10002807 HB RX 258: Performed by: ANESTHESIOLOGY

## 2022-03-12 PROCEDURE — 10002800 HB RX 250 W HCPCS: Performed by: INTERNAL MEDICINE

## 2022-03-12 PROCEDURE — 10002362 HB ANESTH MAC IV 1ST 1/2 HR: Performed by: INTERNAL MEDICINE

## 2022-03-12 PROCEDURE — B51V1ZZ FLUOROSCOPY OF OTHER VEINS USING LOW OSMOLAR CONTRAST: ICD-10-PCS | Performed by: INTERNAL MEDICINE

## 2022-03-12 PROCEDURE — C1892 INTRO/SHEATH,FIXED,PEEL-AWAY: HCPCS | Performed by: INTERNAL MEDICINE

## 2022-03-12 PROCEDURE — C1898 LEAD, PMKR, OTHER THAN TRANS: HCPCS | Performed by: INTERNAL MEDICINE

## 2022-03-12 PROCEDURE — 02583ZZ DESTRUCTION OF CONDUCTION MECHANISM, PERCUTANEOUS APPROACH: ICD-10-PCS | Performed by: INTERNAL MEDICINE

## 2022-03-12 PROCEDURE — 10002801 HB RX 250 W/O HCPCS: Performed by: ANESTHESIOLOGY

## 2022-03-12 PROCEDURE — 71045 X-RAY EXAM CHEST 1 VIEW: CPT

## 2022-03-12 PROCEDURE — 10004385 HB LEAD INSERTION LEFT VENTRICLE: Performed by: INTERNAL MEDICINE

## 2022-03-12 PROCEDURE — 10006025 HB SUPPLY 275: Performed by: INTERNAL MEDICINE

## 2022-03-12 PROCEDURE — 02K83ZZ MAP CONDUCTION MECHANISM, PERCUTANEOUS APPROACH: ICD-10-PCS | Performed by: INTERNAL MEDICINE

## 2022-03-12 PROCEDURE — 10002803 HB RX 637: Performed by: INTERNAL MEDICINE

## 2022-03-12 PROCEDURE — 10001568 HB ANESTH MAC IV ADD'L 1/2 HR: Performed by: INTERNAL MEDICINE

## 2022-03-12 PROCEDURE — A93650 ANES INTRACARD CATH ABLAT AV NODE FUNCT: Performed by: ANESTHESIOLOGY

## 2022-03-12 PROCEDURE — 99232 SBSQ HOSP IP/OBS MODERATE 35: CPT | Performed by: STUDENT IN AN ORGANIZED HEALTH CARE EDUCATION/TRAINING PROGRAM

## 2022-03-12 PROCEDURE — 10004352 HB COUNTER-EP CASE: Performed by: INTERNAL MEDICINE

## 2022-03-12 PROCEDURE — 02H63JZ INSERTION OF PACEMAKER LEAD INTO RIGHT ATRIUM, PERCUTANEOUS APPROACH: ICD-10-PCS | Performed by: INTERNAL MEDICINE

## 2022-03-12 PROCEDURE — 10004387: Performed by: INTERNAL MEDICINE

## 2022-03-12 PROCEDURE — 80048 BASIC METABOLIC PNL TOTAL CA: CPT | Performed by: NURSE PRACTITIONER

## 2022-03-12 PROCEDURE — 10006027 HB SUPPLY 278: Performed by: INTERNAL MEDICINE

## 2022-03-12 PROCEDURE — C1894 INTRO/SHEATH, NON-LASER: HCPCS | Performed by: INTERNAL MEDICINE

## 2022-03-12 PROCEDURE — 85730 THROMBOPLASTIN TIME PARTIAL: CPT | Performed by: NURSE PRACTITIONER

## 2022-03-12 PROCEDURE — 10002801 HB RX 250 W/O HCPCS: Performed by: INTERNAL MEDICINE

## 2022-03-12 PROCEDURE — 10002800 HB RX 250 W HCPCS: Performed by: ANESTHESIOLOGY

## 2022-03-12 PROCEDURE — 10003445 HB TELEMETRY PER DAY

## 2022-03-12 PROCEDURE — 02HK3JZ INSERTION OF PACEMAKER LEAD INTO RIGHT VENTRICLE, PERCUTANEOUS APPROACH: ICD-10-PCS | Performed by: INTERNAL MEDICINE

## 2022-03-12 PROCEDURE — 10002800 HB RX 250 W HCPCS: Performed by: NURSE PRACTITIONER

## 2022-03-12 PROCEDURE — 10002801 HB RX 250 W/O HCPCS: Performed by: NURSE PRACTITIONER

## 2022-03-12 PROCEDURE — 10004651 HB RX, NO CHARGE ITEM: Performed by: INTERNAL MEDICINE

## 2022-03-12 PROCEDURE — C2621 PMKR, OTHER THAN SING/DUAL: HCPCS | Performed by: INTERNAL MEDICINE

## 2022-03-12 PROCEDURE — 10004651 HB RX, NO CHARGE ITEM: Performed by: NURSE PRACTITIONER

## 2022-03-12 PROCEDURE — 71045 X-RAY EXAM CHEST 1 VIEW: CPT | Performed by: RADIOLOGY

## 2022-03-12 PROCEDURE — C2630 CATH, EP, COOL-TIP: HCPCS | Performed by: INTERNAL MEDICINE

## 2022-03-12 PROCEDURE — 10002807 HB RX 258: Performed by: INTERNAL MEDICINE

## 2022-03-12 PROCEDURE — C1883 ADAPT/EXT, PACING/NEURO LEAD: HCPCS | Performed by: INTERNAL MEDICINE

## 2022-03-12 PROCEDURE — 02H43JZ INSERTION OF PACEMAKER LEAD INTO CORONARY VEIN, PERCUTANEOUS APPROACH: ICD-10-PCS | Performed by: INTERNAL MEDICINE

## 2022-03-12 PROCEDURE — 10006023 HB SUPPLY 272: Performed by: INTERNAL MEDICINE

## 2022-03-12 DEVICE — PIN PLUG 6725 US CONNECTOR PORT: Type: IMPLANTABLE DEVICE | Site: CHEST | Status: FUNCTIONAL

## 2022-03-12 DEVICE — LEAD 479888 ATTAIN MRI US
Type: IMPLANTABLE DEVICE | Site: CORONARY SINUS | Status: FUNCTIONAL
Brand: ATTAIN STABILITY™ QUAD MRI SURESCAN™

## 2022-03-12 DEVICE — CRTP W4TR01 PERCEPTA QUAD CRTP MRI US
Type: IMPLANTABLE DEVICE | Site: CHEST | Status: FUNCTIONAL
Brand: PERCEPTA™ QUAD CRT-P MRI SURESCAN™

## 2022-03-12 DEVICE — LEAD 407652 CAPSUREFIX NOVUS US MRI
Type: IMPLANTABLE DEVICE | Site: VENTRICLE | Status: FUNCTIONAL
Brand: CAPSUREFIX NOVUS MRI™ SURESCAN™

## 2022-03-12 RX ORDER — DEXTROSE MONOHYDRATE 50 MG/ML
INJECTION, SOLUTION INTRAVENOUS CONTINUOUS PRN
Status: DISCONTINUED | OUTPATIENT
Start: 2022-03-12 | End: 2022-03-13 | Stop reason: HOSPADM

## 2022-03-12 RX ORDER — FUROSEMIDE 40 MG/1
40 TABLET ORAL DAILY
Status: DISCONTINUED | OUTPATIENT
Start: 2022-03-12 | End: 2022-03-13 | Stop reason: HOSPADM

## 2022-03-12 RX ORDER — HUMAN INSULIN 100 [IU]/ML
INJECTION, SOLUTION SUBCUTANEOUS
Status: DISCONTINUED | OUTPATIENT
Start: 2022-03-12 | End: 2022-03-13 | Stop reason: HOSPADM

## 2022-03-12 RX ORDER — HYDRALAZINE HYDROCHLORIDE 20 MG/ML
5 INJECTION INTRAMUSCULAR; INTRAVENOUS EVERY 10 MIN PRN
Status: DISCONTINUED | OUTPATIENT
Start: 2022-03-12 | End: 2022-03-13 | Stop reason: HOSPADM

## 2022-03-12 RX ORDER — SODIUM CHLORIDE 9 MG/ML
INJECTION, SOLUTION INTRAVENOUS CONTINUOUS PRN
Status: DISCONTINUED | OUTPATIENT
Start: 2022-03-12 | End: 2022-03-12

## 2022-03-12 RX ORDER — POTASSIUM CHLORIDE 20 MEQ/1
20 TABLET, EXTENDED RELEASE ORAL ONCE
Status: COMPLETED | OUTPATIENT
Start: 2022-03-12 | End: 2022-03-12

## 2022-03-12 RX ORDER — SODIUM CHLORIDE, SODIUM LACTATE, POTASSIUM CHLORIDE, CALCIUM CHLORIDE 600; 310; 30; 20 MG/100ML; MG/100ML; MG/100ML; MG/100ML
INJECTION, SOLUTION INTRAVENOUS CONTINUOUS PRN
Status: DISCONTINUED | OUTPATIENT
Start: 2022-03-12 | End: 2022-03-12

## 2022-03-12 RX ORDER — NICOTINE POLACRILEX 4 MG
30 LOZENGE BUCCAL
Status: DISCONTINUED | OUTPATIENT
Start: 2022-03-12 | End: 2022-03-13 | Stop reason: HOSPADM

## 2022-03-12 RX ORDER — HEPARIN SODIUM 200 [USP'U]/100ML
INJECTION, SOLUTION INTRAVENOUS PRN
Status: DISCONTINUED | OUTPATIENT
Start: 2022-03-12 | End: 2022-03-12 | Stop reason: HOSPADM

## 2022-03-12 RX ORDER — SODIUM CHLORIDE, SODIUM LACTATE, POTASSIUM CHLORIDE, CALCIUM CHLORIDE 600; 310; 30; 20 MG/100ML; MG/100ML; MG/100ML; MG/100ML
INJECTION, SOLUTION INTRAVENOUS CONTINUOUS
Status: DISCONTINUED | OUTPATIENT
Start: 2022-03-12 | End: 2022-03-13 | Stop reason: HOSPADM

## 2022-03-12 RX ORDER — ALBUTEROL SULFATE 2.5 MG/3ML
2.5 SOLUTION RESPIRATORY (INHALATION)
Status: DISCONTINUED | OUTPATIENT
Start: 2022-03-12 | End: 2022-03-13 | Stop reason: HOSPADM

## 2022-03-12 RX ORDER — DEXTROSE MONOHYDRATE 25 G/50ML
25 INJECTION, SOLUTION INTRAVENOUS PRN
Status: DISCONTINUED | OUTPATIENT
Start: 2022-03-12 | End: 2022-03-13 | Stop reason: HOSPADM

## 2022-03-12 RX ORDER — MAGNESIUM HYDROXIDE 1200 MG/15ML
LIQUID ORAL PRN
Status: DISCONTINUED | OUTPATIENT
Start: 2022-03-12 | End: 2022-03-12 | Stop reason: HOSPADM

## 2022-03-12 RX ORDER — SODIUM CHLORIDE 9 MG/ML
INJECTION, SOLUTION INTRAVENOUS CONTINUOUS
Status: DISCONTINUED | OUTPATIENT
Start: 2022-03-12 | End: 2022-03-13 | Stop reason: HOSPADM

## 2022-03-12 RX ORDER — LIDOCAINE HYDROCHLORIDE 10 MG/ML
INJECTION, SOLUTION EPIDURAL; INFILTRATION; INTRACAUDAL; PERINEURAL PRN
Status: DISCONTINUED | OUTPATIENT
Start: 2022-03-12 | End: 2022-03-12 | Stop reason: HOSPADM

## 2022-03-12 RX ORDER — LIDOCAINE HYDROCHLORIDE 10 MG/ML
5-10 INJECTION, SOLUTION INFILTRATION; PERINEURAL PRN
Status: DISCONTINUED | OUTPATIENT
Start: 2022-03-12 | End: 2022-03-13 | Stop reason: HOSPADM

## 2022-03-12 RX ORDER — ONDANSETRON 2 MG/ML
4 INJECTION INTRAMUSCULAR; INTRAVENOUS 2 TIMES DAILY PRN
Status: DISCONTINUED | OUTPATIENT
Start: 2022-03-12 | End: 2022-03-13 | Stop reason: HOSPADM

## 2022-03-12 RX ORDER — PROPOFOL 10 MG/ML
INJECTION, EMULSION INTRAVENOUS PRN
Status: DISCONTINUED | OUTPATIENT
Start: 2022-03-12 | End: 2022-03-12

## 2022-03-12 RX ADMIN — MONTELUKAST SODIUM 10 MG: 10 TABLET, FILM COATED ORAL at 20:30

## 2022-03-12 RX ADMIN — FUROSEMIDE 40 MG: 40 TABLET ORAL at 16:20

## 2022-03-12 RX ADMIN — FENTANYL CITRATE 25 MCG: 50 INJECTION INTRAMUSCULAR; INTRAVENOUS at 08:53

## 2022-03-12 RX ADMIN — PROPOFOL 25 MCG/KG/MIN: 10 INJECTION, EMULSION INTRAVENOUS at 08:56

## 2022-03-12 RX ADMIN — PANTOPRAZOLE SODIUM 40 MG: 40 TABLET, DELAYED RELEASE ORAL at 20:30

## 2022-03-12 RX ADMIN — ACETAMINOPHEN 650 MG: 325 TABLET ORAL at 14:41

## 2022-03-12 RX ADMIN — ACETAMINOPHEN 650 MG: 325 TABLET ORAL at 22:18

## 2022-03-12 RX ADMIN — SODIUM CHLORIDE, POTASSIUM CHLORIDE, SODIUM LACTATE AND CALCIUM CHLORIDE: 600; 310; 30; 20 INJECTION, SOLUTION INTRAVENOUS at 06:31

## 2022-03-12 RX ADMIN — VANCOMYCIN HYDROCHLORIDE 1000 MG: 1 INJECTION, POWDER, LYOPHILIZED, FOR SOLUTION INTRAVENOUS at 08:10

## 2022-03-12 RX ADMIN — CLOPIDOGREL BISULFATE 75 MG: 75 TABLET, FILM COATED ORAL at 13:09

## 2022-03-12 RX ADMIN — SODIUM CHLORIDE, PRESERVATIVE FREE 2 ML: 5 INJECTION INTRAVENOUS at 20:32

## 2022-03-12 RX ADMIN — FENTANYL CITRATE 25 MCG: 50 INJECTION INTRAMUSCULAR; INTRAVENOUS at 10:14

## 2022-03-12 RX ADMIN — POTASSIUM CHLORIDE 20 MEQ: 1500 TABLET, EXTENDED RELEASE ORAL at 14:14

## 2022-03-12 RX ADMIN — PROPOFOL 10 MG: 10 INJECTION, EMULSION INTRAVENOUS at 09:30

## 2022-03-12 RX ADMIN — DESMOPRESSIN ACETATE 40 MG: 0.2 TABLET ORAL at 20:29

## 2022-03-12 RX ADMIN — SODIUM CHLORIDE, POTASSIUM CHLORIDE, SODIUM LACTATE AND CALCIUM CHLORIDE: 600; 310; 30; 20 INJECTION, SOLUTION INTRAVENOUS at 08:39

## 2022-03-12 RX ADMIN — FENTANYL CITRATE 25 MCG: 50 INJECTION INTRAMUSCULAR; INTRAVENOUS at 09:30

## 2022-03-12 RX ADMIN — LEVOTHYROXINE SODIUM 50 MCG: 0.03 TABLET ORAL at 13:11

## 2022-03-12 RX ADMIN — PROPOFOL 10 MG: 10 INJECTION, EMULSION INTRAVENOUS at 10:15

## 2022-03-12 RX ADMIN — METOPROLOL SUCCINATE 50 MG: 50 TABLET, EXTENDED RELEASE ORAL at 13:11

## 2022-03-12 RX ADMIN — SPIRONOLACTONE 25 MG: 25 TABLET ORAL at 13:10

## 2022-03-12 RX ADMIN — PROPOFOL 10 MG: 10 INJECTION, EMULSION INTRAVENOUS at 08:58

## 2022-03-12 RX ADMIN — MIRTAZAPINE 7.5 MG: 15 TABLET, FILM COATED ORAL at 20:30

## 2022-03-12 RX ADMIN — METOPROLOL SUCCINATE 50 MG: 50 TABLET, EXTENDED RELEASE ORAL at 20:30

## 2022-03-12 SDOH — SOCIAL STABILITY: SOCIAL INSECURITY: RISK FACTORS: PULMONARY DISEASE

## 2022-03-12 SDOH — SOCIAL STABILITY: SOCIAL INSECURITY: RISK FACTORS: NEUROLOGICAL DISEASE

## 2022-03-12 SDOH — SOCIAL STABILITY: SOCIAL INSECURITY: RISK FACTORS: HEART DISEASE

## 2022-03-12 SDOH — SOCIAL STABILITY: SOCIAL INSECURITY: RISK FACTORS: KIDNEY DISEASE

## 2022-03-12 ASSESSMENT — PAIN SCALES - GENERAL
PAINLEVEL_OUTOF10: 1
PAINLEVEL_OUTOF10: 0
PAINLEVEL_OUTOF10: 5
PAINLEVEL_OUTOF10: 6
PAINLEVEL_OUTOF10: 1
PAINLEVEL_OUTOF10: 0

## 2022-03-13 ENCOUNTER — APPOINTMENT (OUTPATIENT)
Dept: GENERAL RADIOLOGY | Age: 87
DRG: 242 | End: 2022-03-13
Attending: INTERNAL MEDICINE

## 2022-03-13 VITALS
SYSTOLIC BLOOD PRESSURE: 115 MMHG | BODY MASS INDEX: 25.85 KG/M2 | HEIGHT: 61 IN | RESPIRATION RATE: 16 BRPM | TEMPERATURE: 98.7 F | OXYGEN SATURATION: 96 % | WEIGHT: 136.9 LBS | DIASTOLIC BLOOD PRESSURE: 56 MMHG | HEART RATE: 80 BPM

## 2022-03-13 LAB
ANION GAP SERPL CALC-SCNC: 11 MMOL/L (ref 10–20)
APTT PPP: 27 SEC (ref 22–30)
ATRIAL RATE (BPM): 85
BASOPHILS # BLD: 0.1 K/MCL (ref 0–0.3)
BASOPHILS NFR BLD: 1 %
BUN SERPL-MCNC: 29 MG/DL (ref 6–20)
BUN/CREAT SERPL: 24 (ref 7–25)
CALCIUM SERPL-MCNC: 10 MG/DL (ref 8.4–10.2)
CHLORIDE SERPL-SCNC: 106 MMOL/L (ref 98–107)
CO2 SERPL-SCNC: 27 MMOL/L (ref 21–32)
CREAT SERPL-MCNC: 1.22 MG/DL (ref 0.51–0.95)
DEPRECATED RDW RBC: 45.5 FL (ref 39–50)
EOSINOPHIL # BLD: 0.2 K/MCL (ref 0–0.5)
EOSINOPHIL NFR BLD: 2 %
ERYTHROCYTE [DISTWIDTH] IN BLOOD: 15.9 % (ref 11–15)
FASTING DURATION TIME PATIENT: ABNORMAL H
GFR SERPLBLD BASED ON 1.73 SQ M-ARVRAT: 40 ML/MIN
GLUCOSE SERPL-MCNC: 131 MG/DL (ref 70–99)
HCT VFR BLD CALC: 36.4 % (ref 36–46.5)
HGB BLD-MCNC: 10.6 G/DL (ref 12–15.5)
IMM GRANULOCYTES # BLD AUTO: 0 K/MCL (ref 0–0.2)
IMM GRANULOCYTES # BLD: 0 %
LYMPHOCYTES # BLD: 1.6 K/MCL (ref 1–4)
LYMPHOCYTES NFR BLD: 18 %
MCH RBC QN AUTO: 23.9 PG (ref 26–34)
MCHC RBC AUTO-ENTMCNC: 29.1 G/DL (ref 32–36.5)
MCV RBC AUTO: 82.2 FL (ref 78–100)
MONOCYTES # BLD: 0.6 K/MCL (ref 0.3–0.9)
MONOCYTES NFR BLD: 7 %
NEUTROPHILS # BLD: 6.5 K/MCL (ref 1.8–7.7)
NEUTROPHILS NFR BLD: 72 %
NRBC BLD MANUAL-RTO: 0 /100 WBC
PLATELET # BLD AUTO: 346 K/MCL (ref 140–450)
POTASSIUM SERPL-SCNC: 4.1 MMOL/L (ref 3.4–5.1)
QRS-INTERVAL (MSEC): 132
QT-INTERVAL (MSEC): 460
QTC: 531
R AXIS (DEGREES): -76
RBC # BLD: 4.43 MIL/MCL (ref 4–5.2)
REPORT TEXT: NORMAL
SODIUM SERPL-SCNC: 140 MMOL/L (ref 135–145)
T AXIS (DEGREES): 77
VENTRICULAR RATE EKG/MIN (BPM): 80
WBC # BLD: 9 K/MCL (ref 4.2–11)

## 2022-03-13 PROCEDURE — 85730 THROMBOPLASTIN TIME PARTIAL: CPT | Performed by: NURSE PRACTITIONER

## 2022-03-13 PROCEDURE — 71046 X-RAY EXAM CHEST 2 VIEWS: CPT

## 2022-03-13 PROCEDURE — 93280 PM DEVICE PROGR EVAL DUAL: CPT | Performed by: INTERNAL MEDICINE

## 2022-03-13 PROCEDURE — 10002803 HB RX 637: Performed by: INTERNAL MEDICINE

## 2022-03-13 PROCEDURE — 93280 PM DEVICE PROGR EVAL DUAL: CPT

## 2022-03-13 PROCEDURE — 10004651 HB RX, NO CHARGE ITEM: Performed by: NURSE PRACTITIONER

## 2022-03-13 PROCEDURE — 93010 ELECTROCARDIOGRAM REPORT: CPT | Performed by: INTERNAL MEDICINE

## 2022-03-13 PROCEDURE — 99233 SBSQ HOSP IP/OBS HIGH 50: CPT | Performed by: INTERNAL MEDICINE

## 2022-03-13 PROCEDURE — 85025 COMPLETE CBC W/AUTO DIFF WBC: CPT | Performed by: INTERNAL MEDICINE

## 2022-03-13 PROCEDURE — 80048 BASIC METABOLIC PNL TOTAL CA: CPT | Performed by: NURSE PRACTITIONER

## 2022-03-13 PROCEDURE — 10002803 HB RX 637: Performed by: NURSE PRACTITIONER

## 2022-03-13 PROCEDURE — 93005 ELECTROCARDIOGRAM TRACING: CPT | Performed by: STUDENT IN AN ORGANIZED HEALTH CARE EDUCATION/TRAINING PROGRAM

## 2022-03-13 PROCEDURE — 71046 X-RAY EXAM CHEST 2 VIEWS: CPT | Performed by: RADIOLOGY

## 2022-03-13 PROCEDURE — 99232 SBSQ HOSP IP/OBS MODERATE 35: CPT | Performed by: STUDENT IN AN ORGANIZED HEALTH CARE EDUCATION/TRAINING PROGRAM

## 2022-03-13 PROCEDURE — 10002803 HB RX 637: Performed by: PHYSICIAN ASSISTANT

## 2022-03-13 PROCEDURE — 10003445 HB TELEMETRY PER DAY

## 2022-03-13 RX ORDER — POTASSIUM CHLORIDE 20 MEQ/1
20 TABLET, EXTENDED RELEASE ORAL ONCE
Status: COMPLETED | OUTPATIENT
Start: 2022-03-13 | End: 2022-03-13

## 2022-03-13 RX ORDER — FERROUS SULFATE 325(65) MG
325 TABLET ORAL
Qty: 30 TABLET | Refills: 1 | Status: SHIPPED | OUTPATIENT
Start: 2022-03-13

## 2022-03-13 RX ORDER — FUROSEMIDE 40 MG/1
40 TABLET ORAL DAILY
Qty: 30 TABLET | Refills: 1 | Status: SHIPPED | OUTPATIENT
Start: 2022-03-13 | End: 2022-03-21 | Stop reason: SDUPTHER

## 2022-03-13 RX ADMIN — POTASSIUM CHLORIDE 20 MEQ: 1500 TABLET, EXTENDED RELEASE ORAL at 11:17

## 2022-03-13 RX ADMIN — CLOPIDOGREL BISULFATE 75 MG: 75 TABLET, FILM COATED ORAL at 09:07

## 2022-03-13 RX ADMIN — LEVOTHYROXINE SODIUM 50 MCG: 0.03 TABLET ORAL at 09:07

## 2022-03-13 RX ADMIN — FUROSEMIDE 40 MG: 40 TABLET ORAL at 09:07

## 2022-03-13 RX ADMIN — APIXABAN 5 MG: 5 TABLET, FILM COATED ORAL at 09:07

## 2022-03-13 RX ADMIN — SODIUM CHLORIDE, PRESERVATIVE FREE 2 ML: 5 INJECTION INTRAVENOUS at 09:07

## 2022-03-13 RX ADMIN — SPIRONOLACTONE 25 MG: 25 TABLET ORAL at 09:06

## 2022-03-13 RX ADMIN — METOPROLOL SUCCINATE 50 MG: 50 TABLET, EXTENDED RELEASE ORAL at 09:07

## 2022-03-13 ASSESSMENT — PAIN SCALES - GENERAL: PAINLEVEL_OUTOF10: 0

## 2022-03-14 ENCOUNTER — TELEPHONE (OUTPATIENT)
Dept: ELECTROPHYSIOLOGY | Age: 87
End: 2022-03-14

## 2022-03-15 ENCOUNTER — CASE MANAGEMENT (OUTPATIENT)
Dept: CARE COORDINATION | Age: 87
End: 2022-03-15

## 2022-03-15 ASSESSMENT — ACTIVITIES OF DAILY LIVING (ADL)
DME_IN_USE: WALKER;GLUCOMETER;SHOWER CHAIR
DME_IN_USE: YES

## 2022-03-16 ENCOUNTER — OFFICE VISIT (OUTPATIENT)
Dept: FAMILY MEDICINE | Age: 87
End: 2022-03-16

## 2022-03-16 ENCOUNTER — APPOINTMENT (OUTPATIENT)
Dept: ELECTROPHYSIOLOGY | Age: 87
End: 2022-03-16

## 2022-03-16 VITALS
SYSTOLIC BLOOD PRESSURE: 124 MMHG | OXYGEN SATURATION: 97 % | DIASTOLIC BLOOD PRESSURE: 60 MMHG | HEART RATE: 83 BPM | TEMPERATURE: 97.6 F | BODY MASS INDEX: 26.34 KG/M2 | WEIGHT: 139.4 LBS

## 2022-03-16 DIAGNOSIS — E11.22 CONTROLLED TYPE 2 DIABETES MELLITUS WITH STAGE 3 CHRONIC KIDNEY DISEASE, WITHOUT LONG-TERM CURRENT USE OF INSULIN (CMD): ICD-10-CM

## 2022-03-16 DIAGNOSIS — N18.30 CONTROLLED TYPE 2 DIABETES MELLITUS WITH STAGE 3 CHRONIC KIDNEY DISEASE, WITHOUT LONG-TERM CURRENT USE OF INSULIN (CMD): ICD-10-CM

## 2022-03-16 DIAGNOSIS — E03.8 SUBCLINICAL HYPOTHYROIDISM: ICD-10-CM

## 2022-03-16 DIAGNOSIS — I48.0 PAROXYSMAL ATRIAL FIBRILLATION (CMD): ICD-10-CM

## 2022-03-16 DIAGNOSIS — F34.1 DYSTHYMIA: ICD-10-CM

## 2022-03-16 DIAGNOSIS — I50.22 CHRONIC SYSTOLIC CONGESTIVE HEART FAILURE (CMD): Primary | ICD-10-CM

## 2022-03-16 DIAGNOSIS — I10 BENIGN ESSENTIAL HTN: ICD-10-CM

## 2022-03-16 PROCEDURE — 99214 OFFICE O/P EST MOD 30 MIN: CPT | Performed by: FAMILY MEDICINE

## 2022-03-16 RX ORDER — FLUOXETINE HYDROCHLORIDE 20 MG/1
20 CAPSULE ORAL DAILY
Qty: 90 CAPSULE | Refills: 0 | OUTPATIENT
Start: 2022-03-16

## 2022-03-21 ENCOUNTER — OFFICE VISIT (OUTPATIENT)
Dept: CARDIOLOGY | Age: 87
End: 2022-03-21

## 2022-03-21 VITALS
BODY MASS INDEX: 27.01 KG/M2 | WEIGHT: 143.08 LBS | HEIGHT: 61 IN | OXYGEN SATURATION: 99 % | SYSTOLIC BLOOD PRESSURE: 124 MMHG | HEART RATE: 80 BPM | DIASTOLIC BLOOD PRESSURE: 70 MMHG

## 2022-03-21 DIAGNOSIS — I50.22 CHRONIC SYSTOLIC CONGESTIVE HEART FAILURE (CMD): Primary | ICD-10-CM

## 2022-03-21 DIAGNOSIS — N18.30 CONTROLLED TYPE 2 DIABETES MELLITUS WITH STAGE 3 CHRONIC KIDNEY DISEASE, WITHOUT LONG-TERM CURRENT USE OF INSULIN (CMD): ICD-10-CM

## 2022-03-21 DIAGNOSIS — I48.20 CHRONIC ATRIAL FIBRILLATION (CMD): ICD-10-CM

## 2022-03-21 DIAGNOSIS — I10 BENIGN ESSENTIAL HTN: ICD-10-CM

## 2022-03-21 DIAGNOSIS — E11.22 CONTROLLED TYPE 2 DIABETES MELLITUS WITH STAGE 3 CHRONIC KIDNEY DISEASE, WITHOUT LONG-TERM CURRENT USE OF INSULIN (CMD): ICD-10-CM

## 2022-03-21 DIAGNOSIS — I34.0 MITRAL VALVE INSUFFICIENCY, UNSPECIFIED ETIOLOGY: ICD-10-CM

## 2022-03-21 DIAGNOSIS — R60.0 PEDAL EDEMA: ICD-10-CM

## 2022-03-21 PROCEDURE — 99214 OFFICE O/P EST MOD 30 MIN: CPT | Performed by: INTERNAL MEDICINE

## 2022-03-21 RX ORDER — FUROSEMIDE 40 MG/1
40 TABLET ORAL DAILY
Qty: 90 TABLET | Refills: 1 | Status: SHIPPED | OUTPATIENT
Start: 2022-03-21 | End: 2022-07-19 | Stop reason: DRUGHIGH

## 2022-03-21 ASSESSMENT — PAIN SCALES - GENERAL: PAINLEVEL: 0

## 2022-03-22 ENCOUNTER — CASE MANAGEMENT (OUTPATIENT)
Dept: CARE COORDINATION | Age: 87
End: 2022-03-22

## 2022-03-28 ENCOUNTER — TELEPHONE (OUTPATIENT)
Dept: CARDIOLOGY | Age: 87
End: 2022-03-28

## 2022-03-28 ENCOUNTER — LAB SERVICES (OUTPATIENT)
Dept: LAB | Age: 87
End: 2022-03-28

## 2022-03-28 DIAGNOSIS — I48.0 PAROXYSMAL ATRIAL FIBRILLATION (CMD): ICD-10-CM

## 2022-03-28 DIAGNOSIS — I50.22 CHRONIC SYSTOLIC CONGESTIVE HEART FAILURE (CMD): ICD-10-CM

## 2022-03-28 DIAGNOSIS — I48.20 CHRONIC ATRIAL FIBRILLATION (CMD): ICD-10-CM

## 2022-03-28 DIAGNOSIS — E11.22 CONTROLLED TYPE 2 DIABETES MELLITUS WITH STAGE 3 CHRONIC KIDNEY DISEASE, WITHOUT LONG-TERM CURRENT USE OF INSULIN (CMD): ICD-10-CM

## 2022-03-28 DIAGNOSIS — E03.8 SUBCLINICAL HYPOTHYROIDISM: ICD-10-CM

## 2022-03-28 DIAGNOSIS — N18.30 CONTROLLED TYPE 2 DIABETES MELLITUS WITH STAGE 3 CHRONIC KIDNEY DISEASE, WITHOUT LONG-TERM CURRENT USE OF INSULIN (CMD): ICD-10-CM

## 2022-03-28 LAB
ANION GAP SERPL CALC-SCNC: 11 MMOL/L (ref 10–20)
BUN SERPL-MCNC: 19 MG/DL (ref 6–20)
BUN/CREAT SERPL: 18 (ref 7–25)
CALCIUM SERPL-MCNC: 9 MG/DL (ref 8.4–10.2)
CHLORIDE SERPL-SCNC: 99 MMOL/L (ref 98–107)
CO2 SERPL-SCNC: 31 MMOL/L (ref 21–32)
CREAT SERPL-MCNC: 1.08 MG/DL (ref 0.51–0.95)
FASTING DURATION TIME PATIENT: ABNORMAL H
GFR SERPLBLD BASED ON 1.73 SQ M-ARVRAT: 47 ML/MIN
GLUCOSE SERPL-MCNC: 155 MG/DL (ref 70–99)
HBA1C MFR BLD: 6.6 % (ref 4.5–5.6)
MAGNESIUM SERPL-MCNC: 1.6 MG/DL (ref 1.7–2.4)
POTASSIUM SERPL-SCNC: 3.5 MMOL/L (ref 3.4–5.1)
SODIUM SERPL-SCNC: 137 MMOL/L (ref 135–145)
TSH SERPL-ACNC: 4.22 MCUNITS/ML (ref 0.35–5)

## 2022-03-28 PROCEDURE — 84443 ASSAY THYROID STIM HORMONE: CPT | Performed by: CLINICAL MEDICAL LABORATORY

## 2022-03-28 PROCEDURE — 83036 HEMOGLOBIN GLYCOSYLATED A1C: CPT | Performed by: CLINICAL MEDICAL LABORATORY

## 2022-03-28 PROCEDURE — 36415 COLL VENOUS BLD VENIPUNCTURE: CPT | Performed by: CLINICAL MEDICAL LABORATORY

## 2022-03-28 PROCEDURE — 83735 ASSAY OF MAGNESIUM: CPT | Performed by: CLINICAL MEDICAL LABORATORY

## 2022-03-28 PROCEDURE — 80048 BASIC METABOLIC PNL TOTAL CA: CPT | Performed by: CLINICAL MEDICAL LABORATORY

## 2022-03-28 RX ORDER — ROSUVASTATIN CALCIUM 10 MG/1
10 TABLET, COATED ORAL DAILY
Qty: 90 TABLET | Refills: 2 | OUTPATIENT
Start: 2022-03-28

## 2022-03-29 ENCOUNTER — OFFICE VISIT (OUTPATIENT)
Dept: ELECTROPHYSIOLOGY | Age: 87
End: 2022-03-29

## 2022-03-29 ENCOUNTER — TELEPHONE (OUTPATIENT)
Dept: FAMILY MEDICINE | Age: 87
End: 2022-03-29

## 2022-03-29 VITALS
HEIGHT: 61 IN | HEART RATE: 82 BPM | BODY MASS INDEX: 27.81 KG/M2 | DIASTOLIC BLOOD PRESSURE: 66 MMHG | SYSTOLIC BLOOD PRESSURE: 130 MMHG | RESPIRATION RATE: 16 BRPM | OXYGEN SATURATION: 94 % | WEIGHT: 147.3 LBS

## 2022-03-29 DIAGNOSIS — I48.20 CHRONIC ATRIAL FIBRILLATION (CMD): ICD-10-CM

## 2022-03-29 DIAGNOSIS — Z95.810 BIVENTRICULAR ICD (IMPLANTABLE CARDIOVERTER-DEFIBRILLATOR) IN PLACE: ICD-10-CM

## 2022-03-29 DIAGNOSIS — I25.5 ISCHEMIC CARDIOMYOPATHY: Primary | ICD-10-CM

## 2022-03-29 PROCEDURE — 93280 PM DEVICE PROGR EVAL DUAL: CPT | Performed by: NURSE PRACTITIONER

## 2022-03-29 PROCEDURE — 99212 OFFICE O/P EST SF 10 MIN: CPT | Performed by: NURSE PRACTITIONER

## 2022-03-29 RX ORDER — POTASSIUM CHLORIDE 20 MEQ/1
20 TABLET, EXTENDED RELEASE ORAL
Status: SHIPPED | COMMUNITY
Start: 2022-03-29 | End: 2022-08-04 | Stop reason: SDUPTHER

## 2022-03-30 ENCOUNTER — CASE MANAGEMENT (OUTPATIENT)
Dept: CARE COORDINATION | Age: 87
End: 2022-03-30

## 2022-04-01 ENCOUNTER — CASE MANAGEMENT (OUTPATIENT)
Dept: CARE COORDINATION | Age: 87
End: 2022-04-01

## 2022-04-05 ENCOUNTER — CASE MANAGEMENT (OUTPATIENT)
Dept: CARE COORDINATION | Age: 87
End: 2022-04-05

## 2022-04-05 ASSESSMENT — SLEEP AND FATIGUE QUESTIONNAIRES: SLEEP DESCRIPTORS: REPORTS NO PROBLEM

## 2022-04-06 ENCOUNTER — OFFICE VISIT (OUTPATIENT)
Dept: PODIATRY | Age: 87
End: 2022-04-06

## 2022-04-06 VITALS
WEIGHT: 147.27 LBS | DIASTOLIC BLOOD PRESSURE: 86 MMHG | BODY MASS INDEX: 27.8 KG/M2 | SYSTOLIC BLOOD PRESSURE: 134 MMHG | HEIGHT: 61 IN

## 2022-04-06 DIAGNOSIS — M79.671 PAIN IN BOTH FEET: Primary | ICD-10-CM

## 2022-04-06 DIAGNOSIS — M79.672 PAIN IN BOTH FEET: Primary | ICD-10-CM

## 2022-04-06 DIAGNOSIS — R60.9 PERIPHERAL EDEMA: ICD-10-CM

## 2022-04-06 DIAGNOSIS — E11.42 DIABETIC POLYNEUROPATHY ASSOCIATED WITH TYPE 2 DIABETES MELLITUS (CMD): ICD-10-CM

## 2022-04-06 PROCEDURE — 99202 OFFICE O/P NEW SF 15 MIN: CPT | Performed by: PODIATRIST

## 2022-04-06 PROCEDURE — A6549 G COMPRESSION STOCKING: HCPCS | Performed by: PODIATRIST

## 2022-04-07 ENCOUNTER — HOSPITAL ENCOUNTER (OUTPATIENT)
Dept: CARDIAC REHAB | Age: 87
Discharge: STILL ADMITTED OR ADMINISTRATIVE | End: 2022-04-07
Attending: INTERNAL MEDICINE

## 2022-04-07 VITALS — HEIGHT: 61 IN | BODY MASS INDEX: 27.83 KG/M2

## 2022-04-07 DIAGNOSIS — Z95.820 S/P ANGIOPLASTY WITH STENT: ICD-10-CM

## 2022-04-07 PROCEDURE — 10004107 HB COUNTER-CARDIAC REHAB INITIAL PHASE 2

## 2022-04-07 PROCEDURE — 93798 PHYS/QHP OP CAR RHAB W/ECG: CPT

## 2022-04-07 ASSESSMENT — PATIENT HEALTH QUESTIONNAIRE - PHQ9
2. FEELING DOWN, DEPRESSED OR HOPELESS: SEVERAL DAYS
SUM OF ALL RESPONSES TO PHQ QUESTIONS 1-9: 8
1. LITTLE INTEREST OR PLEASURE IN DOING THINGS: SEVERAL DAYS
9. THOUGHTS THAT YOU WOULD BE BETTER OFF DEAD, OR OF HURTING YOURSELF: NOT AT ALL
10. IF YOU CHECKED OFF ANY PROBLEMS, HOW DIFFICULT HAVE THESE PROBLEMS MADE IT FOR YOU TO DO YOUR WORK, TAKE CARE OF THINGS AT HOME, OR GET ALONG WITH OTHER PEOPLE: SOMEWHAT DIFFICULT
4. FEELING TIRED OR HAVING LITTLE ENERGY: MORE THAN HALF THE DAYS
6. FEELING BAD ABOUT YOURSELF - OR THAT YOU ARE A FAILURE OR HAVE LET YOURSELF OR YOUR FAMILY DOWN: NOT AT ALL
7. TROUBLE CONCENTRATING ON THINGS, SUCH AS READING THE NEWSPAPER OR WATCHING TELEVISION: NOT AT ALL
5. POOR APPETITE OR OVEREATING: MORE THAN HALF THE DAYS
3. TROUBLE FALLING OR STAYING ASLEEP OR SLEEPING TOO MUCH: SEVERAL DAYS
8. MOVING OR SPEAKING SO SLOWLY THAT OTHER PEOPLE COULD HAVE NOTICED. OR THE OPPOSITE, BEING SO FIGETY OR RESTLESS THAT YOU HAVE BEEN MOVING AROUND A LOT MORE THAN USUAL: SEVERAL DAYS

## 2022-04-07 ASSESSMENT — EJECTION FRACTION: LVEF_VALUE: 30

## 2022-04-07 ASSESSMENT — LIFESTYLE VARIABLES: TOBACCO_STATUS: NEVER USED

## 2022-04-11 ENCOUNTER — APPOINTMENT (OUTPATIENT)
Dept: CARDIOLOGY | Age: 87
End: 2022-04-11

## 2022-04-12 DIAGNOSIS — F43.21 ADJUSTMENT REACTION WITH BRIEF DEPRESSIVE REACTION: ICD-10-CM

## 2022-04-12 DIAGNOSIS — G47.00 INSOMNIA, UNSPECIFIED TYPE: ICD-10-CM

## 2022-04-12 DIAGNOSIS — R63.0 DECREASE IN APPETITE: ICD-10-CM

## 2022-04-12 RX ORDER — MIRTAZAPINE 7.5 MG/1
TABLET, FILM COATED ORAL
Qty: 90 TABLET | Refills: 0 | Status: SHIPPED | OUTPATIENT
Start: 2022-04-12 | End: 2022-07-14 | Stop reason: CLARIF

## 2022-04-13 ENCOUNTER — HOSPITAL ENCOUNTER (OUTPATIENT)
Dept: CARDIAC REHAB | Age: 87
Discharge: STILL ADMITTED OR ADMINISTRATIVE | End: 2022-04-13
Attending: INTERNAL MEDICINE

## 2022-04-13 PROCEDURE — 93798 PHYS/QHP OP CAR RHAB W/ECG: CPT

## 2022-04-14 ENCOUNTER — CASE MANAGEMENT (OUTPATIENT)
Dept: CARE COORDINATION | Age: 87
End: 2022-04-14

## 2022-04-15 ENCOUNTER — HOSPITAL ENCOUNTER (OUTPATIENT)
Dept: CARDIAC REHAB | Age: 87
Discharge: STILL ADMITTED OR ADMINISTRATIVE | End: 2022-04-15
Attending: INTERNAL MEDICINE

## 2022-04-15 PROCEDURE — 93798 PHYS/QHP OP CAR RHAB W/ECG: CPT

## 2022-04-18 ENCOUNTER — HOSPITAL ENCOUNTER (OUTPATIENT)
Dept: CARDIAC REHAB | Age: 87
Discharge: STILL ADMITTED OR ADMINISTRATIVE | End: 2022-04-18
Attending: INTERNAL MEDICINE

## 2022-04-18 PROCEDURE — 93798 PHYS/QHP OP CAR RHAB W/ECG: CPT

## 2022-04-19 ENCOUNTER — HOSPITAL ENCOUNTER (OUTPATIENT)
Dept: CV DIAGNOSTICS | Age: 87
Discharge: HOME | End: 2022-04-19
Attending: INTERNAL MEDICINE

## 2022-04-19 ENCOUNTER — OFFICE VISIT (OUTPATIENT)
Dept: CARDIOLOGY | Age: 87
End: 2022-04-19

## 2022-04-19 ENCOUNTER — TELEPHONE (OUTPATIENT)
Dept: CARDIOLOGY | Age: 87
End: 2022-04-19

## 2022-04-19 ENCOUNTER — LAB SERVICES (OUTPATIENT)
Dept: LAB | Age: 87
End: 2022-04-19

## 2022-04-19 VITALS
DIASTOLIC BLOOD PRESSURE: 72 MMHG | SYSTOLIC BLOOD PRESSURE: 120 MMHG | HEART RATE: 96 BPM | OXYGEN SATURATION: 98 % | BODY MASS INDEX: 27.47 KG/M2 | RESPIRATION RATE: 20 BRPM | WEIGHT: 145.5 LBS | HEIGHT: 61 IN

## 2022-04-19 DIAGNOSIS — I50.22 CHRONIC SYSTOLIC CONGESTIVE HEART FAILURE (CMD): ICD-10-CM

## 2022-04-19 DIAGNOSIS — I50.22 CHRONIC SYSTOLIC CONGESTIVE HEART FAILURE (CMD): Primary | ICD-10-CM

## 2022-04-19 DIAGNOSIS — I48.20 CHRONIC ATRIAL FIBRILLATION (CMD): ICD-10-CM

## 2022-04-19 DIAGNOSIS — I34.0 MITRAL VALVE INSUFFICIENCY, UNSPECIFIED ETIOLOGY: ICD-10-CM

## 2022-04-19 LAB
ANION GAP SERPL CALC-SCNC: 15 MMOL/L (ref 10–20)
AORTIC VALVE AREA: 1.44 CMÂ²
AV MEAN GRADIENT (AVMG): 5.83 MMHG
AV PEAK GRADIENT (AVPG): 11.49 MMHG
AV PEAK VELOCITY (AVPV): 1.6 M/S
AVI LVOT PEAK GRADIENT (LVOTMG): 1.81 MMHG
BUN SERPL-MCNC: 16 MG/DL (ref 6–20)
BUN/CREAT SERPL: 15 (ref 7–25)
CALCIUM SERPL-MCNC: 9.6 MG/DL (ref 8.4–10.2)
CHLORIDE SERPL-SCNC: 102 MMOL/L (ref 98–107)
CO2 SERPL-SCNC: 29 MMOL/L (ref 21–32)
CREAT SERPL-MCNC: 1.1 MG/DL (ref 0.51–0.95)
DOP CALC LVOT PEAK VEL (LVOTPV): 0.9 M/S
E WAVE DECELARATION TIME (MDT): 0.16 S
EST RIGHT VENT SYSTOLIC PRESSURE BY TRICUSPID REGURGITATION JET (RVSP): 24.4 MMHG
FASTING DURATION TIME PATIENT: ABNORMAL H
GFR SERPLBLD BASED ON 1.73 SQ M-ARVRAT: 46 ML/MIN
GLUCOSE SERPL-MCNC: 90 MG/DL (ref 70–99)
INTERVENTRICULAR SEPTUM IN END DIASTOLE (IVSD): 1.33 CM
LEFT INTERNAL DIMENSION IN SYSTOLE (LVSD): 3.17 CM
LEFT VENTRICULAR INTERNAL DIMENSION IN DIASTOLE (LVDD): 4.26 CM
LEFT VENTRICULAR POSTERIOR WALL IN END DIASTOLE (LVPW): 1.09 CM
LV EF: 51 %
LVOT 2D (LVOTD): 1.85 CM
LVOT VTI (LVOTVTI): 16.72 CM
MV PEAK E VELOCITY (MVPEV): 1.1 M/S
POTASSIUM SERPL-SCNC: 4 MMOL/L (ref 3.4–5.1)
SODIUM SERPL-SCNC: 142 MMOL/L (ref 135–145)
TRICUSPID ANNULAR PLANE SYSTOLIC EXCURSION (TAPSE): 1.11 CM
TRICUSPID VALVE ANNULAR PEAK VELOCITY (TVAPV): 0.1 M/S
TRICUSPID VALVE PEAK REGURGITATION VELOCITY (TRPV): 2.3 M/S
TV ESTIMATED RIGHT ARTERIAL PRESSURE (RAP): 3 MMHG

## 2022-04-19 PROCEDURE — 99214 OFFICE O/P EST MOD 30 MIN: CPT | Performed by: INTERNAL MEDICINE

## 2022-04-19 PROCEDURE — 93306 TTE W/DOPPLER COMPLETE: CPT | Performed by: INTERNAL MEDICINE

## 2022-04-19 PROCEDURE — 80048 BASIC METABOLIC PNL TOTAL CA: CPT | Performed by: CLINICAL MEDICAL LABORATORY

## 2022-04-19 PROCEDURE — 36415 COLL VENOUS BLD VENIPUNCTURE: CPT | Performed by: CLINICAL MEDICAL LABORATORY

## 2022-04-19 PROCEDURE — 93306 TTE W/DOPPLER COMPLETE: CPT

## 2022-04-20 ENCOUNTER — CASE MANAGEMENT (OUTPATIENT)
Dept: CARE COORDINATION | Age: 87
End: 2022-04-20

## 2022-04-20 ENCOUNTER — HOSPITAL ENCOUNTER (OUTPATIENT)
Dept: CARDIAC REHAB | Age: 87
Discharge: STILL ADMITTED OR ADMINISTRATIVE | End: 2022-04-20
Attending: INTERNAL MEDICINE

## 2022-04-20 PROCEDURE — 93798 PHYS/QHP OP CAR RHAB W/ECG: CPT

## 2022-04-22 ENCOUNTER — HOSPITAL ENCOUNTER (OUTPATIENT)
Dept: CARDIAC REHAB | Age: 87
Discharge: STILL ADMITTED OR ADMINISTRATIVE | End: 2022-04-22
Attending: INTERNAL MEDICINE

## 2022-04-22 PROCEDURE — 93798 PHYS/QHP OP CAR RHAB W/ECG: CPT

## 2022-04-25 ENCOUNTER — COMMITTEE REVIEW (OUTPATIENT)
Dept: CARDIOLOGY | Age: 87
End: 2022-04-25

## 2022-04-25 ENCOUNTER — HOSPITAL ENCOUNTER (OUTPATIENT)
Dept: CARDIAC REHAB | Age: 87
Discharge: STILL ADMITTED OR ADMINISTRATIVE | End: 2022-04-25
Attending: INTERNAL MEDICINE

## 2022-04-25 PROCEDURE — 93798 PHYS/QHP OP CAR RHAB W/ECG: CPT

## 2022-04-27 ENCOUNTER — APPOINTMENT (OUTPATIENT)
Dept: CARDIAC REHAB | Age: 87
End: 2022-04-27
Attending: INTERNAL MEDICINE

## 2022-04-29 ENCOUNTER — APPOINTMENT (OUTPATIENT)
Dept: CARDIAC REHAB | Age: 87
End: 2022-04-29
Attending: INTERNAL MEDICINE

## 2022-05-02 ENCOUNTER — APPOINTMENT (OUTPATIENT)
Dept: CARDIAC REHAB | Age: 87
End: 2022-05-02
Attending: INTERNAL MEDICINE

## 2022-05-04 ENCOUNTER — HOSPITAL ENCOUNTER (OUTPATIENT)
Dept: CARDIAC REHAB | Age: 87
Discharge: STILL ADMITTED OR ADMINISTRATIVE | End: 2022-05-04
Attending: INTERNAL MEDICINE

## 2022-05-04 PROCEDURE — 93798 PHYS/QHP OP CAR RHAB W/ECG: CPT

## 2022-05-04 ASSESSMENT — PATIENT HEALTH QUESTIONNAIRE - PHQ9
10. IF YOU CHECKED OFF ANY PROBLEMS, HOW DIFFICULT HAVE THESE PROBLEMS MADE IT FOR YOU TO DO YOUR WORK, TAKE CARE OF THINGS AT HOME, OR GET ALONG WITH OTHER PEOPLE: SOMEWHAT DIFFICULT

## 2022-05-04 ASSESSMENT — EJECTION FRACTION: LVEF_VALUE: 51

## 2022-05-04 ASSESSMENT — LIFESTYLE VARIABLES: TOBACCO_STATUS: NEVER USED

## 2022-05-06 ENCOUNTER — HOSPITAL ENCOUNTER (OUTPATIENT)
Dept: CARDIAC REHAB | Age: 87
Discharge: STILL ADMITTED OR ADMINISTRATIVE | End: 2022-05-06
Attending: INTERNAL MEDICINE

## 2022-05-06 ENCOUNTER — TELEPHONE (OUTPATIENT)
Dept: CARDIAC REHAB | Age: 87
End: 2022-05-06

## 2022-05-06 PROCEDURE — 10004281 HB COUNTER-STAFF TIME PER 15 MIN

## 2022-05-09 ENCOUNTER — HOSPITAL ENCOUNTER (OUTPATIENT)
Dept: CARDIAC REHAB | Age: 87
Discharge: STILL ADMITTED OR ADMINISTRATIVE | End: 2022-05-09
Attending: INTERNAL MEDICINE

## 2022-05-09 PROCEDURE — 93798 PHYS/QHP OP CAR RHAB W/ECG: CPT

## 2022-05-13 ENCOUNTER — HOSPITAL ENCOUNTER (OUTPATIENT)
Dept: CARDIAC REHAB | Age: 87
Discharge: STILL ADMITTED OR ADMINISTRATIVE | End: 2022-05-13
Attending: INTERNAL MEDICINE

## 2022-05-13 PROCEDURE — 93798 PHYS/QHP OP CAR RHAB W/ECG: CPT

## 2022-05-13 RX ORDER — METOPROLOL SUCCINATE 50 MG/1
TABLET, EXTENDED RELEASE ORAL
Qty: 180 TABLET | Refills: 3 | Status: SHIPPED | OUTPATIENT
Start: 2022-05-13 | End: 2022-06-02 | Stop reason: SDUPTHER

## 2022-05-14 DIAGNOSIS — J30.1 NON-SEASONAL ALLERGIC RHINITIS DUE TO POLLEN: ICD-10-CM

## 2022-05-14 DIAGNOSIS — E03.8 SUBCLINICAL HYPOTHYROIDISM: ICD-10-CM

## 2022-05-16 ENCOUNTER — HOSPITAL ENCOUNTER (OUTPATIENT)
Dept: CARDIAC REHAB | Age: 87
Discharge: STILL ADMITTED OR ADMINISTRATIVE | End: 2022-05-16
Attending: INTERNAL MEDICINE

## 2022-05-16 PROCEDURE — 93798 PHYS/QHP OP CAR RHAB W/ECG: CPT

## 2022-05-16 RX ORDER — LEVOTHYROXINE SODIUM 0.05 MG/1
50 TABLET ORAL DAILY
Qty: 90 TABLET | Refills: 0 | Status: SHIPPED | OUTPATIENT
Start: 2022-05-16 | End: 2022-08-11

## 2022-05-16 RX ORDER — MONTELUKAST SODIUM 10 MG/1
TABLET ORAL
Qty: 90 TABLET | Refills: 0 | Status: SHIPPED | OUTPATIENT
Start: 2022-05-16 | End: 2022-07-14 | Stop reason: CLARIF

## 2022-05-18 ENCOUNTER — APPOINTMENT (OUTPATIENT)
Dept: CARDIAC REHAB | Age: 87
End: 2022-05-18
Attending: INTERNAL MEDICINE

## 2022-05-20 ENCOUNTER — HOSPITAL ENCOUNTER (OUTPATIENT)
Dept: CARDIAC REHAB | Age: 87
Discharge: STILL ADMITTED OR ADMINISTRATIVE | End: 2022-05-20
Attending: INTERNAL MEDICINE

## 2022-05-20 PROCEDURE — 93798 PHYS/QHP OP CAR RHAB W/ECG: CPT

## 2022-05-23 ENCOUNTER — APPOINTMENT (OUTPATIENT)
Dept: CARDIAC REHAB | Age: 87
End: 2022-05-23
Attending: INTERNAL MEDICINE

## 2022-05-25 ENCOUNTER — APPOINTMENT (OUTPATIENT)
Dept: CARDIAC REHAB | Age: 87
End: 2022-05-25
Attending: INTERNAL MEDICINE

## 2022-05-26 DIAGNOSIS — R60.0 PEDAL EDEMA: ICD-10-CM

## 2022-05-27 ENCOUNTER — HOSPITAL ENCOUNTER (OUTPATIENT)
Dept: CARDIAC REHAB | Age: 87
Discharge: STILL ADMITTED OR ADMINISTRATIVE | End: 2022-05-27
Attending: INTERNAL MEDICINE

## 2022-05-27 PROCEDURE — 93798 PHYS/QHP OP CAR RHAB W/ECG: CPT

## 2022-05-27 RX ORDER — FUROSEMIDE 20 MG/1
TABLET ORAL
Qty: 100 TABLET | Refills: 3 | OUTPATIENT
Start: 2022-05-27

## 2022-05-27 RX ORDER — FUROSEMIDE 40 MG/1
40 TABLET ORAL DAILY
Qty: 90 TABLET | Refills: 0 | Status: SHIPPED | OUTPATIENT
Start: 2022-05-27 | End: 2022-07-14 | Stop reason: SDUPTHER

## 2022-05-27 ASSESSMENT — LIFESTYLE VARIABLES: TOBACCO_STATUS: NEVER USED

## 2022-05-27 ASSESSMENT — EJECTION FRACTION: LVEF_VALUE: 51

## 2022-06-01 ENCOUNTER — TELEPHONE (OUTPATIENT)
Dept: CARDIAC REHAB | Age: 87
End: 2022-06-01

## 2022-06-01 ENCOUNTER — HOSPITAL ENCOUNTER (OUTPATIENT)
Dept: CARDIAC REHAB | Age: 87
Discharge: STILL ADMITTED OR ADMINISTRATIVE | End: 2022-06-01
Attending: INTERNAL MEDICINE

## 2022-06-01 PROCEDURE — 93798 PHYS/QHP OP CAR RHAB W/ECG: CPT

## 2022-06-02 RX ORDER — METOPROLOL SUCCINATE 50 MG/1
75 TABLET, EXTENDED RELEASE ORAL 2 TIMES DAILY
Qty: 270 TABLET | Refills: 3 | Status: ON HOLD | OUTPATIENT
Start: 2022-06-02 | End: 2022-07-22 | Stop reason: HOSPADM

## 2022-06-10 ENCOUNTER — APPOINTMENT (OUTPATIENT)
Dept: FAMILY MEDICINE | Age: 87
End: 2022-06-10

## 2022-06-13 ENCOUNTER — APPOINTMENT (OUTPATIENT)
Dept: CARDIAC REHAB | Age: 87
End: 2022-06-13
Attending: INTERNAL MEDICINE

## 2022-06-13 ENCOUNTER — TELEPHONE (OUTPATIENT)
Dept: FAMILY MEDICINE | Age: 87
End: 2022-06-13

## 2022-06-13 ENCOUNTER — OFFICE VISIT (OUTPATIENT)
Dept: FAMILY MEDICINE | Age: 87
End: 2022-06-13

## 2022-06-13 VITALS
BODY MASS INDEX: 28.21 KG/M2 | OXYGEN SATURATION: 98 % | DIASTOLIC BLOOD PRESSURE: 74 MMHG | HEIGHT: 61 IN | SYSTOLIC BLOOD PRESSURE: 128 MMHG | TEMPERATURE: 97.4 F | WEIGHT: 149.4 LBS | HEART RATE: 89 BPM

## 2022-06-13 DIAGNOSIS — E11.22 CONTROLLED TYPE 2 DIABETES MELLITUS WITH STAGE 3 CHRONIC KIDNEY DISEASE, WITHOUT LONG-TERM CURRENT USE OF INSULIN (CMD): ICD-10-CM

## 2022-06-13 DIAGNOSIS — I50.22 CHRONIC SYSTOLIC CONGESTIVE HEART FAILURE (CMD): Primary | ICD-10-CM

## 2022-06-13 DIAGNOSIS — I48.20 CHRONIC ATRIAL FIBRILLATION (CMD): ICD-10-CM

## 2022-06-13 DIAGNOSIS — N18.30 CONTROLLED TYPE 2 DIABETES MELLITUS WITH STAGE 3 CHRONIC KIDNEY DISEASE, WITHOUT LONG-TERM CURRENT USE OF INSULIN (CMD): ICD-10-CM

## 2022-06-13 PROCEDURE — 99214 OFFICE O/P EST MOD 30 MIN: CPT | Performed by: FAMILY MEDICINE

## 2022-06-14 ENCOUNTER — TELEPHONE (OUTPATIENT)
Dept: FAMILY MEDICINE | Age: 87
End: 2022-06-14

## 2022-06-15 ENCOUNTER — APPOINTMENT (OUTPATIENT)
Dept: CARDIAC REHAB | Age: 87
End: 2022-06-15
Attending: INTERNAL MEDICINE

## 2022-06-15 ENCOUNTER — TELEPHONE (OUTPATIENT)
Dept: CARDIAC REHAB | Age: 87
End: 2022-06-15

## 2022-06-15 ASSESSMENT — LIFESTYLE VARIABLES: TOBACCO_STATUS: NEVER USED

## 2022-06-15 ASSESSMENT — EJECTION FRACTION: LVEF_VALUE: 51

## 2022-06-17 ENCOUNTER — APPOINTMENT (OUTPATIENT)
Dept: CARDIAC REHAB | Age: 87
End: 2022-06-17
Attending: INTERNAL MEDICINE

## 2022-06-20 ENCOUNTER — APPOINTMENT (OUTPATIENT)
Dept: CARDIAC REHAB | Age: 87
End: 2022-06-20
Attending: INTERNAL MEDICINE

## 2022-06-22 ENCOUNTER — APPOINTMENT (OUTPATIENT)
Dept: CARDIAC REHAB | Age: 87
End: 2022-06-22
Attending: INTERNAL MEDICINE

## 2022-06-24 ENCOUNTER — APPOINTMENT (OUTPATIENT)
Dept: CARDIAC REHAB | Age: 87
End: 2022-06-24
Attending: INTERNAL MEDICINE

## 2022-06-27 ENCOUNTER — APPOINTMENT (OUTPATIENT)
Dept: CARDIAC REHAB | Age: 87
End: 2022-06-27
Attending: INTERNAL MEDICINE

## 2022-06-29 ENCOUNTER — APPOINTMENT (OUTPATIENT)
Dept: CARDIAC REHAB | Age: 87
End: 2022-06-29
Attending: INTERNAL MEDICINE

## 2022-07-01 ENCOUNTER — APPOINTMENT (OUTPATIENT)
Dept: ELECTROPHYSIOLOGY | Age: 87
End: 2022-07-01

## 2022-07-01 ENCOUNTER — TELEPHONE (OUTPATIENT)
Dept: ELECTROPHYSIOLOGY | Age: 87
End: 2022-07-01

## 2022-07-01 ENCOUNTER — APPOINTMENT (OUTPATIENT)
Dept: CARDIAC REHAB | Age: 87
End: 2022-07-01
Attending: INTERNAL MEDICINE

## 2022-07-06 ENCOUNTER — APPOINTMENT (OUTPATIENT)
Dept: CARDIAC REHAB | Age: 87
End: 2022-07-06
Attending: INTERNAL MEDICINE

## 2022-07-08 ENCOUNTER — APPOINTMENT (OUTPATIENT)
Dept: CARDIAC REHAB | Age: 87
End: 2022-07-08
Attending: INTERNAL MEDICINE

## 2022-07-11 ENCOUNTER — APPOINTMENT (OUTPATIENT)
Dept: ELECTROPHYSIOLOGY | Age: 87
End: 2022-07-11

## 2022-07-13 ENCOUNTER — APPOINTMENT (OUTPATIENT)
Dept: CARDIAC REHAB | Age: 87
End: 2022-07-13
Attending: INTERNAL MEDICINE

## 2022-07-14 ENCOUNTER — OFFICE VISIT (OUTPATIENT)
Dept: CARDIOLOGY | Age: 87
End: 2022-07-14

## 2022-07-14 VITALS
BODY MASS INDEX: 28.95 KG/M2 | OXYGEN SATURATION: 97 % | DIASTOLIC BLOOD PRESSURE: 68 MMHG | HEART RATE: 82 BPM | RESPIRATION RATE: 16 BRPM | WEIGHT: 153.33 LBS | SYSTOLIC BLOOD PRESSURE: 110 MMHG | HEIGHT: 61 IN

## 2022-07-14 DIAGNOSIS — I10 BENIGN ESSENTIAL HTN: Primary | ICD-10-CM

## 2022-07-14 DIAGNOSIS — I48.20 CHRONIC ATRIAL FIBRILLATION (CMD): ICD-10-CM

## 2022-07-14 PROCEDURE — 99214 OFFICE O/P EST MOD 30 MIN: CPT | Performed by: INTERNAL MEDICINE

## 2022-07-15 ENCOUNTER — APPOINTMENT (OUTPATIENT)
Dept: CARDIAC REHAB | Age: 87
End: 2022-07-15
Attending: INTERNAL MEDICINE

## 2022-07-18 ENCOUNTER — APPOINTMENT (OUTPATIENT)
Dept: CARDIAC REHAB | Age: 87
End: 2022-07-18
Attending: INTERNAL MEDICINE

## 2022-07-18 ENCOUNTER — OFFICE VISIT (OUTPATIENT)
Dept: ELECTROPHYSIOLOGY | Age: 87
End: 2022-07-18

## 2022-07-18 ENCOUNTER — NURSE TRIAGE (OUTPATIENT)
Dept: TELEHEALTH | Age: 87
End: 2022-07-18

## 2022-07-18 ENCOUNTER — LAB SERVICES (OUTPATIENT)
Dept: LAB | Age: 87
End: 2022-07-18

## 2022-07-18 VITALS
DIASTOLIC BLOOD PRESSURE: 90 MMHG | WEIGHT: 159 LBS | BODY MASS INDEX: 30.02 KG/M2 | HEART RATE: 89 BPM | OXYGEN SATURATION: 96 % | RESPIRATION RATE: 18 BRPM | HEIGHT: 61 IN | SYSTOLIC BLOOD PRESSURE: 192 MMHG

## 2022-07-18 DIAGNOSIS — R06.09 DYSPNEA ON EXERTION: ICD-10-CM

## 2022-07-18 DIAGNOSIS — I25.5 ISCHEMIC CARDIOMYOPATHY: ICD-10-CM

## 2022-07-18 DIAGNOSIS — I48.20 CHRONIC ATRIAL FIBRILLATION (CMD): ICD-10-CM

## 2022-07-18 DIAGNOSIS — I48.20 CHRONIC ATRIAL FIBRILLATION (CMD): Primary | ICD-10-CM

## 2022-07-18 LAB
ANION GAP SERPL CALC-SCNC: 15 MMOL/L (ref 7–19)
BASOPHILS # BLD: 0 K/MCL (ref 0–0.3)
BASOPHILS NFR BLD: 0 %
BUN SERPL-MCNC: 13 MG/DL (ref 6–20)
BUN/CREAT SERPL: 13 (ref 7–25)
CALCIUM SERPL-MCNC: 9.4 MG/DL (ref 8.4–10.2)
CHLORIDE SERPL-SCNC: 103 MMOL/L (ref 97–110)
CO2 SERPL-SCNC: 29 MMOL/L (ref 21–32)
CREAT SERPL-MCNC: 1.04 MG/DL (ref 0.51–0.95)
DEPRECATED RDW RBC: 44.1 FL (ref 39–50)
EOSINOPHIL # BLD: 0.2 K/MCL (ref 0–0.5)
EOSINOPHIL NFR BLD: 3 %
ERYTHROCYTE [DISTWIDTH] IN BLOOD: 13.8 % (ref 11–15)
FASTING DURATION TIME PATIENT: 12 HOURS (ref 0–999)
GFR SERPLBLD BASED ON 1.73 SQ M-ARVRAT: 53 ML/MIN
GLUCOSE SERPL-MCNC: 101 MG/DL (ref 70–99)
HCT VFR BLD CALC: 40.8 % (ref 36–46.5)
HGB BLD-MCNC: 12.9 G/DL (ref 12–15.5)
IMM GRANULOCYTES # BLD AUTO: 0 K/MCL (ref 0–0.2)
IMM GRANULOCYTES # BLD: 0 %
LYMPHOCYTES # BLD: 1.8 K/MCL (ref 1–4)
LYMPHOCYTES NFR BLD: 25 %
MAGNESIUM SERPL-MCNC: 2 MG/DL (ref 1.7–2.4)
MCH RBC QN AUTO: 27.6 PG (ref 26–34)
MCHC RBC AUTO-ENTMCNC: 31.6 G/DL (ref 32–36.5)
MCV RBC AUTO: 87.4 FL (ref 78–100)
MONOCYTES # BLD: 0.5 K/MCL (ref 0.3–0.9)
MONOCYTES NFR BLD: 7 %
NEUTROPHILS # BLD: 4.7 K/MCL (ref 1.8–7.7)
NEUTROPHILS NFR BLD: 65 %
NT-PROBNP SERPL-MCNC: 6444 PG/ML
PLATELET # BLD AUTO: 284 K/MCL (ref 140–450)
POTASSIUM SERPL-SCNC: 3.9 MMOL/L (ref 3.4–5.1)
RBC # BLD: 4.67 MIL/MCL (ref 4–5.2)
SODIUM SERPL-SCNC: 143 MMOL/L (ref 135–145)
TROPONIN I SERPL DL<=0.01 NG/ML-MCNC: 328 NG/L
WBC # BLD: 7.2 K/MCL (ref 4.2–11)

## 2022-07-18 PROCEDURE — 84484 ASSAY OF TROPONIN QUANT: CPT | Performed by: CLINICAL MEDICAL LABORATORY

## 2022-07-18 PROCEDURE — 93281 PM DEVICE PROGR EVAL MULTI: CPT | Performed by: NURSE PRACTITIONER

## 2022-07-18 PROCEDURE — 85025 COMPLETE CBC W/AUTO DIFF WBC: CPT | Performed by: INTERNAL MEDICINE

## 2022-07-18 PROCEDURE — 83735 ASSAY OF MAGNESIUM: CPT | Performed by: CLINICAL MEDICAL LABORATORY

## 2022-07-18 PROCEDURE — 36415 COLL VENOUS BLD VENIPUNCTURE: CPT | Performed by: INTERNAL MEDICINE

## 2022-07-18 PROCEDURE — 80048 BASIC METABOLIC PNL TOTAL CA: CPT | Performed by: INTERNAL MEDICINE

## 2022-07-18 PROCEDURE — 83880 ASSAY OF NATRIURETIC PEPTIDE: CPT | Performed by: CLINICAL MEDICAL LABORATORY

## 2022-07-18 PROCEDURE — 99214 OFFICE O/P EST MOD 30 MIN: CPT | Performed by: NURSE PRACTITIONER

## 2022-07-19 ENCOUNTER — HOSPITAL ENCOUNTER (INPATIENT)
Age: 87
LOS: 1 days | Discharge: ANOTHER HOSPITAL | DRG: 280 | End: 2022-07-20
Attending: STUDENT IN AN ORGANIZED HEALTH CARE EDUCATION/TRAINING PROGRAM | Admitting: INTERNAL MEDICINE

## 2022-07-19 ENCOUNTER — APPOINTMENT (OUTPATIENT)
Dept: GENERAL RADIOLOGY | Age: 87
DRG: 280 | End: 2022-07-19
Attending: STUDENT IN AN ORGANIZED HEALTH CARE EDUCATION/TRAINING PROGRAM

## 2022-07-19 DIAGNOSIS — I50.9 DECOMPENSATED HEART FAILURE (CMD): ICD-10-CM

## 2022-07-19 DIAGNOSIS — I21.4 NSTEMI (NON-ST ELEVATED MYOCARDIAL INFARCTION) (CMD): Primary | ICD-10-CM

## 2022-07-19 PROBLEM — I25.10 CAD (CORONARY ARTERY DISEASE): Status: ACTIVE | Noted: 2022-07-19

## 2022-07-19 PROBLEM — I50.23 ACUTE ON CHRONIC SYSTOLIC (CONGESTIVE) HEART FAILURE (CMD): Status: ACTIVE | Noted: 2022-07-19

## 2022-07-19 LAB
ALBUMIN SERPL-MCNC: 3.7 G/DL (ref 3.6–5.1)
ALBUMIN/GLOB SERPL: 1.1 {RATIO} (ref 1–2.4)
ALP SERPL-CCNC: 70 UNITS/L (ref 45–117)
ALT SERPL-CCNC: 14 UNITS/L
ANION GAP SERPL CALC-SCNC: 13 MMOL/L (ref 7–19)
APPEARANCE UR: CLEAR
APTT PPP: 29 SEC (ref 22–30)
AST SERPL-CCNC: 14 UNITS/L
BACTERIA #/AREA URNS HPF: ABNORMAL /HPF
BASOPHILS # BLD: 0 K/MCL (ref 0–0.3)
BASOPHILS NFR BLD: 1 %
BILIRUB SERPL-MCNC: 0.8 MG/DL (ref 0.2–1)
BILIRUB UR QL STRIP: NEGATIVE
BUN SERPL-MCNC: 12 MG/DL (ref 6–20)
BUN/CREAT SERPL: 12 (ref 7–25)
CALCIUM SERPL-MCNC: 9.9 MG/DL (ref 8.4–10.2)
CHLORIDE SERPL-SCNC: 103 MMOL/L (ref 97–110)
CO2 SERPL-SCNC: 28 MMOL/L (ref 21–32)
COLOR UR: YELLOW
CREAT SERPL-MCNC: 1 MG/DL (ref 0.51–0.95)
DEPRECATED RDW RBC: 43 FL (ref 39–50)
DEPRECATED RDW RBC: 43.4 FL (ref 39–50)
EOSINOPHIL # BLD: 0.2 K/MCL (ref 0–0.5)
EOSINOPHIL NFR BLD: 3 %
ERYTHROCYTE [DISTWIDTH] IN BLOOD: 13.9 % (ref 11–15)
ERYTHROCYTE [DISTWIDTH] IN BLOOD: 14.1 % (ref 11–15)
FASTING DURATION TIME PATIENT: ABNORMAL H
GFR SERPLBLD BASED ON 1.73 SQ M-ARVRAT: 55 ML/MIN
GLOBULIN SER-MCNC: 3.4 G/DL (ref 2–4)
GLUCOSE SERPL-MCNC: 106 MG/DL (ref 70–99)
GLUCOSE UR STRIP-MCNC: NEGATIVE MG/DL
HCT VFR BLD CALC: 38.1 % (ref 36–46.5)
HCT VFR BLD CALC: 38.9 % (ref 36–46.5)
HGB BLD-MCNC: 12.3 G/DL (ref 12–15.5)
HGB BLD-MCNC: 12.6 G/DL (ref 12–15.5)
HGB UR QL STRIP: ABNORMAL
HYALINE CASTS #/AREA URNS LPF: ABNORMAL /LPF
IMM GRANULOCYTES # BLD AUTO: 0 K/MCL (ref 0–0.2)
IMM GRANULOCYTES # BLD: 1 %
INR PPP: 1.1
KETONES UR STRIP-MCNC: NEGATIVE MG/DL
LEUKOCYTE ESTERASE UR QL STRIP: ABNORMAL
LYMPHOCYTES # BLD: 1.6 K/MCL (ref 1–4)
LYMPHOCYTES NFR BLD: 26 %
MAGNESIUM SERPL-MCNC: 1.5 MG/DL (ref 1.7–2.4)
MCH RBC QN AUTO: 27.3 PG (ref 26–34)
MCH RBC QN AUTO: 27.9 PG (ref 26–34)
MCHC RBC AUTO-ENTMCNC: 32.3 G/DL (ref 32–36.5)
MCHC RBC AUTO-ENTMCNC: 32.4 G/DL (ref 32–36.5)
MCV RBC AUTO: 84.5 FL (ref 78–100)
MCV RBC AUTO: 86.1 FL (ref 78–100)
MONOCYTES # BLD: 0.5 K/MCL (ref 0.3–0.9)
MONOCYTES NFR BLD: 8 %
NEUTROPHILS # BLD: 4 K/MCL (ref 1.8–7.7)
NEUTROPHILS NFR BLD: 61 %
NITRITE UR QL STRIP: NEGATIVE
NRBC BLD MANUAL-RTO: 0 /100 WBC
NRBC BLD MANUAL-RTO: 0 /100 WBC
NT-PROBNP SERPL-MCNC: 7016 PG/ML
PH UR STRIP: 7.5 [PH] (ref 5–7)
PLATELET # BLD AUTO: 250 K/MCL (ref 140–450)
PLATELET # BLD AUTO: 270 K/MCL (ref 140–450)
POTASSIUM SERPL-SCNC: 3.3 MMOL/L (ref 3.4–5.1)
PROT SERPL-MCNC: 7.1 G/DL (ref 6.4–8.2)
PROT UR STRIP-MCNC: NEGATIVE MG/DL
PROTHROMBIN TIME: 11.9 SEC (ref 9.7–11.8)
RBC # BLD: 4.51 MIL/MCL (ref 4–5.2)
RBC # BLD: 4.52 MIL/MCL (ref 4–5.2)
RBC #/AREA URNS HPF: ABNORMAL /HPF
SARS-COV-2 RNA RESP QL NAA+PROBE: NOT DETECTED
SERVICE CMNT-IMP: NORMAL
SERVICE CMNT-IMP: NORMAL
SODIUM SERPL-SCNC: 141 MMOL/L (ref 135–145)
SP GR UR STRIP: 1.01 (ref 1–1.03)
SQUAMOUS #/AREA URNS HPF: ABNORMAL /HPF
T4 FREE SERPL-MCNC: 1.2 NG/DL (ref 0.8–1.5)
TROPONIN I SERPL DL<=0.01 NG/ML-MCNC: 280 NG/L
TROPONIN I SERPL DL<=0.01 NG/ML-MCNC: 283 NG/L
TROPONIN I SERPL DL<=0.01 NG/ML-MCNC: 304 NG/L
TROPONIN I SERPL DL<=0.01 NG/ML-MCNC: 305 NG/L
TSH SERPL-ACNC: 6.09 MCUNITS/ML (ref 0.35–5)
UROBILINOGEN UR STRIP-MCNC: 0.2 MG/DL
WBC # BLD: 6.4 K/MCL (ref 4.2–11)
WBC # BLD: 6.5 K/MCL (ref 4.2–11)
WBC #/AREA URNS HPF: ABNORMAL /HPF

## 2022-07-19 PROCEDURE — 10002800 HB RX 250 W HCPCS: Performed by: STUDENT IN AN ORGANIZED HEALTH CARE EDUCATION/TRAINING PROGRAM

## 2022-07-19 PROCEDURE — 99285 EMERGENCY DEPT VISIT HI MDM: CPT

## 2022-07-19 PROCEDURE — 99223 1ST HOSP IP/OBS HIGH 75: CPT | Performed by: INTERNAL MEDICINE

## 2022-07-19 PROCEDURE — 36415 COLL VENOUS BLD VENIPUNCTURE: CPT | Performed by: STUDENT IN AN ORGANIZED HEALTH CARE EDUCATION/TRAINING PROGRAM

## 2022-07-19 PROCEDURE — 93010 ELECTROCARDIOGRAM REPORT: CPT | Performed by: INTERNAL MEDICINE

## 2022-07-19 PROCEDURE — 93005 ELECTROCARDIOGRAM TRACING: CPT | Performed by: STUDENT IN AN ORGANIZED HEALTH CARE EDUCATION/TRAINING PROGRAM

## 2022-07-19 PROCEDURE — 85027 COMPLETE CBC AUTOMATED: CPT | Performed by: STUDENT IN AN ORGANIZED HEALTH CARE EDUCATION/TRAINING PROGRAM

## 2022-07-19 PROCEDURE — 85730 THROMBOPLASTIN TIME PARTIAL: CPT | Performed by: STUDENT IN AN ORGANIZED HEALTH CARE EDUCATION/TRAINING PROGRAM

## 2022-07-19 PROCEDURE — 51798 US URINE CAPACITY MEASURE: CPT | Performed by: HOSPITALIST

## 2022-07-19 PROCEDURE — 10002803 HB RX 637: Performed by: HOSPITALIST

## 2022-07-19 PROCEDURE — 83880 ASSAY OF NATRIURETIC PEPTIDE: CPT | Performed by: STUDENT IN AN ORGANIZED HEALTH CARE EDUCATION/TRAINING PROGRAM

## 2022-07-19 PROCEDURE — 99223 1ST HOSP IP/OBS HIGH 75: CPT | Performed by: HOSPITALIST

## 2022-07-19 PROCEDURE — 93005 ELECTROCARDIOGRAM TRACING: CPT | Performed by: HOSPITALIST

## 2022-07-19 PROCEDURE — 84439 ASSAY OF FREE THYROXINE: CPT | Performed by: HOSPITALIST

## 2022-07-19 PROCEDURE — 87086 URINE CULTURE/COLONY COUNT: CPT | Performed by: HOSPITALIST

## 2022-07-19 PROCEDURE — 84443 ASSAY THYROID STIM HORMONE: CPT | Performed by: HOSPITALIST

## 2022-07-19 PROCEDURE — 10002800 HB RX 250 W HCPCS: Performed by: HOSPITALIST

## 2022-07-19 PROCEDURE — 84484 ASSAY OF TROPONIN QUANT: CPT | Performed by: HOSPITALIST

## 2022-07-19 PROCEDURE — 10004651 HB RX, NO CHARGE ITEM: Performed by: STUDENT IN AN ORGANIZED HEALTH CARE EDUCATION/TRAINING PROGRAM

## 2022-07-19 PROCEDURE — 81001 URINALYSIS AUTO W/SCOPE: CPT | Performed by: HOSPITALIST

## 2022-07-19 PROCEDURE — 83735 ASSAY OF MAGNESIUM: CPT | Performed by: HOSPITALIST

## 2022-07-19 PROCEDURE — 10004651 HB RX, NO CHARGE ITEM: Performed by: HOSPITALIST

## 2022-07-19 PROCEDURE — 85025 COMPLETE CBC W/AUTO DIFF WBC: CPT | Performed by: STUDENT IN AN ORGANIZED HEALTH CARE EDUCATION/TRAINING PROGRAM

## 2022-07-19 PROCEDURE — 87635 SARS-COV-2 COVID-19 AMP PRB: CPT | Performed by: STUDENT IN AN ORGANIZED HEALTH CARE EDUCATION/TRAINING PROGRAM

## 2022-07-19 PROCEDURE — 80053 COMPREHEN METABOLIC PANEL: CPT | Performed by: STUDENT IN AN ORGANIZED HEALTH CARE EDUCATION/TRAINING PROGRAM

## 2022-07-19 PROCEDURE — 85610 PROTHROMBIN TIME: CPT | Performed by: STUDENT IN AN ORGANIZED HEALTH CARE EDUCATION/TRAINING PROGRAM

## 2022-07-19 PROCEDURE — 10000002 HB ROOM CHARGE MED SURG

## 2022-07-19 PROCEDURE — 71045 X-RAY EXAM CHEST 1 VIEW: CPT

## 2022-07-19 PROCEDURE — 84484 ASSAY OF TROPONIN QUANT: CPT | Performed by: STUDENT IN AN ORGANIZED HEALTH CARE EDUCATION/TRAINING PROGRAM

## 2022-07-19 PROCEDURE — 71045 X-RAY EXAM CHEST 1 VIEW: CPT | Performed by: RADIOLOGY

## 2022-07-19 PROCEDURE — 99291 CRITICAL CARE FIRST HOUR: CPT | Performed by: STUDENT IN AN ORGANIZED HEALTH CARE EDUCATION/TRAINING PROGRAM

## 2022-07-19 RX ORDER — FLUTICASONE PROPIONATE 50 MCG
1 SPRAY, SUSPENSION (ML) NASAL DAILY PRN
Status: DISCONTINUED | OUTPATIENT
Start: 2022-07-19 | End: 2022-07-20 | Stop reason: HOSPADM

## 2022-07-19 RX ORDER — HYDROCODONE BITARTRATE AND ACETAMINOPHEN 5; 325 MG/1; MG/1
1 TABLET ORAL EVERY 4 HOURS PRN
Status: DISCONTINUED | OUTPATIENT
Start: 2022-07-19 | End: 2022-07-20 | Stop reason: HOSPADM

## 2022-07-19 RX ORDER — FUROSEMIDE 40 MG/1
40 TABLET ORAL
Status: ON HOLD | COMMUNITY
End: 2022-07-22 | Stop reason: HOSPADM

## 2022-07-19 RX ORDER — METOPROLOL SUCCINATE 50 MG/1
75 TABLET, EXTENDED RELEASE ORAL 2 TIMES DAILY
Status: DISCONTINUED | OUTPATIENT
Start: 2022-07-19 | End: 2022-07-20 | Stop reason: HOSPADM

## 2022-07-19 RX ORDER — CETIRIZINE HYDROCHLORIDE 10 MG/1
10 TABLET ORAL DAILY
Status: DISCONTINUED | OUTPATIENT
Start: 2022-07-20 | End: 2022-07-20 | Stop reason: HOSPADM

## 2022-07-19 RX ORDER — PANTOPRAZOLE SODIUM 40 MG/1
40 TABLET, DELAYED RELEASE ORAL
Status: DISCONTINUED | OUTPATIENT
Start: 2022-07-20 | End: 2022-07-20 | Stop reason: HOSPADM

## 2022-07-19 RX ORDER — ATORVASTATIN CALCIUM 20 MG/1
40 TABLET, FILM COATED ORAL NIGHTLY
Status: DISCONTINUED | OUTPATIENT
Start: 2022-07-19 | End: 2022-07-20 | Stop reason: HOSPADM

## 2022-07-19 RX ORDER — LANOLIN ALCOHOL/MO/W.PET/CERES
400 CREAM (GRAM) TOPICAL ONCE
Status: COMPLETED | OUTPATIENT
Start: 2022-07-19 | End: 2022-07-19

## 2022-07-19 RX ORDER — LANOLIN ALCOHOL/MO/W.PET/CERES
400 CREAM (GRAM) TOPICAL ONCE
Status: COMPLETED | OUTPATIENT
Start: 2022-07-20 | End: 2022-07-20

## 2022-07-19 RX ORDER — FERROUS SULFATE 325(65) MG
325 TABLET ORAL
Status: DISCONTINUED | OUTPATIENT
Start: 2022-07-20 | End: 2022-07-20 | Stop reason: HOSPADM

## 2022-07-19 RX ORDER — ASPIRIN 81 MG/1
81 TABLET, CHEWABLE ORAL DAILY
Status: DISCONTINUED | OUTPATIENT
Start: 2022-07-20 | End: 2022-07-20 | Stop reason: HOSPADM

## 2022-07-19 RX ORDER — CLOPIDOGREL BISULFATE 75 MG/1
75 TABLET ORAL DAILY
Status: DISCONTINUED | OUTPATIENT
Start: 2022-07-20 | End: 2022-07-20 | Stop reason: HOSPADM

## 2022-07-19 RX ORDER — NITROGLYCERIN 0.4 MG/1
0.4 TABLET SUBLINGUAL EVERY 5 MIN PRN
Status: DISCONTINUED | OUTPATIENT
Start: 2022-07-19 | End: 2022-07-20 | Stop reason: HOSPADM

## 2022-07-19 RX ORDER — FOLIC ACID 1 MG/1
1 TABLET ORAL DAILY
Status: DISCONTINUED | OUTPATIENT
Start: 2022-07-20 | End: 2022-07-20 | Stop reason: HOSPADM

## 2022-07-19 RX ORDER — BISACODYL 10 MG
10 SUPPOSITORY, RECTAL RECTAL DAILY PRN
Status: DISCONTINUED | OUTPATIENT
Start: 2022-07-19 | End: 2022-07-20 | Stop reason: HOSPADM

## 2022-07-19 RX ORDER — HEPARIN SODIUM 1000 [USP'U]/ML
4000 INJECTION, SOLUTION INTRAVENOUS; SUBCUTANEOUS PRN
Status: DISCONTINUED | OUTPATIENT
Start: 2022-07-20 | End: 2022-07-20 | Stop reason: HOSPADM

## 2022-07-19 RX ORDER — ONDANSETRON 2 MG/ML
4 INJECTION INTRAMUSCULAR; INTRAVENOUS 2 TIMES DAILY PRN
Status: DISCONTINUED | OUTPATIENT
Start: 2022-07-19 | End: 2022-07-20 | Stop reason: HOSPADM

## 2022-07-19 RX ORDER — POLYETHYLENE GLYCOL 3350 17 G/17G
17 POWDER, FOR SOLUTION ORAL DAILY PRN
Status: DISCONTINUED | OUTPATIENT
Start: 2022-07-19 | End: 2022-07-20 | Stop reason: HOSPADM

## 2022-07-19 RX ORDER — FUROSEMIDE 10 MG/ML
40 INJECTION INTRAMUSCULAR; INTRAVENOUS ONCE
Status: COMPLETED | OUTPATIENT
Start: 2022-07-19 | End: 2022-07-19

## 2022-07-19 RX ORDER — HEPARIN SODIUM 1000 [USP'U]/ML
2000 INJECTION, SOLUTION INTRAVENOUS; SUBCUTANEOUS PRN
Status: DISCONTINUED | OUTPATIENT
Start: 2022-07-20 | End: 2022-07-20 | Stop reason: HOSPADM

## 2022-07-19 RX ORDER — LANOLIN ALCOHOL/MO/W.PET/CERES
100 CREAM (GRAM) TOPICAL DAILY
Status: DISCONTINUED | OUTPATIENT
Start: 2022-07-20 | End: 2022-07-19

## 2022-07-19 RX ORDER — HEPARIN SODIUM 10000 [USP'U]/100ML
1-30 INJECTION, SOLUTION INTRAVENOUS CONTINUOUS
Status: DISCONTINUED | OUTPATIENT
Start: 2022-07-19 | End: 2022-07-20 | Stop reason: HOSPADM

## 2022-07-19 RX ORDER — LEVOTHYROXINE SODIUM 0.05 MG/1
50 TABLET ORAL DAILY
Status: DISCONTINUED | OUTPATIENT
Start: 2022-07-20 | End: 2022-07-20 | Stop reason: HOSPADM

## 2022-07-19 RX ORDER — SPIRONOLACTONE 25 MG/1
25 TABLET ORAL DAILY
Status: DISCONTINUED | OUTPATIENT
Start: 2022-07-20 | End: 2022-07-20 | Stop reason: HOSPADM

## 2022-07-19 RX ORDER — AMOXICILLIN 250 MG
2 CAPSULE ORAL DAILY PRN
Status: DISCONTINUED | OUTPATIENT
Start: 2022-07-19 | End: 2022-07-20 | Stop reason: HOSPADM

## 2022-07-19 RX ORDER — MAGNESIUM HYDROXIDE/ALUMINUM HYDROXICE/SIMETHICONE 120; 1200; 1200 MG/30ML; MG/30ML; MG/30ML
30 SUSPENSION ORAL EVERY 4 HOURS PRN
Status: DISCONTINUED | OUTPATIENT
Start: 2022-07-19 | End: 2022-07-20 | Stop reason: HOSPADM

## 2022-07-19 RX ORDER — 0.9 % SODIUM CHLORIDE 0.9 %
2 VIAL (ML) INJECTION EVERY 12 HOURS SCHEDULED
Status: DISCONTINUED | OUTPATIENT
Start: 2022-07-19 | End: 2022-07-20 | Stop reason: HOSPADM

## 2022-07-19 RX ORDER — ASPIRIN 81 MG/1
324 TABLET, CHEWABLE ORAL ONCE
Status: COMPLETED | OUTPATIENT
Start: 2022-07-19 | End: 2022-07-19

## 2022-07-19 RX ORDER — FUROSEMIDE 10 MG/ML
20 INJECTION INTRAMUSCULAR; INTRAVENOUS
Status: DISCONTINUED | OUTPATIENT
Start: 2022-07-20 | End: 2022-07-20 | Stop reason: HOSPADM

## 2022-07-19 RX ORDER — POTASSIUM CHLORIDE 20 MEQ/1
40 TABLET, EXTENDED RELEASE ORAL ONCE
Status: COMPLETED | OUTPATIENT
Start: 2022-07-19 | End: 2022-07-19

## 2022-07-19 RX ORDER — ACETAMINOPHEN 325 MG/1
650 TABLET ORAL EVERY 4 HOURS PRN
Status: DISCONTINUED | OUTPATIENT
Start: 2022-07-19 | End: 2022-07-20 | Stop reason: HOSPADM

## 2022-07-19 RX ADMIN — ATORVASTATIN CALCIUM 40 MG: 20 TABLET, FILM COATED ORAL at 23:04

## 2022-07-19 RX ADMIN — MORPHINE SULFATE 2 MG: 2 INJECTION, SOLUTION INTRAMUSCULAR; INTRAVENOUS at 21:15

## 2022-07-19 RX ADMIN — ASPIRIN 324 MG: 81 TABLET, CHEWABLE ORAL at 16:36

## 2022-07-19 RX ADMIN — SODIUM CHLORIDE, PRESERVATIVE FREE 2 ML: 5 INJECTION INTRAVENOUS at 22:42

## 2022-07-19 RX ADMIN — POTASSIUM CHLORIDE 40 MEQ: 1500 TABLET, EXTENDED RELEASE ORAL at 23:04

## 2022-07-19 RX ADMIN — Medication 400 MG: at 23:12

## 2022-07-19 RX ADMIN — METOPROLOL SUCCINATE 75 MG: 50 TABLET, EXTENDED RELEASE ORAL at 23:04

## 2022-07-19 RX ADMIN — FUROSEMIDE 40 MG: 10 INJECTION, SOLUTION INTRAMUSCULAR; INTRAVENOUS at 18:21

## 2022-07-19 RX ADMIN — HEPARIN SODIUM AND DEXTROSE 12 UNITS/KG/HR: 10000; 5 INJECTION INTRAVENOUS at 18:50

## 2022-07-19 ASSESSMENT — PAIN SCALES - GENERAL
PAINLEVEL_OUTOF10: 4
PAINLEVEL_OUTOF10: 4
PAINLEVEL_OUTOF10: 6
PAINLEVEL_OUTOF10: 3
PAINLEVEL_OUTOF10: 5

## 2022-07-19 ASSESSMENT — ACTIVITIES OF DAILY LIVING (ADL)
TRANSFERRING: INDEPENDENT
BATHING: NEEDS ASSISTANCE
CHRONIC_PAIN_PRESENT: NO
TOILETING: INDEPENDENT
DRESSING YOURSELF: INDEPENDENT
RECENT_DECLINE_ADL: YES, ACUTE ILLNESS WITHOUT THERAPY NEEDS
CONTINENCE: NEEDS ASSISTANCE
FEEDING YOURSELF: INDEPENDENT
ADL_SHORT_OF_BREATH: YES
ADL_SCORE: 10
ADL_BEFORE_ADMISSION: NEEDS/REQUIRES ASSISTANCE

## 2022-07-19 ASSESSMENT — ENCOUNTER SYMPTOMS
NAUSEA: 0
WOUND: 0
DIZZINESS: 0
COUGH: 0
CONFUSION: 0
CHILLS: 0
RHINORRHEA: 0
EYE PAIN: 0
BACK PAIN: 0
ABDOMINAL PAIN: 0
SORE THROAT: 0
FEVER: 0
VOMITING: 0
SHORTNESS OF BREATH: 1

## 2022-07-19 ASSESSMENT — PATIENT HEALTH QUESTIONNAIRE - PHQ9
CLINICAL INTERPRETATION OF PHQ2 SCORE: NO FURTHER SCREENING NEEDED
SUM OF ALL RESPONSES TO PHQ9 QUESTIONS 1 AND 2: 0
1. LITTLE INTEREST OR PLEASURE IN DOING THINGS: NOT AT ALL
2. FEELING DOWN, DEPRESSED OR HOPELESS: NOT AT ALL
SUM OF ALL RESPONSES TO PHQ9 QUESTIONS 1 AND 2: 0
IS PATIENT ABLE TO COMPLETE PHQ2 OR PHQ9: YES

## 2022-07-19 ASSESSMENT — COGNITIVE AND FUNCTIONAL STATUS - GENERAL
DO YOU HAVE DIFFICULTY DRESSING OR BATHING: YES
BECAUSE OF A PHYSICAL, MENTAL, OR EMOTIONAL CONDITION, DO YOU HAVE DIFFICULTY DOING ERRANDS ALONE: YES
DO YOU HAVE SERIOUS DIFFICULTY WALKING OR CLIMBING STAIRS: YES
BECAUSE OF A PHYSICAL, MENTAL, OR EMOTIONAL CONDITION, DO YOU HAVE SERIOUS DIFFICULTY CONCENTRATING, REMEMBERING OR MAKING DECISIONS: NO
ARE YOU DEAF OR DO YOU HAVE SERIOUS DIFFICULTY  HEARING: NO
ARE YOU BLIND OR DO YOU HAVE SERIOUS DIFFICULTY SEEING, EVEN WHEN WEARING GLASSES: NO

## 2022-07-19 ASSESSMENT — HEART SCORE
EKG: NORMAL
AGE: EQUAL OR GREATER THAN 65
HEART SCORE: 8
RISK FACTORS: EQUAL OR GREATER  THAN 3 RISK FACTORS OR HISTORY OF ATHEROSCLEROTIC DISEASE
TROPONIN: EQUAL OR GREATER THAN 3X NORMAL LIMIT
HISTORY: HIGHLY SUSPICIOUS

## 2022-07-19 ASSESSMENT — PAIN DESCRIPTION - PAIN TYPE: TYPE: ACUTE PAIN

## 2022-07-19 ASSESSMENT — COLUMBIA-SUICIDE SEVERITY RATING SCALE - C-SSRS
IS THE PATIENT ABLE TO COMPLETE C-SSRS: YES
6. HAVE YOU EVER DONE ANYTHING, STARTED TO DO ANYTHING, OR PREPARED TO DO ANYTHING TO END YOUR LIFE?: NO
2. HAVE YOU ACTUALLY HAD ANY THOUGHTS OF KILLING YOURSELF?: NO
1. WITHIN THE PAST MONTH, HAVE YOU WISHED YOU WERE DEAD OR WISHED YOU COULD GO TO SLEEP AND NOT WAKE UP?: NO

## 2022-07-19 ASSESSMENT — LIFESTYLE VARIABLES
HOW OFTEN DO YOU HAVE A DRINK CONTAINING ALCOHOL: NEVER
ALCOHOL_USE_STATUS: NO OR LOW RISK WITH VALIDATED TOOL
HOW MANY STANDARD DRINKS CONTAINING ALCOHOL DO YOU HAVE ON A TYPICAL DAY: 0,1 OR 2
AUDIT-C TOTAL SCORE: 0
HOW OFTEN DO YOU HAVE 6 OR MORE DRINKS ON ONE OCCASION: NEVER

## 2022-07-20 ENCOUNTER — TELEPHONE (OUTPATIENT)
Dept: CARDIOLOGY | Age: 87
End: 2022-07-20

## 2022-07-20 ENCOUNTER — APPOINTMENT (OUTPATIENT)
Dept: CARDIAC REHAB | Age: 87
End: 2022-07-20
Attending: INTERNAL MEDICINE

## 2022-07-20 ENCOUNTER — APPOINTMENT (OUTPATIENT)
Dept: CV DIAGNOSTICS | Age: 87
DRG: 280 | End: 2022-07-20
Attending: HOSPITALIST

## 2022-07-20 ENCOUNTER — TELEPHONE (OUTPATIENT)
Dept: ELECTROPHYSIOLOGY | Age: 87
End: 2022-07-20

## 2022-07-20 ENCOUNTER — HOSPITAL ENCOUNTER (INPATIENT)
Age: 87
LOS: 2 days | Discharge: HOME | DRG: 280 | End: 2022-07-22
Attending: INTERNAL MEDICINE | Admitting: INTERNAL MEDICINE

## 2022-07-20 VITALS
WEIGHT: 151 LBS | SYSTOLIC BLOOD PRESSURE: 143 MMHG | HEART RATE: 80 BPM | OXYGEN SATURATION: 92 % | TEMPERATURE: 98.1 F | DIASTOLIC BLOOD PRESSURE: 67 MMHG | BODY MASS INDEX: 28.51 KG/M2 | RESPIRATION RATE: 27 BRPM | HEIGHT: 61 IN

## 2022-07-20 DIAGNOSIS — I21.4 NSTEMI (NON-ST ELEVATED MYOCARDIAL INFARCTION) (CMD): ICD-10-CM

## 2022-07-20 DIAGNOSIS — I50.23 ACUTE ON CHRONIC SYSTOLIC (CONGESTIVE) HEART FAILURE (CMD): ICD-10-CM

## 2022-07-20 DIAGNOSIS — R07.89 OTHER CHEST PAIN: ICD-10-CM

## 2022-07-20 DIAGNOSIS — R07.89 OTHER CHEST PAIN: Primary | ICD-10-CM

## 2022-07-20 LAB
ANION GAP SERPL CALC-SCNC: 10 MMOL/L (ref 7–19)
APTT PPP: 37 SEC (ref 22–30)
APTT PPP: 39 SEC (ref 22–30)
APTT PPP: 54 SEC (ref 22–30)
APTT PPP: 64 SEC (ref 22–30)
APTT PPP: 74 SEC (ref 22–30)
ATRIAL RATE (BPM): 63
ATRIAL RATE (BPM): 73
ATRIAL RATE (BPM): 76
BACTERIA UR CULT: NORMAL
BASOPHILS # BLD: 0.1 K/MCL (ref 0–0.3)
BASOPHILS NFR BLD: 1 %
BUN SERPL-MCNC: 13 MG/DL (ref 6–20)
BUN/CREAT SERPL: 13 (ref 7–25)
CALCIUM SERPL-MCNC: 9.4 MG/DL (ref 8.4–10.2)
CHLORIDE SERPL-SCNC: 105 MMOL/L (ref 97–110)
CHOLEST SERPL-MCNC: 228 MG/DL
CHOLEST/HDLC SERPL: 4.9 {RATIO}
CO2 SERPL-SCNC: 31 MMOL/L (ref 21–32)
CREAT SERPL-MCNC: 0.98 MG/DL (ref 0.51–0.95)
DEPRECATED RDW RBC: 43 FL (ref 39–50)
DIASTOLIC BLOOD PRESSURE: 80
DIASTOLIC BLOOD PRESSURE: 84
DIASTOLIC BLOOD PRESSURE: 95
EOSINOPHIL # BLD: 0.3 K/MCL (ref 0–0.5)
EOSINOPHIL NFR BLD: 4 %
ERYTHROCYTE [DISTWIDTH] IN BLOOD: 14 % (ref 11–15)
FASTING DURATION TIME PATIENT: ABNORMAL H
FASTING DURATION TIME PATIENT: ABNORMAL H
GFR SERPLBLD BASED ON 1.73 SQ M-ARVRAT: 57 ML/MIN
GLUCOSE SERPL-MCNC: 100 MG/DL (ref 70–99)
HCT VFR BLD CALC: 37.8 % (ref 36–46.5)
HDLC SERPL-MCNC: 47 MG/DL
HGB BLD-MCNC: 12.3 G/DL (ref 12–15.5)
IMM GRANULOCYTES # BLD AUTO: 0 K/MCL (ref 0–0.2)
IMM GRANULOCYTES # BLD: 0 %
LDLC SERPL CALC-MCNC: 159 MG/DL
LYMPHOCYTES # BLD: 1.8 K/MCL (ref 1–4)
LYMPHOCYTES NFR BLD: 25 %
MAGNESIUM SERPL-MCNC: 1.5 MG/DL (ref 1.7–2.4)
MAGNESIUM SERPL-MCNC: 1.8 MG/DL (ref 1.7–2.4)
MCH RBC QN AUTO: 27.8 PG (ref 26–34)
MCHC RBC AUTO-ENTMCNC: 32.5 G/DL (ref 32–36.5)
MCV RBC AUTO: 85.3 FL (ref 78–100)
MONOCYTES # BLD: 0.5 K/MCL (ref 0.3–0.9)
MONOCYTES NFR BLD: 7 %
NEUTROPHILS # BLD: 4.5 K/MCL (ref 1.8–7.7)
NEUTROPHILS NFR BLD: 63 %
NONHDLC SERPL-MCNC: 181 MG/DL
NRBC BLD MANUAL-RTO: 0 /100 WBC
NT-PROBNP SERPL-MCNC: 7016 PG/ML
PLATELET # BLD AUTO: 231 K/MCL (ref 140–450)
POTASSIUM SERPL-SCNC: 3.5 MMOL/L (ref 3.4–5.1)
POTASSIUM SERPL-SCNC: 3.9 MMOL/L (ref 3.4–5.1)
POTASSIUM SERPL-SCNC: 4.1 MMOL/L (ref 3.4–5.1)
QRS-INTERVAL (MSEC): 132
QRS-INTERVAL (MSEC): 134
QRS-INTERVAL (MSEC): 136
QT-INTERVAL (MSEC): 470
QT-INTERVAL (MSEC): 476
QT-INTERVAL (MSEC): 506
QTC: 511
QTC: 514
QTC: 546
R AXIS (DEGREES): -82
R AXIS (DEGREES): -83
R AXIS (DEGREES): -83
RAINBOW EXTRA TUBES HOLD SPECIMEN: NORMAL
RAINBOW EXTRA TUBES HOLD SPECIMEN: NORMAL
RBC # BLD: 4.43 MIL/MCL (ref 4–5.2)
REPORT TEXT: NORMAL
SODIUM SERPL-SCNC: 142 MMOL/L (ref 135–145)
SYSTOLIC BLOOD PRESSURE: 151
SYSTOLIC BLOOD PRESSURE: 163
SYSTOLIC BLOOD PRESSURE: 190
T AXIS (DEGREES): 82
T AXIS (DEGREES): 85
T AXIS (DEGREES): 86
TRIGL SERPL-MCNC: 108 MG/DL
VENTRICULAR RATE EKG/MIN (BPM): 70
VENTRICULAR RATE EKG/MIN (BPM): 70
VENTRICULAR RATE EKG/MIN (BPM): 71
WBC # BLD: 7.2 K/MCL (ref 4.2–11)

## 2022-07-20 PROCEDURE — 10002800 HB RX 250 W HCPCS: Performed by: HOSPITALIST

## 2022-07-20 PROCEDURE — 10002801 HB RX 250 W/O HCPCS: Performed by: HOSPITALIST

## 2022-07-20 PROCEDURE — 10002800 HB RX 250 W HCPCS

## 2022-07-20 PROCEDURE — 83735 ASSAY OF MAGNESIUM: CPT | Performed by: PHYSICIAN ASSISTANT

## 2022-07-20 PROCEDURE — 10002800 HB RX 250 W HCPCS: Performed by: PHYSICIAN ASSISTANT

## 2022-07-20 PROCEDURE — 85730 THROMBOPLASTIN TIME PARTIAL: CPT | Performed by: STUDENT IN AN ORGANIZED HEALTH CARE EDUCATION/TRAINING PROGRAM

## 2022-07-20 PROCEDURE — S0310 HOSPITALIST VISIT: HCPCS | Performed by: PHYSICIAN ASSISTANT

## 2022-07-20 PROCEDURE — 99239 HOSP IP/OBS DSCHRG MGMT >30: CPT | Performed by: INTERNAL MEDICINE

## 2022-07-20 PROCEDURE — 93306 TTE W/DOPPLER COMPLETE: CPT | Performed by: INTERNAL MEDICINE

## 2022-07-20 PROCEDURE — 10002803 HB RX 637: Performed by: HOSPITALIST

## 2022-07-20 PROCEDURE — 36415 COLL VENOUS BLD VENIPUNCTURE: CPT | Performed by: HOSPITALIST

## 2022-07-20 PROCEDURE — 93356 MYOCRD STRAIN IMG SPCKL TRCK: CPT

## 2022-07-20 PROCEDURE — 84132 ASSAY OF SERUM POTASSIUM: CPT | Performed by: PHYSICIAN ASSISTANT

## 2022-07-20 PROCEDURE — 84132 ASSAY OF SERUM POTASSIUM: CPT | Performed by: HOSPITALIST

## 2022-07-20 PROCEDURE — 10002803 HB RX 637: Performed by: STUDENT IN AN ORGANIZED HEALTH CARE EDUCATION/TRAINING PROGRAM

## 2022-07-20 PROCEDURE — 85730 THROMBOPLASTIN TIME PARTIAL: CPT | Performed by: PHYSICIAN ASSISTANT

## 2022-07-20 PROCEDURE — 83880 ASSAY OF NATRIURETIC PEPTIDE: CPT | Performed by: HOSPITALIST

## 2022-07-20 PROCEDURE — 83735 ASSAY OF MAGNESIUM: CPT | Performed by: HOSPITALIST

## 2022-07-20 PROCEDURE — 93356 MYOCRD STRAIN IMG SPCKL TRCK: CPT | Performed by: INTERNAL MEDICINE

## 2022-07-20 PROCEDURE — 85730 THROMBOPLASTIN TIME PARTIAL: CPT | Performed by: HOSPITALIST

## 2022-07-20 PROCEDURE — 85025 COMPLETE CBC W/AUTO DIFF WBC: CPT | Performed by: HOSPITALIST

## 2022-07-20 PROCEDURE — 10000002 HB ROOM CHARGE MED SURG

## 2022-07-20 PROCEDURE — 85730 THROMBOPLASTIN TIME PARTIAL: CPT | Performed by: INTERNAL MEDICINE

## 2022-07-20 PROCEDURE — 10004651 HB RX, NO CHARGE ITEM: Performed by: HOSPITALIST

## 2022-07-20 PROCEDURE — 80061 LIPID PANEL: CPT | Performed by: HOSPITALIST

## 2022-07-20 PROCEDURE — 99233 SBSQ HOSP IP/OBS HIGH 50: CPT | Performed by: INTERNAL MEDICINE

## 2022-07-20 PROCEDURE — 80048 BASIC METABOLIC PNL TOTAL CA: CPT | Performed by: HOSPITALIST

## 2022-07-20 RX ORDER — FOLIC ACID 1 MG/1
1 TABLET ORAL DAILY
Status: CANCELLED | OUTPATIENT
Start: 2022-07-21

## 2022-07-20 RX ORDER — HYDROCODONE BITARTRATE AND ACETAMINOPHEN 5; 325 MG/1; MG/1
1 TABLET ORAL EVERY 4 HOURS PRN
Status: DISCONTINUED | OUTPATIENT
Start: 2022-07-20 | End: 2022-07-22 | Stop reason: HOSPADM

## 2022-07-20 RX ORDER — MAGNESIUM HYDROXIDE/ALUMINUM HYDROXICE/SIMETHICONE 120; 1200; 1200 MG/30ML; MG/30ML; MG/30ML
30 SUSPENSION ORAL EVERY 4 HOURS PRN
Status: CANCELLED | OUTPATIENT
Start: 2022-07-20

## 2022-07-20 RX ORDER — CLOPIDOGREL BISULFATE 75 MG/1
75 TABLET ORAL DAILY
Status: CANCELLED | OUTPATIENT
Start: 2022-07-21

## 2022-07-20 RX ORDER — POTASSIUM CHLORIDE 20 MEQ/1
40 TABLET, EXTENDED RELEASE ORAL ONCE
Status: COMPLETED | OUTPATIENT
Start: 2022-07-20 | End: 2022-07-20

## 2022-07-20 RX ORDER — CLOPIDOGREL BISULFATE 75 MG/1
75 TABLET ORAL DAILY
Status: DISCONTINUED | OUTPATIENT
Start: 2022-07-21 | End: 2022-07-22 | Stop reason: HOSPADM

## 2022-07-20 RX ORDER — HEPARIN SODIUM 10000 [USP'U]/100ML
INJECTION, SOLUTION INTRAVENOUS
Status: COMPLETED
Start: 2022-07-20 | End: 2022-07-20

## 2022-07-20 RX ORDER — MAGNESIUM HYDROXIDE/ALUMINUM HYDROXICE/SIMETHICONE 120; 1200; 1200 MG/30ML; MG/30ML; MG/30ML
30 SUSPENSION ORAL EVERY 4 HOURS PRN
Status: DISCONTINUED | OUTPATIENT
Start: 2022-07-20 | End: 2022-07-22 | Stop reason: HOSPADM

## 2022-07-20 RX ORDER — FUROSEMIDE 10 MG/ML
INJECTION INTRAMUSCULAR; INTRAVENOUS
Status: DISCONTINUED
Start: 2022-07-20 | End: 2022-07-20 | Stop reason: HOSPADM

## 2022-07-20 RX ORDER — 0.9 % SODIUM CHLORIDE 0.9 %
2 VIAL (ML) INJECTION EVERY 12 HOURS SCHEDULED
Status: CANCELLED | OUTPATIENT
Start: 2022-07-20

## 2022-07-20 RX ORDER — ACETAMINOPHEN 325 MG/1
650 TABLET ORAL EVERY 4 HOURS PRN
Status: CANCELLED | OUTPATIENT
Start: 2022-07-20

## 2022-07-20 RX ORDER — HEPARIN SODIUM 1000 [USP'U]/ML
2000 INJECTION, SOLUTION INTRAVENOUS; SUBCUTANEOUS PRN
Status: CANCELLED | OUTPATIENT
Start: 2022-07-20

## 2022-07-20 RX ORDER — FERROUS SULFATE 325(65) MG
325 TABLET ORAL
Status: DISCONTINUED | OUTPATIENT
Start: 2022-07-21 | End: 2022-07-22 | Stop reason: HOSPADM

## 2022-07-20 RX ORDER — ACETAMINOPHEN 325 MG/1
650 TABLET ORAL EVERY 4 HOURS PRN
Status: DISCONTINUED | OUTPATIENT
Start: 2022-07-20 | End: 2022-07-22 | Stop reason: HOSPADM

## 2022-07-20 RX ORDER — METOPROLOL SUCCINATE 50 MG/1
75 TABLET, EXTENDED RELEASE ORAL 2 TIMES DAILY
Status: CANCELLED | OUTPATIENT
Start: 2022-07-20

## 2022-07-20 RX ORDER — BISACODYL 10 MG
10 SUPPOSITORY, RECTAL RECTAL DAILY PRN
Status: DISCONTINUED | OUTPATIENT
Start: 2022-07-20 | End: 2022-07-22 | Stop reason: HOSPADM

## 2022-07-20 RX ORDER — HEPARIN SODIUM 1000 [USP'U]/ML
2000 INJECTION, SOLUTION INTRAVENOUS; SUBCUTANEOUS PRN
Status: DISCONTINUED | OUTPATIENT
Start: 2022-07-20 | End: 2022-07-21

## 2022-07-20 RX ORDER — AMOXICILLIN 250 MG
2 CAPSULE ORAL DAILY PRN
Status: DISCONTINUED | OUTPATIENT
Start: 2022-07-20 | End: 2022-07-22 | Stop reason: HOSPADM

## 2022-07-20 RX ORDER — HEPARIN SODIUM 10000 [USP'U]/100ML
1-30 INJECTION, SOLUTION INTRAVENOUS CONTINUOUS
Status: CANCELLED | OUTPATIENT
Start: 2022-07-20

## 2022-07-20 RX ORDER — ONDANSETRON 2 MG/ML
4 INJECTION INTRAMUSCULAR; INTRAVENOUS 2 TIMES DAILY PRN
Status: CANCELLED | OUTPATIENT
Start: 2022-07-20

## 2022-07-20 RX ORDER — FOLIC ACID 1 MG/1
1 TABLET ORAL DAILY
Status: DISCONTINUED | OUTPATIENT
Start: 2022-07-21 | End: 2022-07-22 | Stop reason: HOSPADM

## 2022-07-20 RX ORDER — PANTOPRAZOLE SODIUM 40 MG/1
40 TABLET, DELAYED RELEASE ORAL
Status: DISCONTINUED | OUTPATIENT
Start: 2022-07-21 | End: 2022-07-22 | Stop reason: HOSPADM

## 2022-07-20 RX ORDER — POTASSIUM CHLORIDE 1.5 G/1.58G
40 POWDER, FOR SOLUTION ORAL ONCE
Status: DISCONTINUED | OUTPATIENT
Start: 2022-07-20 | End: 2022-07-20

## 2022-07-20 RX ORDER — LEVOTHYROXINE SODIUM 0.05 MG/1
50 TABLET ORAL DAILY
Status: CANCELLED | OUTPATIENT
Start: 2022-07-21

## 2022-07-20 RX ORDER — FLUTICASONE PROPIONATE 50 MCG
1 SPRAY, SUSPENSION (ML) NASAL DAILY PRN
Status: CANCELLED | OUTPATIENT
Start: 2022-07-20

## 2022-07-20 RX ORDER — AMOXICILLIN 250 MG
2 CAPSULE ORAL DAILY PRN
Status: CANCELLED | OUTPATIENT
Start: 2022-07-20

## 2022-07-20 RX ORDER — HEPARIN SODIUM 10000 [USP'U]/100ML
1-30 INJECTION, SOLUTION INTRAVENOUS CONTINUOUS
Status: DISCONTINUED | OUTPATIENT
Start: 2022-07-20 | End: 2022-07-21

## 2022-07-20 RX ORDER — NITROGLYCERIN 0.4 MG/1
0.4 TABLET SUBLINGUAL EVERY 5 MIN PRN
Status: DISCONTINUED | OUTPATIENT
Start: 2022-07-20 | End: 2022-07-22 | Stop reason: HOSPADM

## 2022-07-20 RX ORDER — HEPARIN SODIUM 1000 [USP'U]/ML
4000 INJECTION, SOLUTION INTRAVENOUS; SUBCUTANEOUS PRN
Status: CANCELLED | OUTPATIENT
Start: 2022-07-20

## 2022-07-20 RX ORDER — SPIRONOLACTONE 25 MG/1
25 TABLET ORAL DAILY
Status: CANCELLED | OUTPATIENT
Start: 2022-07-21

## 2022-07-20 RX ORDER — BISACODYL 10 MG
10 SUPPOSITORY, RECTAL RECTAL DAILY PRN
Status: CANCELLED | OUTPATIENT
Start: 2022-07-20

## 2022-07-20 RX ORDER — HYDROCODONE BITARTRATE AND ACETAMINOPHEN 5; 325 MG/1; MG/1
1 TABLET ORAL EVERY 4 HOURS PRN
Status: CANCELLED | OUTPATIENT
Start: 2022-07-20

## 2022-07-20 RX ORDER — CETIRIZINE HYDROCHLORIDE 10 MG/1
10 TABLET ORAL DAILY
Status: DISCONTINUED | OUTPATIENT
Start: 2022-07-21 | End: 2022-07-22 | Stop reason: HOSPADM

## 2022-07-20 RX ORDER — FUROSEMIDE 10 MG/ML
20 INJECTION INTRAMUSCULAR; INTRAVENOUS
Status: CANCELLED | OUTPATIENT
Start: 2022-07-20

## 2022-07-20 RX ORDER — SPIRONOLACTONE 25 MG/1
25 TABLET ORAL DAILY
Status: DISCONTINUED | OUTPATIENT
Start: 2022-07-21 | End: 2022-07-22 | Stop reason: HOSPADM

## 2022-07-20 RX ORDER — ATORVASTATIN CALCIUM 20 MG/1
40 TABLET, FILM COATED ORAL NIGHTLY
Status: CANCELLED | OUTPATIENT
Start: 2022-07-20

## 2022-07-20 RX ORDER — POLYETHYLENE GLYCOL 3350 17 G/17G
17 POWDER, FOR SOLUTION ORAL DAILY PRN
Status: CANCELLED | OUTPATIENT
Start: 2022-07-20

## 2022-07-20 RX ORDER — FERROUS SULFATE 325(65) MG
325 TABLET ORAL
Status: CANCELLED | OUTPATIENT
Start: 2022-07-21

## 2022-07-20 RX ORDER — POLYETHYLENE GLYCOL 3350 17 G/17G
17 POWDER, FOR SOLUTION ORAL DAILY PRN
Status: DISCONTINUED | OUTPATIENT
Start: 2022-07-20 | End: 2022-07-22 | Stop reason: HOSPADM

## 2022-07-20 RX ORDER — FUROSEMIDE 10 MG/ML
20 INJECTION INTRAMUSCULAR; INTRAVENOUS
Status: DISCONTINUED | OUTPATIENT
Start: 2022-07-21 | End: 2022-07-21

## 2022-07-20 RX ORDER — ONDANSETRON 2 MG/ML
4 INJECTION INTRAMUSCULAR; INTRAVENOUS 2 TIMES DAILY PRN
Status: DISCONTINUED | OUTPATIENT
Start: 2022-07-20 | End: 2022-07-22 | Stop reason: HOSPADM

## 2022-07-20 RX ORDER — PANTOPRAZOLE SODIUM 40 MG/1
40 TABLET, DELAYED RELEASE ORAL
Status: CANCELLED | OUTPATIENT
Start: 2022-07-21

## 2022-07-20 RX ORDER — HEPARIN SODIUM 1000 [USP'U]/ML
4000 INJECTION, SOLUTION INTRAVENOUS; SUBCUTANEOUS PRN
Status: DISCONTINUED | OUTPATIENT
Start: 2022-07-20 | End: 2022-07-21

## 2022-07-20 RX ORDER — FLUTICASONE PROPIONATE 50 MCG
1 SPRAY, SUSPENSION (ML) NASAL DAILY PRN
Status: DISCONTINUED | OUTPATIENT
Start: 2022-07-20 | End: 2022-07-22 | Stop reason: HOSPADM

## 2022-07-20 RX ORDER — LEVOTHYROXINE SODIUM 0.05 MG/1
50 TABLET ORAL DAILY
Status: DISCONTINUED | OUTPATIENT
Start: 2022-07-21 | End: 2022-07-21

## 2022-07-20 RX ORDER — 0.9 % SODIUM CHLORIDE 0.9 %
2 VIAL (ML) INJECTION EVERY 12 HOURS SCHEDULED
Status: DISCONTINUED | OUTPATIENT
Start: 2022-07-20 | End: 2022-07-22 | Stop reason: HOSPADM

## 2022-07-20 RX ORDER — ASPIRIN 81 MG/1
81 TABLET, CHEWABLE ORAL DAILY
Status: DISCONTINUED | OUTPATIENT
Start: 2022-07-21 | End: 2022-07-22 | Stop reason: HOSPADM

## 2022-07-20 RX ORDER — NITROGLYCERIN 0.4 MG/1
0.4 TABLET SUBLINGUAL EVERY 5 MIN PRN
Status: CANCELLED | OUTPATIENT
Start: 2022-07-20

## 2022-07-20 RX ORDER — ASPIRIN 81 MG/1
81 TABLET, CHEWABLE ORAL DAILY
Status: CANCELLED | OUTPATIENT
Start: 2022-07-21

## 2022-07-20 RX ORDER — ATORVASTATIN CALCIUM 40 MG/1
40 TABLET, FILM COATED ORAL NIGHTLY
Status: DISCONTINUED | OUTPATIENT
Start: 2022-07-20 | End: 2022-07-21

## 2022-07-20 RX ORDER — POTASSIUM CHLORIDE 20 MEQ/1
TABLET, EXTENDED RELEASE ORAL
Qty: 40 TABLET | Refills: 2 | OUTPATIENT
Start: 2022-07-20

## 2022-07-20 RX ORDER — CETIRIZINE HYDROCHLORIDE 10 MG/1
10 TABLET ORAL DAILY
Status: CANCELLED | OUTPATIENT
Start: 2022-07-21

## 2022-07-20 RX ADMIN — LEVOTHYROXINE SODIUM 50 MCG: 0.05 TABLET ORAL at 09:28

## 2022-07-20 RX ADMIN — FUROSEMIDE 20 MG: 10 INJECTION, SOLUTION INTRAMUSCULAR; INTRAVENOUS at 08:16

## 2022-07-20 RX ADMIN — SODIUM CHLORIDE, PRESERVATIVE FREE 2 ML: 5 INJECTION INTRAVENOUS at 08:18

## 2022-07-20 RX ADMIN — METOPROLOL SUCCINATE 75 MG: 50 TABLET, EXTENDED RELEASE ORAL at 08:15

## 2022-07-20 RX ADMIN — CLOPIDOGREL BISULFATE 75 MG: 75 TABLET, FILM COATED ORAL at 08:16

## 2022-07-20 RX ADMIN — Medication 400 MG: at 08:16

## 2022-07-20 RX ADMIN — HEPARIN SODIUM AND DEXTROSE 14 UNITS/KG/HR: 10000; 5 INJECTION INTRAVENOUS at 18:06

## 2022-07-20 RX ADMIN — HEPARIN SODIUM AND DEXTROSE 14 UNITS/KG/HR: 10000; 5 INJECTION INTRAVENOUS at 21:38

## 2022-07-20 RX ADMIN — Medication 1000 UNITS: at 09:28

## 2022-07-20 RX ADMIN — POTASSIUM CHLORIDE 40 MEQ: 1500 TABLET, EXTENDED RELEASE ORAL at 09:28

## 2022-07-20 RX ADMIN — FOLIC ACID 1 MG: 1 TABLET ORAL at 08:16

## 2022-07-20 RX ADMIN — ONDANSETRON 4 MG: 2 INJECTION INTRAMUSCULAR; INTRAVENOUS at 02:22

## 2022-07-20 RX ADMIN — FERROUS SULFATE TAB 325 MG (65 MG ELEMENTAL FE) 325 MG: 325 (65 FE) TAB at 09:28

## 2022-07-20 RX ADMIN — FUROSEMIDE 20 MG: 10 INJECTION, SOLUTION INTRAMUSCULAR; INTRAVENOUS at 18:01

## 2022-07-20 RX ADMIN — HEPARIN SODIUM 2000 UNITS: 1000 INJECTION, SOLUTION INTRAVENOUS; SUBCUTANEOUS at 02:19

## 2022-07-20 RX ADMIN — SPIRONOLACTONE 25 MG: 25 TABLET ORAL at 09:28

## 2022-07-20 RX ADMIN — CETIRIZINE HYDROCHLORIDE 10 MG: 10 TABLET, FILM COATED ORAL at 09:28

## 2022-07-20 RX ADMIN — ASPIRIN 81 MG 81 MG: 81 TABLET ORAL at 08:15

## 2022-07-20 ASSESSMENT — LIFESTYLE VARIABLES
HOW OFTEN DO YOU HAVE 6 OR MORE DRINKS ON ONE OCCASION: NEVER
ALCOHOL_USE_STATUS: NO OR LOW RISK WITH VALIDATED TOOL
HOW MANY STANDARD DRINKS CONTAINING ALCOHOL DO YOU HAVE ON A TYPICAL DAY: 0,1 OR 2
HOW OFTEN DO YOU HAVE 6 OR MORE DRINKS ON ONE OCCASION: NEVER
AUDIT-C TOTAL SCORE: 0
HOW OFTEN DO YOU HAVE A DRINK CONTAINING ALCOHOL: NEVER
ALCOHOL_USE_STATUS: NO OR LOW RISK WITH VALIDATED TOOL
HOW OFTEN DO YOU HAVE A DRINK CONTAINING ALCOHOL: NEVER
AUDIT-C TOTAL SCORE: 0
HOW MANY STANDARD DRINKS CONTAINING ALCOHOL DO YOU HAVE ON A TYPICAL DAY: 0,1 OR 2

## 2022-07-20 ASSESSMENT — ACTIVITIES OF DAILY LIVING (ADL)
ADL_SCORE: 12
ADL_BEFORE_ADMISSION: INDEPENDENT
MOBILITY_ASSIST_DEVICES: CANE;FRONT-WHEELED WALKER
RECENT_DECLINE_ADL: YES, DECLINE IN AMBULATION/TRANSFERRING, COLLABORATE WITH PROVIDER (T)
CHRONIC_PAIN_PRESENT: YES, CANCER
RECENT_DECLINE_ADL: YES, DECLINE IN AMBULATION/TRANSFERRING, COLLABORATE WITH PROVIDER (T)
DESCRIBE HOW PAIN IMPACTS YOUR LIFE: PHYSICAL ACTIVITY
CHRONIC_PAIN_PRESENT: YES, CHRONIC
ADL_SHORT_OF_BREATH: YES
ADL_SCORE: 12
ADL_BEFORE_ADMISSION: INDEPENDENT
DESCRIBE HOW PAIN IMPACTS YOUR LIFE: PHYSICAL ACTIVITY;MOBILITY
MOBILITY_ASSIST_DEVICES: CANE;FRONT-WHEELED WALKER
ADL_SHORT_OF_BREATH: YES

## 2022-07-20 ASSESSMENT — PAIN SCALES - GENERAL
PAINLEVEL_OUTOF10: 0
PAINLEVEL_OUTOF10: 4
PAINLEVEL_OUTOF10: 0

## 2022-07-20 ASSESSMENT — COGNITIVE AND FUNCTIONAL STATUS - GENERAL
ARE YOU BLIND OR DO YOU HAVE SERIOUS DIFFICULTY SEEING, EVEN WHEN WEARING GLASSES: NO
DO YOU HAVE DIFFICULTY DRESSING OR BATHING: NO
ARE YOU DEAF OR DO YOU HAVE SERIOUS DIFFICULTY  HEARING: NO
DO YOU HAVE SERIOUS DIFFICULTY WALKING OR CLIMBING STAIRS: YES
BECAUSE OF A PHYSICAL, MENTAL, OR EMOTIONAL CONDITION, DO YOU HAVE DIFFICULTY DOING ERRANDS ALONE: NO
BECAUSE OF A PHYSICAL, MENTAL, OR EMOTIONAL CONDITION, DO YOU HAVE SERIOUS DIFFICULTY CONCENTRATING, REMEMBERING OR MAKING DECISIONS: YES
DO YOU HAVE DIFFICULTY DRESSING OR BATHING: YES
DO YOU HAVE SERIOUS DIFFICULTY WALKING OR CLIMBING STAIRS: YES
BECAUSE OF A PHYSICAL, MENTAL, OR EMOTIONAL CONDITION, DO YOU HAVE SERIOUS DIFFICULTY CONCENTRATING, REMEMBERING OR MAKING DECISIONS: YES
BECAUSE OF A PHYSICAL, MENTAL, OR EMOTIONAL CONDITION, DO YOU HAVE DIFFICULTY DOING ERRANDS ALONE: NO
ARE YOU BLIND OR DO YOU HAVE SERIOUS DIFFICULTY SEEING, EVEN WHEN WEARING GLASSES: NO
ARE YOU DEAF OR DO YOU HAVE SERIOUS DIFFICULTY  HEARING: NO

## 2022-07-21 LAB
ACTIVATED CLOTTING TIME LOW RANGE - POINT OF CARE: 169 SEC
ALBUMIN SERPL-MCNC: 3.2 G/DL (ref 3.6–5.1)
ALBUMIN/GLOB SERPL: 1 {RATIO} (ref 1–2.4)
ALP SERPL-CCNC: 69 UNITS/L (ref 45–117)
ALT SERPL-CCNC: 12 UNITS/L
ANION GAP SERPL CALC-SCNC: 11 MMOL/L (ref 7–19)
AORTIC VALVE AREA: 1.1 CMÂ²
APPEARANCE UR: ABNORMAL
APTT PPP: 51 SEC (ref 22–30)
APTT PPP: 53 SEC (ref 22–30)
AST SERPL-CCNC: 13 UNITS/L
AV MEAN GRADIENT (AVMG): 5.43 MMHG
AV PEAK GRADIENT (AVPG): 9.94 MMHG
AV PEAK VELOCITY (AVPV): 1.5 M/S
AVI LVOT PEAK GRADIENT (LVOTMG): 1.22 MMHG
BACTERIA #/AREA URNS HPF: ABNORMAL /HPF
BILIRUB SERPL-MCNC: 0.7 MG/DL (ref 0.2–1)
BILIRUB UR QL STRIP: NEGATIVE
BUN SERPL-MCNC: 15 MG/DL (ref 6–20)
BUN/CREAT SERPL: 14 (ref 7–25)
CALCIUM SERPL-MCNC: 8.8 MG/DL (ref 8.4–10.2)
CHLORIDE SERPL-SCNC: 106 MMOL/L (ref 97–110)
CO2 SERPL-SCNC: 29 MMOL/L (ref 21–32)
COLOR UR: YELLOW
CREAT SERPL-MCNC: 1.11 MG/DL (ref 0.51–0.95)
DEPRECATED RDW RBC: 45.9 FL (ref 39–50)
DOP CALC LVOT PEAK VEL (LVOTPV): 0.8 M/S
E WAVE DECELARATION TIME (MDT): 0.19 S
ERYTHROCYTE [DISTWIDTH] IN BLOOD: 14.3 % (ref 11–15)
EST RIGHT VENT SYSTOLIC PRESSURE BY TRICUSPID REGURGITATION JET (RVSP): 32.6 MMHG
FASTING DURATION TIME PATIENT: ABNORMAL H
GFR SERPLBLD BASED ON 1.73 SQ M-ARVRAT: 49 ML/MIN
GLOBULIN SER-MCNC: 3.3 G/DL (ref 2–4)
GLUCOSE SERPL-MCNC: 86 MG/DL (ref 70–99)
GLUCOSE UR STRIP-MCNC: NEGATIVE MG/DL
HCT VFR BLD CALC: 37.2 % (ref 36–46.5)
HGB BLD-MCNC: 11.4 G/DL (ref 12–15.5)
HGB UR QL STRIP: ABNORMAL
HYALINE CASTS #/AREA URNS LPF: ABNORMAL /LPF
INTERVENTRICULAR SEPTUM IN END DIASTOLE (IVSD): 1.14 CM
KETONES UR STRIP-MCNC: NEGATIVE MG/DL
LEFT INTERNAL DIMENSION IN SYSTOLE (LVSD): 3.9 CM
LEFT VENTRICULAR INTERNAL DIMENSION IN DIASTOLE (LVDD): 5.06 CM
LEFT VENTRICULAR POSTERIOR WALL IN END DIASTOLE (LVPW): 0.98 CM
LEUKOCYTE ESTERASE UR QL STRIP: ABNORMAL
LV EF: 42 %
LV END SYSTOLIC LONGITUDINAL STRAIN GLOBAL (LVGS): -8.8 %
LVOT 2D (LVOTD): 1.74 CM
LVOT VTI (LVOTVTI): 14.27 CM
MAGNESIUM SERPL-MCNC: 1.7 MG/DL (ref 1.7–2.4)
MCH RBC QN AUTO: 27.3 PG (ref 26–34)
MCHC RBC AUTO-ENTMCNC: 30.6 G/DL (ref 32–36.5)
MCV RBC AUTO: 89 FL (ref 78–100)
MV E TISSUE VEL LAT (MELV): 0 M/S
MV E TISSUE VEL MED (MESV): 0 M/S
MV E WAVE VEL/E TISSUE VEL MED(MSR): 18.45 UNITLESS
MV PEAK A VELOCITY (MVPAV): 0.3 M/S
MV PEAK E VELOCITY (MVPEV): 0.9 M/S
NITRITE UR QL STRIP: NEGATIVE
NRBC BLD MANUAL-RTO: 0 /100 WBC
PH UR STRIP: 5.5 [PH] (ref 5–7)
PLATELET # BLD AUTO: 215 K/MCL (ref 140–450)
POTASSIUM SERPL-SCNC: 3.4 MMOL/L (ref 3.4–5.1)
POTASSIUM SERPL-SCNC: 3.9 MMOL/L (ref 3.4–5.1)
PROT SERPL-MCNC: 6.5 G/DL (ref 6.4–8.2)
PROT UR STRIP-MCNC: ABNORMAL MG/DL
PV PEAK VELOCITY (PVPV): 0.4 M/S
RBC # BLD: 4.18 MIL/MCL (ref 4–5.2)
RBC #/AREA URNS HPF: ABNORMAL /HPF
SODIUM SERPL-SCNC: 143 MMOL/L (ref 135–145)
SP GR UR STRIP: 1.02 (ref 1–1.03)
SQUAMOUS #/AREA URNS HPF: ABNORMAL /HPF
TRANS CELLS #/AREA URNS HPF: ABNORMAL /HPF
TRICUSPID ANNULAR PLANE SYSTOLIC EXCURSION (TAPSE): 1.4 CM
TRICUSPID VALVE ANNULAR PEAK VELOCITY (TVAPV): 0 M/S
TRICUSPID VALVE PEAK REGURGITATION VELOCITY (TRPV): 2.7 M/S
TV ESTIMATED RIGHT ARTERIAL PRESSURE (RAP): 3 MMHG
UROBILINOGEN UR STRIP-MCNC: 0.2 MG/DL
WBC # BLD: 6.7 K/MCL (ref 4.2–11)
WBC #/AREA URNS HPF: >100 /HPF

## 2022-07-21 PROCEDURE — 10002800 HB RX 250 W HCPCS: Performed by: INTERNAL MEDICINE

## 2022-07-21 PROCEDURE — 81001 URINALYSIS AUTO W/SCOPE: CPT | Performed by: PHYSICIAN ASSISTANT

## 2022-07-21 PROCEDURE — 10002801 HB RX 250 W/O HCPCS: Performed by: INTERNAL MEDICINE

## 2022-07-21 PROCEDURE — 10004351 HB COUNTER-CATH LAB CASE: Performed by: INTERNAL MEDICINE

## 2022-07-21 PROCEDURE — 99152 MOD SED SAME PHYS/QHP 5/>YRS: CPT | Performed by: INTERNAL MEDICINE

## 2022-07-21 PROCEDURE — 93454 CORONARY ARTERY ANGIO S&I: CPT | Performed by: INTERNAL MEDICINE

## 2022-07-21 PROCEDURE — 84132 ASSAY OF SERUM POTASSIUM: CPT | Performed by: INTERNAL MEDICINE

## 2022-07-21 PROCEDURE — 10006023 HB SUPPLY 272: Performed by: INTERNAL MEDICINE

## 2022-07-21 PROCEDURE — 10003445 HB TELEMETRY PER DAY

## 2022-07-21 PROCEDURE — C1894 INTRO/SHEATH, NON-LASER: HCPCS | Performed by: INTERNAL MEDICINE

## 2022-07-21 PROCEDURE — B2151ZZ FLUOROSCOPY OF LEFT HEART USING LOW OSMOLAR CONTRAST: ICD-10-PCS | Performed by: INTERNAL MEDICINE

## 2022-07-21 PROCEDURE — 10002807 HB RX 258: Performed by: INTERNAL MEDICINE

## 2022-07-21 PROCEDURE — 10004651 HB RX, NO CHARGE ITEM: Performed by: PHYSICIAN ASSISTANT

## 2022-07-21 PROCEDURE — 85730 THROMBOPLASTIN TIME PARTIAL: CPT | Performed by: INTERNAL MEDICINE

## 2022-07-21 PROCEDURE — 83735 ASSAY OF MAGNESIUM: CPT | Performed by: PHYSICIAN ASSISTANT

## 2022-07-21 PROCEDURE — B2111ZZ FLUOROSCOPY OF MULTIPLE CORONARY ARTERIES USING LOW OSMOLAR CONTRAST: ICD-10-PCS | Performed by: INTERNAL MEDICINE

## 2022-07-21 PROCEDURE — 10002803 HB RX 637: Performed by: INTERNAL MEDICINE

## 2022-07-21 PROCEDURE — 85027 COMPLETE CBC AUTOMATED: CPT | Performed by: PHYSICIAN ASSISTANT

## 2022-07-21 PROCEDURE — 10002801 HB RX 250 W/O HCPCS: Performed by: PHYSICIAN ASSISTANT

## 2022-07-21 PROCEDURE — 10000002 HB ROOM CHARGE MED SURG

## 2022-07-21 PROCEDURE — 10004370 HB CARDIAC CATH: Performed by: INTERNAL MEDICINE

## 2022-07-21 PROCEDURE — 36415 COLL VENOUS BLD VENIPUNCTURE: CPT | Performed by: PHYSICIAN ASSISTANT

## 2022-07-21 PROCEDURE — C1769 GUIDE WIRE: HCPCS | Performed by: INTERNAL MEDICINE

## 2022-07-21 PROCEDURE — 87086 URINE CULTURE/COLONY COUNT: CPT | Performed by: PHYSICIAN ASSISTANT

## 2022-07-21 PROCEDURE — 85730 THROMBOPLASTIN TIME PARTIAL: CPT | Performed by: PHYSICIAN ASSISTANT

## 2022-07-21 PROCEDURE — 10002803 HB RX 637: Performed by: PHYSICIAN ASSISTANT

## 2022-07-21 PROCEDURE — 10002807 HB RX 258: Performed by: NURSE PRACTITIONER

## 2022-07-21 PROCEDURE — 85347 COAGULATION TIME ACTIVATED: CPT

## 2022-07-21 PROCEDURE — 80053 COMPREHEN METABOLIC PANEL: CPT | Performed by: INTERNAL MEDICINE

## 2022-07-21 PROCEDURE — 99223 1ST HOSP IP/OBS HIGH 75: CPT | Performed by: INTERNAL MEDICINE

## 2022-07-21 PROCEDURE — 10002805 HB CONTRAST AGENT: Performed by: INTERNAL MEDICINE

## 2022-07-21 PROCEDURE — 4A023N7 MEASUREMENT OF CARDIAC SAMPLING AND PRESSURE, LEFT HEART, PERCUTANEOUS APPROACH: ICD-10-PCS | Performed by: INTERNAL MEDICINE

## 2022-07-21 PROCEDURE — 10002767 HB EXTENDED RECOVERY PER HOUR

## 2022-07-21 PROCEDURE — 10004167 HB COUNTER-SHEATH REMOVAL: Performed by: INTERNAL MEDICINE

## 2022-07-21 RX ORDER — CLONIDINE HYDROCHLORIDE 0.1 MG/1
0.1 TABLET ORAL EVERY 4 HOURS PRN
Status: DISCONTINUED | OUTPATIENT
Start: 2022-07-21 | End: 2022-07-21

## 2022-07-21 RX ORDER — SODIUM CHLORIDE 9 MG/ML
INJECTION, SOLUTION INTRAVENOUS CONTINUOUS PRN
Status: DISCONTINUED | OUTPATIENT
Start: 2022-07-21 | End: 2022-07-21

## 2022-07-21 RX ORDER — LIDOCAINE HYDROCHLORIDE 10 MG/ML
1 INJECTION, SOLUTION INFILTRATION; PERINEURAL PRN
Status: DISCONTINUED | OUTPATIENT
Start: 2022-07-21 | End: 2022-07-22 | Stop reason: HOSPADM

## 2022-07-21 RX ORDER — ATORVASTATIN CALCIUM 40 MG/1
40 TABLET, FILM COATED ORAL NIGHTLY
Status: DISCONTINUED | OUTPATIENT
Start: 2022-07-22 | End: 2022-07-22 | Stop reason: HOSPADM

## 2022-07-21 RX ORDER — MIDAZOLAM HYDROCHLORIDE 1 MG/ML
1 INJECTION, SOLUTION INTRAMUSCULAR; INTRAVENOUS PRN
Status: DISCONTINUED | OUTPATIENT
Start: 2022-07-21 | End: 2022-07-22 | Stop reason: HOSPADM

## 2022-07-21 RX ORDER — CLONIDINE HYDROCHLORIDE 0.1 MG/1
0.1 TABLET ORAL EVERY 4 HOURS PRN
Status: DISCONTINUED | OUTPATIENT
Start: 2022-07-21 | End: 2022-07-22 | Stop reason: HOSPADM

## 2022-07-21 RX ORDER — NITROGLYCERIN 5 MG/ML
INJECTION, SOLUTION INTRAVENOUS PRN
Status: DISCONTINUED | OUTPATIENT
Start: 2022-07-21 | End: 2022-07-21

## 2022-07-21 RX ORDER — POTASSIUM CHLORIDE 20 MEQ/1
40 TABLET, EXTENDED RELEASE ORAL ONCE
Status: COMPLETED | OUTPATIENT
Start: 2022-07-21 | End: 2022-07-21

## 2022-07-21 RX ORDER — HYDRALAZINE HYDROCHLORIDE 20 MG/ML
10 INJECTION INTRAMUSCULAR; INTRAVENOUS EVERY 4 HOURS PRN
Status: DISCONTINUED | OUTPATIENT
Start: 2022-07-21 | End: 2022-07-22 | Stop reason: HOSPADM

## 2022-07-21 RX ORDER — LEVOTHYROXINE SODIUM 0.05 MG/1
50 TABLET ORAL DAILY
Status: DISCONTINUED | OUTPATIENT
Start: 2022-07-22 | End: 2022-07-21 | Stop reason: SDUPTHER

## 2022-07-21 RX ORDER — NITROGLYCERIN 0.4 MG/1
0.4 TABLET SUBLINGUAL EVERY 5 MIN PRN
Status: DISCONTINUED | OUTPATIENT
Start: 2022-07-21 | End: 2022-07-22 | Stop reason: HOSPADM

## 2022-07-21 RX ORDER — MIDAZOLAM HYDROCHLORIDE 1 MG/ML
INJECTION, SOLUTION INTRAMUSCULAR; INTRAVENOUS PRN
Status: DISCONTINUED | OUTPATIENT
Start: 2022-07-21 | End: 2022-07-21

## 2022-07-21 RX ORDER — LIDOCAINE HYDROCHLORIDE 10 MG/ML
INJECTION, SOLUTION EPIDURAL; INFILTRATION; INTRACAUDAL; PERINEURAL PRN
Status: DISCONTINUED | OUTPATIENT
Start: 2022-07-21 | End: 2022-07-21

## 2022-07-21 RX ORDER — HYDRALAZINE HYDROCHLORIDE 20 MG/ML
10 INJECTION INTRAMUSCULAR; INTRAVENOUS EVERY 4 HOURS PRN
Status: DISCONTINUED | OUTPATIENT
Start: 2022-07-21 | End: 2022-07-21

## 2022-07-21 RX ORDER — SODIUM CHLORIDE 9 MG/ML
INJECTION, SOLUTION INTRAVENOUS CONTINUOUS
Status: DISCONTINUED | OUTPATIENT
Start: 2022-07-21 | End: 2022-07-21

## 2022-07-21 RX ORDER — LOSARTAN POTASSIUM 25 MG/1
25 TABLET ORAL DAILY
Status: DISCONTINUED | OUTPATIENT
Start: 2022-07-22 | End: 2022-07-22 | Stop reason: HOSPADM

## 2022-07-21 RX ORDER — HEPARIN SODIUM 200 [USP'U]/100ML
INJECTION, SOLUTION INTRAVENOUS PRN
Status: DISCONTINUED | OUTPATIENT
Start: 2022-07-21 | End: 2022-07-21

## 2022-07-21 RX ORDER — FUROSEMIDE 40 MG/1
40 TABLET ORAL DAILY
Status: DISCONTINUED | OUTPATIENT
Start: 2022-07-22 | End: 2022-07-22 | Stop reason: HOSPADM

## 2022-07-21 RX ADMIN — LEVOTHYROXINE SODIUM 50 MCG: 0.05 TABLET ORAL at 06:04

## 2022-07-21 RX ADMIN — METOPROLOL SUCCINATE 75 MG: 50 TABLET, EXTENDED RELEASE ORAL at 10:52

## 2022-07-21 RX ADMIN — ASPIRIN 81 MG CHEWABLE TABLET 81 MG: 81 TABLET CHEWABLE at 10:53

## 2022-07-21 RX ADMIN — Medication 1000 UNITS: at 10:51

## 2022-07-21 RX ADMIN — POTASSIUM CHLORIDE 40 MEQ: 1500 TABLET, EXTENDED RELEASE ORAL at 10:53

## 2022-07-21 RX ADMIN — ACETAMINOPHEN 650 MG: 325 TABLET ORAL at 23:06

## 2022-07-21 RX ADMIN — FERROUS SULFATE TAB 325 MG (65 MG ELEMENTAL FE) 325 MG: 325 (65 FE) TAB at 10:53

## 2022-07-21 RX ADMIN — CLOPIDOGREL BISULFATE 75 MG: 75 TABLET, FILM COATED ORAL at 10:52

## 2022-07-21 RX ADMIN — FOLIC ACID 1 MG: 1 TABLET ORAL at 10:52

## 2022-07-21 RX ADMIN — CETIRIZINE HYDROCHLORIDE 10 MG: 10 TABLET, FILM COATED ORAL at 10:51

## 2022-07-21 RX ADMIN — SODIUM CHLORIDE: 9 INJECTION, SOLUTION INTRAVENOUS at 18:14

## 2022-07-21 RX ADMIN — METOPROLOL SUCCINATE 75 MG: 50 TABLET, EXTENDED RELEASE ORAL at 23:01

## 2022-07-21 ASSESSMENT — PAIN SCALES - GENERAL
PAINLEVEL_OUTOF10: 0
PAINLEVEL_OUTOF10: 4
PAINLEVEL_OUTOF10: 0
PAINLEVEL_OUTOF10: 2
PAINLEVEL_OUTOF10: 0

## 2022-07-21 ASSESSMENT — LIFESTYLE VARIABLES: SMOKING_HISTORY: NO

## 2022-07-22 ENCOUNTER — APPOINTMENT (OUTPATIENT)
Dept: CARDIAC REHAB | Age: 87
End: 2022-07-22
Attending: INTERNAL MEDICINE

## 2022-07-22 VITALS
WEIGHT: 147.71 LBS | DIASTOLIC BLOOD PRESSURE: 56 MMHG | RESPIRATION RATE: 21 BRPM | SYSTOLIC BLOOD PRESSURE: 116 MMHG | HEIGHT: 61 IN | OXYGEN SATURATION: 95 % | TEMPERATURE: 97.8 F | BODY MASS INDEX: 27.89 KG/M2 | HEART RATE: 81 BPM

## 2022-07-22 LAB
ALBUMIN SERPL-MCNC: 3.4 G/DL (ref 3.6–5.1)
ALBUMIN/GLOB SERPL: 1 {RATIO} (ref 1–2.4)
ALP SERPL-CCNC: 74 UNITS/L (ref 45–117)
ALT SERPL-CCNC: 13 UNITS/L
ANION GAP SERPL CALC-SCNC: 10 MMOL/L (ref 7–19)
APTT PPP: 28 SEC (ref 22–30)
AST SERPL-CCNC: 13 UNITS/L
BACTERIA UR CULT: NORMAL
BILIRUB SERPL-MCNC: 0.9 MG/DL (ref 0.2–1)
BUN SERPL-MCNC: 15 MG/DL (ref 6–20)
BUN/CREAT SERPL: 15 (ref 7–25)
CALCIUM SERPL-MCNC: 9.2 MG/DL (ref 8.4–10.2)
CHLORIDE SERPL-SCNC: 107 MMOL/L (ref 97–110)
CO2 SERPL-SCNC: 27 MMOL/L (ref 21–32)
CREAT SERPL-MCNC: 0.97 MG/DL (ref 0.51–0.95)
DEPRECATED RDW RBC: 46 FL (ref 39–50)
ERYTHROCYTE [DISTWIDTH] IN BLOOD: 14.2 % (ref 11–15)
FASTING DURATION TIME PATIENT: ABNORMAL H
GFR SERPLBLD BASED ON 1.73 SQ M-ARVRAT: 57 ML/MIN
GLOBULIN SER-MCNC: 3.3 G/DL (ref 2–4)
GLUCOSE SERPL-MCNC: 108 MG/DL (ref 70–99)
HCT VFR BLD CALC: 37 % (ref 36–46.5)
HGB BLD-MCNC: 11.6 G/DL (ref 12–15.5)
MAGNESIUM SERPL-MCNC: 2 MG/DL (ref 1.7–2.4)
MCH RBC QN AUTO: 28.1 PG (ref 26–34)
MCHC RBC AUTO-ENTMCNC: 31.4 G/DL (ref 32–36.5)
MCV RBC AUTO: 89.6 FL (ref 78–100)
NRBC BLD MANUAL-RTO: 0 /100 WBC
PLATELET # BLD AUTO: 224 K/MCL (ref 140–450)
POTASSIUM SERPL-SCNC: 4.1 MMOL/L (ref 3.4–5.1)
PROT SERPL-MCNC: 6.7 G/DL (ref 6.4–8.2)
RBC # BLD: 4.13 MIL/MCL (ref 4–5.2)
SODIUM SERPL-SCNC: 140 MMOL/L (ref 135–145)
WBC # BLD: 5.8 K/MCL (ref 4.2–11)

## 2022-07-22 PROCEDURE — 10002803 HB RX 637: Performed by: INTERNAL MEDICINE

## 2022-07-22 PROCEDURE — 85027 COMPLETE CBC AUTOMATED: CPT | Performed by: PHYSICIAN ASSISTANT

## 2022-07-22 PROCEDURE — 10003445 HB TELEMETRY PER DAY

## 2022-07-22 PROCEDURE — 10002803 HB RX 637: Performed by: NURSE PRACTITIONER

## 2022-07-22 PROCEDURE — 10002803 HB RX 637: Performed by: PHYSICIAN ASSISTANT

## 2022-07-22 PROCEDURE — 10002801 HB RX 250 W/O HCPCS: Performed by: PHYSICIAN ASSISTANT

## 2022-07-22 PROCEDURE — 36415 COLL VENOUS BLD VENIPUNCTURE: CPT | Performed by: PHYSICIAN ASSISTANT

## 2022-07-22 PROCEDURE — 83735 ASSAY OF MAGNESIUM: CPT | Performed by: INTERNAL MEDICINE

## 2022-07-22 PROCEDURE — 85730 THROMBOPLASTIN TIME PARTIAL: CPT | Performed by: PHYSICIAN ASSISTANT

## 2022-07-22 PROCEDURE — 10004125 HB COUNTER-FIRST DAY ADMIT

## 2022-07-22 PROCEDURE — 10004651 HB RX, NO CHARGE ITEM: Performed by: PHYSICIAN ASSISTANT

## 2022-07-22 PROCEDURE — 80053 COMPREHEN METABOLIC PANEL: CPT | Performed by: NURSE PRACTITIONER

## 2022-07-22 PROCEDURE — 10002803 HB RX 637: Performed by: STUDENT IN AN ORGANIZED HEALTH CARE EDUCATION/TRAINING PROGRAM

## 2022-07-22 RX ORDER — LOSARTAN POTASSIUM 25 MG/1
25 TABLET ORAL DAILY
Qty: 30 TABLET | Refills: 0 | Status: SHIPPED | OUTPATIENT
Start: 2022-07-23 | End: 2022-09-01 | Stop reason: SDUPTHER

## 2022-07-22 RX ORDER — LANOLIN ALCOHOL/MO/W.PET/CERES
400 CREAM (GRAM) TOPICAL ONCE
Status: COMPLETED | OUTPATIENT
Start: 2022-07-22 | End: 2022-07-22

## 2022-07-22 RX ORDER — FUROSEMIDE 40 MG/1
40 TABLET ORAL DAILY
Qty: 30 TABLET | Refills: 0 | Status: SHIPPED | OUTPATIENT
Start: 2022-07-23 | End: 2022-08-04 | Stop reason: SDUPTHER

## 2022-07-22 RX ORDER — METOPROLOL SUCCINATE 100 MG/1
100 TABLET, EXTENDED RELEASE ORAL 2 TIMES DAILY
Qty: 60 TABLET | Refills: 0 | Status: SHIPPED | OUTPATIENT
Start: 2022-07-22 | End: 2023-08-28 | Stop reason: SDUPTHER

## 2022-07-22 RX ORDER — LOSARTAN POTASSIUM 25 MG/1
TABLET ORAL
Status: DISCONTINUED
Start: 2022-07-22 | End: 2022-07-22 | Stop reason: HOSPADM

## 2022-07-22 RX ORDER — LEVOTHYROXINE SODIUM 0.05 MG/1
50 TABLET ORAL DAILY
Status: DISCONTINUED | OUTPATIENT
Start: 2022-07-22 | End: 2022-07-22 | Stop reason: HOSPADM

## 2022-07-22 RX ORDER — FUROSEMIDE 40 MG/1
TABLET ORAL
Status: DISCONTINUED
Start: 2022-07-22 | End: 2022-07-22 | Stop reason: HOSPADM

## 2022-07-22 RX ORDER — NITROFURANTOIN 25; 75 MG/1; MG/1
100 CAPSULE ORAL EVERY 12 HOURS SCHEDULED
Qty: 10 CAPSULE | Refills: 0 | Status: SHIPPED | OUTPATIENT
Start: 2022-07-22 | End: 2022-07-27

## 2022-07-22 RX ORDER — METOPROLOL SUCCINATE 100 MG/1
100 TABLET, EXTENDED RELEASE ORAL 2 TIMES DAILY
Status: DISCONTINUED | OUTPATIENT
Start: 2022-07-22 | End: 2022-07-22 | Stop reason: HOSPADM

## 2022-07-22 RX ORDER — NITROFURANTOIN 25; 75 MG/1; MG/1
100 CAPSULE ORAL EVERY 12 HOURS SCHEDULED
Status: DISCONTINUED | OUTPATIENT
Start: 2022-07-22 | End: 2022-07-22 | Stop reason: HOSPADM

## 2022-07-22 RX ORDER — LANOLIN ALCOHOL/MO/W.PET/CERES
CREAM (GRAM) TOPICAL
Status: DISCONTINUED
Start: 2022-07-22 | End: 2022-07-22 | Stop reason: HOSPADM

## 2022-07-22 RX ADMIN — SPIRONOLACTONE 25 MG: 25 TABLET ORAL at 08:57

## 2022-07-22 RX ADMIN — CLONIDINE HYDROCHLORIDE 0.1 MG: 0.1 TABLET ORAL at 01:08

## 2022-07-22 RX ADMIN — FERROUS SULFATE TAB 325 MG (65 MG ELEMENTAL FE) 325 MG: 325 (65 FE) TAB at 08:57

## 2022-07-22 RX ADMIN — APIXABAN 5 MG: 5 TABLET, FILM COATED ORAL at 09:09

## 2022-07-22 RX ADMIN — CLOPIDOGREL BISULFATE 75 MG: 75 TABLET, FILM COATED ORAL at 08:57

## 2022-07-22 RX ADMIN — SODIUM CHLORIDE, PRESERVATIVE FREE 2 ML: 5 INJECTION INTRAVENOUS at 09:01

## 2022-07-22 RX ADMIN — METOPROLOL SUCCINATE 75 MG: 50 TABLET, EXTENDED RELEASE ORAL at 08:57

## 2022-07-22 RX ADMIN — FOLIC ACID 1 MG: 1 TABLET ORAL at 08:57

## 2022-07-22 RX ADMIN — Medication 400 MG: at 09:09

## 2022-07-22 RX ADMIN — NITROFURANTOIN (MONOHYDRATE/MACROCRYSTALS) 100 MG: 75; 25 CAPSULE ORAL at 14:32

## 2022-07-22 RX ADMIN — LEVOTHYROXINE SODIUM 50 MCG: 0.05 TABLET ORAL at 14:07

## 2022-07-22 RX ADMIN — ASPIRIN 81 MG CHEWABLE TABLET 81 MG: 81 TABLET CHEWABLE at 08:57

## 2022-07-22 RX ADMIN — LOSARTAN POTASSIUM 25 MG: 25 TABLET, FILM COATED ORAL at 09:09

## 2022-07-22 RX ADMIN — Medication 400 MG: at 01:01

## 2022-07-22 RX ADMIN — CETIRIZINE HYDROCHLORIDE 10 MG: 10 TABLET, FILM COATED ORAL at 08:57

## 2022-07-22 RX ADMIN — Medication 1000 UNITS: at 08:57

## 2022-07-22 RX ADMIN — FUROSEMIDE 40 MG: 40 TABLET ORAL at 08:57

## 2022-07-22 ASSESSMENT — PAIN SCALES - GENERAL: PAINLEVEL_OUTOF10: 0

## 2022-07-25 ENCOUNTER — APPOINTMENT (OUTPATIENT)
Dept: CARDIAC REHAB | Age: 87
End: 2022-07-25
Attending: INTERNAL MEDICINE

## 2022-08-04 ENCOUNTER — TELEPHONE (OUTPATIENT)
Dept: FAMILY MEDICINE | Age: 87
End: 2022-08-04

## 2022-08-04 ENCOUNTER — LAB SERVICES (OUTPATIENT)
Dept: LAB | Age: 87
End: 2022-08-04

## 2022-08-04 ENCOUNTER — OFFICE VISIT (OUTPATIENT)
Dept: CARDIOLOGY | Age: 87
End: 2022-08-04

## 2022-08-04 ENCOUNTER — TELEPHONE (OUTPATIENT)
Dept: CARDIOLOGY | Age: 87
End: 2022-08-04

## 2022-08-04 VITALS
OXYGEN SATURATION: 98 % | WEIGHT: 143 LBS | BODY MASS INDEX: 27 KG/M2 | RESPIRATION RATE: 16 BRPM | DIASTOLIC BLOOD PRESSURE: 80 MMHG | HEART RATE: 82 BPM | HEIGHT: 61 IN | SYSTOLIC BLOOD PRESSURE: 122 MMHG

## 2022-08-04 DIAGNOSIS — I25.10 CORONARY ARTERY DISEASE, UNSPECIFIED VESSEL OR LESION TYPE, UNSPECIFIED WHETHER ANGINA PRESENT, UNSPECIFIED WHETHER NATIVE OR TRANSPLANTED HEART: Primary | ICD-10-CM

## 2022-08-04 DIAGNOSIS — R07.9 CHEST PAIN, UNSPECIFIED TYPE: ICD-10-CM

## 2022-08-04 DIAGNOSIS — I48.0 PAROXYSMAL ATRIAL FIBRILLATION (CMD): ICD-10-CM

## 2022-08-04 DIAGNOSIS — I10 BENIGN ESSENTIAL HTN: ICD-10-CM

## 2022-08-04 DIAGNOSIS — N39.0 URINARY TRACT INFECTION WITHOUT HEMATURIA, SITE UNSPECIFIED: ICD-10-CM

## 2022-08-04 DIAGNOSIS — I25.10 CORONARY ARTERY DISEASE, UNSPECIFIED VESSEL OR LESION TYPE, UNSPECIFIED WHETHER ANGINA PRESENT, UNSPECIFIED WHETHER NATIVE OR TRANSPLANTED HEART: ICD-10-CM

## 2022-08-04 DIAGNOSIS — N18.31 STAGE 3A CHRONIC KIDNEY DISEASE (CMD): Primary | ICD-10-CM

## 2022-08-04 LAB
ALBUMIN SERPL-MCNC: 3.5 G/DL (ref 3.6–5.1)
ALBUMIN/GLOB SERPL: 0.9 {RATIO} (ref 1–2.4)
ALP SERPL-CCNC: 141 UNITS/L (ref 45–117)
ALT SERPL-CCNC: 20 UNITS/L
ANION GAP SERPL CALC-SCNC: 12 MMOL/L (ref 7–19)
AST SERPL-CCNC: 16 UNITS/L
BASOPHILS # BLD: 0.1 K/MCL (ref 0–0.3)
BASOPHILS NFR BLD: 1 %
BILIRUB SERPL-MCNC: 0.7 MG/DL (ref 0.2–1)
BUN SERPL-MCNC: 23 MG/DL (ref 6–20)
BUN/CREAT SERPL: 22 (ref 7–25)
CALCIUM SERPL-MCNC: 9.3 MG/DL (ref 8.4–10.2)
CHLORIDE SERPL-SCNC: 102 MMOL/L (ref 97–110)
CO2 SERPL-SCNC: 30 MMOL/L (ref 21–32)
CREAT SERPL-MCNC: 1.06 MG/DL (ref 0.51–0.95)
DEPRECATED RDW RBC: 44.9 FL (ref 39–50)
EOSINOPHIL # BLD: 0.4 K/MCL (ref 0–0.5)
EOSINOPHIL NFR BLD: 4 %
ERYTHROCYTE [DISTWIDTH] IN BLOOD: 14 % (ref 11–15)
FASTING DURATION TIME PATIENT: ABNORMAL H
GFR SERPLBLD BASED ON 1.73 SQ M-ARVRAT: 51 ML/MIN
GLOBULIN SER-MCNC: 3.9 G/DL (ref 2–4)
GLUCOSE SERPL-MCNC: 126 MG/DL (ref 70–99)
HCT VFR BLD CALC: 41.2 % (ref 36–46.5)
HGB BLD-MCNC: 13.2 G/DL (ref 12–15.5)
IMM GRANULOCYTES # BLD AUTO: 0.1 K/MCL (ref 0–0.2)
IMM GRANULOCYTES # BLD: 1 %
LYMPHOCYTES # BLD: 1.9 K/MCL (ref 1–4)
LYMPHOCYTES NFR BLD: 19 %
MCH RBC QN AUTO: 28 PG (ref 26–34)
MCHC RBC AUTO-ENTMCNC: 32 G/DL (ref 32–36.5)
MCV RBC AUTO: 87.3 FL (ref 78–100)
MONOCYTES # BLD: 0.6 K/MCL (ref 0.3–0.9)
MONOCYTES NFR BLD: 6 %
NEUTROPHILS # BLD: 6.6 K/MCL (ref 1.8–7.7)
NEUTROPHILS NFR BLD: 69 %
NRBC BLD MANUAL-RTO: 0 /100 WBC
PLATELET # BLD AUTO: 325 K/MCL (ref 140–450)
POTASSIUM SERPL-SCNC: 3.9 MMOL/L (ref 3.4–5.1)
PROT SERPL-MCNC: 7.4 G/DL (ref 6.4–8.2)
RBC # BLD: 4.72 MIL/MCL (ref 4–5.2)
SODIUM SERPL-SCNC: 140 MMOL/L (ref 135–145)
WBC # BLD: 9.6 K/MCL (ref 4.2–11)

## 2022-08-04 PROCEDURE — 80053 COMPREHEN METABOLIC PANEL: CPT | Performed by: CLINICAL MEDICAL LABORATORY

## 2022-08-04 PROCEDURE — 85025 COMPLETE CBC W/AUTO DIFF WBC: CPT | Performed by: CLINICAL MEDICAL LABORATORY

## 2022-08-04 PROCEDURE — 99214 OFFICE O/P EST MOD 30 MIN: CPT | Performed by: NURSE PRACTITIONER

## 2022-08-04 PROCEDURE — 36415 COLL VENOUS BLD VENIPUNCTURE: CPT | Performed by: INTERNAL MEDICINE

## 2022-08-04 RX ORDER — ISOSORBIDE MONONITRATE 30 MG/1
30 TABLET, EXTENDED RELEASE ORAL DAILY
Qty: 90 TABLET | Refills: 1 | OUTPATIENT
Start: 2022-08-04

## 2022-08-04 RX ORDER — FUROSEMIDE 40 MG/1
TABLET ORAL
Qty: 30 TABLET | Refills: 1 | Status: SHIPPED | OUTPATIENT
Start: 2022-08-04 | End: 2022-12-19

## 2022-08-04 RX ORDER — POTASSIUM CHLORIDE 20 MEQ/1
20 TABLET, EXTENDED RELEASE ORAL DAILY
Status: SHIPPED | COMMUNITY
Start: 2022-08-04 | End: 2023-01-03 | Stop reason: SDUPTHER

## 2022-08-11 DIAGNOSIS — J30.1 NON-SEASONAL ALLERGIC RHINITIS DUE TO POLLEN: ICD-10-CM

## 2022-08-11 DIAGNOSIS — R07.9 CHEST PAIN, UNSPECIFIED TYPE: ICD-10-CM

## 2022-08-11 DIAGNOSIS — E03.8 SUBCLINICAL HYPOTHYROIDISM: ICD-10-CM

## 2022-08-11 RX ORDER — MONTELUKAST SODIUM 10 MG/1
TABLET ORAL
Qty: 90 TABLET | Refills: 0 | OUTPATIENT
Start: 2022-08-11

## 2022-08-11 RX ORDER — LEVOTHYROXINE SODIUM 0.05 MG/1
50 TABLET ORAL DAILY
Qty: 90 TABLET | Refills: 0 | Status: SHIPPED | OUTPATIENT
Start: 2022-08-11 | End: 2022-11-07

## 2022-08-12 RX ORDER — NITROGLYCERIN 0.4 MG/1
TABLET SUBLINGUAL
Qty: 25 TABLET | Refills: 3 | Status: SHIPPED | OUTPATIENT
Start: 2022-08-12 | End: 2023-10-10 | Stop reason: SDUPTHER

## 2022-08-19 ENCOUNTER — APPOINTMENT (OUTPATIENT)
Dept: FAMILY MEDICINE | Age: 87
End: 2022-08-19

## 2022-09-01 ENCOUNTER — LAB SERVICES (OUTPATIENT)
Dept: LAB | Age: 87
End: 2022-09-01

## 2022-09-01 ENCOUNTER — TELEPHONE (OUTPATIENT)
Dept: CARDIOLOGY | Age: 87
End: 2022-09-01

## 2022-09-01 ENCOUNTER — OFFICE VISIT (OUTPATIENT)
Dept: CARDIOLOGY | Age: 87
End: 2022-09-01

## 2022-09-01 VITALS
DIASTOLIC BLOOD PRESSURE: 80 MMHG | RESPIRATION RATE: 15 BRPM | SYSTOLIC BLOOD PRESSURE: 118 MMHG | BODY MASS INDEX: 27.78 KG/M2 | OXYGEN SATURATION: 99 % | HEIGHT: 61 IN | HEART RATE: 82 BPM | WEIGHT: 147.16 LBS

## 2022-09-01 DIAGNOSIS — I34.0 MITRAL VALVE INSUFFICIENCY, UNSPECIFIED ETIOLOGY: Primary | ICD-10-CM

## 2022-09-01 DIAGNOSIS — N18.31 STAGE 3A CHRONIC KIDNEY DISEASE (CMD): ICD-10-CM

## 2022-09-01 LAB
ANION GAP SERPL CALC-SCNC: 10 MMOL/L (ref 7–19)
APPEARANCE UR: ABNORMAL
BACTERIA #/AREA URNS HPF: ABNORMAL /HPF
BILIRUB UR QL STRIP: NEGATIVE
BUN SERPL-MCNC: 20 MG/DL (ref 6–20)
BUN/CREAT SERPL: 19 (ref 7–25)
CALCIUM SERPL-MCNC: 9.3 MG/DL (ref 8.4–10.2)
CHLORIDE SERPL-SCNC: 103 MMOL/L (ref 97–110)
CO2 SERPL-SCNC: 32 MMOL/L (ref 21–32)
COLOR UR: YELLOW
CREAT SERPL-MCNC: 1.06 MG/DL (ref 0.51–0.95)
FASTING DURATION TIME PATIENT: ABNORMAL H
GFR SERPLBLD BASED ON 1.73 SQ M-ARVRAT: 51 ML/MIN
GLUCOSE SERPL-MCNC: 110 MG/DL (ref 70–99)
GLUCOSE UR STRIP-MCNC: NEGATIVE MG/DL
HGB UR QL STRIP: ABNORMAL
HYALINE CASTS #/AREA URNS LPF: ABNORMAL /LPF
KETONES UR STRIP-MCNC: NEGATIVE MG/DL
LEUKOCYTE ESTERASE UR QL STRIP: ABNORMAL
NITRITE UR QL STRIP: NEGATIVE
PH UR STRIP: 6 [PH] (ref 5–7)
POTASSIUM SERPL-SCNC: 3.9 MMOL/L (ref 3.4–5.1)
PROT UR STRIP-MCNC: ABNORMAL MG/DL
RBC #/AREA URNS HPF: ABNORMAL /HPF
SODIUM SERPL-SCNC: 141 MMOL/L (ref 135–145)
SP GR UR STRIP: 1.02 (ref 1–1.03)
SQUAMOUS #/AREA URNS HPF: ABNORMAL /HPF
UROBILINOGEN UR STRIP-MCNC: 0.2 MG/DL
WBC #/AREA URNS HPF: >100 /HPF

## 2022-09-01 PROCEDURE — 81001 URINALYSIS AUTO W/SCOPE: CPT | Performed by: INTERNAL MEDICINE

## 2022-09-01 PROCEDURE — 80048 BASIC METABOLIC PNL TOTAL CA: CPT | Performed by: CLINICAL MEDICAL LABORATORY

## 2022-09-01 PROCEDURE — 36415 COLL VENOUS BLD VENIPUNCTURE: CPT | Performed by: INTERNAL MEDICINE

## 2022-09-01 PROCEDURE — 87086 URINE CULTURE/COLONY COUNT: CPT | Performed by: INTERNAL MEDICINE

## 2022-09-01 PROCEDURE — 99214 OFFICE O/P EST MOD 30 MIN: CPT | Performed by: INTERNAL MEDICINE

## 2022-09-01 RX ORDER — LOSARTAN POTASSIUM 25 MG/1
25 TABLET ORAL DAILY
Qty: 90 TABLET | Refills: 3 | Status: SHIPPED | OUTPATIENT
Start: 2022-09-01 | End: 2023-04-24 | Stop reason: ALTCHOICE

## 2022-09-02 ENCOUNTER — TELEPHONE (OUTPATIENT)
Dept: FAMILY MEDICINE | Age: 87
End: 2022-09-02

## 2022-09-02 DIAGNOSIS — Z87.440 HISTORY OF UTI: Primary | ICD-10-CM

## 2022-09-02 LAB — BACTERIA UR CULT: NORMAL

## 2022-09-03 ENCOUNTER — LAB SERVICES (OUTPATIENT)
Dept: LAB | Age: 87
End: 2022-09-03

## 2022-09-03 DIAGNOSIS — Z87.440 HISTORY OF UTI: ICD-10-CM

## 2022-09-03 PROCEDURE — 87086 URINE CULTURE/COLONY COUNT: CPT | Performed by: CLINICAL MEDICAL LABORATORY

## 2022-09-04 LAB — BACTERIA UR CULT: NORMAL

## 2022-09-06 ENCOUNTER — TELEPHONE (OUTPATIENT)
Dept: FAMILY MEDICINE | Age: 87
End: 2022-09-06

## 2022-09-06 DIAGNOSIS — N39.0 ACUTE UTI (URINARY TRACT INFECTION): Primary | ICD-10-CM

## 2022-09-06 RX ORDER — NITROFURANTOIN 25; 75 MG/1; MG/1
CAPSULE ORAL
Qty: 10 CAPSULE | Refills: 0 | Status: SHIPPED | OUTPATIENT
Start: 2022-09-06 | End: 2023-05-26 | Stop reason: ALTCHOICE

## 2022-10-17 ENCOUNTER — NURSE ONLY (OUTPATIENT)
Dept: FAMILY MEDICINE | Age: 87
End: 2022-10-17

## 2022-10-17 DIAGNOSIS — Z23 NEED FOR VACCINATION: ICD-10-CM

## 2022-10-17 DIAGNOSIS — Z23 NEED FOR COVID-19 VACCINE: ICD-10-CM

## 2022-10-17 DIAGNOSIS — Z23 IMMUNIZATION DUE: Primary | ICD-10-CM

## 2022-10-17 PROCEDURE — 91313 COVID-19 18Y+ MODERNA BIVALENT BOOSTER VACCINE: CPT | Performed by: FAMILY MEDICINE

## 2022-10-17 PROCEDURE — 0134A COVID-19 18Y+ MODERNA BIVALENT BOOSTER VACCINE: CPT | Performed by: FAMILY MEDICINE

## 2022-10-17 PROCEDURE — 90662 IIV NO PRSV INCREASED AG IM: CPT | Performed by: FAMILY MEDICINE

## 2022-10-17 PROCEDURE — G0008 ADMIN INFLUENZA VIRUS VAC: HCPCS | Performed by: FAMILY MEDICINE

## 2022-10-19 ENCOUNTER — REMOTE DEVICE CHECK (OUTPATIENT)
Dept: ELECTROPHYSIOLOGY | Age: 87
End: 2022-10-19

## 2022-10-19 DIAGNOSIS — Z95.810 BIVENTRICULAR ICD (IMPLANTABLE CARDIOVERTER-DEFIBRILLATOR) IN PLACE: ICD-10-CM

## 2022-10-19 DIAGNOSIS — I25.5 ISCHEMIC CARDIOMYOPATHY: ICD-10-CM

## 2022-10-19 DIAGNOSIS — I25.5 ISCHEMIC CARDIOMYOPATHY: Primary | ICD-10-CM

## 2022-10-19 DIAGNOSIS — Z95.810 BIVENTRICULAR ICD (IMPLANTABLE CARDIOVERTER-DEFIBRILLATOR) IN PLACE: Primary | ICD-10-CM

## 2022-10-19 PROCEDURE — 93294 REM INTERROG EVL PM/LDLS PM: CPT | Performed by: INTERNAL MEDICINE

## 2022-10-19 PROCEDURE — 93296 REM INTERROG EVL PM/IDS: CPT | Performed by: INTERNAL MEDICINE

## 2022-11-06 DIAGNOSIS — E03.8 SUBCLINICAL HYPOTHYROIDISM: ICD-10-CM

## 2022-11-07 RX ORDER — LEVOTHYROXINE SODIUM 0.05 MG/1
50 TABLET ORAL DAILY
Qty: 90 TABLET | Refills: 0 | Status: SHIPPED | OUTPATIENT
Start: 2022-11-07 | End: 2023-02-06

## 2022-12-19 RX ORDER — FUROSEMIDE 40 MG/1
TABLET ORAL
Qty: 90 TABLET | Refills: 2 | Status: SHIPPED | OUTPATIENT
Start: 2022-12-19 | End: 2023-03-21 | Stop reason: SDUPTHER

## 2022-12-20 ENCOUNTER — LAB SERVICES (OUTPATIENT)
Dept: LAB | Age: 87
End: 2022-12-20

## 2022-12-20 ENCOUNTER — OFFICE VISIT (OUTPATIENT)
Dept: FAMILY MEDICINE | Age: 87
End: 2022-12-20

## 2022-12-20 VITALS
BODY MASS INDEX: 27.4 KG/M2 | WEIGHT: 145.1 LBS | HEART RATE: 78 BPM | SYSTOLIC BLOOD PRESSURE: 168 MMHG | DIASTOLIC BLOOD PRESSURE: 100 MMHG | OXYGEN SATURATION: 98 % | TEMPERATURE: 98.1 F | HEIGHT: 61 IN

## 2022-12-20 DIAGNOSIS — F32.4 MAJOR DEPRESSIVE DISORDER IN PARTIAL REMISSION, UNSPECIFIED WHETHER RECURRENT (CMD): ICD-10-CM

## 2022-12-20 DIAGNOSIS — F34.1 DYSTHYMIA: ICD-10-CM

## 2022-12-20 DIAGNOSIS — E11.22 CONTROLLED TYPE 2 DIABETES MELLITUS WITH STAGE 3 CHRONIC KIDNEY DISEASE, WITHOUT LONG-TERM CURRENT USE OF INSULIN (CMD): ICD-10-CM

## 2022-12-20 DIAGNOSIS — E03.8 SUBCLINICAL HYPOTHYROIDISM: ICD-10-CM

## 2022-12-20 DIAGNOSIS — R13.10 DYSPHAGIA, UNSPECIFIED TYPE: ICD-10-CM

## 2022-12-20 DIAGNOSIS — Z00.00 MEDICARE ANNUAL WELLNESS VISIT, SUBSEQUENT: Primary | ICD-10-CM

## 2022-12-20 DIAGNOSIS — I50.22 CHRONIC SYSTOLIC CONGESTIVE HEART FAILURE (CMD): ICD-10-CM

## 2022-12-20 DIAGNOSIS — N18.30 CONTROLLED TYPE 2 DIABETES MELLITUS WITH STAGE 3 CHRONIC KIDNEY DISEASE, WITHOUT LONG-TERM CURRENT USE OF INSULIN (CMD): ICD-10-CM

## 2022-12-20 DIAGNOSIS — R05.8 PRODUCTIVE COUGH: ICD-10-CM

## 2022-12-20 DIAGNOSIS — R41.3 MEMORY LOSS, SHORT TERM: ICD-10-CM

## 2022-12-20 DIAGNOSIS — G47.10 HYPERSOMNOLENCE: ICD-10-CM

## 2022-12-20 DIAGNOSIS — I10 BENIGN ESSENTIAL HTN: ICD-10-CM

## 2022-12-20 DIAGNOSIS — I48.0 PAROXYSMAL ATRIAL FIBRILLATION (CMD): ICD-10-CM

## 2022-12-20 DIAGNOSIS — N18.30 STAGE 3 CHRONIC KIDNEY DISEASE, UNSPECIFIED WHETHER STAGE 3A OR 3B CKD (CMD): ICD-10-CM

## 2022-12-20 LAB
ALBUMIN SERPL-MCNC: 4 G/DL (ref 3.6–5.1)
ALBUMIN/GLOB SERPL: 1.1 {RATIO} (ref 1–2.4)
ALP SERPL-CCNC: 62 UNITS/L (ref 45–117)
ALT SERPL-CCNC: 13 UNITS/L
ANION GAP SERPL CALC-SCNC: 14 MMOL/L (ref 7–19)
APPEARANCE UR: CLEAR
AST SERPL-CCNC: 15 UNITS/L
BACTERIA #/AREA URNS HPF: NORMAL /HPF
BILIRUB SERPL-MCNC: 0.9 MG/DL (ref 0.2–1)
BILIRUB UR QL STRIP: NEGATIVE
BUN SERPL-MCNC: 15 MG/DL (ref 6–20)
BUN/CREAT SERPL: 14 (ref 7–25)
CALCIUM SERPL-MCNC: 9.8 MG/DL (ref 8.4–10.2)
CHLORIDE SERPL-SCNC: 102 MMOL/L (ref 97–110)
CO2 SERPL-SCNC: 30 MMOL/L (ref 21–32)
COLOR UR: YELLOW
CREAT SERPL-MCNC: 1.11 MG/DL (ref 0.51–0.95)
FASTING DURATION TIME PATIENT: 12 HOURS (ref 0–999)
GFR SERPLBLD BASED ON 1.73 SQ M-ARVRAT: 48 ML/MIN
GLOBULIN SER-MCNC: 3.5 G/DL (ref 2–4)
GLUCOSE SERPL-MCNC: 117 MG/DL (ref 70–99)
GLUCOSE UR STRIP-MCNC: NEGATIVE MG/DL
HBA1C MFR BLD: 5.9 % (ref 4.5–5.6)
HGB UR QL STRIP: NEGATIVE
HYALINE CASTS #/AREA URNS LPF: NORMAL /LPF
KETONES UR STRIP-MCNC: NEGATIVE MG/DL
LEUKOCYTE ESTERASE UR QL STRIP: NEGATIVE
NITRITE UR QL STRIP: NEGATIVE
PH UR STRIP: 7 [PH] (ref 5–7)
POTASSIUM SERPL-SCNC: 3.9 MMOL/L (ref 3.4–5.1)
PROT SERPL-MCNC: 7.5 G/DL (ref 6.4–8.2)
PROT UR STRIP-MCNC: NEGATIVE MG/DL
RBC #/AREA URNS HPF: NORMAL /HPF
SODIUM SERPL-SCNC: 142 MMOL/L (ref 135–145)
SP GR UR STRIP: 1.01 (ref 1–1.03)
SQUAMOUS #/AREA URNS HPF: NORMAL /HPF
UROBILINOGEN UR STRIP-MCNC: 0.2 MG/DL
WBC #/AREA URNS HPF: NORMAL /HPF

## 2022-12-20 PROCEDURE — 99214 OFFICE O/P EST MOD 30 MIN: CPT | Performed by: FAMILY MEDICINE

## 2022-12-20 PROCEDURE — 36415 COLL VENOUS BLD VENIPUNCTURE: CPT | Performed by: INTERNAL MEDICINE

## 2022-12-20 PROCEDURE — G0439 PPPS, SUBSEQ VISIT: HCPCS | Performed by: FAMILY MEDICINE

## 2022-12-20 PROCEDURE — 83036 HEMOGLOBIN GLYCOSYLATED A1C: CPT | Performed by: CLINICAL MEDICAL LABORATORY

## 2022-12-20 PROCEDURE — 84439 ASSAY OF FREE THYROXINE: CPT | Performed by: CLINICAL MEDICAL LABORATORY

## 2022-12-20 PROCEDURE — 84443 ASSAY THYROID STIM HORMONE: CPT | Performed by: CLINICAL MEDICAL LABORATORY

## 2022-12-20 PROCEDURE — 80053 COMPREHEN METABOLIC PANEL: CPT | Performed by: INTERNAL MEDICINE

## 2022-12-20 PROCEDURE — 81001 URINALYSIS AUTO W/SCOPE: CPT | Performed by: INTERNAL MEDICINE

## 2022-12-20 RX ORDER — AZITHROMYCIN 250 MG/1
TABLET, FILM COATED ORAL
Qty: 6 TABLET | Refills: 0 | Status: SHIPPED | OUTPATIENT
Start: 2022-12-20 | End: 2023-01-23 | Stop reason: ALTCHOICE

## 2022-12-20 ASSESSMENT — PATIENT HEALTH QUESTIONNAIRE - PHQ9
SUM OF ALL RESPONSES TO PHQ9 QUESTIONS 1 AND 2: 4
3. TROUBLE FALLING OR STAYING ASLEEP OR SLEEPING TOO MUCH: MORE THAN HALF THE DAYS
1. LITTLE INTEREST OR PLEASURE IN DOING THINGS: MORE THAN HALF THE DAYS
4. FEELING TIRED OR HAVING LITTLE ENERGY: MORE THAN HALF THE DAYS
5. POOR APPETITE, WEIGHT LOSS, OR OVEREATING: MORE THAN HALF THE DAYS
8. MOVING OR SPEAKING SO SLOWLY THAT OTHER PEOPLE COULD HAVE NOTICED. OR THE OPPOSITE, BEING SO FIGETY OR RESTLESS THAT YOU HAVE BEEN MOVING AROUND A LOT MORE THAN USUAL: MORE THAN HALF THE DAYS
2. FEELING DOWN, DEPRESSED OR HOPELESS: MORE THAN HALF THE DAYS
6. FEELING BAD ABOUT YOURSELF - OR THAT YOU ARE A FAILURE OR HAVE LET YOURSELF OR YOUR FAMILY DOWN: NOT AT ALL
9. THOUGHTS THAT YOU WOULD BE BETTER OFF DEAD, OR OF HURTING YOURSELF: MORE THAN HALF THE DAYS
SUM OF ALL RESPONSES TO PHQ9 QUESTIONS 1 AND 2: 4
7. TROUBLE CONCENTRATING ON THINGS, SUCH AS READING THE NEWSPAPER OR WATCHING TELEVISION: SEVERAL DAYS
CLINICAL INTERPRETATION OF PHQ9 SCORE: MODERATELY SEVERE DEPRESSION
CLINICAL INTERPRETATION OF PHQ2 SCORE: FURTHER SCREENING NEEDED
SUM OF ALL RESPONSES TO PHQ QUESTIONS 1-9: 15

## 2022-12-21 ENCOUNTER — TELEPHONE (OUTPATIENT)
Dept: FAMILY MEDICINE | Age: 87
End: 2022-12-21

## 2022-12-21 LAB
T4 FREE SERPL-MCNC: 1.2 NG/DL (ref 0.8–1.5)
TSH SERPL-ACNC: 5.58 MCUNITS/ML (ref 0.35–5)

## 2022-12-23 ENCOUNTER — TELEPHONE (OUTPATIENT)
Dept: SLEEP MEDICINE | Age: 87
End: 2022-12-23

## 2023-01-03 ENCOUNTER — TELEPHONE (OUTPATIENT)
Dept: CARDIOLOGY | Age: 88
End: 2023-01-03

## 2023-01-03 RX ORDER — POTASSIUM CHLORIDE 20 MEQ/1
20 TABLET, EXTENDED RELEASE ORAL
Qty: 36 TABLET | Refills: 2 | Status: SHIPPED | OUTPATIENT
Start: 2023-01-04

## 2023-01-03 RX ORDER — POTASSIUM CHLORIDE 20 MEQ/1
20 TABLET, EXTENDED RELEASE ORAL DAILY
Qty: 90 TABLET | Refills: 2 | Status: SHIPPED | OUTPATIENT
Start: 2023-01-03 | End: 2023-01-03 | Stop reason: CLARIF

## 2023-01-04 ENCOUNTER — TELEPHONE (OUTPATIENT)
Dept: SLEEP MEDICINE | Age: 88
End: 2023-01-04

## 2023-01-12 DIAGNOSIS — I48.91 ATRIAL FIBRILLATION, UNSPECIFIED TYPE (CMD): ICD-10-CM

## 2023-01-17 ENCOUNTER — OFFICE VISIT (OUTPATIENT)
Dept: CARDIOLOGY | Age: 88
End: 2023-01-17

## 2023-01-17 ENCOUNTER — TELEPHONE (OUTPATIENT)
Dept: CARDIOLOGY | Age: 88
End: 2023-01-17

## 2023-01-17 VITALS
SYSTOLIC BLOOD PRESSURE: 146 MMHG | WEIGHT: 144.73 LBS | HEIGHT: 61 IN | DIASTOLIC BLOOD PRESSURE: 78 MMHG | RESPIRATION RATE: 16 BRPM | OXYGEN SATURATION: 99 % | BODY MASS INDEX: 27.33 KG/M2

## 2023-01-17 DIAGNOSIS — I25.10 CORONARY ARTERY DISEASE, UNSPECIFIED VESSEL OR LESION TYPE, UNSPECIFIED WHETHER ANGINA PRESENT, UNSPECIFIED WHETHER NATIVE OR TRANSPLANTED HEART: Primary | ICD-10-CM

## 2023-01-17 DIAGNOSIS — I20.0 UNSTABLE ANGINA PECTORIS (CMD): Primary | ICD-10-CM

## 2023-01-17 DIAGNOSIS — I21.4 NSTEMI (NON-ST ELEVATED MYOCARDIAL INFARCTION) (CMD): ICD-10-CM

## 2023-01-17 PROCEDURE — 99214 OFFICE O/P EST MOD 30 MIN: CPT | Performed by: INTERNAL MEDICINE

## 2023-01-17 RX ORDER — ATORVASTATIN CALCIUM 40 MG/1
40 TABLET, FILM COATED ORAL NIGHTLY
Qty: 90 TABLET | Refills: 1 | Status: SHIPPED | OUTPATIENT
Start: 2023-01-17 | End: 2023-04-18

## 2023-01-23 ENCOUNTER — OFFICE VISIT (OUTPATIENT)
Dept: ELECTROPHYSIOLOGY | Age: 88
End: 2023-01-23
Attending: NURSE PRACTITIONER

## 2023-01-23 VITALS
WEIGHT: 144 LBS | SYSTOLIC BLOOD PRESSURE: 132 MMHG | DIASTOLIC BLOOD PRESSURE: 74 MMHG | HEART RATE: 64 BPM | OXYGEN SATURATION: 91 % | BODY MASS INDEX: 27.19 KG/M2 | HEIGHT: 61 IN | RESPIRATION RATE: 16 BRPM

## 2023-01-23 DIAGNOSIS — I25.5 ISCHEMIC CARDIOMYOPATHY: ICD-10-CM

## 2023-01-23 PROCEDURE — 99212 OFFICE O/P EST SF 10 MIN: CPT | Performed by: NURSE PRACTITIONER

## 2023-01-23 PROCEDURE — 93281 PM DEVICE PROGR EVAL MULTI: CPT | Performed by: NURSE PRACTITIONER

## 2023-02-06 DIAGNOSIS — E03.8 SUBCLINICAL HYPOTHYROIDISM: ICD-10-CM

## 2023-02-06 RX ORDER — LEVOTHYROXINE SODIUM 0.05 MG/1
50 TABLET ORAL DAILY
Qty: 90 TABLET | Refills: 1 | Status: SHIPPED | OUTPATIENT
Start: 2023-02-06 | End: 2023-08-14

## 2023-02-14 ENCOUNTER — NURSE TRIAGE (OUTPATIENT)
Dept: FAMILY MEDICINE | Age: 88
End: 2023-02-14

## 2023-02-14 ENCOUNTER — LAB SERVICES (OUTPATIENT)
Dept: LAB | Age: 88
End: 2023-02-14

## 2023-02-14 ENCOUNTER — TELEPHONE (OUTPATIENT)
Dept: FAMILY MEDICINE | Age: 88
End: 2023-02-14

## 2023-02-14 DIAGNOSIS — R35.0 INCREASED URINARY FREQUENCY: ICD-10-CM

## 2023-02-14 DIAGNOSIS — R82.90 BAD ODOR OF URINE: ICD-10-CM

## 2023-02-14 DIAGNOSIS — R35.0 INCREASED URINARY FREQUENCY: Primary | ICD-10-CM

## 2023-02-14 DIAGNOSIS — R13.10 DYSPHAGIA, UNSPECIFIED TYPE: Primary | ICD-10-CM

## 2023-02-14 LAB
APPEARANCE UR: CLEAR
BILIRUB UR QL STRIP: NEGATIVE
COLOR UR: YELLOW
GLUCOSE UR STRIP-MCNC: NEGATIVE MG/DL
HGB UR QL STRIP: NEGATIVE
KETONES UR STRIP-MCNC: NEGATIVE MG/DL
LEUKOCYTE ESTERASE UR QL STRIP: NEGATIVE
NITRITE UR QL STRIP: NEGATIVE
PH UR STRIP: 6 [PH] (ref 5–7)
PROT UR STRIP-MCNC: NEGATIVE MG/DL
SP GR UR STRIP: 1.01 (ref 1–1.03)
UROBILINOGEN UR STRIP-MCNC: 0.2 MG/DL

## 2023-02-14 PROCEDURE — 81003 URINALYSIS AUTO W/O SCOPE: CPT | Performed by: INTERNAL MEDICINE

## 2023-02-23 ENCOUNTER — HOSPITAL ENCOUNTER (OUTPATIENT)
Dept: GENERAL RADIOLOGY | Age: 88
End: 2023-02-23
Attending: FAMILY MEDICINE

## 2023-02-27 ENCOUNTER — HOSPITAL ENCOUNTER (OUTPATIENT)
Dept: GENERAL RADIOLOGY | Age: 88
Discharge: HOME | End: 2023-02-27
Attending: FAMILY MEDICINE

## 2023-02-27 ENCOUNTER — TELEPHONE (OUTPATIENT)
Dept: FAMILY MEDICINE | Age: 88
End: 2023-02-27

## 2023-02-27 DIAGNOSIS — R13.10 DYSPHAGIA, UNSPECIFIED TYPE: ICD-10-CM

## 2023-02-27 DIAGNOSIS — K21.9 GASTROESOPHAGEAL REFLUX DISEASE WITHOUT ESOPHAGITIS: Primary | ICD-10-CM

## 2023-02-27 PROCEDURE — 74221 X-RAY XM ESOPHAGUS 2CNTRST: CPT

## 2023-02-27 PROCEDURE — 74220 X-RAY XM ESOPHAGUS 1CNTRST: CPT | Performed by: RADIOLOGY

## 2023-02-28 RX ORDER — PANTOPRAZOLE SODIUM 40 MG/1
40 TABLET, DELAYED RELEASE ORAL DAILY
Qty: 90 TABLET | Refills: 1 | Status: SHIPPED | OUTPATIENT
Start: 2023-02-28 | End: 2023-09-07

## 2023-03-08 ENCOUNTER — OFFICE VISIT (OUTPATIENT)
Dept: CARDIOLOGY | Age: 88
End: 2023-03-08

## 2023-03-08 VITALS
RESPIRATION RATE: 16 BRPM | SYSTOLIC BLOOD PRESSURE: 148 MMHG | OXYGEN SATURATION: 99 % | BODY MASS INDEX: 27.21 KG/M2 | HEART RATE: 80 BPM | HEIGHT: 61 IN | DIASTOLIC BLOOD PRESSURE: 70 MMHG

## 2023-03-08 DIAGNOSIS — I25.10 CORONARY ARTERY DISEASE, UNSPECIFIED VESSEL OR LESION TYPE, UNSPECIFIED WHETHER ANGINA PRESENT, UNSPECIFIED WHETHER NATIVE OR TRANSPLANTED HEART: Primary | ICD-10-CM

## 2023-03-08 DIAGNOSIS — I48.0 PAROXYSMAL ATRIAL FIBRILLATION (CMD): ICD-10-CM

## 2023-03-08 DIAGNOSIS — I50.23 ACUTE ON CHRONIC SYSTOLIC (CONGESTIVE) HEART FAILURE (CMD): ICD-10-CM

## 2023-03-08 DIAGNOSIS — I10 BENIGN ESSENTIAL HTN: ICD-10-CM

## 2023-03-08 PROCEDURE — 99214 OFFICE O/P EST MOD 30 MIN: CPT | Performed by: INTERNAL MEDICINE

## 2023-03-08 RX ORDER — ISOSORBIDE MONONITRATE 30 MG/1
30 TABLET, EXTENDED RELEASE ORAL DAILY
Qty: 90 TABLET | Refills: 3 | Status: SHIPPED | OUTPATIENT
Start: 2023-03-08 | End: 2023-06-05 | Stop reason: SDUPTHER

## 2023-03-21 ENCOUNTER — TELEPHONE (OUTPATIENT)
Dept: CARDIOLOGY | Age: 88
End: 2023-03-21

## 2023-03-21 RX ORDER — FUROSEMIDE 40 MG/1
40 TABLET ORAL EVERY OTHER DAY
Qty: 45 TABLET | Refills: 3 | Status: SHIPPED | OUTPATIENT
Start: 2023-03-18 | End: 2023-08-30 | Stop reason: CLARIF

## 2023-03-21 RX ORDER — CLOPIDOGREL BISULFATE 75 MG/1
75 TABLET ORAL DAILY
Qty: 90 TABLET | Refills: 3 | Status: SHIPPED | OUTPATIENT
Start: 2023-03-21

## 2023-04-16 DIAGNOSIS — I10 BENIGN ESSENTIAL HTN: ICD-10-CM

## 2023-04-16 DIAGNOSIS — I48.19 PERSISTENT ATRIAL FIBRILLATION (CMD): ICD-10-CM

## 2023-04-17 RX ORDER — SPIRONOLACTONE 25 MG/1
25 TABLET ORAL DAILY
Qty: 90 TABLET | Refills: 1 | Status: SHIPPED | OUTPATIENT
Start: 2023-04-17 | End: 2023-10-16 | Stop reason: SDUPTHER

## 2023-04-18 DIAGNOSIS — I25.10 CORONARY ARTERY DISEASE, UNSPECIFIED VESSEL OR LESION TYPE, UNSPECIFIED WHETHER ANGINA PRESENT, UNSPECIFIED WHETHER NATIVE OR TRANSPLANTED HEART: ICD-10-CM

## 2023-04-18 RX ORDER — ATORVASTATIN CALCIUM 40 MG/1
TABLET, FILM COATED ORAL
Qty: 90 TABLET | Refills: 1 | Status: SHIPPED | OUTPATIENT
Start: 2023-04-18 | End: 2023-09-08 | Stop reason: SDUPTHER

## 2023-04-19 ENCOUNTER — REMOTE DEVICE CHECK (OUTPATIENT)
Dept: ELECTROPHYSIOLOGY | Age: 88
End: 2023-04-19
Attending: INTERNAL MEDICINE

## 2023-04-19 DIAGNOSIS — Z95.810 BIVENTRICULAR ICD (IMPLANTABLE CARDIOVERTER-DEFIBRILLATOR) IN PLACE: ICD-10-CM

## 2023-04-19 DIAGNOSIS — I25.5 ISCHEMIC CARDIOMYOPATHY: Primary | ICD-10-CM

## 2023-04-19 PROCEDURE — 93296 REM INTERROG EVL PM/IDS: CPT | Performed by: INTERNAL MEDICINE

## 2023-04-19 PROCEDURE — 93294 REM INTERROG EVL PM/LDLS PM: CPT | Performed by: INTERNAL MEDICINE

## 2023-04-21 ENCOUNTER — APPOINTMENT (OUTPATIENT)
Dept: CT IMAGING | Age: 88
End: 2023-04-21
Attending: EMERGENCY MEDICINE

## 2023-04-21 ENCOUNTER — HOSPITAL ENCOUNTER (EMERGENCY)
Age: 88
Discharge: HOME | End: 2023-04-21
Attending: EMERGENCY MEDICINE

## 2023-04-21 ENCOUNTER — APPOINTMENT (OUTPATIENT)
Dept: GENERAL RADIOLOGY | Age: 88
End: 2023-04-21
Attending: EMERGENCY MEDICINE

## 2023-04-21 VITALS
WEIGHT: 145 LBS | RESPIRATION RATE: 18 BRPM | OXYGEN SATURATION: 100 % | TEMPERATURE: 97 F | BODY MASS INDEX: 27.4 KG/M2 | HEART RATE: 69 BPM | SYSTOLIC BLOOD PRESSURE: 166 MMHG | DIASTOLIC BLOOD PRESSURE: 83 MMHG

## 2023-04-21 DIAGNOSIS — R10.9 RIGHT FLANK PAIN: ICD-10-CM

## 2023-04-21 DIAGNOSIS — N20.0 NEPHROLITHIASIS: Primary | ICD-10-CM

## 2023-04-21 DIAGNOSIS — R31.29 MICROSCOPIC HEMATURIA: ICD-10-CM

## 2023-04-21 DIAGNOSIS — R11.2 NAUSEA AND VOMITING IN ADULT: ICD-10-CM

## 2023-04-21 LAB
ALBUMIN SERPL-MCNC: 3.6 G/DL (ref 3.6–5.1)
ALBUMIN/GLOB SERPL: 1 {RATIO} (ref 1–2.4)
ALP SERPL-CCNC: 71 UNITS/L (ref 45–117)
ALT SERPL-CCNC: 16 UNITS/L
ANION GAP SERPL CALC-SCNC: 12 MMOL/L (ref 7–19)
APPEARANCE UR: CLEAR
AST SERPL-CCNC: 15 UNITS/L
BACTERIA #/AREA URNS HPF: ABNORMAL /HPF
BASOPHILS # BLD: 0.1 K/MCL (ref 0–0.3)
BASOPHILS NFR BLD: 1 %
BILIRUB SERPL-MCNC: 0.6 MG/DL (ref 0.2–1)
BILIRUB UR QL STRIP: NEGATIVE
BUN SERPL-MCNC: 19 MG/DL (ref 6–20)
BUN/CREAT SERPL: 17 (ref 7–25)
CALCIUM SERPL-MCNC: 9.4 MG/DL (ref 8.4–10.2)
CHLORIDE SERPL-SCNC: 104 MMOL/L (ref 97–110)
CO2 SERPL-SCNC: 29 MMOL/L (ref 21–32)
COLOR UR: YELLOW
CREAT SERPL-MCNC: 1.14 MG/DL (ref 0.51–0.95)
DEPRECATED RDW RBC: 40.2 FL (ref 39–50)
EOSINOPHIL # BLD: 0.1 K/MCL (ref 0–0.5)
EOSINOPHIL NFR BLD: 2 %
ERYTHROCYTE [DISTWIDTH] IN BLOOD: 12.3 % (ref 11–15)
FASTING DURATION TIME PATIENT: ABNORMAL H
GFR SERPLBLD BASED ON 1.73 SQ M-ARVRAT: 47 ML/MIN
GLOBULIN SER-MCNC: 3.6 G/DL (ref 2–4)
GLUCOSE SERPL-MCNC: 150 MG/DL (ref 70–99)
GLUCOSE UR STRIP-MCNC: NEGATIVE MG/DL
HCT VFR BLD CALC: 40.5 % (ref 36–46.5)
HGB BLD-MCNC: 13.4 G/DL (ref 12–15.5)
HGB UR QL STRIP: ABNORMAL
HYALINE CASTS #/AREA URNS LPF: ABNORMAL /LPF
IMM GRANULOCYTES # BLD AUTO: 0 K/MCL (ref 0–0.2)
IMM GRANULOCYTES # BLD: 0 %
INR PPP: 1
KETONES UR STRIP-MCNC: NEGATIVE MG/DL
LACTATE BLDV-SCNC: 1.5 MMOL/L (ref 0–2)
LEUKOCYTE ESTERASE UR QL STRIP: ABNORMAL
LIPASE SERPL-CCNC: <50 UNITS/L (ref 73–393)
LYMPHOCYTES # BLD: 2.1 K/MCL (ref 1–4)
LYMPHOCYTES NFR BLD: 30 %
MAGNESIUM SERPL-MCNC: 1.6 MG/DL (ref 1.7–2.4)
MCH RBC QN AUTO: 29.8 PG (ref 26–34)
MCHC RBC AUTO-ENTMCNC: 33.1 G/DL (ref 32–36.5)
MCV RBC AUTO: 90.2 FL (ref 78–100)
MONOCYTES # BLD: 0.4 K/MCL (ref 0.3–0.9)
MONOCYTES NFR BLD: 5 %
NEUTROPHILS # BLD: 4.3 K/MCL (ref 1.8–7.7)
NEUTROPHILS NFR BLD: 62 %
NITRITE UR QL STRIP: NEGATIVE
NRBC BLD MANUAL-RTO: 0 /100 WBC
PH UR STRIP: 7 [PH] (ref 5–7)
PLATELET # BLD AUTO: 226 K/MCL (ref 140–450)
POTASSIUM SERPL-SCNC: 4.5 MMOL/L (ref 3.4–5.1)
PROT SERPL-MCNC: 7.2 G/DL (ref 6.4–8.2)
PROT UR STRIP-MCNC: ABNORMAL MG/DL
PROTHROMBIN TIME: 10.8 SEC (ref 9.7–11.8)
RBC # BLD: 4.49 MIL/MCL (ref 4–5.2)
RBC #/AREA URNS HPF: >100 /HPF
SODIUM SERPL-SCNC: 140 MMOL/L (ref 135–145)
SP GR UR STRIP: 1.01 (ref 1–1.03)
SQUAMOUS #/AREA URNS HPF: ABNORMAL /HPF
TROPONIN I SERPL DL<=0.01 NG/ML-MCNC: 72 NG/L
TROPONIN I SERPL DL<=0.01 NG/ML-MCNC: 85 NG/L
UROBILINOGEN UR STRIP-MCNC: 0.2 MG/DL
WBC # BLD: 7 K/MCL (ref 4.2–11)
WBC #/AREA URNS HPF: ABNORMAL /HPF

## 2023-04-21 PROCEDURE — 10002807 HB RX 258: Performed by: RADIOLOGY

## 2023-04-21 PROCEDURE — 96365 THER/PROPH/DIAG IV INF INIT: CPT

## 2023-04-21 PROCEDURE — 74177 CT ABD & PELVIS W/CONTRAST: CPT | Performed by: RADIOLOGY

## 2023-04-21 PROCEDURE — 96376 TX/PRO/DX INJ SAME DRUG ADON: CPT

## 2023-04-21 PROCEDURE — 83605 ASSAY OF LACTIC ACID: CPT | Performed by: EMERGENCY MEDICINE

## 2023-04-21 PROCEDURE — 74177 CT ABD & PELVIS W/CONTRAST: CPT

## 2023-04-21 PROCEDURE — 93005 ELECTROCARDIOGRAM TRACING: CPT | Performed by: EMERGENCY MEDICINE

## 2023-04-21 PROCEDURE — 83735 ASSAY OF MAGNESIUM: CPT | Performed by: EMERGENCY MEDICINE

## 2023-04-21 PROCEDURE — 10002800 HB RX 250 W HCPCS: Performed by: EMERGENCY MEDICINE

## 2023-04-21 PROCEDURE — 10002805 HB CONTRAST AGENT: Performed by: EMERGENCY MEDICINE

## 2023-04-21 PROCEDURE — 93010 ELECTROCARDIOGRAM REPORT: CPT | Performed by: INTERNAL MEDICINE

## 2023-04-21 PROCEDURE — G1004 CDSM NDSC: HCPCS | Performed by: RADIOLOGY

## 2023-04-21 PROCEDURE — 81001 URINALYSIS AUTO W/SCOPE: CPT | Performed by: EMERGENCY MEDICINE

## 2023-04-21 PROCEDURE — G1004 CDSM NDSC: HCPCS

## 2023-04-21 PROCEDURE — 96375 TX/PRO/DX INJ NEW DRUG ADDON: CPT

## 2023-04-21 PROCEDURE — 85610 PROTHROMBIN TIME: CPT | Performed by: EMERGENCY MEDICINE

## 2023-04-21 PROCEDURE — 36415 COLL VENOUS BLD VENIPUNCTURE: CPT | Performed by: EMERGENCY MEDICINE

## 2023-04-21 PROCEDURE — 71045 X-RAY EXAM CHEST 1 VIEW: CPT | Performed by: RADIOLOGY

## 2023-04-21 PROCEDURE — 71045 X-RAY EXAM CHEST 1 VIEW: CPT

## 2023-04-21 PROCEDURE — 84484 ASSAY OF TROPONIN QUANT: CPT | Performed by: EMERGENCY MEDICINE

## 2023-04-21 PROCEDURE — 80053 COMPREHEN METABOLIC PANEL: CPT | Performed by: EMERGENCY MEDICINE

## 2023-04-21 PROCEDURE — 83690 ASSAY OF LIPASE: CPT | Performed by: EMERGENCY MEDICINE

## 2023-04-21 PROCEDURE — 87086 URINE CULTURE/COLONY COUNT: CPT | Performed by: EMERGENCY MEDICINE

## 2023-04-21 PROCEDURE — 99285 EMERGENCY DEPT VISIT HI MDM: CPT | Performed by: EMERGENCY MEDICINE

## 2023-04-21 PROCEDURE — 10004651 HB RX, NO CHARGE ITEM: Performed by: EMERGENCY MEDICINE

## 2023-04-21 PROCEDURE — 36415 COLL VENOUS BLD VENIPUNCTURE: CPT

## 2023-04-21 PROCEDURE — 85025 COMPLETE CBC W/AUTO DIFF WBC: CPT | Performed by: EMERGENCY MEDICINE

## 2023-04-21 PROCEDURE — 99284 EMERGENCY DEPT VISIT MOD MDM: CPT

## 2023-04-21 RX ORDER — ONDANSETRON 4 MG/1
4 TABLET, ORALLY DISINTEGRATING ORAL EVERY 6 HOURS
Qty: 10 TABLET | Refills: 0 | Status: SHIPPED | OUTPATIENT
Start: 2023-04-21 | End: 2023-05-26 | Stop reason: ALTCHOICE

## 2023-04-21 RX ORDER — ASPIRIN 81 MG/1
324 TABLET, CHEWABLE ORAL ONCE
Status: COMPLETED | OUTPATIENT
Start: 2023-04-21 | End: 2023-04-21

## 2023-04-21 RX ORDER — ONDANSETRON 2 MG/ML
4 INJECTION INTRAMUSCULAR; INTRAVENOUS EVERY 30 MIN PRN
Status: COMPLETED | OUTPATIENT
Start: 2023-04-21 | End: 2023-04-21

## 2023-04-21 RX ADMIN — MAGNESIUM SULFATE HEPTAHYDRATE 2 G: 40 INJECTION, SOLUTION INTRAVENOUS at 14:04

## 2023-04-21 RX ADMIN — ONDANSETRON 4 MG: 2 INJECTION INTRAMUSCULAR; INTRAVENOUS at 15:01

## 2023-04-21 RX ADMIN — IOHEXOL 100 ML: 300 INJECTION, SOLUTION INTRAVENOUS at 13:54

## 2023-04-21 RX ADMIN — ONDANSETRON 4 MG: 2 INJECTION INTRAMUSCULAR; INTRAVENOUS at 12:56

## 2023-04-21 RX ADMIN — ASPIRIN 81 MG CHEWABLE TABLET 324 MG: 81 TABLET CHEWABLE at 14:04

## 2023-04-21 RX ADMIN — SODIUM CHLORIDE 100 ML: 9 INJECTION, SOLUTION INTRAVENOUS at 13:54

## 2023-04-21 ASSESSMENT — ENCOUNTER SYMPTOMS
SORE THROAT: 0
WOUND: 0
FATIGUE: 0
EYE DISCHARGE: 0
DIARRHEA: 0
ABDOMINAL DISTENTION: 0
VOMITING: 1
CONSTIPATION: 0
STRIDOR: 0
SPEECH DIFFICULTY: 0
NAUSEA: 1
CONFUSION: 0
EYE REDNESS: 0
COUGH: 0
FEVER: 0
AGITATION: 0
FACIAL ASYMMETRY: 0

## 2023-04-22 LAB — BACTERIA UR CULT: NORMAL

## 2023-04-24 ENCOUNTER — TELEPHONE (OUTPATIENT)
Dept: UROLOGY | Age: 88
End: 2023-04-24

## 2023-04-24 ENCOUNTER — OFFICE VISIT (OUTPATIENT)
Dept: CARDIOLOGY | Age: 88
End: 2023-04-24

## 2023-04-24 VITALS
WEIGHT: 148.26 LBS | HEART RATE: 72 BPM | BODY MASS INDEX: 27.99 KG/M2 | SYSTOLIC BLOOD PRESSURE: 114 MMHG | DIASTOLIC BLOOD PRESSURE: 76 MMHG | HEIGHT: 61 IN | OXYGEN SATURATION: 99 % | RESPIRATION RATE: 12 BRPM

## 2023-04-24 DIAGNOSIS — I48.19 PERSISTENT ATRIAL FIBRILLATION (CMD): ICD-10-CM

## 2023-04-24 DIAGNOSIS — I50.22 CHRONIC SYSTOLIC CONGESTIVE HEART FAILURE (CMD): ICD-10-CM

## 2023-04-24 DIAGNOSIS — I50.42 CHRONIC COMBINED SYSTOLIC AND DIASTOLIC CONGESTIVE HEART FAILURE (CMD): Primary | ICD-10-CM

## 2023-04-24 DIAGNOSIS — I34.0 MITRAL VALVE INSUFFICIENCY, UNSPECIFIED ETIOLOGY: ICD-10-CM

## 2023-04-24 LAB
ATRIAL RATE (BPM): 61
QRS-INTERVAL (MSEC): 114
QT-INTERVAL (MSEC): 440
QTC: 478
R AXIS (DEGREES): -78
REPORT TEXT: NORMAL
T AXIS (DEGREES): 87
VENTRICULAR RATE EKG/MIN (BPM): 71

## 2023-04-24 PROCEDURE — 99214 OFFICE O/P EST MOD 30 MIN: CPT | Performed by: INTERNAL MEDICINE

## 2023-04-24 RX ORDER — SACUBITRIL AND VALSARTAN 24; 26 MG/1; MG/1
1 TABLET, FILM COATED ORAL 2 TIMES DAILY
Qty: 180 TABLET | Refills: 3 | Status: SHIPPED | OUTPATIENT
Start: 2023-04-24 | End: 2023-06-05

## 2023-04-24 RX ORDER — TAMSULOSIN HYDROCHLORIDE 0.4 MG/1
0.4 CAPSULE ORAL
Qty: 30 CAPSULE | Refills: 0 | Status: SHIPPED | OUTPATIENT
Start: 2023-04-24 | End: 2023-05-26 | Stop reason: ALTCHOICE

## 2023-04-24 RX ORDER — TAMSULOSIN HYDROCHLORIDE 0.4 MG/1
CAPSULE ORAL
Qty: 30 CAPSULE | Refills: 0 | OUTPATIENT
Start: 2023-04-24

## 2023-04-27 ENCOUNTER — REMOTE DEVICE CHECK (OUTPATIENT)
Dept: ELECTROPHYSIOLOGY | Age: 88
End: 2023-04-27
Attending: INTERNAL MEDICINE

## 2023-04-27 DIAGNOSIS — Z45.018 ENCOUNTER FOR MANAGEMENT OF BIVENTRICULAR CARDIAC PACEMAKER: ICD-10-CM

## 2023-04-27 DIAGNOSIS — I25.5 ISCHEMIC CARDIOMYOPATHY: Primary | ICD-10-CM

## 2023-04-27 PROCEDURE — 93294 REM INTERROG EVL PM/LDLS PM: CPT | Performed by: INTERNAL MEDICINE

## 2023-04-27 PROCEDURE — 93296 REM INTERROG EVL PM/IDS: CPT | Performed by: INTERNAL MEDICINE

## 2023-04-28 DIAGNOSIS — I25.5 ISCHEMIC CARDIOMYOPATHY: Primary | ICD-10-CM

## 2023-04-28 DIAGNOSIS — Z95.810 BIVENTRICULAR ICD (IMPLANTABLE CARDIOVERTER-DEFIBRILLATOR) IN PLACE: ICD-10-CM

## 2023-05-17 ENCOUNTER — TELEPHONE (OUTPATIENT)
Dept: FAMILY MEDICINE | Age: 88
End: 2023-05-17

## 2023-05-22 ENCOUNTER — APPOINTMENT (OUTPATIENT)
Dept: FAMILY MEDICINE | Age: 88
End: 2023-05-22

## 2023-05-22 RX ORDER — TAMSULOSIN HYDROCHLORIDE 0.4 MG/1
CAPSULE ORAL
Qty: 90 CAPSULE | OUTPATIENT
Start: 2023-05-22

## 2023-05-25 DIAGNOSIS — N20.0 KIDNEY STONES: Primary | ICD-10-CM

## 2023-05-26 ENCOUNTER — IMAGING SERVICES (OUTPATIENT)
Dept: GENERAL RADIOLOGY | Age: 88
End: 2023-05-26
Attending: UROLOGY

## 2023-05-26 ENCOUNTER — OFFICE VISIT (OUTPATIENT)
Dept: UROLOGY | Age: 88
End: 2023-05-26
Attending: EMERGENCY MEDICINE

## 2023-05-26 VITALS
WEIGHT: 140 LBS | HEART RATE: 87 BPM | BODY MASS INDEX: 26.43 KG/M2 | DIASTOLIC BLOOD PRESSURE: 80 MMHG | SYSTOLIC BLOOD PRESSURE: 150 MMHG | TEMPERATURE: 97.6 F | HEIGHT: 61 IN

## 2023-05-26 DIAGNOSIS — N20.0 KIDNEY STONE ON RIGHT SIDE: Primary | ICD-10-CM

## 2023-05-26 DIAGNOSIS — N20.0 KIDNEY STONES: ICD-10-CM

## 2023-05-26 PROCEDURE — 74018 RADEX ABDOMEN 1 VIEW: CPT | Performed by: RADIOLOGY

## 2023-05-26 PROCEDURE — 99203 OFFICE O/P NEW LOW 30 MIN: CPT | Performed by: UROLOGY

## 2023-06-01 ENCOUNTER — EXTERNAL RECORD (OUTPATIENT)
Dept: OTHER | Age: 88
End: 2023-06-01

## 2023-06-01 ENCOUNTER — TELEPHONE (OUTPATIENT)
Dept: FAMILY MEDICINE | Age: 88
End: 2023-06-01

## 2023-06-01 LAB — HM DILATED EYE EXAM: NORMAL

## 2023-06-05 ENCOUNTER — OFFICE VISIT (OUTPATIENT)
Dept: CARDIOLOGY | Age: 88
End: 2023-06-05

## 2023-06-05 VITALS
WEIGHT: 150.13 LBS | RESPIRATION RATE: 12 BRPM | HEIGHT: 61 IN | BODY MASS INDEX: 28.35 KG/M2 | SYSTOLIC BLOOD PRESSURE: 134 MMHG | OXYGEN SATURATION: 95 % | DIASTOLIC BLOOD PRESSURE: 76 MMHG | HEART RATE: 72 BPM

## 2023-06-05 DIAGNOSIS — I50.22 CHRONIC SYSTOLIC CONGESTIVE HEART FAILURE, NYHA CLASS 1 (CMD): ICD-10-CM

## 2023-06-05 DIAGNOSIS — M19.90 ARTHRITIS: Primary | ICD-10-CM

## 2023-06-05 DIAGNOSIS — M79.641 BILATERAL HAND PAIN: ICD-10-CM

## 2023-06-05 DIAGNOSIS — M79.642 BILATERAL HAND PAIN: ICD-10-CM

## 2023-06-05 PROCEDURE — 99214 OFFICE O/P EST MOD 30 MIN: CPT | Performed by: INTERNAL MEDICINE

## 2023-06-05 RX ORDER — SACUBITRIL AND VALSARTAN 24; 26 MG/1; MG/1
1 TABLET, FILM COATED ORAL 2 TIMES DAILY
Qty: 60 TABLET | Refills: 3 | Status: SHIPPED | OUTPATIENT
Start: 2023-06-05

## 2023-06-05 RX ORDER — ISOSORBIDE MONONITRATE 30 MG/1
30 TABLET, EXTENDED RELEASE ORAL DAILY
Qty: 90 TABLET | Refills: 3 | Status: SHIPPED | OUTPATIENT
Start: 2023-06-05 | End: 2024-05-30

## 2023-06-12 LAB — RETINOPATHY PRESENT (RTP): NO

## 2023-06-14 ENCOUNTER — OFFICE VISIT (OUTPATIENT)
Dept: FAMILY MEDICINE | Age: 88
End: 2023-06-14

## 2023-06-14 VITALS
BODY MASS INDEX: 28.47 KG/M2 | HEART RATE: 71 BPM | TEMPERATURE: 97.8 F | WEIGHT: 150.7 LBS | OXYGEN SATURATION: 98 % | SYSTOLIC BLOOD PRESSURE: 130 MMHG | DIASTOLIC BLOOD PRESSURE: 88 MMHG

## 2023-06-14 DIAGNOSIS — I10 BENIGN ESSENTIAL HTN: ICD-10-CM

## 2023-06-14 DIAGNOSIS — I48.0 PAROXYSMAL ATRIAL FIBRILLATION (CMD): ICD-10-CM

## 2023-06-14 DIAGNOSIS — F32.4 MAJOR DEPRESSIVE DISORDER IN PARTIAL REMISSION, UNSPECIFIED WHETHER RECURRENT (CMD): ICD-10-CM

## 2023-06-14 DIAGNOSIS — E03.8 SUBCLINICAL HYPOTHYROIDISM: ICD-10-CM

## 2023-06-14 DIAGNOSIS — M54.50 CHRONIC RIGHT-SIDED LOW BACK PAIN WITHOUT SCIATICA: ICD-10-CM

## 2023-06-14 DIAGNOSIS — I50.22 CHRONIC SYSTOLIC CONGESTIVE HEART FAILURE (CMD): Primary | ICD-10-CM

## 2023-06-14 DIAGNOSIS — I13.2 HYPERTENSIVE HEART AND CHRONIC KIDNEY DISEASE WITH HEART FAILURE AND WITH STAGE 5 CHRONIC KIDNEY DISEASE, OR END STAGE RENAL DISEASE (CMD): ICD-10-CM

## 2023-06-14 DIAGNOSIS — N18.30 CONTROLLED TYPE 2 DIABETES MELLITUS WITH STAGE 3 CHRONIC KIDNEY DISEASE, WITHOUT LONG-TERM CURRENT USE OF INSULIN (CMD): ICD-10-CM

## 2023-06-14 DIAGNOSIS — E11.22 CONTROLLED TYPE 2 DIABETES MELLITUS WITH STAGE 3 CHRONIC KIDNEY DISEASE, WITHOUT LONG-TERM CURRENT USE OF INSULIN (CMD): ICD-10-CM

## 2023-06-14 DIAGNOSIS — G89.29 CHRONIC RIGHT-SIDED LOW BACK PAIN WITHOUT SCIATICA: ICD-10-CM

## 2023-06-14 PROCEDURE — 99214 OFFICE O/P EST MOD 30 MIN: CPT | Performed by: FAMILY MEDICINE

## 2023-06-14 RX ORDER — HYDROCODONE BITARTRATE AND ACETAMINOPHEN 5; 325 MG/1; MG/1
1 TABLET ORAL DAILY PRN
Qty: 30 TABLET | Refills: 0 | Status: SHIPPED | OUTPATIENT
Start: 2023-06-14 | End: 2023-10-10 | Stop reason: SDUPTHER

## 2023-06-15 ENCOUNTER — EXTERNAL RECORD (OUTPATIENT)
Dept: OTHER | Age: 88
End: 2023-06-15

## 2023-06-20 ENCOUNTER — TELEPHONE (OUTPATIENT)
Dept: FAMILY MEDICINE | Age: 88
End: 2023-06-20

## 2023-06-20 ENCOUNTER — TELEPHONE (OUTPATIENT)
Dept: OTOLARYNGOLOGY | Age: 88
End: 2023-06-20

## 2023-06-20 DIAGNOSIS — J01.91 ACUTE RECURRENT SINUSITIS, UNSPECIFIED LOCATION: Primary | ICD-10-CM

## 2023-07-03 ENCOUNTER — APPOINTMENT (OUTPATIENT)
Dept: RHEUMATOLOGY | Age: 88
End: 2023-07-03
Attending: INTERNAL MEDICINE

## 2023-07-31 ENCOUNTER — OFFICE VISIT (OUTPATIENT)
Dept: RHEUMATOLOGY | Age: 88
End: 2023-07-31
Attending: INTERNAL MEDICINE

## 2023-07-31 VITALS
SYSTOLIC BLOOD PRESSURE: 158 MMHG | HEART RATE: 71 BPM | BODY MASS INDEX: 27.59 KG/M2 | OXYGEN SATURATION: 97 % | DIASTOLIC BLOOD PRESSURE: 90 MMHG | WEIGHT: 146 LBS | RESPIRATION RATE: 16 BRPM

## 2023-07-31 DIAGNOSIS — M25.50 MULTIPLE JOINT PAIN: Primary | ICD-10-CM

## 2023-07-31 PROCEDURE — 99204 OFFICE O/P NEW MOD 45 MIN: CPT | Performed by: INTERNAL MEDICINE

## 2023-07-31 RX ORDER — DULOXETIN HYDROCHLORIDE 30 MG/1
CAPSULE, DELAYED RELEASE ORAL
Qty: 60 CAPSULE | Refills: 5 | Status: SHIPPED | OUTPATIENT
Start: 2023-07-31 | End: 2023-10-10 | Stop reason: SDUPTHER

## 2023-08-08 ENCOUNTER — APPOINTMENT (OUTPATIENT)
Dept: ELECTROPHYSIOLOGY | Age: 88
End: 2023-08-08

## 2023-08-08 ENCOUNTER — APPOINTMENT (OUTPATIENT)
Dept: ELECTROPHYSIOLOGY | Age: 88
End: 2023-08-08
Attending: INTERNAL MEDICINE

## 2023-08-12 DIAGNOSIS — E03.8 SUBCLINICAL HYPOTHYROIDISM: ICD-10-CM

## 2023-08-14 ENCOUNTER — APPOINTMENT (OUTPATIENT)
Dept: OTOLARYNGOLOGY | Age: 88
End: 2023-08-14

## 2023-08-14 ENCOUNTER — TELEPHONE (OUTPATIENT)
Dept: ELECTROPHYSIOLOGY | Age: 88
End: 2023-08-14

## 2023-08-14 ENCOUNTER — APPOINTMENT (OUTPATIENT)
Dept: AUDIOLOGY | Age: 88
End: 2023-08-14

## 2023-08-14 ENCOUNTER — ANCILLARY PROCEDURE (OUTPATIENT)
Dept: ELECTROPHYSIOLOGY | Age: 88
End: 2023-08-14
Attending: INTERNAL MEDICINE

## 2023-08-14 DIAGNOSIS — I25.5 ISCHEMIC CARDIOMYOPATHY: ICD-10-CM

## 2023-08-14 DIAGNOSIS — R06.09 DYSPNEA ON EXERTION: ICD-10-CM

## 2023-08-14 DIAGNOSIS — I50.23 ACUTE ON CHRONIC SYSTOLIC (CONGESTIVE) HEART FAILURE (CMD): Primary | ICD-10-CM

## 2023-08-14 DIAGNOSIS — I48.20 CHRONIC ATRIAL FIBRILLATION (CMD): ICD-10-CM

## 2023-08-14 DIAGNOSIS — Z95.810 BIVENTRICULAR ICD (IMPLANTABLE CARDIOVERTER-DEFIBRILLATOR) IN PLACE: ICD-10-CM

## 2023-08-14 PROCEDURE — 93296 REM INTERROG EVL PM/IDS: CPT | Performed by: INTERNAL MEDICINE

## 2023-08-14 PROCEDURE — 93294 REM INTERROG EVL PM/LDLS PM: CPT | Performed by: INTERNAL MEDICINE

## 2023-08-14 RX ORDER — LEVOTHYROXINE SODIUM 0.05 MG/1
50 TABLET ORAL DAILY
Qty: 90 TABLET | Refills: 1 | Status: SHIPPED | OUTPATIENT
Start: 2023-08-14

## 2023-08-23 ENCOUNTER — TELEPHONE (OUTPATIENT)
Dept: ELECTROPHYSIOLOGY | Age: 88
End: 2023-08-23

## 2023-08-23 ENCOUNTER — ANCILLARY PROCEDURE (OUTPATIENT)
Dept: ELECTROPHYSIOLOGY | Age: 88
End: 2023-08-23
Attending: INTERNAL MEDICINE

## 2023-08-23 DIAGNOSIS — I25.5 ISCHEMIC CARDIOMYOPATHY: ICD-10-CM

## 2023-08-23 DIAGNOSIS — Z95.810 BIVENTRICULAR ICD (IMPLANTABLE CARDIOVERTER-DEFIBRILLATOR) IN PLACE: ICD-10-CM

## 2023-08-23 PROCEDURE — 93296 REM INTERROG EVL PM/IDS: CPT | Performed by: INTERNAL MEDICINE

## 2023-08-23 PROCEDURE — 93294 REM INTERROG EVL PM/LDLS PM: CPT | Performed by: INTERNAL MEDICINE

## 2023-08-28 RX ORDER — METOPROLOL SUCCINATE 100 MG/1
100 TABLET, EXTENDED RELEASE ORAL 2 TIMES DAILY
Qty: 60 TABLET | Refills: 0 | Status: SHIPPED | OUTPATIENT
Start: 2023-08-28 | End: 2023-10-11

## 2023-08-31 RX ORDER — FUROSEMIDE 40 MG/1
40 TABLET ORAL EVERY OTHER DAY
Qty: 45 TABLET | Refills: 1 | Status: SHIPPED | OUTPATIENT
Start: 2023-08-31

## 2023-09-07 DIAGNOSIS — K21.9 GASTROESOPHAGEAL REFLUX DISEASE WITHOUT ESOPHAGITIS: ICD-10-CM

## 2023-09-07 RX ORDER — PANTOPRAZOLE SODIUM 40 MG/1
40 TABLET, DELAYED RELEASE ORAL DAILY
Qty: 90 TABLET | Refills: 1 | Status: SHIPPED | OUTPATIENT
Start: 2023-09-07 | End: 2023-09-21 | Stop reason: ALTCHOICE

## 2023-09-08 DIAGNOSIS — I25.10 CORONARY ARTERY DISEASE, UNSPECIFIED VESSEL OR LESION TYPE, UNSPECIFIED WHETHER ANGINA PRESENT, UNSPECIFIED WHETHER NATIVE OR TRANSPLANTED HEART: ICD-10-CM

## 2023-09-08 RX ORDER — ATORVASTATIN CALCIUM 40 MG/1
40 TABLET, FILM COATED ORAL NIGHTLY
Qty: 90 TABLET | Refills: 1 | Status: SHIPPED | OUTPATIENT
Start: 2023-09-08

## 2023-09-11 ENCOUNTER — APPOINTMENT (OUTPATIENT)
Dept: CARDIOLOGY | Age: 88
End: 2023-09-11

## 2023-09-21 ENCOUNTER — IMAGING SERVICES (OUTPATIENT)
Dept: GENERAL RADIOLOGY | Age: 88
End: 2023-09-21
Attending: FAMILY MEDICINE

## 2023-09-21 ENCOUNTER — OFFICE VISIT (OUTPATIENT)
Dept: FAMILY MEDICINE | Age: 88
End: 2023-09-21

## 2023-09-21 ENCOUNTER — LAB SERVICES (OUTPATIENT)
Dept: LAB | Age: 88
End: 2023-09-21

## 2023-09-21 VITALS
OXYGEN SATURATION: 99 % | TEMPERATURE: 98.1 F | SYSTOLIC BLOOD PRESSURE: 138 MMHG | WEIGHT: 144.4 LBS | HEIGHT: 61 IN | DIASTOLIC BLOOD PRESSURE: 80 MMHG | RESPIRATION RATE: 16 BRPM | BODY MASS INDEX: 27.26 KG/M2 | HEART RATE: 89 BPM

## 2023-09-21 DIAGNOSIS — E03.9 HYPOTHYROIDISM, UNSPECIFIED TYPE: ICD-10-CM

## 2023-09-21 DIAGNOSIS — R10.30 LOWER ABDOMINAL PAIN: ICD-10-CM

## 2023-09-21 DIAGNOSIS — K21.9 GASTROESOPHAGEAL REFLUX DISEASE, UNSPECIFIED WHETHER ESOPHAGITIS PRESENT: ICD-10-CM

## 2023-09-21 DIAGNOSIS — E11.22 CONTROLLED TYPE 2 DIABETES MELLITUS WITH STAGE 3 CHRONIC KIDNEY DISEASE, WITHOUT LONG-TERM CURRENT USE OF INSULIN (CMD): ICD-10-CM

## 2023-09-21 DIAGNOSIS — I13.2 HYPERTENSIVE HEART AND CHRONIC KIDNEY DISEASE WITH HEART FAILURE AND WITH STAGE 5 CHRONIC KIDNEY DISEASE, OR END STAGE RENAL DISEASE (CMD): ICD-10-CM

## 2023-09-21 DIAGNOSIS — R30.0 DYSURIA: ICD-10-CM

## 2023-09-21 DIAGNOSIS — F43.21 GRIEF: Primary | ICD-10-CM

## 2023-09-21 DIAGNOSIS — R35.0 INCREASED URINARY FREQUENCY: ICD-10-CM

## 2023-09-21 DIAGNOSIS — N18.30 CONTROLLED TYPE 2 DIABETES MELLITUS WITH STAGE 3 CHRONIC KIDNEY DISEASE, WITHOUT LONG-TERM CURRENT USE OF INSULIN (CMD): ICD-10-CM

## 2023-09-21 LAB
APPEARANCE UR: ABNORMAL
BACTERIA #/AREA URNS HPF: ABNORMAL /HPF
BILIRUB UR QL STRIP: NEGATIVE
COLOR UR: YELLOW
CREAT UR-MCNC: 131 MG/DL
GLUCOSE UR STRIP-MCNC: NEGATIVE MG/DL
HBA1C MFR BLD: 6.3 % (ref 4.5–5.6)
HGB UR QL STRIP: ABNORMAL
HYALINE CASTS #/AREA URNS LPF: ABNORMAL /LPF
KETONES UR STRIP-MCNC: NEGATIVE MG/DL
LEUKOCYTE ESTERASE UR QL STRIP: ABNORMAL
MICROALBUMIN UR-MCNC: 19.2 MG/DL
MICROALBUMIN/CREAT UR: 146.6 MG/G
NITRITE UR QL STRIP: POSITIVE
PH UR STRIP: 6 [PH] (ref 5–7)
PROT UR STRIP-MCNC: 100 MG/DL
RBC #/AREA URNS HPF: ABNORMAL /HPF
SP GR UR STRIP: 1.02 (ref 1–1.03)
SQUAMOUS #/AREA URNS HPF: ABNORMAL /HPF
UROBILINOGEN UR STRIP-MCNC: 0.2 MG/DL
WBC #/AREA URNS HPF: >100 /HPF

## 2023-09-21 PROCEDURE — 74018 RADEX ABDOMEN 1 VIEW: CPT | Performed by: RADIOLOGY

## 2023-09-21 PROCEDURE — 84443 ASSAY THYROID STIM HORMONE: CPT | Performed by: CLINICAL MEDICAL LABORATORY

## 2023-09-21 PROCEDURE — 87186 SC STD MICRODIL/AGAR DIL: CPT | Performed by: CLINICAL MEDICAL LABORATORY

## 2023-09-21 PROCEDURE — 82570 ASSAY OF URINE CREATININE: CPT | Performed by: CLINICAL MEDICAL LABORATORY

## 2023-09-21 PROCEDURE — 82043 UR ALBUMIN QUANTITATIVE: CPT | Performed by: CLINICAL MEDICAL LABORATORY

## 2023-09-21 PROCEDURE — 36415 COLL VENOUS BLD VENIPUNCTURE: CPT | Performed by: INTERNAL MEDICINE

## 2023-09-21 PROCEDURE — 80053 COMPREHEN METABOLIC PANEL: CPT | Performed by: CLINICAL MEDICAL LABORATORY

## 2023-09-21 PROCEDURE — 99214 OFFICE O/P EST MOD 30 MIN: CPT | Performed by: FAMILY MEDICINE

## 2023-09-21 PROCEDURE — 87077 CULTURE AEROBIC IDENTIFY: CPT | Performed by: CLINICAL MEDICAL LABORATORY

## 2023-09-21 PROCEDURE — 81001 URINALYSIS AUTO W/SCOPE: CPT | Performed by: INTERNAL MEDICINE

## 2023-09-21 PROCEDURE — 80061 LIPID PANEL: CPT | Performed by: CLINICAL MEDICAL LABORATORY

## 2023-09-21 PROCEDURE — 87086 URINE CULTURE/COLONY COUNT: CPT | Performed by: CLINICAL MEDICAL LABORATORY

## 2023-09-21 PROCEDURE — 83036 HEMOGLOBIN GLYCOSYLATED A1C: CPT | Performed by: CLINICAL MEDICAL LABORATORY

## 2023-09-21 RX ORDER — ONDANSETRON 4 MG/1
4 TABLET, FILM COATED ORAL EVERY 8 HOURS PRN
Qty: 30 TABLET | Refills: 0 | Status: SHIPPED | OUTPATIENT
Start: 2023-09-21

## 2023-09-21 RX ORDER — FAMOTIDINE 20 MG/1
20 TABLET, FILM COATED ORAL 2 TIMES DAILY
Qty: 60 TABLET | Refills: 1 | Status: SHIPPED | OUTPATIENT
Start: 2023-09-21

## 2023-09-22 ENCOUNTER — TELEPHONE (OUTPATIENT)
Dept: FAMILY MEDICINE | Age: 88
End: 2023-09-22

## 2023-09-22 DIAGNOSIS — N39.0 ACUTE UTI: Primary | ICD-10-CM

## 2023-09-22 LAB
ALBUMIN SERPL-MCNC: 3.8 G/DL (ref 3.6–5.1)
ALBUMIN/GLOB SERPL: 1.3 {RATIO} (ref 1–2.4)
ALP SERPL-CCNC: 84 UNITS/L (ref 45–117)
ALT SERPL-CCNC: 18 UNITS/L
ANION GAP SERPL CALC-SCNC: 10 MMOL/L (ref 7–19)
AST SERPL-CCNC: 16 UNITS/L
BILIRUB SERPL-MCNC: 0.8 MG/DL (ref 0.2–1)
BUN SERPL-MCNC: 17 MG/DL (ref 6–20)
BUN/CREAT SERPL: 15 (ref 7–25)
CALCIUM SERPL-MCNC: 9.4 MG/DL (ref 8.4–10.2)
CHLORIDE SERPL-SCNC: 104 MMOL/L (ref 97–110)
CHOLEST SERPL-MCNC: 145 MG/DL
CHOLEST/HDLC SERPL: 3 {RATIO}
CO2 SERPL-SCNC: 28 MMOL/L (ref 21–32)
CREAT SERPL-MCNC: 1.14 MG/DL (ref 0.51–0.95)
EGFRCR SERPLBLD CKD-EPI 2021: 47 ML/MIN/{1.73_M2}
FASTING DURATION TIME PATIENT: 0 HOURS (ref 0–999)
GLOBULIN SER-MCNC: 3 G/DL (ref 2–4)
GLUCOSE SERPL-MCNC: 88 MG/DL (ref 70–99)
HDLC SERPL-MCNC: 48 MG/DL
LDLC SERPL CALC-MCNC: 72 MG/DL
NONHDLC SERPL-MCNC: 97 MG/DL
POTASSIUM SERPL-SCNC: 3.9 MMOL/L (ref 3.4–5.1)
PROT SERPL-MCNC: 6.8 G/DL (ref 6.4–8.2)
SODIUM SERPL-SCNC: 138 MMOL/L (ref 135–145)
TRIGL SERPL-MCNC: 123 MG/DL
TSH SERPL-ACNC: 4 MCUNITS/ML (ref 0.35–5)

## 2023-09-22 RX ORDER — CIPROFLOXACIN 500 MG/1
500 TABLET, FILM COATED ORAL 2 TIMES DAILY
Qty: 14 TABLET | Refills: 0 | Status: SHIPPED | OUTPATIENT
Start: 2023-09-22 | End: 2023-09-29

## 2023-09-22 RX ORDER — FLUCONAZOLE 150 MG/1
TABLET ORAL
Qty: 2 TABLET | Refills: 0 | Status: SHIPPED | OUTPATIENT
Start: 2023-09-22 | End: 2023-10-10 | Stop reason: ALTCHOICE

## 2023-09-24 LAB — BACTERIA UR CULT: ABNORMAL

## 2023-09-25 ENCOUNTER — TELEPHONE (OUTPATIENT)
Dept: SCHEDULING | Age: 88
End: 2023-09-25

## 2023-09-25 ENCOUNTER — TELEPHONE (OUTPATIENT)
Dept: FAMILY MEDICINE | Age: 88
End: 2023-09-25

## 2023-10-02 ENCOUNTER — TELEPHONE (OUTPATIENT)
Dept: FAMILY MEDICINE | Age: 88
End: 2023-10-02

## 2023-10-03 ENCOUNTER — TELEPHONE (OUTPATIENT)
Dept: FAMILY MEDICINE | Age: 88
End: 2023-10-03

## 2023-10-05 ENCOUNTER — TELEPHONE (OUTPATIENT)
Dept: CARDIOLOGY | Age: 88
End: 2023-10-05

## 2023-10-10 ENCOUNTER — OFFICE VISIT (OUTPATIENT)
Dept: FAMILY MEDICINE | Age: 88
End: 2023-10-10

## 2023-10-10 ENCOUNTER — LAB SERVICES (OUTPATIENT)
Dept: LAB | Age: 88
End: 2023-10-10

## 2023-10-10 ENCOUNTER — HOME/GROUP HOME VISIT (OUTPATIENT)
Dept: SCHEDULING | Age: 88
End: 2023-10-10

## 2023-10-10 VITALS
HEART RATE: 81 BPM | RESPIRATION RATE: 18 BRPM | SYSTOLIC BLOOD PRESSURE: 122 MMHG | WEIGHT: 147.2 LBS | TEMPERATURE: 97.9 F | OXYGEN SATURATION: 100 % | HEIGHT: 61 IN | BODY MASS INDEX: 27.79 KG/M2 | DIASTOLIC BLOOD PRESSURE: 88 MMHG

## 2023-10-10 DIAGNOSIS — I10 BENIGN ESSENTIAL HTN: ICD-10-CM

## 2023-10-10 DIAGNOSIS — R30.0 DYSURIA: ICD-10-CM

## 2023-10-10 DIAGNOSIS — G89.4 CHRONIC PAIN DISORDER: ICD-10-CM

## 2023-10-10 DIAGNOSIS — N18.30 STAGE 3 CHRONIC KIDNEY DISEASE, UNSPECIFIED WHETHER STAGE 3A OR 3B CKD (CMD): ICD-10-CM

## 2023-10-10 DIAGNOSIS — Z11.1 SCREENING FOR TUBERCULOSIS: ICD-10-CM

## 2023-10-10 DIAGNOSIS — E11.22 CONTROLLED TYPE 2 DIABETES MELLITUS WITH STAGE 3 CHRONIC KIDNEY DISEASE, WITHOUT LONG-TERM CURRENT USE OF INSULIN (CMD): ICD-10-CM

## 2023-10-10 DIAGNOSIS — M54.50 CHRONIC RIGHT-SIDED LOW BACK PAIN WITHOUT SCIATICA: ICD-10-CM

## 2023-10-10 DIAGNOSIS — Z00.00 HEALTH MAINTENANCE EXAMINATION: Primary | ICD-10-CM

## 2023-10-10 DIAGNOSIS — I25.10 CORONARY ARTERY DISEASE, UNSPECIFIED VESSEL OR LESION TYPE, UNSPECIFIED WHETHER ANGINA PRESENT, UNSPECIFIED WHETHER NATIVE OR TRANSPLANTED HEART: ICD-10-CM

## 2023-10-10 DIAGNOSIS — G89.29 CHRONIC RIGHT-SIDED LOW BACK PAIN WITHOUT SCIATICA: ICD-10-CM

## 2023-10-10 DIAGNOSIS — I50.23 ACUTE ON CHRONIC SYSTOLIC (CONGESTIVE) HEART FAILURE (CMD): ICD-10-CM

## 2023-10-10 DIAGNOSIS — F43.21 GRIEF: ICD-10-CM

## 2023-10-10 DIAGNOSIS — I13.2 HYPERTENSIVE HEART AND CHRONIC KIDNEY DISEASE WITH HEART FAILURE AND WITH STAGE 5 CHRONIC KIDNEY DISEASE, OR END STAGE RENAL DISEASE (CMD): ICD-10-CM

## 2023-10-10 DIAGNOSIS — H61.23 BILATERAL IMPACTED CERUMEN: ICD-10-CM

## 2023-10-10 DIAGNOSIS — N18.30 CONTROLLED TYPE 2 DIABETES MELLITUS WITH STAGE 3 CHRONIC KIDNEY DISEASE, WITHOUT LONG-TERM CURRENT USE OF INSULIN (CMD): ICD-10-CM

## 2023-10-10 LAB
APPEARANCE UR: CLEAR
BACTERIA #/AREA URNS HPF: ABNORMAL /HPF
BILIRUB UR QL STRIP: NEGATIVE
COLOR UR: YELLOW
GLUCOSE UR STRIP-MCNC: NEGATIVE MG/DL
HGB UR QL STRIP: NEGATIVE
HYALINE CASTS #/AREA URNS LPF: ABNORMAL /LPF
KETONES UR STRIP-MCNC: NEGATIVE MG/DL
LEUKOCYTE ESTERASE UR QL STRIP: ABNORMAL
MUCOUS THREADS URNS QL MICRO: PRESENT
NITRITE UR QL STRIP: NEGATIVE
PH UR STRIP: 6.5 [PH] (ref 5–7)
PROT UR STRIP-MCNC: ABNORMAL MG/DL
RBC #/AREA URNS HPF: ABNORMAL /HPF
SP GR UR STRIP: 1.02 (ref 1–1.03)
SQUAMOUS #/AREA URNS HPF: ABNORMAL /HPF
UROBILINOGEN UR STRIP-MCNC: 0.2 MG/DL
WBC #/AREA URNS HPF: ABNORMAL /HPF

## 2023-10-10 PROCEDURE — 36415 COLL VENOUS BLD VENIPUNCTURE: CPT | Performed by: INTERNAL MEDICINE

## 2023-10-10 PROCEDURE — 86480 TB TEST CELL IMMUN MEASURE: CPT | Performed by: CLINICAL MEDICAL LABORATORY

## 2023-10-10 PROCEDURE — 81001 URINALYSIS AUTO W/SCOPE: CPT | Performed by: CLINICAL MEDICAL LABORATORY

## 2023-10-10 PROCEDURE — 87086 URINE CULTURE/COLONY COUNT: CPT | Performed by: CLINICAL MEDICAL LABORATORY

## 2023-10-10 PROCEDURE — 99214 OFFICE O/P EST MOD 30 MIN: CPT | Performed by: FAMILY MEDICINE

## 2023-10-10 PROCEDURE — 69209 REMOVE IMPACTED EAR WAX UNI: CPT | Performed by: FAMILY MEDICINE

## 2023-10-10 RX ORDER — DULOXETIN HYDROCHLORIDE 30 MG/1
30 CAPSULE, DELAYED RELEASE ORAL DAILY
Qty: 90 CAPSULE | Refills: 1 | Status: SHIPPED | OUTPATIENT
Start: 2023-10-10

## 2023-10-10 RX ORDER — HYDROCODONE BITARTRATE AND ACETAMINOPHEN 5; 325 MG/1; MG/1
1 TABLET ORAL DAILY PRN
Qty: 30 TABLET | Refills: 0 | Status: SHIPPED | OUTPATIENT
Start: 2023-10-10

## 2023-10-10 RX ORDER — NITROGLYCERIN 0.4 MG/1
0.4 TABLET SUBLINGUAL EVERY 5 MIN PRN
Qty: 30 TABLET | Refills: 0 | Status: SHIPPED | OUTPATIENT
Start: 2023-10-10

## 2023-10-10 RX ORDER — SERTRALINE HYDROCHLORIDE 25 MG/1
25 TABLET, FILM COATED ORAL DAILY
Qty: 30 TABLET | Refills: 0 | Status: SHIPPED | OUTPATIENT
Start: 2023-10-10

## 2023-10-10 ASSESSMENT — PATIENT HEALTH QUESTIONNAIRE - PHQ9
1. LITTLE INTEREST OR PLEASURE IN DOING THINGS: NOT AT ALL
SUM OF ALL RESPONSES TO PHQ9 QUESTIONS 1 AND 2: 0
CLINICAL INTERPRETATION OF PHQ2 SCORE: NO FURTHER SCREENING NEEDED
SUM OF ALL RESPONSES TO PHQ9 QUESTIONS 1 AND 2: 0
2. FEELING DOWN, DEPRESSED OR HOPELESS: NOT AT ALL

## 2023-10-11 LAB
GAMMA INTERFERON BACKGROUND BLD IA-ACNC: 0.11 IU/ML
M TB IFN-G BLD-IMP: NEGATIVE
M TB IFN-G CD4+ BCKGRND COR BLD-ACNC: 0.01 IU/ML
M TB IFN-G CD4+CD8+ BCKGRND COR BLD-ACNC: 0 IU/ML
MITOGEN IGNF BCKGRD COR BLD-ACNC: 8.93 IU/ML

## 2023-10-12 LAB — BACTERIA UR CULT: NORMAL

## 2023-10-13 ENCOUNTER — TELEPHONE (OUTPATIENT)
Dept: FAMILY MEDICINE | Age: 88
End: 2023-10-13

## 2023-10-16 ENCOUNTER — TELEPHONE (OUTPATIENT)
Dept: FAMILY MEDICINE | Age: 88
End: 2023-10-16

## 2023-10-16 ENCOUNTER — TELEPHONE (OUTPATIENT)
Dept: CARDIOLOGY | Age: 88
End: 2023-10-16

## 2023-10-16 DIAGNOSIS — I48.19 PERSISTENT ATRIAL FIBRILLATION (CMD): ICD-10-CM

## 2023-10-16 DIAGNOSIS — I10 BENIGN ESSENTIAL HTN: ICD-10-CM

## 2023-10-16 RX ORDER — SPIRONOLACTONE 25 MG/1
25 TABLET ORAL DAILY
Qty: 90 TABLET | Refills: 0 | Status: SHIPPED | OUTPATIENT
Start: 2023-10-16

## 2023-10-18 DIAGNOSIS — Z66 DNR (DO NOT RESUSCITATE): Primary | ICD-10-CM

## 2023-10-27 ENCOUNTER — APPOINTMENT (OUTPATIENT)
Dept: FAMILY MEDICINE | Age: 88
End: 2023-10-27

## 2023-10-31 ENCOUNTER — TELEPHONE (OUTPATIENT)
Dept: FAMILY MEDICINE | Age: 88
End: 2023-10-31

## 2023-11-09 ENCOUNTER — APPOINTMENT (OUTPATIENT)
Dept: ELECTROPHYSIOLOGY | Age: 88
End: 2023-11-09
Attending: INTERNAL MEDICINE

## 2023-11-10 ENCOUNTER — TELEPHONE (OUTPATIENT)
Dept: ELECTROPHYSIOLOGY | Age: 88
End: 2023-11-10

## 2023-11-15 ENCOUNTER — TELEPHONE (OUTPATIENT)
Dept: FAMILY MEDICINE | Age: 88
End: 2023-11-15

## 2023-11-22 ENCOUNTER — APPOINTMENT (OUTPATIENT)
Dept: ELECTROPHYSIOLOGY | Age: 88
End: 2023-11-22
Attending: INTERNAL MEDICINE

## 2023-11-22 DIAGNOSIS — G89.4 CHRONIC PAIN DISORDER: ICD-10-CM

## 2023-11-22 RX ORDER — DULOXETIN HYDROCHLORIDE 30 MG/1
CAPSULE, DELAYED RELEASE ORAL
Qty: 62 CAPSULE | Refills: 10 | OUTPATIENT
Start: 2023-11-22

## 2023-11-24 ENCOUNTER — ANCILLARY PROCEDURE (OUTPATIENT)
Dept: ELECTROPHYSIOLOGY | Age: 88
End: 2023-11-24
Attending: INTERNAL MEDICINE

## 2023-11-24 ENCOUNTER — APPOINTMENT (OUTPATIENT)
Dept: CARDIOLOGY | Age: 88
End: 2023-11-24
Attending: INTERNAL MEDICINE

## 2023-11-24 DIAGNOSIS — I25.5 ISCHEMIC CARDIOMYOPATHY: ICD-10-CM

## 2023-11-24 DIAGNOSIS — Z95.810 BIVENTRICULAR ICD (IMPLANTABLE CARDIOVERTER-DEFIBRILLATOR) IN PLACE: ICD-10-CM

## 2023-11-24 PROCEDURE — 93294 REM INTERROG EVL PM/LDLS PM: CPT | Performed by: INTERNAL MEDICINE

## 2023-11-24 PROCEDURE — 93296 REM INTERROG EVL PM/IDS: CPT | Performed by: INTERNAL MEDICINE

## 2023-12-14 ENCOUNTER — APPOINTMENT (OUTPATIENT)
Dept: FAMILY MEDICINE | Age: 88
End: 2023-12-14

## 2024-01-23 ENCOUNTER — HOSPITAL ENCOUNTER (OUTPATIENT)
Dept: LAB | Facility: HOSPITAL | Age: 89
Discharge: HOME | End: 2024-01-23
Attending: INTERNAL MEDICINE
Payer: MEDICARE

## 2024-01-23 ENCOUNTER — TELEPHONE (OUTPATIENT)
Dept: ELECTROPHYSIOLOGY | Age: 89
End: 2024-01-23

## 2024-01-23 LAB
BASOPHILS # BLD AUTO: 0.05 X10(3) UL (ref 0–0.2)
BASOPHILS NFR BLD AUTO: 0.6 %
EOSINOPHIL # BLD AUTO: 0.16 X10(3) UL (ref 0–0.7)
EOSINOPHIL NFR BLD AUTO: 2 %
ERYTHROCYTE [DISTWIDTH] IN BLOOD BY AUTOMATED COUNT: 13.7 %
HCT VFR BLD AUTO: 41.4 %
HGB BLD-MCNC: 12.9 G/DL
IMM GRANULOCYTES # BLD AUTO: 0.1 X10(3) UL (ref 0–1)
IMM GRANULOCYTES NFR BLD: 1.3 %
LYMPHOCYTES # BLD AUTO: 2.01 X10(3) UL (ref 1–4)
LYMPHOCYTES NFR BLD AUTO: 25.5 %
MCH RBC QN AUTO: 29.6 PG (ref 26–34)
MCHC RBC AUTO-ENTMCNC: 31.2 G/DL (ref 31–37)
MCV RBC AUTO: 95 FL
MONOCYTES # BLD AUTO: 0.5 X10(3) UL (ref 0.1–1)
MONOCYTES NFR BLD AUTO: 6.4 %
NEUTROPHILS # BLD AUTO: 5.05 X10 (3) UL (ref 1.5–7.7)
NEUTROPHILS # BLD AUTO: 5.05 X10(3) UL (ref 1.5–7.7)
NEUTROPHILS NFR BLD AUTO: 64.2 %
PLATELET # BLD AUTO: 288 10(3)UL (ref 150–450)
RBC # BLD AUTO: 4.36 X10(6)UL
T4 FREE SERPL-MCNC: 0.9 NG/DL (ref 0.8–1.7)
WBC # BLD AUTO: 7.9 X10(3) UL (ref 4–11)

## 2024-01-23 PROCEDURE — 85025 COMPLETE CBC W/AUTO DIFF WBC: CPT | Performed by: INTERNAL MEDICINE

## 2024-01-23 PROCEDURE — 36415 COLL VENOUS BLD VENIPUNCTURE: CPT | Performed by: INTERNAL MEDICINE

## 2024-01-23 PROCEDURE — 84439 ASSAY OF FREE THYROXINE: CPT | Performed by: INTERNAL MEDICINE

## 2024-04-03 DIAGNOSIS — R10.30 LOWER ABDOMINAL PAIN: ICD-10-CM

## 2024-04-03 RX ORDER — ONDANSETRON 4 MG/1
TABLET, FILM COATED ORAL
Qty: 45 TABLET | Refills: 10 | OUTPATIENT
Start: 2024-04-03

## 2024-07-14 ENCOUNTER — APPOINTMENT (OUTPATIENT)
Dept: CT IMAGING | Facility: HOSPITAL | Age: 89
End: 2024-07-14
Attending: EMERGENCY MEDICINE
Payer: MEDICARE

## 2024-07-14 ENCOUNTER — HOSPITAL ENCOUNTER (INPATIENT)
Facility: HOSPITAL | Age: 89
LOS: 2 days | Discharge: NURSING, REHAB OR LONG-TERM CARE | End: 2024-07-21
Attending: EMERGENCY MEDICINE | Admitting: HOSPITALIST
Payer: MEDICARE

## 2024-07-14 ENCOUNTER — APPOINTMENT (OUTPATIENT)
Dept: GENERAL RADIOLOGY | Facility: HOSPITAL | Age: 89
End: 2024-07-14
Attending: EMERGENCY MEDICINE
Payer: MEDICARE

## 2024-07-14 ENCOUNTER — APPOINTMENT (OUTPATIENT)
Dept: CT IMAGING | Facility: HOSPITAL | Age: 89
End: 2024-07-14
Attending: HOSPITALIST
Payer: MEDICARE

## 2024-07-14 DIAGNOSIS — D50.9 IRON DEFICIENCY ANEMIA, UNSPECIFIED IRON DEFICIENCY ANEMIA TYPE: ICD-10-CM

## 2024-07-14 DIAGNOSIS — S70.02XA CONTUSION OF LEFT HIP, INITIAL ENCOUNTER: Primary | ICD-10-CM

## 2024-07-14 DIAGNOSIS — R79.89 ELEVATED TROPONIN I LEVEL: ICD-10-CM

## 2024-07-14 PROBLEM — D64.9 ANEMIA: Status: ACTIVE | Noted: 2024-07-14

## 2024-07-14 PROBLEM — R73.9 HYPERGLYCEMIA: Status: ACTIVE | Noted: 2024-07-14

## 2024-07-14 LAB
ALBUMIN SERPL-MCNC: 3.3 G/DL (ref 3.4–5)
ALBUMIN/GLOB SERPL: 1.1 {RATIO} (ref 1–2)
ALP LIVER SERPL-CCNC: 69 U/L
ALT SERPL-CCNC: 16 U/L
ANION GAP SERPL CALC-SCNC: 9 MMOL/L (ref 0–18)
ANTIBODY SCREEN: NEGATIVE
AST SERPL-CCNC: 5 U/L (ref 15–37)
ATRIAL RATE: 312 BPM
BASOPHILS # BLD AUTO: 0.05 X10(3) UL (ref 0–0.2)
BASOPHILS NFR BLD AUTO: 0.4 %
BILIRUB SERPL-MCNC: 0.8 MG/DL (ref 0.1–2)
BUN BLD-MCNC: 27 MG/DL (ref 9–23)
CALCIUM BLD-MCNC: 9.3 MG/DL (ref 8.5–10.1)
CHLORIDE SERPL-SCNC: 106 MMOL/L (ref 98–112)
CHOLEST SERPL-MCNC: 120 MG/DL (ref ?–200)
CO2 SERPL-SCNC: 26 MMOL/L (ref 21–32)
CREAT BLD-MCNC: 1.45 MG/DL
DEPRECATED HBV CORE AB SER IA-ACNC: 39.4 NG/ML
EGFRCR SERPLBLD CKD-EPI 2021: 35 ML/MIN/1.73M2 (ref 60–?)
EOSINOPHIL # BLD AUTO: 0.21 X10(3) UL (ref 0–0.7)
EOSINOPHIL NFR BLD AUTO: 1.8 %
ERYTHROCYTE [DISTWIDTH] IN BLOOD BY AUTOMATED COUNT: 13.6 %
GLOBULIN PLAS-MCNC: 3 G/DL (ref 2.8–4.4)
GLUCOSE BLD-MCNC: 162 MG/DL (ref 70–99)
HCT VFR BLD AUTO: 26.7 %
HDLC SERPL-MCNC: 40 MG/DL (ref 40–59)
HGB BLD-MCNC: 8.6 G/DL
IMM GRANULOCYTES # BLD AUTO: 0.06 X10(3) UL (ref 0–1)
IMM GRANULOCYTES NFR BLD: 0.5 %
IRON SATN MFR SERPL: 13 %
IRON SERPL-MCNC: 45 UG/DL
LDLC SERPL CALC-MCNC: 59 MG/DL (ref ?–100)
LYMPHOCYTES # BLD AUTO: 3.68 X10(3) UL (ref 1–4)
LYMPHOCYTES NFR BLD AUTO: 32.1 %
MCH RBC QN AUTO: 29.7 PG (ref 26–34)
MCHC RBC AUTO-ENTMCNC: 32.2 G/DL (ref 31–37)
MCV RBC AUTO: 92.1 FL
MONOCYTES # BLD AUTO: 0.71 X10(3) UL (ref 0.1–1)
MONOCYTES NFR BLD AUTO: 6.2 %
NEUTROPHILS # BLD AUTO: 6.74 X10 (3) UL (ref 1.5–7.7)
NEUTROPHILS # BLD AUTO: 6.74 X10(3) UL (ref 1.5–7.7)
NEUTROPHILS NFR BLD AUTO: 59 %
NONHDLC SERPL-MCNC: 80 MG/DL (ref ?–130)
OSMOLALITY SERPL CALC.SUM OF ELEC: 301 MOSM/KG (ref 275–295)
PLATELET # BLD AUTO: 302 10(3)UL (ref 150–450)
POTASSIUM SERPL-SCNC: 4.4 MMOL/L (ref 3.5–5.1)
PROT SERPL-MCNC: 6.3 G/DL (ref 6.4–8.2)
Q-T INTERVAL: 466 MS
QRS DURATION: 124 MS
QTC CALCULATION (BEZET): 503 MS
R AXIS: -89 DEGREES
RBC # BLD AUTO: 2.9 X10(6)UL
RH BLOOD TYPE: POSITIVE
RH BLOOD TYPE: POSITIVE
SODIUM SERPL-SCNC: 141 MMOL/L (ref 136–145)
T AXIS: 68 DEGREES
TIBC SERPL-MCNC: 335 UG/DL (ref 240–450)
TRANSFERRIN SERPL-MCNC: 225 MG/DL (ref 200–360)
TRIGL SERPL-MCNC: 115 MG/DL (ref 30–149)
TROPONIN I SERPL HS-MCNC: 151 NG/L
TROPONIN I SERPL HS-MCNC: 187 NG/L
VENTRICULAR RATE: 70 BPM
VLDLC SERPL CALC-MCNC: 17 MG/DL (ref 0–30)
WBC # BLD AUTO: 11.5 X10(3) UL (ref 4–11)

## 2024-07-14 PROCEDURE — 73700 CT LOWER EXTREMITY W/O DYE: CPT | Performed by: HOSPITALIST

## 2024-07-14 PROCEDURE — 73502 X-RAY EXAM HIP UNI 2-3 VIEWS: CPT | Performed by: EMERGENCY MEDICINE

## 2024-07-14 PROCEDURE — 76377 3D RENDER W/INTRP POSTPROCES: CPT | Performed by: HOSPITALIST

## 2024-07-14 PROCEDURE — 99223 1ST HOSP IP/OBS HIGH 75: CPT | Performed by: HOSPITALIST

## 2024-07-14 PROCEDURE — 70450 CT HEAD/BRAIN W/O DYE: CPT | Performed by: EMERGENCY MEDICINE

## 2024-07-14 PROCEDURE — 73552 X-RAY EXAM OF FEMUR 2/>: CPT | Performed by: EMERGENCY MEDICINE

## 2024-07-14 RX ORDER — MELATONIN
3 NIGHTLY PRN
Status: DISCONTINUED | OUTPATIENT
Start: 2024-07-14 | End: 2024-07-21

## 2024-07-14 RX ORDER — MELATONIN
1000 DAILY
Status: DISCONTINUED | OUTPATIENT
Start: 2024-07-15 | End: 2024-07-21

## 2024-07-14 RX ORDER — METOCLOPRAMIDE HYDROCHLORIDE 5 MG/ML
10 INJECTION INTRAMUSCULAR; INTRAVENOUS EVERY 8 HOURS PRN
Status: DISCONTINUED | OUTPATIENT
Start: 2024-07-14 | End: 2024-07-19 | Stop reason: ALTCHOICE

## 2024-07-14 RX ORDER — MAGNESIUM OXIDE 400 MG/1
400 TABLET ORAL DAILY
COMMUNITY

## 2024-07-14 RX ORDER — CLOPIDOGREL BISULFATE 75 MG/1
75 TABLET ORAL DAILY
Status: DISCONTINUED | OUTPATIENT
Start: 2024-07-15 | End: 2024-07-21

## 2024-07-14 RX ORDER — BISACODYL 10 MG
10 SUPPOSITORY, RECTAL RECTAL
Status: DISCONTINUED | OUTPATIENT
Start: 2024-07-14 | End: 2024-07-21

## 2024-07-14 RX ORDER — ECHINACEA PURPUREA EXTRACT 125 MG
1 TABLET ORAL
Status: DISCONTINUED | OUTPATIENT
Start: 2024-07-14 | End: 2024-07-21

## 2024-07-14 RX ORDER — LEVOTHYROXINE SODIUM 0.05 MG/1
50 TABLET ORAL
Status: DISCONTINUED | OUTPATIENT
Start: 2024-07-15 | End: 2024-07-21

## 2024-07-14 RX ORDER — FUROSEMIDE 40 MG/1
40 TABLET ORAL 2 TIMES DAILY
COMMUNITY

## 2024-07-14 RX ORDER — ISOSORBIDE MONONITRATE 30 MG/1
30 TABLET, EXTENDED RELEASE ORAL DAILY
Status: DISCONTINUED | OUTPATIENT
Start: 2024-07-15 | End: 2024-07-16

## 2024-07-14 RX ORDER — ACETAMINOPHEN 500 MG
500 TABLET ORAL 2 TIMES DAILY
COMMUNITY

## 2024-07-14 RX ORDER — FAMOTIDINE 20 MG/1
20 TABLET, FILM COATED ORAL 2 TIMES DAILY
COMMUNITY

## 2024-07-14 RX ORDER — DULOXETIN HYDROCHLORIDE 30 MG/1
30 CAPSULE, DELAYED RELEASE ORAL DAILY
COMMUNITY

## 2024-07-14 RX ORDER — ENEMA 19; 7 G/133ML; G/133ML
1 ENEMA RECTAL ONCE AS NEEDED
Status: DISCONTINUED | OUTPATIENT
Start: 2024-07-14 | End: 2024-07-21

## 2024-07-14 RX ORDER — ONDANSETRON 2 MG/ML
4 INJECTION INTRAMUSCULAR; INTRAVENOUS EVERY 4 HOURS PRN
Status: DISCONTINUED | OUTPATIENT
Start: 2024-07-14 | End: 2024-07-14

## 2024-07-14 RX ORDER — MORPHINE SULFATE 2 MG/ML
1 INJECTION, SOLUTION INTRAMUSCULAR; INTRAVENOUS EVERY 2 HOUR PRN
Status: DISCONTINUED | OUTPATIENT
Start: 2024-07-14 | End: 2024-07-17

## 2024-07-14 RX ORDER — LEVOTHYROXINE SODIUM 0.05 MG/1
50 TABLET ORAL
COMMUNITY

## 2024-07-14 RX ORDER — METOPROLOL SUCCINATE 50 MG/1
100 TABLET, EXTENDED RELEASE ORAL
Status: DISCONTINUED | OUTPATIENT
Start: 2024-07-15 | End: 2024-07-21

## 2024-07-14 RX ORDER — SODIUM CHLORIDE 9 MG/ML
125 INJECTION, SOLUTION INTRAVENOUS CONTINUOUS
Status: DISCONTINUED | OUTPATIENT
Start: 2024-07-14 | End: 2024-07-14

## 2024-07-14 RX ORDER — DULOXETIN HYDROCHLORIDE 30 MG/1
30 CAPSULE, DELAYED RELEASE ORAL DAILY
Status: DISCONTINUED | OUTPATIENT
Start: 2024-07-15 | End: 2024-07-21

## 2024-07-14 RX ORDER — ATORVASTATIN CALCIUM 40 MG/1
40 TABLET, FILM COATED ORAL NIGHTLY
Status: DISCONTINUED | OUTPATIENT
Start: 2024-07-14 | End: 2024-07-21

## 2024-07-14 RX ORDER — METOPROLOL SUCCINATE 100 MG/1
100 TABLET, EXTENDED RELEASE ORAL DAILY
COMMUNITY

## 2024-07-14 RX ORDER — SPIRONOLACTONE 25 MG/1
25 TABLET ORAL DAILY
COMMUNITY

## 2024-07-14 RX ORDER — POLYETHYLENE GLYCOL 3350 17 G/17G
17 POWDER, FOR SOLUTION ORAL DAILY PRN
Status: DISCONTINUED | OUTPATIENT
Start: 2024-07-14 | End: 2024-07-21

## 2024-07-14 RX ORDER — ONDANSETRON 2 MG/ML
4 INJECTION INTRAMUSCULAR; INTRAVENOUS EVERY 6 HOURS PRN
Status: DISCONTINUED | OUTPATIENT
Start: 2024-07-14 | End: 2024-07-19 | Stop reason: SINTOL

## 2024-07-14 RX ORDER — ISOSORBIDE MONONITRATE 30 MG/1
30 TABLET, EXTENDED RELEASE ORAL DAILY
COMMUNITY
End: 2024-07-21

## 2024-07-14 RX ORDER — FAMOTIDINE 20 MG/1
20 TABLET, FILM COATED ORAL DAILY
Status: DISCONTINUED | OUTPATIENT
Start: 2024-07-14 | End: 2024-07-21

## 2024-07-14 RX ORDER — MELATONIN
1000 DAILY
COMMUNITY

## 2024-07-14 RX ORDER — SPIRONOLACTONE 25 MG/1
25 TABLET ORAL DAILY
Status: DISCONTINUED | OUTPATIENT
Start: 2024-07-15 | End: 2024-07-16

## 2024-07-14 RX ORDER — FUROSEMIDE 20 MG/1
20 TABLET ORAL DAILY
Status: DISCONTINUED | OUTPATIENT
Start: 2024-07-15 | End: 2024-07-21

## 2024-07-14 RX ORDER — SERTRALINE HYDROCHLORIDE 25 MG/1
25 TABLET, FILM COATED ORAL DAILY
COMMUNITY

## 2024-07-14 RX ORDER — MORPHINE SULFATE 2 MG/ML
2 INJECTION, SOLUTION INTRAMUSCULAR; INTRAVENOUS EVERY 2 HOUR PRN
Status: DISCONTINUED | OUTPATIENT
Start: 2024-07-14 | End: 2024-07-17

## 2024-07-14 RX ORDER — SERTRALINE HYDROCHLORIDE 25 MG/1
25 TABLET, FILM COATED ORAL DAILY
Status: DISCONTINUED | OUTPATIENT
Start: 2024-07-15 | End: 2024-07-21

## 2024-07-14 RX ORDER — MAGNESIUM OXIDE 400 MG/1
400 TABLET ORAL DAILY
Status: DISCONTINUED | OUTPATIENT
Start: 2024-07-15 | End: 2024-07-21

## 2024-07-14 RX ORDER — SODIUM CHLORIDE 9 MG/ML
INJECTION, SOLUTION INTRAVENOUS CONTINUOUS
Status: DISCONTINUED | OUTPATIENT
Start: 2024-07-14 | End: 2024-07-14

## 2024-07-14 RX ORDER — ACETAMINOPHEN 500 MG
500 TABLET ORAL EVERY 4 HOURS PRN
Status: DISCONTINUED | OUTPATIENT
Start: 2024-07-14 | End: 2024-07-21

## 2024-07-14 RX ORDER — MORPHINE SULFATE 4 MG/ML
4 INJECTION, SOLUTION INTRAMUSCULAR; INTRAVENOUS EVERY 2 HOUR PRN
Status: DISCONTINUED | OUTPATIENT
Start: 2024-07-14 | End: 2024-07-17

## 2024-07-14 RX ORDER — MORPHINE SULFATE 4 MG/ML
4 INJECTION, SOLUTION INTRAMUSCULAR; INTRAVENOUS EVERY 30 MIN PRN
Status: DISCONTINUED | OUTPATIENT
Start: 2024-07-14 | End: 2024-07-14

## 2024-07-14 RX ORDER — POTASSIUM CHLORIDE 750 MG/1
10 TABLET, EXTENDED RELEASE ORAL 2 TIMES DAILY
COMMUNITY

## 2024-07-14 RX ORDER — SENNOSIDES 8.6 MG
17.2 TABLET ORAL NIGHTLY PRN
Status: DISCONTINUED | OUTPATIENT
Start: 2024-07-14 | End: 2024-07-21

## 2024-07-14 NOTE — ED PROVIDER NOTES
Patient Seen in: [facility]      History     Chief Complaint   Patient presents with    Hip Pain    Fall     Stated Complaint: Left hip pain, unwitnessed bruising, on thinners    Subjective:   HPI    This is a 88-year-old female who lives at [facility] she stated that she did fall 2 days ago.  And she sustained a bruise over her left hip.  She states that she was walking today and a similar episode she is not sure if her legs gave out and she fell onto her bottom.  She did not think she hit her head.  She did not pass out.  She was not dizzy or lightheaded prior to the fall she denies any chest pain shortness of breath the patient does have a history of CVA previously, diabetes esophageal reflux hypertension, kidney stones..  The patient states that she thinks her leg go a lot on her.  The 2 days ago and then today the patient has no headache she did not think she hit her head but she is not 100% sure denies any neck pain denies any chest pain denies any abdominal pain denies any nausea, vomiting, she states she has some bruising over the left hip.  But she has some mild pain in the left hip.  She denies any knee pain.  She denies any right lower extremity pain she denies any back pain chest pain abdominal pain.    Objective:   Past Medical History:    CVA (cerebral vascular accident) (HCC)    Diabetes (HCC)    Esophageal reflux    HLD (hyperlipidemia)    HTN (hypertension)    Kidney stones              Past Surgical History:   Procedure Laterality Date    Excis lumbar disk,one level      Knee replacement surgery      Removal gallbladder      Total abdom hysterectomy                  Social History     Socioeconomic History    Marital status:    Tobacco Use    Smoking status: Never    Smokeless tobacco: Never   Vaping Use    Vaping status: Never Used   Substance and Sexual Activity    Alcohol use: Never    Drug use: Never     Social Determinants of Health     Financial Resource  Strain: Low Risk  (2/7/2022)    Received from [employer], [facility]    Financial Resource Strain     In the past year, have you or any family members you live with been unable to get any of the following when it was really needed? Check all that apply.: None   Food Insecurity: Not At Risk (2/7/2022)    Received from [employer], [facility]    Food Insecurity     RETIRE How often in the past 12 months would you say you are worried or stressed about having enough money to buy nutritious meals? : Never   Transportation Needs: No Transportation Needs (2/7/2022)    Received from [facility]    PRAPARE - Transportation     Lack of Transportation (Medical): No     Lack of Transportation (Non-Medical): No   Physical Activity: Inactive (2/7/2022)    Received from [facility]    Exercise Vital Sign     Days of Exercise per Week: 0 days     Minutes of Exercise per Session: 0 min   Stress: Low Risk  (2/7/2022)    Received from [facility]    Stress     Stress is when someone feels tense, nervous, anxious, or can't sleep at night because their mind is troubled. How stressed are you? : A little bit   Social Connections: Unknown (2/7/2022)    Received from [facility]    Social Connections     How often do you see or talk to people that you care about and feel close to? (For example: talking to friends on the phone, visiting friends or family, going to Buddhist or club meetings): 5 or more times a week              Review of Systems    Positive for stated Chief Complaint: Hip Pain and Fall    Other systems are as noted in HPI.  Constitutional and vital signs reviewed.      All other systems reviewed and negative except as noted above.    Physical Exam     ED Triage Vitals [07/14/24 1317]   /82   Pulse 92   Resp 15   Temp    Temp src    SpO2  100 %   O2 Device None (Room air)       Current Vitals:   Vital Signs  BP: 105/68  Pulse: 70  Resp: 19  MAP (mmHg): 79    Oxygen Therapy  SpO2: 100 %  O2 Device: None (Room air)            Physical Exam    General: .  Pleasant female no signs of trauma to head or neck.  The patient is in no respiratory distress    HEENT: Atraumatic, conjunctiva are not pale.  There is no icterus.  Oral mucosa Is wet.  No facial trauma.  The neck is supple.    LUNGS: Clear to auscultation, there is no wheezing or retraction.  No crackles.    CV: Cardiovascular is regular without murmurs or rubs.    ABD: The abdomen is soft nondistended nontender.  There is no rebound.  There is no guarding.    EXT: There is good pulses bilaterally.  There is no calf tenderness.  There is no rash noted.  There is no edema is a fairly large bruise on her left hip.  She has some mild pain on palpation of the hip, femur but she is able to bend it and has normal range of motion of the hip.  At the knee.  Previous surgery on the knee noted.  No right lower extremity tenderness no upper extremity tenderness noted.    NEURO: Alert and oriented x 3.  She knows what year is what month it is.  Muscle strength and sensory exam is grossly normal.  And the patient is neurologically intact with no focal findings.      ED Course     Labs Reviewed   COMP METABOLIC PANEL (14) - Abnormal; Notable for the following components:       Result Value    Glucose 162 (*)     BUN 27 (*)     Creatinine 1.45 (*)     Calculated Osmolality 301 (*)     eGFR-Cr 35 (*)     AST 5 (*)     Total Protein 6.3 (*)     Albumin 3.3 (*)     All other components within normal limits   TROPONIN I HIGH SENSITIVITY - Abnormal; Notable for the following components:    Troponin I (High Sensitivity) 187 (*)     All other components within normal limits   CBC W/ DIFFERENTIAL - Abnormal; Notable for the following components:    WBC 11.5 (*)     RBC 2.90 (*)     HGB 8.6 (*)     HCT 26.7 (*)     All  other components within normal limits   LIPID PANEL - Normal   CBC WITH DIFFERENTIAL WITH PLATELET    Narrative:     The following orders were created for panel order CBC With Differential With Platelet.  Procedure                               Abnormality         Status                     ---------                               -----------         ------                     CBC W/ DIFFERENTIAL[[id number]]          Abnormal            Final result                 Please view results for these tests on the individual orders.   TYPE AND SCREEN    Narrative:     The following orders were created for panel order Type and screen.  Procedure                               Abnormality         Status                     ---------                               -----------         ------                     ABORH (Blood Type)[[id number]]                               Final result               Antibody Screen[[id number]]                                  Final result                 Please view results for these tests on the individual orders.   ABORH (BLOOD TYPE)   ANTIBODY SCREEN   ABORH CONFIRMATION   RAINBOW DRAW BLUE                The patient was placed on monitors, IV was started, blood was drawn.  Patient unsure if she actually hit her head.  Most likely mechanical fall.  But will check electrolytes.  Check her x-rays of the hip to rule out there is no fracture.       Fairly large bruise over the left hip.  Will check electrolytes.,  And to rule out intracranial bleed with a CT of the brain.  MDM      The EKG shows ventricular paced rhythm.  The rest of the EKG including rate rhythm axis and intervals I agree with the EKG report . The rate is 71 compared to an old EKG it showed previously accelerated junctional rhythm that EKG was from 2019    Admission disposition: 7/14/2024  4:14 PM         The patient was typed and screened, troponin was slightly elevated at 187 comprehensive shows findings of some renal insufficiency.   My reading reviewing old records the creatinine is not significantly worse.  CBC shows a hemoglobin 8.6 white count 11.5          The patient's last hemoglobin was 12.5.  Significant drop.  Concerned that there is being may be get more worse with progression dilution felt it would be reasonable to go ahead and admit this patient with a 2 significant abnormality patient was given IV fluids.  Workup was done to rule out fracture, dislocation.            I personally reviewed the radiographs and my individual interpretation shows    X-ray shows no fracture,.  CT of the brain shows volume loss,  No fracture.  No bleed.  Also reviewed official report and it shows     XR HIP W OR WO PELVIS 2 OR 3 VIEWS, LEFT (CPT=73502)    Result Date: 7/14/2024  PROCEDURE:  XR HIP W OR WO PELVIS 2 OR 3 VIEWS, LEFT (CPT=73502)  TECHNIQUE:  Unilateral 2 to 3 views of the hip and pelvis if performed.  COMPARISON:  None.  INDICATIONS:  Left hip pain, unwitnessed bruising, on thinners  PATIENT STATED HISTORY: (As transcribed by Technologist)  Patient stated falling today and has left hip pain.               CONCLUSION:   Negative for fracture or malalignment of the pelvis or hips.  Mild/moderate osteoarthritis of bilateral hips.  Obturator rings are intact.  Normal sacroiliac joints and pubic symphysis.  Lower lumbar spondylosis.   LOCATION:  [facility]   Dictated by (CST): Shanna Cuenca MD on 7/14/2024 at 3:22 PM     Finalized by (CST): Shanna Cuenca MD on 7/14/2024 at 3:22 PM       XR FEMUR MIN 2 VIEWS LEFT (CPT=73552)    Result Date: 7/14/2024  PROCEDURE:  XR FEMUR MIN 2 VIEWS LEFT (CPT=73552)  TECHNIQUE:  AP and lateral views were obtained.  COMPARISON:  None.  INDICATIONS:  Left hip pain, unwitnessed bruising, on thinners  PATIENT STATED HISTORY: (As transcribed by Technologist)  Patient stated falling today and has left hip pain.               CONCLUSION:   Negative for fracture or malalignment.  Mild osteoarthritis of left hip.  Left total  knee prosthesis noted without complicating features.  No appreciable knee joint effusion.  No focal soft tissue swelling.   LOCATION:  [facility]   Dictated by (CST): Shanna Cuenca MD on 7/14/2024 at 3:21 PM     Finalized by (CST): Shanna Cuenca MD on 7/14/2024 at 3:22 PM       CT BRAIN OR HEAD (51010)    Result Date: 7/14/2024            PROCEDURE:  CT BRAIN OR HEAD (50326)  COMPARISON:  [facility] , CT, CT STROKE CTA BRAIN/CTA NECK (W IV)(CPT=70496/41979), 12/25/2019, 9:21 PM.  INDICATIONS:  Left hip pain, unwitnessed bruising, on thinners  TECHNIQUE:  Noncontrast CT scanning is performed through the brain. Dose reduction techniques were used. Dose information is transmitted to the ACR (American College of Radiology) NRDR (National Radiology Data Registry) which includes the Dose Index Registry.  PATIENT STATED HISTORY: (As transcribed by Technologist)  Patient is here for unwitnessed fall 7/10. She is taking blood thinners.   FINDINGS: No evidence of intracranial hemorrhage or extra-axial fluid collection. Lucencies in the deep periventricular white matter are likely sequelae of chronic small vessel ischemic disease. Prominence of the sulci is noted. No mass effect. Mild mucoperiosteal thickening of the paranasal sinuses.  Visualized portions of the mastoid air cells are unremarkable. Visualized portions of the orbits are unremarkable. IMPRESSION: Global parenchymal loss is noted.  Sequelae of chronic small vessel ischemic disease is noted. No evidence of intracranial hemorrhage or extra-axial fluid collection.    LOCATION:  [id number]   Dictated by (CST): Robi Colin MD on 7/14/2024 at 2:47 PM     Finalized by (CST): Robi Colin MD on 7/14/2024 at 2:48 PM        Discussed this case with the patient and the patient I discussed this case with the hospitalist the patient will need to be admitted for to see if there hemoglobins are progressive getting worse.  The patient will need to be admitted for further medical  troponins probably an incidental finding she has no chest pain or shortness of breath patient will be admitted for further evaluation treatment.  Medical Decision Making      Disposition and Plan     Clinical Impression:  1. Contusion of left hip, initial encounter    2. Elevated troponin I level    3. Iron deficiency anemia, unspecified iron deficiency anemia type         Disposition:  Admit  7/14/2024  4:14 pm    Follow-up:  No follow-up provider specified.        Medications Prescribed:  Current Discharge Medication List                            Hospital Problems       Present on Admission             ICD-10-CM Noted POA    * (Principal) Contusion of left hip, initial encounter S70.02XA 7/14/2024 Unknown    Anemia D64.9 7/14/2024 Yes    Hyperglycemia R73.9 7/14/2024 Yes

## 2024-07-14 NOTE — ED QUICK NOTES
PTS FAMILY STATED CONCERNS ABOUT A NURSE TAKING TO LONG TO ENTER ROOM UPON HIS ARRIVAL. PTS FAMILY MEMBER EDUCATED THAT NURSE WAS IN A DIFFERENT ROOM WITH A PATIENT AND APOLOGIES. PTS FAMILY MEMBER STILL VERY UPSET. PTS FAMILY MEMBER WANTED PT TO HAVE WATER BUT WAS EDUCATED THAT CT AND XRAY RESULTS NEEDED TO BE RESULTED FIRST. PTS FAMILY STILL UPSET.

## 2024-07-14 NOTE — ED INITIAL ASSESSMENT (HPI)
Patient received via [facility] for unwitnessed fall yesterday onto buttocks, extensive bruising to left side. Patient rates pain 6 out of 10 on pain scale. Patient is on thinners, denies hitting head.

## 2024-07-14 NOTE — H&P
[facility]     Jamee Johnson Patient Status:  Emergency    1935 MRN [id number]   Location [facility] EMERGENCY DEPARTMENT Attending Gabe Prieto MD   Hosp Day # 0 PCP None Pcp     Chief Complaint: Fall     Subjective:    History of Present Illness:     Jamee Jhonson is a 88 year old female with a PMH of CVA, HTN, HLD, DM who presents after a fall.  She initially fell 2 days ago, and again occurred today.  She says she was walking when her legs just \"gave out\".  Denies LOC, did not strike her head.  Denies cp, sob, fever, chills, no n/v.  No dizziness or lightheadedness.  She fell onto her right side, but felt pain on her left.  She has no other acute complaints at this time.        History/Other:    Past Medical History:  Past Medical History:    CVA (cerebral vascular accident) (HCC)    Diabetes (HCC)    Esophageal reflux    HLD (hyperlipidemia)    HTN (hypertension)    Kidney stones     Past Surgical History:   Past Surgical History:   Procedure Laterality Date    Excis lumbar disk,one level      Knee replacement surgery      Removal gallbladder      Total abdom hysterectomy        Family History:   Family History   Problem Relation Age of Onset    Hypertension Mother     Heart Disorder Mother     Diabetes Paternal Grandmother     Cancer Sister         kidney CA    Stroke Sister     Diabetes Paternal Aunt      Social History:    reports that she has never smoked. She has never used smokeless tobacco. She reports that she does not drink alcohol and does not use drugs.     Allergies:   Allergies   Allergen Reactions    Statins OTHER (SEE COMMENTS)     Gets extremely weak    Sulfa Antibiotics RASH       Medications:    No current facility-administered medications on file prior to encounter.     Current Outpatient Medications on File Prior to Encounter   Medication Sig Dispense Refill    cyanocobalamin 1000 MCG Oral Tab Take 1 tablet (1,000 mcg total) by mouth daily.       DULoxetine 30 MG Oral Cap DR Particles Take 1 capsule (30 mg total) by mouth daily.      apixaban 5 MG Oral Tab Take 1 tablet (5 mg total) by mouth 2 (two) times daily.      sacubitril-valsartan 24-26 MG Oral Tab Take 1 tablet by mouth 2 (two) times daily.      famotidine 20 MG Oral Tab Take 1 tablet (20 mg total) by mouth 2 (two) times daily.      furosemide 40 MG Oral Tab Take 1 tablet (40 mg total) by mouth 2 (two) times daily.      isosorbide mononitrate ER 30 MG Oral Tablet 24 Hr Take 1 tablet (30 mg total) by mouth daily.      empagliflozin (JARDIANCE) 10 MG Oral Tab Take by mouth daily.      levothyroxine 50 MCG Oral Tab Take 1 tablet (50 mcg total) by mouth before breakfast.      magnesium oxide 400 MG Oral Tab Take 1 tablet (400 mg total) by mouth daily.      metoprolol succinate  MG Oral Tablet 24 Hr Take 1 tablet (100 mg total) by mouth daily.      potassium chloride 10 MEQ Oral Tab CR Take 1 tablet (10 mEq total) by mouth 2 (two) times daily.      sertraline 25 MG Oral Tab Take 1 tablet (25 mg total) by mouth daily.      spironolactone 25 MG Oral Tab Take 1 tablet (25 mg total) by mouth daily.      atorvastatin 80 MG Oral Tab Take 1 tablet (80 mg total) by mouth nightly. 30 tablet 3    losartan Potassium 50 MG Oral Tab Take 2 tablets (100 mg total) by mouth daily.      Pantoprazole Sodium 40 MG Oral Tab EC Take 1 tablet (40 mg total) by mouth 2 (two) times daily before meals.      Clopidogrel Bisulfate 75 MG Oral Tab Take 1 tablet (75 mg total) by mouth daily.      Ergocalciferol (VITAMIN D OR) Take by mouth. Taking 1000 IU once a day       Potassium Citrate ER 10 MEQ (1080 MG) Oral Tab CR Take 1 tablet (10 mEq total) by mouth daily.      aspirin 81 MG Oral Chew Tab Chew 1 tablet (81 mg total) by mouth daily. (Patient not taking: Reported on 7/14/2024) 30 tablet 3    amLODIPine Besylate 2.5 MG Oral Tab Take 1 tablet (2.5 mg total) by mouth daily. (Patient not taking: Reported on 7/14/2024) 30  tablet 3    atenolol 50 MG Oral Tab Take 50 mg by mouth daily. (Patient not taking: Reported on 7/14/2024)      folic acid 1 MG Oral Tab Take 2 mg by mouth daily. (Patient not taking: Reported on 7/14/2024)      hydrochlorothiazide 50 MG Oral Tab Take 50 mg by mouth daily. (Patient not taking: Reported on 7/14/2024)         Review of Systems:   A comprehensive review of systems was completed.    Pertinent positives and negatives noted in the HPI.    Objective:   Physical Exam:    /68   Pulse 70   Resp 19   SpO2 100%   General: No acute distress, Alert, pleasant   Respiratory: No rhonchi, no wheezes  Cardiovascular: S1, S2. Regular rate and rhythm  Abdomen: Soft, Non-tender, non-distended, positive bowel sounds  Neuro: No new focal deficits  Extremities: Left hip with swelling, ecchymosis, tender, limited rom, distal pulses and sensation intact     Results:    Labs:      Labs Last 24 Hours:    Recent Labs   Lab 07/14/24  1319   RBC 2.90*   HGB 8.6*   HCT 26.7*   MCV 92.1   MCH 29.7   MCHC 32.2   RDW 13.6   NEPRELIM 6.74   WBC 11.5*   .0       Recent Labs   Lab 07/14/24  1319   *   BUN 27*   CREATSERUM 1.45*   EGFRCR 35*   CA 9.3   ALB 3.3*      K 4.4      CO2 26.0   ALKPHO 69   AST 5*   ALT 16   BILT 0.8   TP 6.3*       Lab Results   Component Value Date    INR 0.97 12/25/2019       Recent Labs   Lab 07/14/24  1319   TROPHS 187*       No results for input(s): \"TROP\", \"PBNP\" in the last 168 hours.    No results for input(s): \"PCT\" in the last 168 hours.    Imaging: Imaging data reviewed in Epic.    Assessment & Plan:      #Fall x2  CT brain neg for acute stroke   EKG Vent paced rhythm   Monitor on telemetry   PT/OT    #Left hip pain/ecchymosis  Xrays neg  Will check CT     #Anemia  Hg 8.6, b/l is 12 from January this year  Trend CBC    #Elevated Troponin  187 -> 151, Demand?  Denies CP  Monitor on telemetry    #Chronic A. Fib s/p PPM  #CAD s/p stents  #HFrEF  #Mod to sev MR  Plavix.  Statin, Jardiance, Imdur, Metoprolol, Entresto, Aldactone, Furosemide   Eliquis for AC    #HLD - Statin    #CKD 3A  B/l Cr of 1.14, currently 1.45    #DM Type 2 - Diet controlled     #Hypothyroidism - Synthroid   #Depression - Duloxetine           Plan of care discussed with patient, nurse, er physician,     Avelino Silveira MD    Supplementary Documentation:     The 21st Century Cures Act makes medical notes like these available to patients in the interest of transparency. Please be advised this is a medical document. Medical documents are intended to carry relevant information, facts as evident, and the clinical opinion of the practitioner. The medical note is intended as peer to peer communication and may appear blunt or direct. It is written in medical language and may contain abbreviations or verbiage that are unfamiliar.

## 2024-07-14 NOTE — ED QUICK NOTES
Orders for admission, patient is aware of plan and ready to go upstairs. Any questions, please call ED RN Loreto at extension [phone].     Patient Covid vaccination status: Fully vaccinated     COVID Test Ordered in ED: None    COVID Suspicion at Admission: N/A    Running Infusions:    sodium chloride 125 mL/hr (07/14/24 1355)        Mental Status/LOC at time of transport: A&O x3    Other pertinent information:   CIWA score: N/A   NIH score:  N/A

## 2024-07-15 ENCOUNTER — APPOINTMENT (OUTPATIENT)
Dept: CT IMAGING | Facility: HOSPITAL | Age: 89
End: 2024-07-15
Attending: INTERNAL MEDICINE
Payer: MEDICARE

## 2024-07-15 ENCOUNTER — APPOINTMENT (OUTPATIENT)
Dept: CV DIAGNOSTICS | Facility: HOSPITAL | Age: 89
End: 2024-07-15
Attending: INTERNAL MEDICINE
Payer: MEDICARE

## 2024-07-15 LAB
ANION GAP SERPL CALC-SCNC: 8 MMOL/L (ref 0–18)
BASOPHILS # BLD AUTO: 0.04 X10(3) UL (ref 0–0.2)
BASOPHILS NFR BLD AUTO: 0.5 %
BILIRUB UR QL STRIP.AUTO: NEGATIVE
BUN BLD-MCNC: 24 MG/DL (ref 9–23)
CALCIUM BLD-MCNC: 8.6 MG/DL (ref 8.5–10.1)
CHLORIDE SERPL-SCNC: 106 MMOL/L (ref 98–112)
CK SERPL-CCNC: 136 U/L
CLARITY UR REFRACT.AUTO: CLEAR
CO2 SERPL-SCNC: 26 MMOL/L (ref 21–32)
COLOR UR AUTO: YELLOW
CREAT BLD-MCNC: 1.25 MG/DL
EGFRCR SERPLBLD CKD-EPI 2021: 42 ML/MIN/1.73M2 (ref 60–?)
EOSINOPHIL # BLD AUTO: 0.21 X10(3) UL (ref 0–0.7)
EOSINOPHIL NFR BLD AUTO: 2.5 %
ERYTHROCYTE [DISTWIDTH] IN BLOOD BY AUTOMATED COUNT: 13.4 %
GLUCOSE BLD-MCNC: 120 MG/DL (ref 70–99)
GLUCOSE UR STRIP.AUTO-MCNC: >1000 MG/DL
HCT VFR BLD AUTO: 23.2 %
HGB BLD-MCNC: 7.2 G/DL
HGB BLD-MCNC: 7.3 G/DL
HGB BLD-MCNC: 7.4 G/DL
IMM GRANULOCYTES # BLD AUTO: 0.04 X10(3) UL (ref 0–1)
IMM GRANULOCYTES NFR BLD: 0.5 %
KETONES UR STRIP.AUTO-MCNC: NEGATIVE MG/DL
LEUKOCYTE ESTERASE UR QL STRIP.AUTO: NEGATIVE
LYMPHOCYTES # BLD AUTO: 2.24 X10(3) UL (ref 1–4)
LYMPHOCYTES NFR BLD AUTO: 27.2 %
MAGNESIUM SERPL-MCNC: 2.2 MG/DL (ref 1.6–2.6)
MCH RBC QN AUTO: 29.2 PG (ref 26–34)
MCHC RBC AUTO-ENTMCNC: 31.5 G/DL (ref 31–37)
MCV RBC AUTO: 92.8 FL
MONOCYTES # BLD AUTO: 0.72 X10(3) UL (ref 0.1–1)
MONOCYTES NFR BLD AUTO: 8.7 %
NEUTROPHILS # BLD AUTO: 4.99 X10 (3) UL (ref 1.5–7.7)
NEUTROPHILS # BLD AUTO: 4.99 X10(3) UL (ref 1.5–7.7)
NEUTROPHILS NFR BLD AUTO: 60.6 %
NITRITE UR QL STRIP.AUTO: NEGATIVE
OSMOLALITY SERPL CALC.SUM OF ELEC: 295 MOSM/KG (ref 275–295)
PH UR STRIP.AUTO: 6.5 [PH] (ref 5–8)
PLATELET # BLD AUTO: 258 10(3)UL (ref 150–450)
POTASSIUM SERPL-SCNC: 4.2 MMOL/L (ref 3.5–5.1)
PROT UR STRIP.AUTO-MCNC: 20 MG/DL
RBC # BLD AUTO: 2.5 X10(6)UL
RBC #/AREA URNS AUTO: >10 /HPF
SODIUM SERPL-SCNC: 140 MMOL/L (ref 136–145)
SP GR UR STRIP.AUTO: 1.02 (ref 1–1.03)
UROBILINOGEN UR STRIP.AUTO-MCNC: NORMAL MG/DL
WBC # BLD AUTO: 8.2 X10(3) UL (ref 4–11)

## 2024-07-15 PROCEDURE — 99233 SBSQ HOSP IP/OBS HIGH 50: CPT | Performed by: INTERNAL MEDICINE

## 2024-07-15 PROCEDURE — 93306 TTE W/DOPPLER COMPLETE: CPT | Performed by: INTERNAL MEDICINE

## 2024-07-15 PROCEDURE — 73706 CT ANGIO LWR EXTR W/O&W/DYE: CPT | Performed by: INTERNAL MEDICINE

## 2024-07-15 RX ORDER — TRAMADOL HYDROCHLORIDE 50 MG/1
50 TABLET ORAL EVERY 8 HOURS PRN
Status: DISCONTINUED | OUTPATIENT
Start: 2024-07-15 | End: 2024-07-15

## 2024-07-15 NOTE — PROGRESS NOTES
0833: CT scan showed no fx, just hematoma. Page sent to Dr Ingram to see if she ok for PT/OT. Also, not sure if Dr Silveira endorsed to Dr Ingram that pt's daughter was asking about a cardiology consult. Pt normally sees Dr Harris. Pt has pacemaker. Pt's daughter wondering if there could be a pacemanker issue that is a reason for her recent frequent falls. Also asking if MD wants a hgb recheck this afternoon. Awaiting response.    0841: Orders received. Cardiology consult paged. Liam AVILA on call. Awaiting response from cards.    0844: Cards will see.

## 2024-07-15 NOTE — PROGRESS NOTES
1327: PT/OT here to see pt. Pt now c/o H/A. Emesis x1. It's very hard to dertermine what are new symptoms. She also had a tramadol at 1351. Was able to sit at edge of bed, but not able to stand up. Max assist. Page sent to Dr Ingram to please review notes. Asked if he would like to order a Brain MRI to see if she had another CVA. Also maybe checking a CK, since they say they didn't know how long she was on the floor rodo at [facility].      1327: spoke to Dr Ingram. No new orders.    1540: spoke to Dr Ingram. Labs ordered.

## 2024-07-15 NOTE — PLAN OF CARE
Patient A&Ox4, forgetful at times. . Tele-V paced. Patient reports mild-mod pain, declining pain meds at this time. Pt denies any N/T. Bruising and hematoma noted to left hip. CT of hip completed. Voiding via purewick. Tolerating cardiac diet. Plan for bedrest, PT/OT evals after CT of hip is reviewed. Updated patient on plan of care, pt and daughter verbalize understanding. Call light within reach. Safety precautions in place.

## 2024-07-15 NOTE — PROGRESS NOTES
[facility]   part of [facility]     Hospitalist Progress Note     Jamee Johnson Patient Status:  Observation    1935 MRN [id number]   Location [facility] 3SW-A Attending Manpreet Ingram,    Hosp Day # 0 PCP None Pcp     Chief Complaint: Fall    Subjective:     Patient reports left hip pain. No fever, chest pain, SOB or nausea.    Objective:    Review of Systems:   A comprehensive review of systems was completed; pertinent positive and negatives stated in subjective.    Vital signs:  Temp:  [97.5 °F (36.4 °C)-98 °F (36.7 °C)] 97.5 °F (36.4 °C)  Pulse:  [68-92] 71  Resp:  [13-20] 16  BP: (103-129)/(53-83) 119/53  SpO2:  [95 %-100 %] 98 %    Physical Exam:    General: No acute distress, awake and alert but confused  Respiratory: No wheezes, no rhonchi  Cardiovascular: S1, S2, paced  Abdomen: Soft, Non-tender, non-distended, positive bowel sounds  Extremities: No edema. Left hip large area of swelling/ecchymosis     Diagnostic Data:    Labs:  Recent Labs   Lab 24  1319 07/15/24  0510   WBC 11.5* 8.2   HGB 8.6* 7.3*   MCV 92.1 92.8   .0 258.0     Recent Labs   Lab 249 07/15/24  0510   * 120*   BUN 27* 24*   CREATSERUM 1.45* 1.25*   CA 9.3 8.6   ALB 3.3*  --     140   K 4.4 4.2    106   CO2 26.0 26.0   ALKPHO 69  --    AST 5*  --    ALT 16  --    BILT 0.8  --    TP 6.3*  --      CrCl cannot be calculated (Unknown ideal weight.).    Recent Labs   Lab 24  1831   TROPHS 187* 151*     No results for input(s): \"PTP\", \"INR\" in the last 168 hours.     Microbiology  No results found for this visit on 24.    Imaging: Reviewed in Epic.    Medications:    [Held by provider] apixaban  5 mg Oral BID    atorvastatin  40 mg Oral Nightly    clopidogrel  75 mg Oral Daily    cyanocobalamin  1,000 mcg Oral Daily    DULoxetine  30 mg Oral Daily    famotidine  20 mg Oral Daily    furosemide  20 mg Oral Daily    isosorbide mononitrate ER  30 mg Oral  Daily    levothyroxine  50 mcg Oral Before breakfast    metoprolol succinate ER  100 mg Oral Daily Beta Blocker    sacubitril-valsartan  1 tablet Oral BID    spironolactone  25 mg Oral Daily    magnesium oxide  400 mg Oral Daily    sertraline  25 mg Oral Daily       Assessment & Plan:      #Fall x2  -CT brain negative for acute abnormalities  -EKG Vent paced rhythm   -Monitor on telemetry   -Interrogate pacemaker  -PT/OT     #Left hip hematoma  -CT reviewed  -Check CTA LLE hip given drop in hemoglobin  -Hold eliquis and plavix     #Acute on chronic anemia, likely due to blood loss from hematoma  -Baseline hgb is 12 from January this year  -Repeat hgb later today. Transfuse if under 7  -Iron studies reviewed, will give IV iron     #Elevated Troponin  -Trend down 187 -> 151. Possible demand?  -Echo  -Denies CP  -Monitor on telemetry     #Chronic A. Fib s/p PPM  #CAD s/p stents  #Prior ischemic cardiomyopathy with revascularization and CRT-D with normalized EF   #Mod to sev MR  -Statin, Imdur, Metoprolol, Entresto, Aldactone, Furosemide   -Hold plavix  -Hold jardiance  -Hold eliquis    #Acute metabolic encephalopathy  -Also hospital-acquired delirium contributing  -Daughter reports her memory has been slipping recently  -Outpatient neuropsych testing     #HLD   -Statin     #CKD 3  -Stable     #DM Type 2 with A1c 6.3  -Diet controlled. Can hold off accuchecks and ISS     #Hypothyroidism   -Synthroid     #Depression   -Duloxetine/zoloft    #Hypothyroidism  -Levothyroxine    #Hx of CVA  -Hold plavix. Continue statin    Discussed with patient, RN, daughter.      Manpreet Ingram DO    Supplementary Documentation:     Quality:  DVT Mechanical Prophylaxis:     Early ambuation  DVT Pharmacologic Prophylaxis   Medication    [Held by provider] apixaban (Eliquis) tab 5 mg     Code Status: Full Code  Mathis: External urinary catheter in place  YVON: TBD    Discharge is dependent on: Clinical state, consultant recs, PT/OT eval  At this  point Ms. Johnson is expected to be discharge to: TBD    The 21st Century Cures Act makes medical notes like these available to patients in the interest of transparency. Please be advised this is a medical document. Medical documents are intended to carry relevant information, facts as evident, and the clinical opinion of the practitioner. The medical note is intended as peer to peer communication and may appear blunt or direct. It is written in medical language and may contain abbreviations or verbiage that are unfamiliar.

## 2024-07-15 NOTE — OCCUPATIONAL THERAPY NOTE
OCCUPATIONAL THERAPY EVALUATION - INPATIENT     Room Number: 386/386-A  Evaluation Date: 7/15/2024  Type of Evaluation: Initial  Presenting Problem: fall, L hip hematoma, AMS    Physician Order: IP Consult to Occupational Therapy  Reason for Therapy: ADL/IADL Dysfunction and Discharge Planning    OCCUPATIONAL THERAPY ASSESSMENT   Patient is currently functioning below baseline with toileting, bathing, upper body dressing, lower body dressing, grooming, eating, bed mobility, transfers, static sitting balance, dynamic sitting balance, static standing balance, dynamic standing balance, maintaining seated position, and functional standing tolerance. Prior to admission, patient's baseline is independent for all ADLs except supervision for showering PRN.  Patient is requiring dependent assistance as a result of the following impairments: decreased functional strength, decreased functional reach, decreased endurance, pain, impaired sitting/standing balance, impaired coordination, cognitive deficits (per daughter, typically 0x4, oriented to self only this session), medical status, and decreased insight to deficits. Occupational Therapy will continue to follow for duration of hospitalization.    Patient will benefit from continued skilled OT Services to promote return to prior level of function and safety with continuous assistance and gradual rehabilitative therapy      History Related to Current Admission: Patient is a 88 year old female admitted on 7/14/2024 with Presenting Problem: fall, L hip hematoma, AMS. Head CT negative for acute process, L hip xray negative for fx, L hip CT shows hematoma; per hospitalist note, patient also with acute metabolic encephalopathy. Co-Morbidities : CVA, HTN, HLD, DM    WEIGHT BEARING RESTRICTION  Weight Bearing Restriction: L lower extremity           L Lower Extremity: Weight Bearing as Tolerated    Recommendations for nursing staff:   Transfers: total lift  Toileting location: bed  level    EVALUATION SESSION:  Patient Start of Session: supine  FUNCTIONAL TRANSFER ASSESSMENT  Sit to Stand: Edge of Bed  Edge of Bed: Maximum Assist (mod x1 plus min x1, bed height elevated)    BED MOBILITY  Supine to Sit : Dependent (max x2)  Sit to Supine (OT): Dependent (max x2)  Scooting: Dependent    BALANCE ASSESSMENT  Static Sitting: Minimal Assist (CGA to min due to R-side lean)  Static Standing: Maximum Assist (Right and posterior lean)    FUNCTIONAL ADL ASSESSMENT  Grooming Seated: Minimal Assist (bringing washcloth and kleenex to face with R hand, greatly increased time)  LB Dressing Seated: Dependent (total adjusting B socks)      ACTIVITY TOLERANCE:                          O2 SATURATIONS       COGNITION  Overall Cognitive Status:  Impaired  Arousal/Alertness:  delayed responses to stimuli, inconsistent responses to stimuli, and lethargic  Attention Span:  difficulty attending to directions and difficulty dividing attention  Orientation Level:  oriented to person, disoriented to place, disoriented to time, disoriented to situation, and thought she was on a boat, year was 1924  Following Commands:  followed one-step commands intermittently with increased time and cueing  Initiation: hand over hand to initiate tasks  Motor Planning: impaired  Awareness of Deficits:  not aware of deficits    Upper Extremity   ROM: unable to formally assess due to difficulty following commands, noted B shoulder flexion at least 50% AROM with increased time, all others full ROM with increased time; mild RA deformity L hand, baseline per daughter  Strength: unable to formally assess due to difficulty following commands, noted to move all UE joints against gravity except B shoulders with limited ROM    EDUCATION PROVIDED  Patient: Role of Occupational Therapy; Functional Transfer Techniques; Fall Prevention; Posture/Positioning  Patient's Response to Education: -- (patient lethargic, poor understanding; daughter at bedside  with good understanding)    Equipment used: RW  Demonstrates functional use, Would benefit from additional trial      Therapist comments: Patient lethargic, eyes open most of session but following <25% one-step commands, answering <25% simple questions; per daughter at bedside, this is not baseline for patient, states patient is hard of hearing but will typically still be able to hear enough to respond even without her hearing aides in, also usually oriented x4 (only oriented to self this session). Patient also noted with R-side lean this session both sitting/standing and reported dizziness and headache; BP WNL, RN notified. Patient requiring max x2 for bed mobility, max assist for sit-to-stand from EOB x2 demos in preparation for standing ADLs; patient unable to take steps, fatiguing quickly, max assist required to control descent to edge of bed. Patient noted with continued R-side lean in supine, positioned for comfort with pillows; daughter notes at end of session that patient does tend to have a R-side lean at baseline despite otherwise L-sided weakness from old CVA. Attempted neuro assessment this session but difficult as patient following only minimal commands. Patient also with emesis while sitting EOB, able to bring washcloth to mouth for facial hygiene, RN present and aware. Daughter present and involved throughout session, RN aware of session. Will continue to follow.     Patient End of Session: In bed;Needs met;Call light within reach;RN aware of session/findings;All patient questions and concerns addressed;Alarm set    OCCUPATIONAL PROFILE    HOME SITUATION  Type of Home: Assisted living facility ([facility])  Home Layout: One level  Lives With: Staff 24 hours;Alone    Toilet and Equipment: Comfort height toilet;Grab bar  Shower/Tub and Equipment: Walk-in shower;Grab bar;Shower chair  Other Equipment: None    Occupation/Status: retired     Drives: No  Patient Regularly Uses: Hearing  aides    Prior Level of Function: Per daughter at bedside as patient is a poor historian at this time, patient typically independent in all BADL and functional mobility via rollator prior to admission. She occasionally receives supervision for showering, however usually prefers to shower independently. Sometimes sleeps in lift recliner. Daughter reports patient typically alert and oriented x4.     SUBJECTIVE   \"I'm on a boat.\"    PAIN ASSESSMENT  Rating: Unable to rate  Location: headache, L hip  Management Techniques: Activity promotion;Relaxation;Repositioning;Nurse notified    OBJECTIVE  Precautions: Bed/chair alarm;Hard of hearing  Fall Risk: High fall risk      ASSESSMENTS    AM-PAC ‘6-Clicks’ Inpatient Daily Activity Short Form  -   Putting on and taking off regular lower body clothing?: Total  -   Bathing (including washing, rinsing, drying)?: Total  -   Toileting, which includes using toilet, bedpan or urinal? : Total  -   Putting on and taking off regular upper body clothing?: Total  -   Taking care of personal grooming such as brushing teeth?: A Lot  -   Eating meals?: A Lot    AM-PAC Score:  Score: 8  Approx Degree of Impairment: 85.69%  Standardized Score (AM-PAC Scale): 22.86    ADDITIONAL TESTS     NEUROLOGICAL FINDINGS      COGNITION ASSESSMENTS       PLAN  OT Treatment Plan: Balance activities;ADL training;Functional transfer training;Endurance training;Patient/Family education;Patient/Family training;Compensatory technique education  Rehab Potential : Good  Frequency: 3x/week  Number of Visits to Meet Established Goals: 5    ADL GOALS   Patient will perform lower body dressing with mod assist  Patient will perform toileting with mod assist    FUNCTIONAL TRANSFER GOALS   Patient will perform supine to sit with mod assist  Patient will perform sit to supine with mod assist  Patient will transfer to commode with mod assist    ADDITIONAL GOAL  Patient will participate in cognitive assessment    Patient  Evaluation Complexity Level:   Occupational Profile/Medical History LOW - Brief history including review of medical or therapy records    Specific performance deficits impacting engagement in ADL/IADL LOW  1 - 3 performance deficits    Client Assessment/Performance Deficits LOW - No comorbidities nor modifications of tasks    Clinical Decision Making LOW - Analysis of occupational profile, problem-focused assessments, limited treatment options    Overall Complexity LOW     OT Session Time: 40 minutes  Therapeutic Activity: 15 minutes

## 2024-07-15 NOTE — PLAN OF CARE
ER admission d/t multiple falls resulting in contusion of left hip. Patient alert and oriented x4. Room air. Continuous pulse oximeter. Telemetry monitoring. V-paced. Non-cardiac electrolyte protocol. Last bowel movement 7/12. Regular diet with cardiac restrictions. CT of hip pending. Bedrest until CT results reviewed. Reviewed medications over the phone with [facility] due to paperwork from facility not available. Updated family at bedside on plan of care.

## 2024-07-15 NOTE — CM/SW NOTE
Therapy indicating pt would benefit from inpatient rehab setting at [facility].  Reviewed notes and pt very lethargic with minimal participation in therapy.  Spoke with pt's dtr who was present during assessment.  She stated pt had just received pain medications and she does not feel it was a true assessment.  Plan to send [facility] referrals and follow up on pt's progress with therapy tomorrow.    Referral sent to [facility] facilities via [facility].  PASRR completed and added to referral.  Message sent to [facility].  / to remain available for support and/or discharge planning.     Karine Alvarez, [facility]  Discharge Planner  [phone]

## 2024-07-15 NOTE — PHYSICAL THERAPY NOTE
PHYSICAL THERAPY EVALUATION - INPATIENT     Room Number: 386/386-A  Evaluation Date: 7/15/2024  Type of Evaluation: Initial  Physician Order: PT Eval and Treat    Presenting Problem: Unwitnessed fall yesterday onto buttocks, extensive bruising to left side  Co-Morbidities : CVA, HTN, HLD, DM  Reason for Therapy: Mobility Dysfunction and Discharge Planning    CT HIP BONE (LEFT) 7/14/24: Hematoma overlying the left greater trochanter     XRAY FEMUR (LEFT): negative for fracture or malalignment    CT BRAIN/HEAD: (-) for acute processes    PHYSICAL THERAPY ASSESSMENT   Patient is currently functioning below baseline with bed mobility, transfers, gait, maintaining seated position, and standing prolonged periods.  Prior to admission, patient's baseline is [place] with rollator.  Patient is requiring moderate assist and maximum assist as a result of the following impairments: decreased functional strength, decreased endurance/aerobic capacity, pain, impaired static and dynamic sitting/standing balance, impaired coordination, impaired motor planning, decreased muscular endurance, and cognitive deficits (incr confusion, alert/oriented to self, decr alertness/responsiveness). Physical Therapy will continue to follow for duration of hospitalization.      Patient will benefit from continued skilled PT Services to promote return to prior level of function and safety with continuous assistance and gradual rehabilitative therapy .    PLAN  PT Treatment Plan: Bed mobility;Body mechanics;Coordination;Endurance;Energy conservation;Patient education;Family education;Gait training;Neuromuscular re-educate;Range of motion;Transfer training;Balance training;Strengthening  Rehab Potential : Guarded  Frequency (Obs): 3-5x/week  Number of Visits to Meet Established Goals: 5      CURRENT GOALS    Goal #1 Patient is able to demonstrate supine - sit EOB @ level: supervision     Goal #2 Patient is able to demonstrate transfers Sit to/from Stand at  assistance level: supervision   Goal #3 Patient is able to ambulate 50 feet with assist device: walker - rolling at assistance level: supervision   Goal #4 Pt able to ambulate 150 feet with RW at supervision.   Goal #5    Goal #6    Goal Comments: Goals established on 7/15/2024    PHYSICAL THERAPY MEDICAL/SOCIAL HISTORY  History related to current admission: Patient is a 88 year old female admitted on 7/14/2024 from home for s/p fall.  Pt diagnosed with hematoma/contusion of left hip.    HOME SITUATION  Type of Home: Assisted living facility ([facility])   Home Layout: One level                Lives With: Staff 24 hours;Alone  Drives: No  Patient Owned Equipment: Rollator  Patient Regularly Uses: Hearing aides    Prior Level of [place]:   Per pt's daughter (pt unable to provide info due to current mentation status).   Lives at [facility] - in supportive living environment (has assistance with medication management and showers). Ambulates with rollator at baseline. Typically A&O x 4. Hx of CVA with some residual left side weakness. No other recent falls that daughter knows of. Per dtr, unsure of how long pt was down for after the fall - she was found in the bathroom. Has lift recliner chair, sometimes sleeps in that sometimes sleeps in flat bed. Wears hearing aides- can't find one of them.     SUBJECTIVE  \"I hurt\"  \"I'm on a boat.\"   \"It's 1923.\"    OBJECTIVE  Precautions: Bed/chair alarm;Hard of hearing  Fall Risk: High fall risk    WEIGHT BEARING RESTRICTION  Weight Bearing Restriction: L lower extremity           L Lower Extremity: Weight Bearing as Tolerated    PAIN ASSESSMENT  Rating: Unable to rate  Location: left hip  Management Techniques: Activity promotion;Body mechanics;Repositioning    COGNITION  Overall Cognitive Status:  Impaired  Arousal/Alertness:  lethargic  Attention Span:  difficulty attending to directions  Orientation Level:  oriented to person, disoriented to place, disoriented to  time, and disoriented to situation  Memory:  impaired working memory and decreased recall of recent events  Following Commands:  follows one-step commands inconsistently  Initiation: cues to initiate tasks and hand over hand to initiate tasks  Motor Planning: impaired  Awareness of Errors:  assistance required to identify errors made, assistance required to correct errors made, and decreased awareness of errors   Awareness of Deficits:  decreased awareness of deficits  Problem Solving:  assistance required to identify errors made, assistance required to generate solutions, and assistance required to implement solutions    RANGE OF MOTION AND STRENGTH ASSESSMENT  Upper extremity ROM and strength are within functional limits   Lower extremity ROM is within functional limits   Lower extremity strength is within functional limits     BALANCE  Static Sitting: Fair -  Dynamic Sitting: Poor +  Static Standing: Poor +  Dynamic Standing: Not tested    ADDITIONAL TESTS                                    ACTIVITY TOLERANCE                         O2 WALK       NEUROLOGICAL FINDINGS                        AM-PAC '6-Clicks' INPATIENT SHORT FORM - BASIC MOBILITY  How much difficulty does the patient currently have...  Patient Difficulty: Turning over in bed (including adjusting bedclothes, sheets and blankets)?: Unable   Patient Difficulty: Sitting down on and standing up from a chair with arms (e.g., wheelchair, bedside commode, etc.): A Lot   Patient Difficulty: Moving from lying on back to sitting on the side of the bed?: A Lot   How much help from another person does the patient currently need...   Help from Another: Moving to and from a bed to a chair (including a wheelchair)?: A Lot   Help from Another: Need to walk in hospital room?: Total   Help from Another: Climbing 3-5 steps with a railing?: Total       AM-PAC Score:  Raw Score: 9   Approx Degree of Impairment: 81.38%   Standardized Score (AM-PAC Scale): 30.55   CMS  Modifier (G-Code): CM    FUNCTIONAL ABILITY STATUS  Gait Assessment   Functional Mobility/Gait Assessment  Gait Assistance: Not tested    Skilled Therapy Provided     Bed Mobility:  Rolling: nt  Supine to sit: maxA x 2 to reach static sitting EOB   Sit to supine: maxA x 2 and totalA for boosting up in bed      Transfer Mobility:  Sit to stand: maxA to RW- v/c for hand placement   Stand to sit: maxA   Gait = NT    Therapist's Comments:   Patient presents sitting up in bed eating. Daughter present at bedside. Discussed role and goal of physical therapy in hospital setting. At rest, denies pain. Pt lethargic, not answering questions when asked, eyes closed. Oriented to self only- thought she was on a boat, year of 1924.   Bed mobility with HOB elevated at maxA x 2 to reach sitting EOB. During transfer, pt yelling out in pain. When asked where she was having pain pt unable to provide answer. When asked if she could point to the pain she was not able to. Sitting EOB, continues to open eyes intermittently. Pt then complained of a headache. In sitting, pt was trying to take something out of her R hand although there was nothing there. Reports dizziness- BP WNL. Sit to stand to RW at maxA, demonstrates posterior and right lean in standing, cues to weight shift forward however pt unable to do so. Returned sitting. Pt then had bout of emesis- RN aware and now present in room. Sat EOB x 10 minutes, demonstrates lean to R. During session followed less than 25% of commands/answering questions. Returned supine in bed at maxA x 2. totalA to boost in bed. Daughter present in room at end of session. Discussed with RN.     Exercise/Education Provided:  Bed mobility  Body mechanics  Energy conservation  Functional activity tolerated  Posture  Transfer training    Patient End of Session: In bed;Needs met;Call light within reach;RN aware of session/findings;All patient questions and concerns addressed;Alarm set;SCDs in place;Family  present;Discussed recommendations with /    Patient Evaluation Complexity Level:  History Moderate - 1 or 2 personal factors and/or co-morbidities   Examination of body systems Moderate - addressing a total of 3 or more elements   Clinical Presentation  Moderate - Evolving   Clinical Decision Making Moderate Complexity     PT Session Time: 30 minutes  Therapeutic Activity: 15 minutes

## 2024-07-15 NOTE — CM/SW NOTE
07/15/24 1400   CM/SW Referral Data   Referral Source Social Work (self-referral)   Reason for Referral Discharge planning   Informant Daughter   Patient Info   Patient's Current Mental Status at Time of Assessment Alert;Memory Impairments   Patient's Home Environment Assisted Living   Post Acute Care Provider Upon Admission   ([facility])   Number of Levels in Home 1   Patient lives with Alone   Patient Status Prior to Admission   Independent with ADLs and Mobility No   Pt. requires assistance with Meals;Driving;Housework;Bathing;Medications;Finances   Services in place prior to admission DME/Supplies at home   Type of DME/Supplies Wheeled Walker   Discharge Needs   Anticipated D/C needs To be determined       Met with pt's dtr, Lo to discuss DC planning.  Pt is an 87 y/o woman admitted with falls and hip hematoma.  Pt resides at [facility] and receives supportive care, assistance with medications and showering.  She normally ambulates independent with a walker and manages her own dressing and toileting.  Pt's dtr stated that pt has been incontinent and has been having increasing falls recently.      Therapy evals are pending.  Discussed possible DC needs including return to [facility] with on site therapy vs possible [facility].  Insurance auth would be needed for [facility].  Pt's dtr is hopeful that pt can return to [facility] with on site therapy services.  Await therapy assessments for further DC planning.  / to remain available for support and/or discharge planning.     Karine Alvarez, [facility]  Discharge Planner  [phone]

## 2024-07-15 NOTE — CONSULTS
Cardiology Consultation    Jamee Johnson Patient Status:  Observation    1935 MRN [id number]   Location [facility] 3SW-A Attending Manpreet Ingram DO   Hosp Day # 0 PCP None Pcp     Reason for Consultation:  mechanical fall, hematoma.  Eliquis and plavix      History of Present Illness:  Jamee Johnson is a a(n) 88 year old female. Nice elderly woman, here with her daughter.  She sees Dr. Verdugo.  Remote PCI's permanent afib.  Has BiVAICD, pacemaker dependent.  EF is preserved.  Prior CVA's, on eliquis and plavix.  She fell a few days ago and also had another fall .  Pain in both hips, and imaging revealed no fracture but large hematoma.  Hb has dropped 4 grams.  She has some confusion now, but denies light headedness, recalls falls.    History:  Past Medical History:    Calculus of kidney    CVA (cerebral vascular accident) (HCC)    Depression    Diabetes (HCC)    Esophageal reflux    High blood pressure    High cholesterol    HLD (hyperlipidemia)    HTN (hypertension)    Incontinence    Kidney stones    Neuropathy    Osteoarthritis    Stroke (HCC)    Visual impairment     Past Surgical History:   Procedure Laterality Date    Excis lumbar disk,one level      Knee replacement surgery      Removal gallbladder      Total abdom hysterectomy       Family History   Problem Relation Age of Onset    Hypertension Mother     Heart Disorder Mother     Diabetes Paternal Grandmother     Cancer Sister         kidney CA    Stroke Sister     Diabetes Paternal Aunt          Allergies:  Allergies   Allergen Reactions    Statins OTHER (SEE COMMENTS)     Gets extremely weak    Sulfa Antibiotics RASH       Medications:   sodium ferric gluconate  125 mg Intravenous Daily    [Held by provider] apixaban  5 mg Oral BID    atorvastatin  40 mg Oral Nightly    [Held by provider] clopidogrel  75 mg Oral Daily    cyanocobalamin  1,000 mcg Oral Daily    DULoxetine  30 mg Oral Daily    famotidine  20 mg Oral Daily     furosemide  20 mg Oral Daily    isosorbide mononitrate ER  30 mg Oral Daily    levothyroxine  50 mcg Oral Before breakfast    metoprolol succinate ER  100 mg Oral Daily Beta Blocker    sacubitril-valsartan  1 tablet Oral BID    spironolactone  25 mg Oral Daily    magnesium oxide  400 mg Oral Daily    sertraline  25 mg Oral Daily       Continuous Infusions:      Social History:   reports that she has never smoked. She has never used smokeless tobacco. She reports that she does not drink alcohol and does not use drugs.    Review of Systems:  All systems were reviewed and are negative except as described above in HPI.    Physical Exam:      Temp:  [97.3 °F (36.3 °C)-98 °F (36.7 °C)] 97.3 °F (36.3 °C)  Pulse:  [68-92] 68  Resp:  [13-20] 18  BP: (103-129)/(53-83) 111/59  SpO2:  [95 %-100 %] 100 %    Last 3 Weights   07/14/24 1735 154 lb 14.4 oz (70.3 kg)   12/25/19 2300 165 lb 1.6 oz (74.9 kg)   12/25/19 2110 162 lb 14.7 oz (73.9 kg)           General: No apparent distress  HEENT: No focal deficits.  Neck: supple. JVP normal  Cardiac: Regular rhythm, S1, S2 normal,   No rub or gallop.  No murmur.  Lungs: CTA  Abdomen: Soft, non-tender.   Extremities: No edema  Neurologic: no focal deficits  Skin: Warm and dry.          Telemetry: paced    Laboratories and Data:  Diagnostics:    EKG, 7/15/2024:  paced    CXR, 7/15/2024:      Labs:   HEM:  Recent Labs   Lab 07/14/24  1319 07/15/24  0510   WBC 11.5* 8.2   HGB 8.6* 7.3*   .0 258.0       Chem:  Recent Labs   Lab 07/14/24  1319 07/15/24  0510    140   K 4.4 4.2    106   CO2 26.0 26.0   BUN 27* 24*   CREATSERUM 1.45* 1.25*   CA 9.3 8.6   MG  --  2.2   * 120*       Recent Labs   Lab 07/14/24  1319   ALT 16   AST 5*   ALB 3.3*       No results for input(s): \"PTP\", \"INR\" in the last 168 hours.    No results for input(s): \"TROP\", \"CK\" in the last 168 hours.      Impression:   Mechanical falls with hip contusion, acute blood loss anemia - on eliquis and  plavix.  Remote h/o multivessel stenting.  Permanent afib  BiV AICD - per notes, placed elsewhere with details unknnown.  EF historically preserved.  AICD interrogation today - shows 100% pacing, no events.  Encephalopathy - hospital borne?  Borderline troponins - appear to be chronic.    Plan:  Discussed with daughter at the bedside. Agree with holding plavix and eliquis, following Hb.  CTA hip pending.  Repeat echo pending.  If EF remains preserved, I would favor decreasing the intensity of her medical therapies in case orthostasis played a role in her falls.  Will discuss with her daughter on dc regarding continuing eliquis long term, but has had prior CVA's in setting of chronic afib, so likely would recommend resuming.    Nilton Shah MD  7/15/2024  12:32 PM  C5

## 2024-07-15 NOTE — HISTORICAL OFFICE NOTE
Facility Logo [facility]  [address], 4th [place] [address]  [phone]      NAE Johnson  Progress Note  Demographics:  Name: NAE Johsnon YOB: 1935  Age: 88, Female Medical Record No: [id number]  Visited Date/Time: 06/04/2024 10:30 AM    Chief Complaints  6 month follow up  History of Present Illness  88-year-old female with a Medtronic ICD, diabetes, hypertension, history of atrial fibrillation and coronary disease as well as heart failure.  Patient reports her ICD was put in during a hospitalization where she had to be shocked out of an irregular rhythm.  She does not know if it was ventricular tachycardia or atrial fibrillation    She can walk 1 block but gets short of breath    No ICD shocks since the device was put in.  No signs of infection    Visit June 4 , 2024  Patient is doing well.  Device checks have been normal.  Permanent A-fib VVIR 70  - Now sees Dr. Harris in our group.  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  Social History  Smoking status Never smoked  Tobacco usage - No (Non-smoker (finding))  Review of systems  Cardiovascular No history of Chest pain, EASTON, Palpitations, Syncope, PND, Orthopnea, Edema and Claudication  Physical Examination  Vitals Right Arm Sitting  / 68 mmHg, Pulse rate 95 bpm, Height in 5' 1\", BMI: 26.8, Weight in 142 lbs (or) 64 kgs and BSA : 1.68 cc/m²  General Appearance No Acute Distress, Well groomed and Normal Body  habitus  Cardiovascular   EKG/Other abnormalities  General - NAD  PEERLA/EOMI  JVD - normal  CTA Bilaterally  CV- RRR, S1/S2, No murmurs  GI- Soft, Nontender  Extremities normal pulses  Mood/Affect Congruent  Skin no lesions  Joint/Musculoskeletal - Normal  Allergies  1.Statins-HMG-CoA Reductase Inhibitors [Snomed:50602078](Reaction:Weakness [Snomed:[id number]], Severity:Severe)  2.Sulfa (Sulfonamide Antibiotics) [Snomed:571326906](Reaction:Rash [Snomed:[id number]], Severity:Severe)  Medications  1.acetaminophen 325 mg tablet, Take 1 tablet orally once a day.  2.atorvastatin 40 mg tablet, Take 1 tablet orally once a day.  3.clopidogreL 75 mg tablet, Take 1 tablet orally once a day.  4.cyanocobalamin (vit B-12) 1,000 mcg/mL injection solution, Take 1 mL (injection) once a month  5.DULoxetine 30 mg capsule,delayed release, Take 1 capsule orally once a day.  6.Eliquis 5 mg tablet, Take 1 tablet orally 2 times a day.  7.Entresto 24 mg-26 mg tablet, Take 1 tablet orally 2 times a day.  8.famotidine 20 mg tablet, Take 1 tablet orally 2 times a day.  9.furosemide 40 mg tablet, Take 1 tablet orally three times a week. Monday, Wednesday, Friday  10.isosorbide mononitrate ER 30 mg tablet,extended release 24 hr, Take 1 tablet orally once a day.  11.Jardiance 10 mg tablet, Take 1 tablet orally once a day.  12.Klor-Con M20 mEq tablet,extended release, Take 1 tablet orally once every Monday, Wednesday, Friday  13.levothyroxine 50 mcg capsule, Take 1 capsule orally once a day.  14.sertraline 25 mg tablet, Take 1 tablet orally once a day.  15.ondansetron HCL 4 mg tablet, Take 1 tablet orally 3 times a day as needed.  16.metoprolol succinate  mg tablet,extended release 24 hr, Take 1 tablet orally 2 times a day.  17.spironolactone 25 mg tablet, Take 1 tablet orally once a day.  18.magnesium oxide 420 mg tablet, Take 1 tablet orally once a day.  19.Vitamin D3 25 mcg (1,000 unit) capsule, Take 1 capsule orally once a  day.  20.Nitrostat 0.4 mg sublingual tablet, Take 1 tablet (sublingual) once a day as needed for chest pain. she may have this at bedside  21.multivitamin tablet, Take 1 tablet orally once a day.  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  6.Hypertension (HTN), primary  7.CKD (chronic kidney disease) stage 3, GFR 30-59 ml/min  8.Acute cerebrovascular accident (CVA)  9.Atrial fibrillation, persistent - Afib  Assessment & Plan  ===============================  Cardiac Testing Personally Reviewed    ECG Reviewed -A-fib BiV paced    Echo Results []    Stress Test Results []    Monitor Results []    EP Procedures: []  ==============================    Problem List:    # Medtronic ICD  - Follows in our device clinic now     # History of permanent atrial fibrillation  - Currently on Eliquis 5 twice daily     # Coronary artery disease prior stenting  - We have obtained records.     # Congestive heart failure  - We have received old records.  Appears to have multivessel disease with stenting of multiple vessels.  CRT-D with permanent A-fib and pacemaker dependency now BiV pacing with normalized EF   # []      Continue current meds except as outlined above.  =====================================    Check out Items:  Follow-up in 6 months  Labs and Diagnostics ordered  1.EKG (electrocardiogram) (Today)  Future appointments  1.Follow up visit - Duke Verdugo MD (6 Months)  Nurses documentation  Refills: none  Upcoming surgeries: none   Use of assisted devices: none  EKG: Done  (AR, PEYTON)     Patient instructions  follow up in 6 months with Dr. Verdugo   Lab Details  FREE T4 (FREE THYROXINE)  01/23/2024 12:57:09 PM  FREE T4 0.9 0.8-1.7 ng/dL  F  CBC W/ DIFFERENTIAL  01/23/2024 12:28:48 PM  WBC 7.9 4.0-11.0 x10(3) uL  F  RED BLOOD COUNT 4.36 3.80-5.30  x10(6)uL  F  HGB 12.9 12.0-16.0 g/dL  F  HCT 41.4 35.0-48.0 %  F  PLATELETS 288.0 150.0-450.0 10(3)uL  F  MEAN CELL VOLUME 95.0 80.0-100.0 fL  F  MEAN CORPUSCULAR HEMOGLOBIN 29.6 26.0-34.0 pg  F  MEAN CORPUSCULAR HGB CONC 31.2 31.0-37.0 g/dL  F  RED CELL DISTRIBUTION WIDTH CV 13.7 %  F  NEUTROPHIL ABS PRELIM 5.05 1.50-7.70 x10 (3) uL  F  NEUTROPHIL ABSOLUTE 5.05 1.50-7.70 x10(3) uL  F  LYMPHOCYTE ABSOLUTE 2.01 1.00-4.00 x10(3) uL  F  MONOCYTE ABSOLUTE 0.50 0.10-1.00 x10(3) uL  F  EOSINOPHIL ABSOLUTE 0.16 0.00-0.70 x10(3) uL  F  BASOPHIL ABSOLUTE 0.05 0.00-0.20 x10(3) uL  F  IMMATURE GRANULOCYTE COUNT 0.10 0.00-1.00 x10(3) uL  F  NEUTROPHIL % 64.2 %  F  LYMPHOCYTE % 25.5 %  F  MONOCYTE % 6.4 %  F  EOSINOPHIL % 2.0 %  F  BASOPHIL % 0.6 %  F  IMMATURE GRANULOCYTE RATIO % 1.3 %  F  Diagnostics Details  Trans Thoracic Echocardiogram 02/15/2024  1.The study quality is good.    2.The left ventricle is normal in size. Global left ventricular systolic function is normal. Left ventricular diastolic function is indeterminate. Mild concentric left ventricular hypertrophy is present. The left ventricular ejection fraction is 60-65%. No regional wall motion abnormality is noted.    3.The right ventricle is normal in size. Right ventricular systolic function is normal. A linear echo density is noted in the right ventricle, suggestive of a pacemaker lead.    4.The left atrium is moderately enlarged based on the left atrium volume index of 48.0ml/m².    5. Mild mitral annular calcification is noted. Mild (1+) mitral regurgitation.    6.Diffuse thickening of the aortic valve cusps is noted with normal cuspal excursion.    CPOE Orders carried out by: Adelita WISE  Care Providers: Duke Verdugo MD and Adelita WISE  Electronically Authenticated by  Duke Verdugo MD  06/06/2024 06:45:06 AM  Disclaimer: Components of this note were documented using voice recognition system and are subject to errors not corrected at  proofreading. Contact the author of this note for any clarifications.

## 2024-07-16 ENCOUNTER — APPOINTMENT (OUTPATIENT)
Dept: CT IMAGING | Facility: HOSPITAL | Age: 89
End: 2024-07-16
Attending: HOSPITALIST
Payer: MEDICARE

## 2024-07-16 PROBLEM — R53.83 LETHARGY: Status: ACTIVE | Noted: 2024-07-16

## 2024-07-16 LAB
AMMONIA PLAS-MCNC: 16 UMOL/L (ref 11–32)
ANION GAP SERPL CALC-SCNC: 6 MMOL/L (ref 0–18)
ARTERIAL PATENCY WRIST A: POSITIVE
BASE EXCESS BLDA CALC-SCNC: 2.3 MMOL/L (ref ?–2)
BASOPHILS # BLD AUTO: 0.03 X10(3) UL (ref 0–0.2)
BASOPHILS NFR BLD AUTO: 0.4 %
BODY TEMPERATURE: 98.6 F
BUN BLD-MCNC: 19 MG/DL (ref 9–23)
CA-I BLD-SCNC: 1.23 MMOL/L (ref 0.95–1.32)
CALCIUM BLD-MCNC: 8.9 MG/DL (ref 8.5–10.1)
CHLORIDE SERPL-SCNC: 106 MMOL/L (ref 98–112)
CO2 SERPL-SCNC: 26 MMOL/L (ref 21–32)
COHGB MFR BLD: 3.3 % SAT (ref 0–3)
CREAT BLD-MCNC: 1.1 MG/DL
EGFRCR SERPLBLD CKD-EPI 2021: 49 ML/MIN/1.73M2 (ref 60–?)
EOSINOPHIL # BLD AUTO: 0.03 X10(3) UL (ref 0–0.7)
EOSINOPHIL NFR BLD AUTO: 0.4 %
ERYTHROCYTE [DISTWIDTH] IN BLOOD BY AUTOMATED COUNT: 14 %
GLUCOSE BLD-MCNC: 145 MG/DL (ref 70–99)
GLUCOSE BLD-MCNC: 147 MG/DL (ref 70–99)
HCO3 BLDA-SCNC: 26.7 MEQ/L (ref 21–27)
HCT VFR BLD AUTO: 23.1 %
HGB BLD-MCNC: 7.2 G/DL
HGB BLD-MCNC: 7.4 G/DL
IMM GRANULOCYTES # BLD AUTO: 0.06 X10(3) UL (ref 0–1)
IMM GRANULOCYTES NFR BLD: 0.7 %
LACTATE BLD-SCNC: 0.7 MMOL/L (ref 0.5–2)
LYMPHOCYTES # BLD AUTO: 1.94 X10(3) UL (ref 1–4)
LYMPHOCYTES NFR BLD AUTO: 23.4 %
MCH RBC QN AUTO: 30 PG (ref 26–34)
MCHC RBC AUTO-ENTMCNC: 32 G/DL (ref 31–37)
MCV RBC AUTO: 93.5 FL
METHGB MFR BLD: 1 % SAT (ref 0.4–1.5)
MONOCYTES # BLD AUTO: 0.74 X10(3) UL (ref 0.1–1)
MONOCYTES NFR BLD AUTO: 8.9 %
NEUTROPHILS # BLD AUTO: 5.48 X10 (3) UL (ref 1.5–7.7)
NEUTROPHILS # BLD AUTO: 5.48 X10(3) UL (ref 1.5–7.7)
NEUTROPHILS NFR BLD AUTO: 66.2 %
OSMOLALITY SERPL CALC.SUM OF ELEC: 291 MOSM/KG (ref 275–295)
OXYHGB MFR BLDA: 95.7 % (ref 92–100)
PCO2 BLDA: 37 MM HG (ref 35–45)
PH BLDA: 7.46 [PH] (ref 7.35–7.45)
PLATELET # BLD AUTO: 265 10(3)UL (ref 150–450)
PO2 BLDA: 85 MM HG (ref 80–100)
POTASSIUM BLD-SCNC: 4.1 MMOL/L (ref 3.6–5.1)
POTASSIUM SERPL-SCNC: 3.9 MMOL/L (ref 3.5–5.1)
RBC # BLD AUTO: 2.47 X10(6)UL
SODIUM BLD-SCNC: 136 MMOL/L (ref 135–145)
SODIUM SERPL-SCNC: 138 MMOL/L (ref 136–145)
WBC # BLD AUTO: 8.3 X10(3) UL (ref 4–11)

## 2024-07-16 PROCEDURE — 99291 CRITICAL CARE FIRST HOUR: CPT | Performed by: HOSPITALIST

## 2024-07-16 PROCEDURE — 99232 SBSQ HOSP IP/OBS MODERATE 35: CPT | Performed by: INTERNAL MEDICINE

## 2024-07-16 PROCEDURE — 70450 CT HEAD/BRAIN W/O DYE: CPT | Performed by: HOSPITALIST

## 2024-07-16 NOTE — PROGRESS NOTES
Called pt's daughter, Lo, and updated her with current plan of care.     Lo would like us to NOT give pt tramadol and to try just plain extra strength tylenol so pt can work with therapy. She states that she forgot that Jamee has reacted so poorly to pain meds in the past.     Will endorse to next RN to discuss pain management options with Dr Ingram in the morning.

## 2024-07-16 NOTE — PHYSICAL THERAPY NOTE
IP PT following, per RN, pt not appropriate d/t continued lethargy. Will continue to follow and progress as appropriate. Of note, RRT called this AM d/t change of mental status. CT brain (-)

## 2024-07-16 NOTE — PROGRESS NOTES
[facility]   part of [facility]     Hospitalist Progress Note     Jamee Johnson Patient Status:  Observation    1935 MRN [id number]   Location [facility] 3SW-A Attending Manpreet Ingram DO   Hosp Day # 0 PCP None Pcp     Chief Complaint: Fall    Subjective:     Patient had RRT for increased lethargy. ABG and CT head unremarkable. Did not receive narcotic or benzodiazepines overnight. She is more alert now. Pain controlled.     Objective:    Review of Systems:   A comprehensive review of systems was completed; pertinent positive and negatives stated in subjective.    Vital signs:  Temp:  [97.3 °F (36.3 °C)-98.3 °F (36.8 °C)] 98.1 °F (36.7 °C)  Pulse:  [68-74] 69  Resp:  [17-18] 17  BP: (110-135)/(47-66) 116/47  SpO2:  [97 %-100 %] 97 %    Physical Exam:    General: No acute distress, awake and alert but confused  Respiratory: No wheezes, no rhonchi  Cardiovascular: S1, S2, paced  Abdomen: Soft, Non-tender, non-distended, positive bowel sounds  Extremities: No edema. Left hip large area of swelling/ecchymosis     Diagnostic Data:    Labs:  Recent Labs   Lab 24  1319 07/15/24  0510 07/15/24  1344 07/15/24  1857 24  0447   WBC 11.5* 8.2  --   --  8.3   HGB 8.6* 7.3* 7.4* 7.2* 7.4*   MCV 92.1 92.8  --   --  93.5   .0 258.0  --   --  265.0     Recent Labs   Lab 24  1319 07/15/24  0510 24  0447   * 120* 147*   BUN 27* 24* 19   CREATSERUM 1.45* 1.25* 1.10*   CA 9.3 8.6 8.9   ALB 3.3*  --   --     140 138   K 4.4 4.2 3.9    106 106   CO2 26.0 26.0 26.0   ALKPHO 69  --   --    AST 5*  --   --    ALT 16  --   --    BILT 0.8  --   --    TP 6.3*  --   --      CrCl cannot be calculated (Unknown ideal weight.).    Recent Labs   Lab 24  1319 24  1831 07/15/24  0510   TROPHS 187* 151*  --    CK  --   --  136     No results for input(s): \"PTP\", \"INR\" in the last 168 hours.     Microbiology  No results found for this visit on 24.    Imaging:  Reviewed in Epic.    Medications:    sodium ferric gluconate  125 mg Intravenous Daily    lidocaine-menthol  1 patch Transdermal Daily    atorvastatin  40 mg Oral Nightly    [Held by provider] clopidogrel  75 mg Oral Daily    cyanocobalamin  1,000 mcg Oral Daily    DULoxetine  30 mg Oral Daily    famotidine  20 mg Oral Daily    furosemide  20 mg Oral Daily    levothyroxine  50 mcg Oral Before breakfast    metoprolol succinate ER  100 mg Oral Daily Beta Blocker    magnesium oxide  400 mg Oral Daily    sertraline  25 mg Oral Daily       Assessment & Plan:      #Fall x2  -CT brain negative for acute abnormalities  -EKG paced rhythm   -Monitor on telemetry   -PPM interrogated - no events  -PT/OT     #Left hip hematoma  -CT reviewed  -CTA LLE with possible small bleed. Discussed with IR, if actively bleeding then it is very small and should tamponade. Hgb is stable  -Hold eliquis and plavix     #Acute on chronic anemia, likely due to blood loss from hematoma  -Baseline hgb is 12 from January this year  -Hgb now stable  -Iron studies reviewed, will give IV iron     #Elevated Troponin  -Trend down 187 -> 151. Possible demand?  -Echo unremarkable  -Denies CP  -Monitor on telemetry     #Chronic A. Fib s/p PPM  #CAD s/p stents  #Prior ischemic cardiomyopathy with revascularization and CRT-D with normalized EF   #Mod to sev MR  -Statin, Metoprolol, Furosemide   -Discontinue imdur, entresto and aldactone  -Hold plavix for at least a few more days  -Hold jardiance  -Hold eliquis (possibly indefinitely, discussed with family risk/benefits)    #Acute metabolic encephalopathy  -Also hospital-acquired delirium contributing  -Daughter reports her memory has been slipping recently  -Outpatient neuropsych testing  -CT head, ammonia, ABG unremarkable  -Avoid narcotics     #HLD   -Statin     #CKD 3  -Stable     #DM Type 2 with A1c 6.3  -Diet controlled. Can hold off accuchecks and ISS     #Hypothyroidism   -Synthroid     #Depression    -Duloxetine/zoloft    #Hypothyroidism  -Levothyroxine    #Hx of CVA  -Hold plavix. Continue statin    Discussed with patient, RN, son-in-law, Dr. Shah.      Manpreet Ingram, DO    Supplementary Documentation:     Quality:  DVT Mechanical Prophylaxis:   SCDs, Early ambuation  DVT Pharmacologic Prophylaxis   Medication   None     Code Status: Full Code  Mathis: External urinary catheter in place  YVON: TBD    Discharge is dependent on: Clinical state, consultant recs, stable hgb  At this point Ms. Johnson is expected to be discharge to: [facility]    The 21st Century Cures Act makes medical notes like these available to patients in the interest of transparency. Please be advised this is a medical document. Medical documents are intended to carry relevant information, facts as evident, and the clinical opinion of the practitioner. The medical note is intended as peer to peer communication and may appear blunt or direct. It is written in medical language and may contain abbreviations or verbiage that are unfamiliar.

## 2024-07-16 NOTE — CODE DOCUMENTATION
[facility]     Jamee Johnson Patient Status:  Observation    1935 MRN [id number]   Location [facility] 3SW-A Attending Manpreet Ingram,    Hosp Day # 0 PCP None Pcp     Reason for RRT: Increased lethergy    Physical Exam:    /66 (BP Location: Right arm)   Pulse 70   Temp 98.3 °F (36.8 °C) (Oral)   Resp 18   Wt 154 lb 14.4 oz (70.3 kg)   SpO2 97%   BMI 29.27 kg/m²   General: Pt responds to sternal rub, but quickly falls back asleep  Respiratory: CTAB  Cardiovascular: CVS  Abdomen: Soft NT/ND  Neurologic: moving all extremities spontaneously.   Extremities: No C/C?E    Diagnostic Data:      Labs: Reviewed    Imaging: reviewed    ASSESSMENT / PLAN:     Increased lethargy  -Patient received no narcotics or any benzodiazepines overnight.  -ABG shows no hypoxia or hypercarbia  -Mild elevation in carboxyhemoglobin not sure this is even significant.  -CT scan of the brain was negative for anything acute.      Critical care time: 35 minutes  [id number]    Corby Burns,   2024

## 2024-07-16 NOTE — OCCUPATIONAL THERAPY NOTE
Attempted to see pt for skilled OT services this date. Pt is currently not appropriate due to AMS and lethargy per RN. Will re-attempt tomorrow per request and as schedule allows. Jordana Reddy, 07/16/24, 2:58 PM

## 2024-07-16 NOTE — OCCUPATIONAL THERAPY NOTE
OCCUPATIONAL THERAPY TREATMENT NOTE - INPATIENT     Room Number: 386/386-A  Session: 1   Number of Visits to Meet Established Goals: 5    Presenting Problem: fall, L hip hematoma, AMS      ASSESSMENT   Patient demonstrates limited progress this session, goals remain in progress.    Patient continues to function below baseline with toileting, bathing, upper body dressing, lower body dressing, grooming, eating, bed mobility, transfers, static sitting balance, dynamic sitting balance, static standing balance, dynamic standing balance, maintaining seated position, and functional standing tolerance.   Contributing factors to remaining limitations include decreased functional strength, decreased functional reach, decreased endurance, pain, impaired sitting balance, decreased muscular endurance, cognitive deficits (very lethargic), and decreased safety awareness.  Next session anticipate patient to progress bed mobility, transfers, and static sitting balance.  Occupational Therapy will continue to follow patient for duration of hospitalization.    Patient continues to benefit from continued skilled OT services: to promote return to prior level of function and safety with continuous assistance and gradual rehabilitative therapy.        OT Device Recommendations: TBD    History: Patient is a 88 year old female admitted on 7/14/2024 with Presenting Problem: fall, L hip hematoma, AMS. Head CT negative for acute process, L hip xray negative for fx, L hip CT shows hematoma; per hospitalist note, patient also with acute metabolic encephalopathy. Co-Morbidities : CVA, HTN, HLD, DM    Addendum 7/16: patient with RRT called this am for decreased responsiveness; brain CT negative for acute process    WEIGHT BEARING RESTRICTION  Weight Bearing Restriction: L lower extremity           L Lower Extremity: Weight Bearing as Tolerated    Recommendations for nursing staff:   Transfers: total lift  Toileting location: bed level    TREATMENT  SESSION:  Patient Start of Session: supine    FUNCTIONAL TRANSFER ASSESSMENT  Sit to Stand: Edge of Bed  Edge of Bed: Not Tested    BED MOBILITY  Supine to Sit : Dependent (max x2)  Sit to Supine (OT): Dependent (max x2)  Scooting: Dependent with drawsheet    BALANCE ASSESSMENT  Static Sitting: Maximum Assist  Static Standing: Not Tested    FUNCTIONAL ADL ASSESSMENT  Grooming Seated: Not Tested  LB Dressing Seated: Dependent (total to don B socks)      ACTIVITY TOLERANCE:                          O2 SATURATIONS       EDUCATION PROVIDED  Patient: Role of Occupational Therapy; Functional Transfer Techniques; Fall Prevention; Posture/Positioning  Patient's Response to Education: -- (patient lethargic, no understanding noted; daughter at bedside with good understanding)      Equipment used: n/a     Therapist comments: Patient with RRT called early this am for decreased responsiveness, therapy was held; per RN, patient with improved alertness, daughter requesting therapy reattempt. Patient received in supine, daughter at bedside. Patient initially demonstrating increased alertness, able to accurately state location and follow several simple one-step commands; patient quickly becoming more fatigued, falling asleep mid-conversation, daughter requesting proceed with session in hopes patient would become more alert with transfer to edge of bed. Supine to sit with max x2 assist and use of drawsheet, patient showing signs of pain with movement of LLE, however once sitting EOB, patient quickly falling asleep again; patient sat EOB approx 5 minutes with grossly max assist, intermittently following commands to open eyes but primarily sleeping; informed daughter not safe to attempt standing at this time, daughter expressed disappointment but understanding; max x2 for return to supine. RN aware of patient's presentation this session.     Patient End of Session: In bed;Needs met;Call light within reach;RN aware of  session/findings;All patient questions and concerns addressed;Alarm set;Family present;SCDs in place    SUBJECTIVE  \"I'm at [facility].\"    PAIN ASSESSMENT  Rating: Unable to rate  Location: signs of pain with movement of L hip  Management Techniques: Activity promotion;Repositioning;Nurse notified     OBJECTIVE  Precautions: Bed/chair alarm;Hard of hearing    AM-PAC ‘6-Clicks’ Inpatient Daily Activity Short Form  -   Putting on and taking off regular lower body clothing?: Total  -   Bathing (including washing, rinsing, drying)?: Total  -   Toileting, which includes using toilet, bedpan or urinal? : Total  -   Putting on and taking off regular upper body clothing?: Total  -   Taking care of personal grooming such as brushing teeth?: Total  -   Eating meals?: Total    AM-PAC Score:  Score: 6  Approx Degree of Impairment: 100%  Standardized Score (AM-PAC Scale): 17.07    PLAN  OT Treatment Plan: Balance activities;ADL training;Functional transfer training;Endurance training;Patient/Family education;Patient/Family training;Compensatory technique education  Rehab Potential : Good  Frequency: 3x/week    Goals in progress 7/16  ADL GOALS   Patient will perform lower body dressing with mod assist  Patient will perform toileting with mod assist    FUNCTIONAL TRANSFER GOALS   Patient will perform supine to sit with mod assist  Patient will perform sit to supine with mod assist  Patient will transfer to commode with mod assist    ADDITIONAL GOAL  Patient will participate in cognitive assessment    OT Session Time: 15 minutes  Therapeutic Activity: 15 minutes

## 2024-07-16 NOTE — PROGRESS NOTES
Cardiology Progress Note        Jamee Johnson Patient Status:  Observation    1935 MRN [id number]   [facility] 3SW-A Attending Manpreet Ingram DO   Hosp Day # 0 PCP None Pcp     Subjective:  Events noted.  Had RRT due to lethargy.  Hospitalist note read.    Medications:   sodium ferric gluconate  125 mg Intravenous Daily    lidocaine-menthol  1 patch Transdermal Daily    [Held by provider] apixaban  5 mg Oral BID    atorvastatin  40 mg Oral Nightly    [Held by provider] clopidogrel  75 mg Oral Daily    cyanocobalamin  1,000 mcg Oral Daily    DULoxetine  30 mg Oral Daily    famotidine  20 mg Oral Daily    furosemide  20 mg Oral Daily    isosorbide mononitrate ER  30 mg Oral Daily    levothyroxine  50 mcg Oral Before breakfast    metoprolol succinate ER  100 mg Oral Daily Beta Blocker    [Held by provider] sacubitril-valsartan  1 tablet Oral BID    spironolactone  25 mg Oral Daily    magnesium oxide  400 mg Oral Daily    sertraline  25 mg Oral Daily       Continuous Infusions:        Allergies:  Allergies   Allergen Reactions    Statins OTHER (SEE COMMENTS)     Gets extremely weak    Sulfa Antibiotics RASH         Intake/Output:    Intake/Output Summary (Last 24 hours) at 2024 1027  Last data filed at 2024 0430  Gross per 24 hour   Intake 80 ml   Output 350 ml   Net -270 ml           Last 3 Weights   24 1735 154 lb 14.4 oz (70.3 kg)   19 2300 165 lb 1.6 oz (74.9 kg)   19 2110 162 lb 14.7 oz (73.9 kg)            Physical Exam:    Temp:  [97.3 °F (36.3 °C)-98.3 °F (36.8 °C)] 98.1 °F (36.7 °C)  Pulse:  [68-74] 69  Resp:  [17-18] 17  BP: (110-135)/(47-66) 116/47  SpO2:  [97 %-100 %] 97 %    General: No apparent distress  HEENT: No focal deficits.  Neck: supple. JVP normal  Cardiac: Regular rhythm, S1, S2 normal,  rub or gallop.  No murmur.  Lungs: CTA  Abdomen: Soft, non-tender.   Extremities: No edema  Neurologic: no focal deficits  Skin: Warm and dry.      Telemetry: paced    EKG:      Echo:      Cardiac Cath:      Labs:  HEM:  Recent Labs   Lab 07/14/24  1319 07/15/24  0510 07/15/24  1344 07/15/24  1857 07/16/24  0447   WBC 11.5* 8.2  --   --  8.3   HGB 8.6* 7.3* 7.4* 7.2* 7.4*   .0 258.0  --   --  265.0       Chem:  Recent Labs   Lab 07/14/24  1319 07/15/24  0510 07/16/24  0447    140 138   K 4.4 4.2 3.9    106 106   CO2 26.0 26.0 26.0   BUN 27* 24* 19   CREATSERUM 1.45* 1.25* 1.10*   CA 9.3 8.6 8.9   MG  --  2.2  --    * 120* 147*       Recent Labs   Lab 07/14/24  1319   ALT 16   AST 5*   ALB 3.3*       Recent Labs   Lab 07/15/24  0510          No results for input(s): \"PTP\", \"INR\" in the last 168 hours.              Impression:  Mechanical falls with hip contusion, acute blood loss anemia - on eliquis and plavix.  Hb 12.9->7.4, stable the past 24 hours.  Remote h/o multivessel stenting.  Permanent afib  BiV AICD - per notes, placed elsewhere with details unknnown.  EF historically preserved.  AICD interrogation - shows 100% pacing, no events.  Echo 7/15/24 with hyperdynamic EF.  Encephalopathy/lethargy - hospital borne?  Borderline troponins - appear to be chronic.          Plan:  She is on a lot of cardiac meds, not sure indicated with her hyperdynamic EF, mariam in setting where orthostasis may be an issue.  Stop entresto, Imdur, and geovanna.  Continue metoprolol.  Hold plavix and eliquis.  Would stop eliquis on dc and reassess as o/p.  Resume plavix when ok with primary service.        Nilton Shah MD  7/16/2024  10:27 AM  L3

## 2024-07-16 NOTE — PROGRESS NOTES
2200: Pts left thigh circumference measuring at 58 cm     0614: Pts left thigh circumference measuring at 57 cm

## 2024-07-16 NOTE — PROGRESS NOTES
0433: Rapid called d/t increased drowsiness and change of mental status. EKG obtained. VSS on RA. Dr. Burns responding.     New orders  ABG  CT brain/head

## 2024-07-16 NOTE — PROGRESS NOTES
1835: Spoke to radiologist Dr Ingram regarding CTA results. He recommends wrapping leg with ace wrap drsg and turing pt onto left side to help with putting pressure on thigh to stop bleeding. States no further intervention needed at this time, that bleeding should cease on it's own.    1840: Spoke to Dr Manpreet Ingram and informed him of Radiologist's recommendations. Dr KARY Ingram called and spoke to radiologists as well. Ace wrap drsgs applied to LLE. Pt turned to left side. Will continue to monitor clinically for blood loss.     1915:Endorsed to next RN to watch for bleeding, hypotension, tachycardia. Also to measure thigh circumference. Will monitor q 8 hrs at 6 am, 2 pm, and 10 pm.

## 2024-07-16 NOTE — PLAN OF CARE
Pt A&O x1. Hx CVA w/ Lt sided weakness. On room air. Tele and  monitoring maintained. Pacemaker interrogated today. Scds to BLE. Tolerating diet with poor appetite. Last BM 7/12/24. Pt incontinent of urine. Ua sent today. (-) for UTI, pain managed with oral medications. Participating in therapy as ordered. Pt unable to get OOB to chair today. Severe swelling and bruising noted to pt's left leg, thigh, groin, buttocks. Echo and CTA LLE completed today. Monitoring Hgb as ordered. Pt normally lives at [facility]. If pt needs YIN, pt will need insurance auth.  aware. Pt's daughter, Lo, at bedside earlier today and updated with plan of care.

## 2024-07-16 NOTE — PLAN OF CARE
Patient A&Ox1, very drowsy. . Tele-V paced. Patient reports mild-mod pain, unable to give PRN tylenol d/t drowsiness. Per daughter pt does not tolerate narcotics well, pt received Tramadol w/ day RN.  Pt denies any N/T. Bruising and hematoma noted to left hip. Bruising to groin and buttocks.Voiding via purewick, incontinent. Tolerating cardiac diet. Plan for PT/OT follow up when more awake, avoid narcotics, monitor Hgb, measure left thigh circumference TID, DC planning YIN? Will need ins. Auth. Call light within reach. Safety precautions in place

## 2024-07-16 NOTE — PLAN OF CARE
Lethargic but arousable. Oriented x 4. Vitals stable. Denies pain. Blue and yellow bruising to left upper thigh, groin, buttock area. Left thigh circumference 58 cm. Voiding via Purewick catheter. Encouraged PO intake but poor appetite. Daughter at bedside and encouraging patient to eat. PT following, likely f/u tomorrow due to drowsiness today. Plan of care discussed with patient, daughter, and son in law.

## 2024-07-17 LAB
ANION GAP SERPL CALC-SCNC: 6 MMOL/L (ref 0–18)
BASOPHILS # BLD AUTO: 0.04 X10(3) UL (ref 0–0.2)
BASOPHILS NFR BLD AUTO: 0.4 %
BUN BLD-MCNC: 21 MG/DL (ref 9–23)
CALCIUM BLD-MCNC: 9.2 MG/DL (ref 8.7–10.4)
CHLORIDE SERPL-SCNC: 105 MMOL/L (ref 98–112)
CO2 SERPL-SCNC: 29 MMOL/L (ref 21–32)
CREAT BLD-MCNC: 1.01 MG/DL
EGFRCR SERPLBLD CKD-EPI 2021: 54 ML/MIN/1.73M2 (ref 60–?)
EOSINOPHIL # BLD AUTO: 0.09 X10(3) UL (ref 0–0.7)
EOSINOPHIL NFR BLD AUTO: 1 %
ERYTHROCYTE [DISTWIDTH] IN BLOOD BY AUTOMATED COUNT: 14.6 %
GLUCOSE BLD-MCNC: 134 MG/DL (ref 70–99)
HCT VFR BLD AUTO: 22.9 %
HGB BLD-MCNC: 7.4 G/DL
IMM GRANULOCYTES # BLD AUTO: 0.11 X10(3) UL (ref 0–1)
IMM GRANULOCYTES NFR BLD: 1.2 %
LYMPHOCYTES # BLD AUTO: 2.02 X10(3) UL (ref 1–4)
LYMPHOCYTES NFR BLD AUTO: 22 %
MCH RBC QN AUTO: 30.3 PG (ref 26–34)
MCHC RBC AUTO-ENTMCNC: 32.3 G/DL (ref 31–37)
MCV RBC AUTO: 93.9 FL
MONOCYTES # BLD AUTO: 0.71 X10(3) UL (ref 0.1–1)
MONOCYTES NFR BLD AUTO: 7.7 %
NEUTROPHILS # BLD AUTO: 6.22 X10 (3) UL (ref 1.5–7.7)
NEUTROPHILS # BLD AUTO: 6.22 X10(3) UL (ref 1.5–7.7)
NEUTROPHILS NFR BLD AUTO: 67.7 %
OSMOLALITY SERPL CALC.SUM OF ELEC: 295 MOSM/KG (ref 275–295)
PLATELET # BLD AUTO: 298 10(3)UL (ref 150–450)
POTASSIUM SERPL-SCNC: 3.8 MMOL/L (ref 3.5–5.1)
RBC # BLD AUTO: 2.44 X10(6)UL
SARS-COV-2 RNA RESP QL NAA+PROBE: NOT DETECTED
SODIUM SERPL-SCNC: 140 MMOL/L (ref 136–145)
WBC # BLD AUTO: 9.2 X10(3) UL (ref 4–11)

## 2024-07-17 PROCEDURE — 99232 SBSQ HOSP IP/OBS MODERATE 35: CPT | Performed by: INTERNAL MEDICINE

## 2024-07-17 RX ORDER — SODIUM CHLORIDE, SODIUM LACTATE, POTASSIUM CHLORIDE, CALCIUM CHLORIDE 600; 310; 30; 20 MG/100ML; MG/100ML; MG/100ML; MG/100ML
INJECTION, SOLUTION INTRAVENOUS CONTINUOUS
Status: ACTIVE | OUTPATIENT
Start: 2024-07-17 | End: 2024-07-18

## 2024-07-17 NOTE — PROGRESS NOTES
[facility]   part of [facility]     Hospitalist Progress Note     Jamee Johnson Patient Status:  Observation    1935 MRN [id number]   Location [facility] 3SW-A Attending Manpreet Ingram DO   Hosp Day # 0 PCP None Pcp     Chief Complaint: Fall    Subjective:     Lethargic, but opens eyes and shake/nods head appropriately to questions and following commands     Objective:    Review of Systems:   A comprehensive review of systems was completed; pertinent positive and negatives stated in subjective.    Vital signs:  Temp:  [97.4 °F (36.3 °C)-98.5 °F (36.9 °C)] 98.5 °F (36.9 °C)  Pulse:  [68-70] 68  Resp:  [16-18] 18  BP: (116-151)/(37-74) 151/62  SpO2:  [94 %-98 %] 97 %    Physical Exam:    General: No acute distress, lethargic, but following commands appropriately  Respiratory: No wheezes, no rhonchi  Cardiovascular: S1, S2, paced  Abdomen: Soft, Non-tender, non-distended, positive bowel sounds  Extremities: No edema. Left hip large area of swelling/ecchymosis     Diagnostic Data:    Labs:  Recent Labs   Lab 24  1319 07/15/24  0510 07/15/24  1344 07/15/24  1857 24  0527   WBC 11.5* 8.2  --   --  8.3 9.2   HGB 8.6* 7.3* 7.4* 7.2* 7.4* 7.4*   MCV 92.1 92.8  --   --  93.5 93.9   .0 258.0  --   --  265.0 298.0     Recent Labs   Lab 24  1319 07/15/24  0510 24  0527   * 120* 147* 134*   BUN 27* 24* 19 21   CREATSERUM 1.45* 1.25* 1.10* 1.01   CA 9.3 8.6 8.9 9.2   ALB 3.3*  --   --   --     140 138 140   K 4.4 4.2 3.9 3.8    106 106 105   CO2 26.0 26.0 26.0 29.0   ALKPHO 69  --   --   --    AST 5*  --   --   --    ALT 16  --   --   --    BILT 0.8  --   --   --    TP 6.3*  --   --   --      CrCl cannot be calculated (Unknown ideal weight.).    Recent Labs   Lab 24  1319 24  1831 07/15/24  0510   TROPHS 187* 151*  --    CK  --   --  136     No results for input(s): \"PTP\", \"INR\" in the last 168 hours.      Microbiology  No results found for this visit on 07/14/24.    Imaging: Reviewed in Epic.    Medications:    sodium ferric gluconate  125 mg Intravenous Daily    lidocaine-menthol  1 patch Transdermal Daily    atorvastatin  40 mg Oral Nightly    [Held by provider] clopidogrel  75 mg Oral Daily    cyanocobalamin  1,000 mcg Oral Daily    DULoxetine  30 mg Oral Daily    famotidine  20 mg Oral Daily    furosemide  20 mg Oral Daily    levothyroxine  50 mcg Oral Before breakfast    metoprolol succinate ER  100 mg Oral Daily Beta Blocker    magnesium oxide  400 mg Oral Daily    sertraline  25 mg Oral Daily       Assessment & Plan:      #Fall x2  -CT brain negative for acute abnormalities  -EKG paced rhythm   -Monitor on telemetry   -PPM interrogated - no events  -PT/OT     #Left hip hematoma  -CT reviewed  -CTA LLE with possible small bleed  -Eliquis discontinued  -okay to resume plavix per cardiology      #Acute on chronic anemia, likely due to blood loss from hematoma  -Baseline hgb is 12 from January this year  -Hgb now stable  -Iron studies reviewed > IV iron while inpatient     #Elevated Troponin  -downtrended  -Echo unremarkable  -Monitor on telemetry     #Chronic A. Fib s/p PPM  #CAD s/p stents  #Prior ischemic cardiomyopathy with revascularization and CRT-D with normalized EF   #Mod to sev MR  -Statin, Metoprolol, Furosemide   -Discontinue imdur, entresto and aldactone  -Hold plavix for at least a few more days  -Hold jardiance  -Hold eliquis (possibly indefinitely, discussed with family risk/benefits)    #Acute metabolic encephalopathy  -Also hospital-acquired delirium contributing  -Daughter reports her memory has been slipping recently  -Outpatient neuropsych testing  -CT head, ammonia, ABG unremarkable  -Avoid narcotics  -MRI brain ordered     #HLD   -Statin     #CKD 3  -Stable     #DM Type 2 with A1c 6.3  -Diet controlled. Can hold off accuchecks and ISS     #Hypothyroidism   -Synthroid     #Depression    -Duloxetine/zoloft    #Hypothyroidism  -Levothyroxine    #Hx of CVA  -Hold plavix. Continue statin    Dacia Paulson, DO      Supplementary Documentation:     Quality:  DVT Mechanical Prophylaxis:   SCDs, Early ambuation  DVT Pharmacologic Prophylaxis   Medication   None     Code Status: Full Code  Mathis: External urinary catheter in place  YVON: TBD    Discharge is dependent on: Clinical state, consultant recs, stable hgb  At this point Ms. Johnson is expected to be discharge to: [facility]    The 21st Century Cures Act makes medical notes like these available to patients in the interest of transparency. Please be advised this is a medical document. Medical documents are intended to carry relevant information, facts as evident, and the clinical opinion of the practitioner. The medical note is intended as peer to peer communication and may appear blunt or direct. It is written in medical language and may contain abbreviations or verbiage that are unfamiliar.

## 2024-07-17 NOTE — PROGRESS NOTES
Cardiology Progress Note        Jamee Johnson Patient Status:  Observation    1935 MRN [id number]   Location [facility] 3SW-A Attending Manpreet Ingram DO   Hosp Day # 0 PCP None Pcp     Subjective:  Currently sleeping and breathing comfortably.  Nursing reports more alert than previous night though still has moments of lethargy.    Medications:   sodium ferric gluconate  125 mg Intravenous Daily    lidocaine-menthol  1 patch Transdermal Daily    atorvastatin  40 mg Oral Nightly    [Held by provider] clopidogrel  75 mg Oral Daily    cyanocobalamin  1,000 mcg Oral Daily    DULoxetine  30 mg Oral Daily    famotidine  20 mg Oral Daily    furosemide  20 mg Oral Daily    levothyroxine  50 mcg Oral Before breakfast    metoprolol succinate ER  100 mg Oral Daily Beta Blocker    magnesium oxide  400 mg Oral Daily    sertraline  25 mg Oral Daily       Continuous Infusions:        Allergies:  Allergies   Allergen Reactions    Statins OTHER (SEE COMMENTS)     Gets extremely weak    Sulfa Antibiotics RASH         Intake/Output:    Intake/Output Summary (Last 24 hours) at 2024 0725  Last data filed at 2024 0357  Gross per 24 hour   Intake 480 ml   Output 850 ml   Net -370 ml           Last 3 Weights   24 1735 154 lb 14.4 oz (70.3 kg)   19 2300 165 lb 1.6 oz (74.9 kg)   19 2110 162 lb 14.7 oz (73.9 kg)            Physical Exam:    Temp:  [97.4 °F (36.3 °C)-98.5 °F (36.9 °C)] 98.5 °F (36.9 °C)  Pulse:  [68-70] 68  Resp:  [16-18] 18  BP: (116-151)/(37-74) 151/62  SpO2:  [94 %-98 %] 97 %    General: No apparent distress  HEENT: No focal deficits.  Neck: supple. JVP normal  Cardiac: Regular rhythm, S1, S2 normal,  rub or gallop.  No murmur.  Lungs: CTA  Abdomen: Soft, non-tender.   Extremities: No edema  Neurologic: no focal deficits  Skin: Warm and dry.     Telemetry: paced    EKG:      Echo:      Cardiac Cath:      Labs:  HEM:  Recent Labs   Lab 24  1319 07/15/24  0510  07/15/24  1344 07/15/24  1857 07/16/24  0447 07/17/24  0527   WBC 11.5* 8.2  --   --  8.3 9.2   HGB 8.6* 7.3* 7.4* 7.2* 7.4* 7.4*   .0 258.0  --   --  265.0 298.0       Chem:  Recent Labs   Lab 07/14/24  1319 07/15/24  0510 07/16/24  0447 07/17/24  0527    140 138 140   K 4.4 4.2 3.9 3.8    106 106 105   CO2 26.0 26.0 26.0 29.0   BUN 27* 24* 19 21   CREATSERUM 1.45* 1.25* 1.10* 1.01   CA 9.3 8.6 8.9 9.2   MG  --  2.2  --   --    * 120* 147* 134*       Recent Labs   Lab 07/14/24  1319   ALT 16   AST 5*   ALB 3.3*       Recent Labs   Lab 07/15/24  0510          No results for input(s): \"PTP\", \"INR\" in the last 168 hours.              Impression:  Mechanical falls with hip contusion, acute blood loss anemia - on eliquis and plavix.  Hb 12.9->7.4, stable the past 24 hours.  Remote h/o multivessel stenting.  Permanent afib  BiV AICD - per notes, placed elsewhere with details unknnown.  EF historically preserved.  AICD interrogation - shows 100% pacing, no events.  Echo 7/15/24 with hyperdynamic EF.  Encephalopathy/lethargy - underlying MS changes with age, worse in the hospital.  Borderline troponins - appear to be chronic.          Plan:    Stopped entresto, Imdur, and geovanna with hyperdynamic EF and possible orthostasis as etiology of falls.  Continue metoprolol.  Hold plavix and eliquis.  Would stop eliquis on dc and reassess as o/p.  Resume plavix when ok with primary service.  Dc planning.        Nilton Shah MD  L2

## 2024-07-17 NOTE — CONGREGATE LIVING REVIEW
[facility] Living Authorization    The [facility] Review Committee has reviewed this case and the patient IS APPROVED for discharge to a facility for Short Term Skilled once the following procedure is followed:     - The physician discharge instructions (contained within the [facility] note for SNF) must inlcude the below appropriate and approved COVID instructions to the facility    For questions regarding CLRC approval process, please contact the CM assigned to the case.  For questions regarding RN discharge workflow, please contact the unit Clinical Leader.

## 2024-07-17 NOTE — CM/SW NOTE
[facility] informed sw that they do not have any beds available for pt.  Daughter Lo updated- she will review other SARs and let sw know choice.  [facility] approved YIN.    Koki Isabel LCSW  /Discharge Planner

## 2024-07-17 NOTE — PHYSICAL THERAPY NOTE
PHYSICAL THERAPY TREATMENT NOTE - INPATIENT    Room Number: 386/386-A     Session: 1     Number of Visits to Meet Established Goals: 5    Presenting Problem: Unwitnessed fall yesterday onto buttocks, extensive bruising to left side  Co-Morbidities : CVA, HTN, HLD, DM    History related to current admission: Patient is a 88 year old female admitted on 7/14/2024 from home for s/p fall.  Pt diagnosed with hematoma/contusion of left hip.   7/16/24 RRT called d/t increased drowsiness/lethargy and AMS. ABG (-) hypoxia or hypercarbia  -CT scan of the brain was negative .    ASSESSMENT   Patient demonstrates fair progress this session, goals  remain in progress.    Patient continues to function below baseline with bed mobility and transfers.  Contributing factors to remaining limitations include decreased functional strength, decreased endurance/aerobic capacity, impaired standing  balance, impaired motor planning, decreased muscular endurance, and cognitive deficits (delayed processing,disorientation).  Next session anticipate patient to progress bed mobility and transfers.  Physical Therapy will continue to follow patient for duration of hospitalization.    Patient continues to benefit from continued skilled PT services: to promote return to prior level of function and safety with continuous assistance and gradual rehabilitative therapy .    PLAN  PT Treatment Plan: Bed mobility;Body mechanics;Coordination;Endurance;Energy conservation;Patient education;Family education;Gait training;Neuromuscular re-educate;Range of motion;Transfer training;Balance training;Strengthening  Rehab Potential : Guarded  Frequency (Obs): 3-5x/week    CURRENT GOALS       Goal #1 Patient is able to demonstrate supine - sit EOB @ level: supervision      Goal #2 Patient is able to demonstrate transfers Sit to/from Stand at assistance level: supervision   Goal #3 Patient is able to ambulate 50 feet with assist device: walker - rolling at  assistance level: supervision   Goal #4 Pt able to ambulate 150 feet with RW at supervision.   Goal #5     Goal #6     Goal Comments: Goals established on 7/15/2024  7/17/2024 all goals ongoing    SUBJECTIVE  \"Because I'm depressed\"   When asked if she knows why she is in the hospital     Prior Level of [place]:   Per pt's daughter (pt unable to provide info due to current mentation status).   Lives at [facility] - in supportive living environment (has assistance with medication management and showers). Ambulates with rollator at baseline. Typically A&O x 4. Hx of CVA with some residual left side weakness. No other recent falls that daughter knows of. Per dtr, unsure of how long pt was down for after the fall - she was found in the bathroom. Has lift recliner chair, sometimes sleeps in that sometimes sleeps in flat bed. Wears hearing aides- can't find one of them.     OBJECTIVE  Precautions: Bed/chair alarm;Hard of hearing    WEIGHT BEARING RESTRICTION  Weight Bearing Restriction: L lower extremity           L Lower Extremity: Weight Bearing as Tolerated    PAIN ASSESSMENT   Rating: Unable to rate  Location: pt denies pain, however, demos some pain behaviors c moving BLe  Management Techniques: Body mechanics;Breathing techniques;Relaxation;Repositioning    BALANCE                                                                                                                       Static Sitting: Fair -  Dynamic Sitting: Poor +           Static Standing: Poor -  Dynamic Standing: Not tested    ACTIVITY TOLERANCE                         O2 WALK         AM-PAC '6-Clicks' INPATIENT SHORT FORM - BASIC MOBILITY  How much difficulty does the patient currently have...  Patient Difficulty: Turning over in bed (including adjusting bedclothes, sheets and blankets)?: A Lot   Patient Difficulty: Sitting down on and standing up from a chair with arms (e.g., wheelchair, bedside commode, etc.): A Lot   Patient Difficulty:  Moving from lying on back to sitting on the side of the bed?: A Lot   How much help from another person does the patient currently need...   Help from Another: Moving to and from a bed to a chair (including a wheelchair)?: Total   Help from Another: Need to walk in hospital room?: Total   Help from Another: Climbing 3-5 steps with a railing?: Total       AM-PAC Score:  Raw Score: 9   Approx Degree of Impairment: 81.38%   Standardized Score (AM-PAC Scale): 30.55   CMS Modifier (G-Code): CM    FUNCTIONAL ABILITY STATUS  Gait Assessment   Functional Mobility/Gait Assessment  Gait Assistance: Not tested    Skilled Therapy Provided  Pt presents in semi sup, eyes open, responds to voice  Pt follows commands and is able to indicate h/o LUE weakness. Noted L hand edema   Pt with delayed processing, and impaired motor planning, however, overall more alert and interactive than previous sessions   Max A to t/f EOB , max to scoot forward  Therapist cued pt for hand placement in prep for standing  Max x 2 STS, [place] assist to place hands on RW and B feet blocked, pt requires max A to weight shift, not safe to attempt steps.  Recommend total lift 2/2 mentation, poor- balance and overall impaired strength.  PCT present and updated on above   TA to return to bed and reposition   VSS   Attending msg'd 2/2 comments pt made re:depression   Pt is A&O x 3   Bed Mobility:  Rolling: max A    Supine<>Sit: max A    Sit<>Supine: max A x 2      Transfer Mobility:  Sit<>Stand: max A x 2    Stand<>Sit: mod A    Gait: NT     Therapist's Comments: pt alert while seated EOB and was able to progress to CGA for seated balance -pt requires repeated cues for sequencing, delayed response- per chart [place] may be component        THERAPEUTIC EXERCISES  Lower Extremity Ankle pumps  Heel raises     Upper Extremity Elbow flex/ext,  - open/close, and AAROM for LUE d/t h/o of weakness, h/o CVA      Position Sitting and Supine     Repetitions   5   Sets   1      Patient End of Session: In bed;With  staff;Needs met;RN aware of session/findings;Call light within reach;All patient questions and concerns addressed;SCDs in place;Alarm set    PT Session Time: 30 minutes  Gait Training:  minutes  Therapeutic Activity: 25 minutes  Therapeutic Exercise: 5 minutes   Neuromuscular Re-education:  minutes

## 2024-07-17 NOTE — PLAN OF CARE
Patient alert and oriented x2. Room air. Continuous pulse oximeter. Telemetry monitoring. V-paced. Non-cardiac electrolyte protocol. Last bowel movement 7/17. Regular diet with cardiac restrictions. MRI pending. Circumference of left thigh orders for 3 times a day. Documented at 56 cm. Hgb 7.4. Social work met with daughter at bedside. Updates provided to family members on plan of care. Awaiting insurance approval for [place].

## 2024-07-17 NOTE — CM/SW NOTE
07/17/24 1500   Choice of Post-Acute Provider   Informed patient of right to choose their preferred provider Yes   List of appropriate post-acute services provided to patient/family with quality data Yes   Patient/family choice [facility]   Information given to Daughter     Met with daughter Lo. She chose [facility] for [facility].  Reserved in aidin and requested that [facility] start auth for pt.  Daughter wanted to discuss POLST form- she will discuss with her other family but may want to complete prior to dc.    MRI brain ordered due to new AMS    Koki Isabel LCSW  /Discharge Planner

## 2024-07-17 NOTE — PLAN OF CARE
Patient lethargic/drowsy but arousable. A&Ox2-3. Unable to give PO night med d/t drowsiness. VSS on RA. Pt denies pain at this time. Bruising to left thigh, groin, lower abd, and buttocks. ACE wrap to LLE for compression, ice applied. Voiding via purewick. Cardiac diet. LBM: 7/16. Plan for measure left thigh circumference TID, monitor Hgb, PT/OT follow up, DC to [facility] when cleared. Call light within reach. Safety precautions in place.     2200: Left thigh circumference measuring at 58 cm.     0600: Left thigh circumference measuring at 58 cm.

## 2024-07-17 NOTE — CM/SW NOTE
Farrah met with son-in-law today to discuss dc plans. [facility] list provided. Pt has long term care insurance that has been paying for her stay at [facility].  We discussed that pt is much weaker and PT/OT recs [facility]. Pt requiring 2 person max assist for transfers.  Daughter Lo will be here this afternoon.  We also discussed POLST and copy of form left with son-in-law.  Will need insurance auth for [facility].    MARIE HealyW  /Discharge Planner

## 2024-07-17 NOTE — CM/SW NOTE
Received call from [facility]- they now state they will be able to have bed for pt.  Daughter aware and wants to proceed with [facility].   They will start auth.    Koki Isabel LCSW  /Discharge Planner

## 2024-07-18 ENCOUNTER — APPOINTMENT (OUTPATIENT)
Dept: MRI IMAGING | Facility: HOSPITAL | Age: 89
End: 2024-07-18
Attending: INTERNAL MEDICINE
Payer: MEDICARE

## 2024-07-18 LAB — HGB BLD-MCNC: 7.3 G/DL

## 2024-07-18 PROCEDURE — 99232 SBSQ HOSP IP/OBS MODERATE 35: CPT | Performed by: INTERNAL MEDICINE

## 2024-07-18 PROCEDURE — 70553 MRI BRAIN STEM W/O & W/DYE: CPT | Performed by: INTERNAL MEDICINE

## 2024-07-18 RX ORDER — METOPROLOL TARTRATE 1 MG/ML
5 INJECTION, SOLUTION INTRAVENOUS EVERY 6 HOURS
Status: DISCONTINUED | OUTPATIENT
Start: 2024-07-18 | End: 2024-07-18

## 2024-07-18 RX ORDER — GADOTERATE MEGLUMINE 376.9 MG/ML
15 INJECTION INTRAVENOUS
Status: COMPLETED | OUTPATIENT
Start: 2024-07-18 | End: 2024-07-18

## 2024-07-18 NOTE — SLP NOTE
ADULT SWALLOWING EVALUATION    ASSESSMENT    ASSESSMENT/OVERALL IMPRESSION:  Patient is a 88 year old female with PMH of CVA, HLD, DM who presented to hospital after a fall. She has been in hospital since 7/14. She was on regular solids and thin liquids. Per RN on 7/17 patient started becoming less verbal and in the evening on 7/17 patient's family reported patient having difficulty swallowing. Patient was made NPO and orders placed for swallowing evaluation. Patient contacted at bedside sitting upright and midline in bed with her daughter present. Patient alert and conversant.   Oral motor mechanism exam revealed functional oral range, rate, and strength of oral musculature, intact dentition, and clear vocal quality. Strong cough on command.    Assessed patient with thin liquids via spoon, cup, and straw, puree, and solids.   Oral phase revealed functional oral acceptance, retrieval and mastication of solids with decreased mastication and increased transit times with solids and complete clearing of the oral cavity. Patient with better acceptance of po from straw rather than cup.   Pharyngeal phase revealed an apparent timely initiation of the pharyngeal phase with functional hyolaryngeal elevation on palpation. No coughing or throat clearing. Patient presents with mildly impaired oral phase and apparent intact pharyngeal phase of swallow.   Recommend soft/easy to chew solids and thin liquids via straw to facilitate retrieval.   Will follow up to ensure safety with diet and educate pt on compensatory strategies/swallow precautions. Video swallow study to be completed if CXR declines, increase in clinical signs of aspiration, and/or MD desires.            RECOMMENDATIONS   Diet Recommendations - Solids: Soft/ Easy to chew  Diet Recommendations - Liquids: Thin Liquids      Compensatory Strategies Recommended: Small bites and sips  Aspiration Precautions: Upright position;Slow rate;Small bites and sips  Medication  Administration Recommendations: Crushed in puree  Treatment Plan/Recommendations: Aspiration precautions (meal monitor)    HISTORY   MEDICAL HISTORY  Reason for Referral: R/O aspiration    Problem List  Principal Problem:    Contusion of left hip, initial encounter  Active Problems:    Anemia    Hyperglycemia    Elevated troponin I level    Iron deficiency anemia, unspecified iron deficiency anemia type    Lethargy      Past Medical History  Past Medical History:    Calculus of kidney    CVA (cerebral vascular accident) (HCC)    Depression    Diabetes (HCC)    Esophageal reflux    High blood pressure    High cholesterol    HLD (hyperlipidemia)    HTN (hypertension)    Incontinence    Kidney stones    Neuropathy    Osteoarthritis    Stroke (HCC)    Visual impairment       Prior Living Situation: Assisted living  Diet Prior to Admission: Soft/ Easy to Chew;Thin liquids  Precautions: Aspiration    Patient/Family Goals: To eat and drink    SWALLOWING HISTORY  Current Diet Consistency: NPO  Dysphagia History: patient seen in 2019 with recommendation of regular solids and thin liquids.   Imaging Results:       SUBJECTIVE       OBJECTIVE   ORAL MOTOR EXAMINATION  Dentition: Natural;Functional  Symmetry: Within Functional Limits  Strength: Within Functional Limits  Tone: Within Functional Limits  Range of Motion: Within Functional Limits  Rate of Motion: Within Functional Limits    Voice Quality: Clear     Consistencies Trialed: Thin liquids;Puree;Hard solid;Nectar thick liquids  Method of Presentation: Self presentation;Spoon;Cup;Straw;Single sips  Patient Positioning: Upright;Midline (in bed)    Oral Phase of Swallow: Impaired  Bolus Retrieval: Intact  Bilabial Seal: Intact  Bolus Formation: Intact  Bolus Propulsion: Intact  Mastication: Impaired  Retention: Intact    Pharyngeal Phase of Swallow: Within Functional Limits           (Please note: Silent aspiration cannot be evaluated clinically. Videofluoroscopic Swallow  Study is required to rule-out silent aspiration.)    Esophageal Phase of Swallow: No complaints consistent with possible esophageal involvement  Comments:               GOALS  Goal #1 The patient will tolerate soft/easy to chew consistency and thin liquids without overt signs or symptoms of aspiration with 95 % accuracy over 1-2 session(s).  In Progress   Goal #2 The patient/family/caregiver will demonstrate understanding and implementation of aspiration precautions and swallow strategies independently over 1-2 session(s).    In Progress   Goal #3 The patient will utilize compensatory strategies as outlined by  BSSE (clinical evaluation) including Slow rate, Small bites, Small sips, Upright 90 degrees, Eliminate distractions with min assistance 95 % of the time across 2 sessions.  In Progress                              FOLLOW UP  Treatment Plan/Recommendations: Aspiration precautions (meal monitor)  Number of Visits to Meet Established Goals: 1  Follow Up Needed (Documentation Required): Yes  SLP Follow-up Date: 07/19/24    Thank you for your referral.   If you have any questions, please contact Susannah SMITH SLP   Airway patent, Nasal mucosa clear. Mouth with normal mucosa. Throat has no vesicles, no oropharyngeal exudates and uvula is midline.

## 2024-07-18 NOTE — PROGRESS NOTES
[facility]   part of [facility]     Hospitalist Progress Note     Jamee Johnson Patient Status:  Observation    1935 MRN [id number]   Location [facility] 3SW-A Attending Manpreet Ingram,    Hosp Day # 0 PCP None Pcp     Chief Complaint: Fall    Subjective:     More awake/alert this morning. Knows date/president/daughter's name. Doesn't remember that she is in the hospital. Daughter at bedside. Concerned about pt's fatigue/lethargy. Updated on plan of care and answered all questions. No focal deficits on neuro exam.    Objective:    Review of Systems:   A comprehensive review of systems was completed; pertinent positive and negatives stated in subjective.    Vital signs:  Temp:  [97.6 °F (36.4 °C)-98.4 °F (36.9 °C)] 98.4 °F (36.9 °C)  Pulse:  [69-70] 70  Resp:  [16-18] 18  BP: (114-166)/(45-67) 114/46  SpO2:  [96 %-98 %] 96 %    Physical Exam:    General: No acute distress, lethargic, but following commands appropriately  Respiratory: No wheezes, no rhonchi  Cardiovascular: S1, S2, paced  Abdomen: Soft, Non-tender, non-distended, positive bowel sounds  Extremities: No edema. Left hip large area of swelling/ecchymosis     Diagnostic Data:    Labs:  Recent Labs   Lab 24  1319 07/15/24  0510 07/15/24  1344 07/15/24  1857 24  0524  1037   WBC 11.5* 8.2  --   --  8.3 9.2  --    HGB 8.6* 7.3* 7.4* 7.2* 7.4* 7.4* 7.3*   MCV 92.1 92.8  --   --  93.5 93.9  --    .0 258.0  --   --  265.0 298.0  --      Recent Labs   Lab 24  1319 07/15/24  0510 247 24  05   * 120* 147* 134*   BUN 27* 24* 19 21   CREATSERUM 1.45* 1.25* 1.10* 1.01   CA 9.3 8.6 8.9 9.2   ALB 3.3*  --   --   --     140 138 140   K 4.4 4.2 3.9 3.8    106 106 105   CO2 26.0 26.0 26.0 29.0   ALKPHO 69  --   --   --    AST 5*  --   --   --    ALT 16  --   --   --    BILT 0.8  --   --   --    TP 6.3*  --   --   --      Estimated Creatinine Clearance: 29.6  mL/min (based on SCr of 1.01 mg/dL).    Recent Labs   Lab 07/14/24  1319 07/14/24  1831 07/15/24  0510   TROPHS 187* 151*  --    CK  --   --  136     No results for input(s): \"PTP\", \"INR\" in the last 168 hours.     Microbiology  No results found for this visit on 07/14/24.    Imaging: Reviewed in Epic.    Medications:    metoprolol  5 mg Intravenous Q6H    lidocaine-menthol  1 patch Transdermal Daily    atorvastatin  40 mg Oral Nightly    clopidogrel  75 mg Oral Daily    cyanocobalamin  1,000 mcg Oral Daily    DULoxetine  30 mg Oral Daily    famotidine  20 mg Oral Daily    furosemide  20 mg Oral Daily    levothyroxine  50 mcg Oral Before breakfast    [Held by provider] metoprolol succinate ER  100 mg Oral Daily Beta Blocker    magnesium oxide  400 mg Oral Daily    sertraline  25 mg Oral Daily       Assessment & Plan:      #Fall x2  -CT brain negative for acute abnormalities  -EKG paced rhythm   -Monitor on telemetry   -PPM interrogated - no events  -PT/OT     #Left hip hematoma  -CT reviewed  -CTA LLE with possible small bleed  -Eliquis discontinued  -okay to resume plavix per cardiology      #Acute on chronic anemia, likely due to blood loss from hematoma  -Baseline hgb is 12 from January this year  -Hgb now stable  -Iron studies reviewed > IV iron while inpatient    #Acute metabolic encephalopathy  -Also hospital-acquired delirium contributing  -Daughter reports her memory has been slipping recently  -Outpatient neuropsych testing  -CT head, ammonia, ABG unremarkable  -Avoid narcotics  -MRI brain ordered     #Elevated Troponin  -downtrended  -Echo unremarkable  -Monitor on telemetry     #Chronic A. Fib s/p PPM  #CAD s/p stents  #Prior ischemic cardiomyopathy with revascularization and CRT-D with normalized EF   #Mod to sev MR  -Statin, Metoprolol, Furosemide   -Discontinue imdur, entresto and aldactone  -resume plavix today  -Hold jardiance  -eliquis discontinued (possibly indefinitely, discussed with family  risk/benefits)     #HLD   -Statin     #CKD 3  -Stable     #DM Type 2 with A1c 6.3  -Diet controlled. Can hold off accuchecks and ISS     #Hypothyroidism   -Synthroid     #Depression   -Duloxetine/zoloft    #Hypothyroidism  -Levothyroxine    #Hx of CVA  -resume plavix. Continue statin    Dacia Paulson, DO      Supplementary Documentation:     Quality:  DVT Mechanical Prophylaxis:   SCDs, Early ambuation  DVT Pharmacologic Prophylaxis   Medication   None     Code Status: Full Code  Math[place]: External urinary catheter in place  YVON: TBD    Discharge is dependent on MRI  At this point Ms. Johnson is expected to be discharge to: [facility]    The 21st Century Cures Act makes medical notes like these available to patients in the interest of transparency. Please be advised this is a medical document. Medical documents are intended to carry relevant information, facts as evident, and the clinical opinion of the practitioner. The medical note is intended as peer to peer communication and may appear blunt or direct. It is written in medical language and may contain abbreviations or verbiage that are unfamiliar.

## 2024-07-18 NOTE — PLAN OF CARE
Pt A&Ox1 with delayed responses. Easy to wake, responds to commands. Pt denies any pain at this time. Bruising and swelling to LLE, pelvic area, and lower back. Compression sleeve in place, SCDs on. IVF infusing per order. NPO, SLP to see. Thigh circumference measurements 3x daily. STAT MRI pending approval from [facility]. DC to [facility] when medically stable. Call light in reach, safety measures in place.    2200 thigh circumference: 56 cm  0600 thigh circumference: 57 cm

## 2024-07-18 NOTE — PROGRESS NOTES
Progress Note  Jamee Johnson Patient Status:  Observation    1935 MRN [id number]   Location [facility] 3SW-A Attending Dacia Paulson, DO   Hosp Day # 0 PCP None Pcp     Subjective:  No acute events overnight.  Resting in bed this morning, on room air, denies CP, or SOB, feels unwell not able to describe what is bothering her. Appears confused, able to state her name and year, not aware of the month, place or situation.       Objective:  /67 (BP Location: Right arm)   Pulse 69   Temp 98.3 °F (36.8 °C) (Oral)   Resp 16   Wt 154 lb 14.4 oz (70.3 kg)   SpO2 98%   BMI 29.27 kg/m²     Telemetry: AV paced, HR 70s      Intake/Output:    Intake/Output Summary (Last 24 hours) at 2024 0732  Last data filed at 2024 0430  Gross per 24 hour   Intake 100 ml   Output 650 ml   Net -550 ml       Last 3 Weights   24 1735 154 lb 14.4 oz (70.3 kg)   24 1054 154 lb (69.9 kg)   19 2300 165 lb 1.6 oz (74.9 kg)   19 2110 162 lb 14.7 oz (73.9 kg)       Labs:  Recent Labs   Lab 07/15/24  0510 24  0447 24  0527   * 147* 134*   BUN 24* 19 21   CREATSERUM 1.25* 1.10* 1.01   EGFRCR 42* 49* 54*   CA 8.6 8.9 9.2    138 140   K 4.2 3.9 3.8    106 105   CO2 26.0 26.0 29.0     Recent Labs   Lab 07/15/24  0510 07/15/24  1344 07/15/24  1857 24  0527   RBC 2.50*  --   --  2.47* 2.44*   HGB 7.3*   < > 7.2* 7.4* 7.4*   HCT 23.2*  --   --  23.1* 22.9*   MCV 92.8  --   --  93.5 93.9   MCH 29.2  --   --  30.0 30.3   MCHC 31.5  --   --  32.0 32.3   RDW 13.4  --   --  14.0 14.6   NEPRELIM 4.99  --   --  5.48 6.22   WBC 8.2  --   --  8.3 9.2   .0  --   --  265.0 298.0    < > = values in this interval not displayed.         Recent Labs   Lab 24  1319 24  1831 07/15/24  0510   TROPHS 187* 151*  --    CK  --   --  136       Review of Systems   Cardiovascular: Negative.    Respiratory: Negative.     Psychiatric/Behavioral:   Positive for altered mental status.        Physical Exam:    Gen: alert, oriented x 2, NAD, confused  Heent: pupils equal, reactive. Mucous membranes moist.   Neck: no jvd  Cardiac: regular rate and rhythm, normal S1,S2, no murmur, gallop or rub   Lungs: CTA  Abd: soft, NT/ND +bs  Ext: left lower extremity edema, bruising to the left thigh   Skin: Warm, dry  Neuro: No focal deficits      Medications:     lidocaine-menthol  1 patch Transdermal Daily    atorvastatin  40 mg Oral Nightly    [Held by provider] clopidogrel  75 mg Oral Daily    cyanocobalamin  1,000 mcg Oral Daily    DULoxetine  30 mg Oral Daily    famotidine  20 mg Oral Daily    furosemide  20 mg Oral Daily    levothyroxine  50 mcg Oral Before breakfast    metoprolol succinate ER  100 mg Oral Daily Beta Blocker    magnesium oxide  400 mg Oral Daily    sertraline  25 mg Oral Daily      lactated ringers 50 mL/hr at 07/17/24 2037       Assessment:  Mechanical fall with left hip hematoma and acute blood loss anemia while being on eliquis and plavix  CT brain neg for acute findings  Holding off eliquis, plan to resume plavix if hgb remains stable   Acute blood loss anemia  Hemoglobin stable at 7.4 (baseline hgb ~13.0)   Holding Eliquis, plavix  Troponin elevation likely d/t demand ischemia   Trop 187>151  EKG, V paced, no acute ST changes    Acute metabolic encephalopathy likely multifactorial advanced age, hospital delirium, ?underlying dementia    MRI brain pending   CAD with hx of multivessel PCI and JOS   Historically on plavix, eliquis, statin, bb  Hx of ischemic cardiomyopathy with now recovered LVEF  Echo 7/15/24 LVEF 65-70%, no rwma, mild TR  Historically on toprol, entresto, geovanna, jardiance, lasix   Hx of BiV ICD  S/P device interrogation showed 100% pacing, no acute arrhthymias   Permanent a-fib  On toprol  On Eliquis for stroke prevention, now held d/t above  HTN, BP elevated, will resume entresto today  HLP-on statin  CKD 3, crt stable at 1.01    DM2  Hx of CVA  Hypothyroidism -on replacement     Plan:  Denies cardiac symptoms.  Awaiting MRI of the brain with acute mental status change.  Currently NPO pending swallow eval.  Holding plavix pending hgb stability, may resume if hgb stable and passes swallow eval.  Eliquis dc, will reevaluate op, could consider Watchman eval as op.  Will order IV lopressor while NPO for BP control.   Off entresto, isosorbide, geovanna with suspect orthostatic hypotension will readdress op.       Plan of care discussed with patient, RN.    Jessica Baltazar, APRN  7/18/2024  7:32 AM  [phone]

## 2024-07-18 NOTE — PLAN OF CARE
Patient alert and oriented x2-3. Room air. Continuous pulse oximeter. Telemetry monitoring. V-paced. Non-cardiac electrolyte protocol. Last bowel movement 7/17. Regular diet with cardiac restrictions. MRI results pending. Circumference of left thigh orders for 3 times a day. Hgb 7.3. Family at bedside for support. Speech evaluation completed today and placed on soft/easy to chew diet. Patient needs assistance with ordering and set up. Medications to be crushed in applesauce, yogurt, or ice cream. Insurance authorization pending for [facility].

## 2024-07-18 NOTE — CM/SW NOTE
Received message from [facility] indicating they have received insurance auth and can accept pt for admission today.  Spoke with Dr Paulson who stated MRI is ordered.  If negative, pt can discharge today.  Updated facility.  / to remain available for support and/or discharge planning.     [facility]  [phone]    [facility]  [facility]  [phone]    Addendum:  updated pt's dtr/MARLENY at bedside.  Await MRI results.

## 2024-07-18 NOTE — PROGRESS NOTES
Pt's family stated \"she has been choking on everything\" during handoff. Started pt with green mouth swabs. Unable to use swabs without coughing. Pt made NPO, SLP teo ordered.  notified.

## 2024-07-19 ENCOUNTER — APPOINTMENT (OUTPATIENT)
Dept: CT IMAGING | Facility: HOSPITAL | Age: 89
End: 2024-07-19
Payer: MEDICARE

## 2024-07-19 ENCOUNTER — APPOINTMENT (OUTPATIENT)
Dept: CV DIAGNOSTICS | Facility: HOSPITAL | Age: 89
End: 2024-07-19
Attending: Other
Payer: MEDICARE

## 2024-07-19 PROBLEM — I63.9 CEREBROVASCULAR ACCIDENT (HCC): Status: ACTIVE | Noted: 2024-07-19

## 2024-07-19 LAB
CHOLEST SERPL-MCNC: 131 MG/DL (ref ?–200)
EST. AVERAGE GLUCOSE BLD GHB EST-MCNC: 114 MG/DL (ref 68–126)
GLUCOSE BLD-MCNC: 137 MG/DL (ref 70–99)
GLUCOSE BLD-MCNC: 174 MG/DL (ref 70–99)
HBA1C MFR BLD: 5.6 % (ref ?–5.7)
HDLC SERPL-MCNC: 37 MG/DL (ref 40–59)
LDLC SERPL CALC-MCNC: 73 MG/DL (ref ?–100)
NONHDLC SERPL-MCNC: 94 MG/DL (ref ?–130)
TRIGL SERPL-MCNC: 112 MG/DL (ref 30–149)
VLDLC SERPL CALC-MCNC: 17 MG/DL (ref 0–30)

## 2024-07-19 PROCEDURE — 99232 SBSQ HOSP IP/OBS MODERATE 35: CPT | Performed by: INTERNAL MEDICINE

## 2024-07-19 PROCEDURE — 70498 CT ANGIOGRAPHY NECK: CPT

## 2024-07-19 PROCEDURE — 70496 CT ANGIOGRAPHY HEAD: CPT

## 2024-07-19 PROCEDURE — 93308 TTE F-UP OR LMTD: CPT | Performed by: OTHER

## 2024-07-19 PROCEDURE — 99223 1ST HOSP IP/OBS HIGH 75: CPT | Performed by: OTHER

## 2024-07-19 RX ORDER — PROCHLORPERAZINE EDISYLATE 5 MG/ML
10 INJECTION INTRAMUSCULAR; INTRAVENOUS EVERY 6 HOURS PRN
Status: DISCONTINUED | OUTPATIENT
Start: 2024-07-19 | End: 2024-07-21

## 2024-07-19 RX ORDER — SPIRONOLACTONE 25 MG/1
25 TABLET ORAL DAILY
Status: DISCONTINUED | OUTPATIENT
Start: 2024-07-19 | End: 2024-07-21

## 2024-07-19 RX ORDER — METOPROLOL TARTRATE 50 MG/1
50 TABLET, FILM COATED ORAL ONCE
Status: COMPLETED | OUTPATIENT
Start: 2024-07-19 | End: 2024-07-19

## 2024-07-19 RX ORDER — HYDRALAZINE HYDROCHLORIDE 20 MG/ML
10 INJECTION INTRAMUSCULAR; INTRAVENOUS EVERY 2 HOUR PRN
Status: DISCONTINUED | OUTPATIENT
Start: 2024-07-19 | End: 2024-07-21

## 2024-07-19 RX ORDER — ACETAMINOPHEN 650 MG/1
650 SUPPOSITORY RECTAL EVERY 4 HOURS PRN
Status: DISCONTINUED | OUTPATIENT
Start: 2024-07-19 | End: 2024-07-21

## 2024-07-19 RX ORDER — ACETAMINOPHEN 325 MG/1
650 TABLET ORAL EVERY 4 HOURS PRN
Status: DISCONTINUED | OUTPATIENT
Start: 2024-07-19 | End: 2024-07-21

## 2024-07-19 RX ORDER — METOPROLOL TARTRATE 100 MG/1
100 TABLET ORAL ONCE
Status: COMPLETED | OUTPATIENT
Start: 2024-07-19 | End: 2024-07-19

## 2024-07-19 RX ORDER — METOCLOPRAMIDE HYDROCHLORIDE 5 MG/ML
5 INJECTION INTRAMUSCULAR; INTRAVENOUS EVERY 8 HOURS PRN
Status: DISCONTINUED | OUTPATIENT
Start: 2024-07-19 | End: 2024-07-19 | Stop reason: ALTCHOICE

## 2024-07-19 RX ORDER — LABETALOL HYDROCHLORIDE 5 MG/ML
10 INJECTION, SOLUTION INTRAVENOUS EVERY 10 MIN PRN
Status: DISCONTINUED | OUTPATIENT
Start: 2024-07-19 | End: 2024-07-21

## 2024-07-19 RX ORDER — ONDANSETRON 2 MG/ML
4 INJECTION INTRAMUSCULAR; INTRAVENOUS EVERY 6 HOURS PRN
Status: DISCONTINUED | OUTPATIENT
Start: 2024-07-19 | End: 2024-07-19 | Stop reason: SINTOL

## 2024-07-19 RX ORDER — SODIUM CHLORIDE 9 MG/ML
INJECTION, SOLUTION INTRAVENOUS CONTINUOUS
Status: DISCONTINUED | OUTPATIENT
Start: 2024-07-19 | End: 2024-07-19

## 2024-07-19 NOTE — PROGRESS NOTES
Stroke booklet given to patient; the following education was provided:     What is stroke  BEFAST - Stroke warning sings and symptoms  How to initiate EMS  Secondary stroke prevention and personalized risk factors: previous CVA, DM, HL, HTN, OA. obesity  Lifestyle modifications (nutrition and exercise)  Post-stroke recovery and follow-up  Community resources (stroke support group and non-emergent stroke line)    Daughter present during education, receptive to teachings. All pertinent questions and concerns were addressed.    Patient has chosen daughter Lo as designated care partner. She can be reached at [phone].      RN Stroke Navigator team  [phone]

## 2024-07-19 NOTE — SLP NOTE
Orders were received for a bedside swallowing evaluation as part of stroke protocol. Patient was seen on 7/18 after mental status changes occurred and before the MRI revealing acute stroke. She has been tolerating po diet and crushed medications. A new bedside swallowing assessment is not warranted. Spoke to RN who was in agreement. Will continue to follow for meal monitor and cognitive/communicative assessment per protocol as schedule permits.

## 2024-07-19 NOTE — PLAN OF CARE
Pt A&Ox2 vital signs stable on RA. No c/o pain at this time. Tele (Vpaced), , SCD's. Voiding via purewick. Saline locked. MRI complete. Neurology to see in AM. Plan to DC to [facility] when medically cleared. Safety precautions in place. Call light within reach.     0052: MD Savage pageyue for BP of 175/74, Orders received to administer Lopressor 50mg     0414: /64, MD Savage pageyue. No orders received.     0556: /66, MD Savage pageyue, provided order to given Lopressor 100mg

## 2024-07-19 NOTE — CONSULTS
[facility]   NEUROLOGY   CONSULT NOTE    Admission date: 7/14/2024  Reason for Consult: Acute Stroke.  Chief Complaint:   Chief Complaint   Patient presents with    Hip Pain    Fall   ________________________________________________________________    History     History of Presenting Illness  88 year old female with multiple medical problems, listed below, admitted for multiple falls, confusion, had worsening of cognitive functioning in the hospital and MRI reported multiple infarcts. Patient was on Eliquis before, discontinued lately. Currently answering questions, has left hip pain and swelling. No seizures. Has mild right sided weakness.     History obtained from patient, daughter, nurse and chart review.      Past Medical History:    Calculus of kidney    CVA (cerebral vascular accident) (HCC)    Depression    Diabetes (HCC)    Esophageal reflux    High blood pressure    High cholesterol    HLD (hyperlipidemia)    HTN (hypertension)    Incontinence    Kidney stones    Neuropathy    Osteoarthritis    Stroke (HCC)    Visual impairment     Past Surgical History:   Procedure Laterality Date    Excis lumbar disk,one level      Knee replacement surgery      Removal gallbladder      Total abdom hysterectomy       Social History     Socioeconomic History    Marital status:    Tobacco Use    Smoking status: Never    Smokeless tobacco: Never   Vaping Use    Vaping status: Never Used   Substance and Sexual Activity    Alcohol use: Never    Drug use: Never     Social Determinants of Health     Financial Resource Strain: Low Risk  (2/7/2022)    Received from [facility], [facility]    Financial Resource Strain     In the past year, have you or any family members you live with been unable to get any of the following when it was really needed? Check all that apply.: None   Food Insecurity: No Food Insecurity (7/14/2024)    Food Insecurity     Food Insecurity: Never true    Transportation Needs: No Transportation Needs (7/14/2024)    Transportation Needs     Lack of Transportation: No   Physical Activity: Inactive (2/7/2022)    [facility]    Exercise Vital Sign     Days of Exercise per Week: 0 days     Minutes of Exercise per Session: 0 min   Stress: Low Risk  (2/7/2022)    [facility], [facility]    Stress     Stress is when someone feels tense, nervous, anxious, or can't sleep at night because their mind is troubled. How stressed are you? : A little bit   Social Connections: Unknown (2/7/2022)    [facility]    Social Connections     How often do you see or talk to people that you care about and feel close to? (For example: talking to friends on the phone, visiting friends or family, going to Religion or club meetings): 5 or more times a week   Housing Stability: Low Risk  (7/14/2024)    Housing Stability     Housing Instability: No     Family History   Problem Relation Age of Onset    Hypertension Mother     Heart Disorder Mother     Diabetes Paternal Grandmother     Cancer Sister         kidney CA    Stroke Sister     Diabetes Paternal Aunt      Allergies   Allergies   Allergen Reactions    Statins OTHER (SEE COMMENTS)     Gets extremely weak    Sulfa Antibiotics RASH       Home Meds  Current Outpatient Medications   Medication Instructions    acetaminophen (TYLENOL EXTRA STRENGTH) 500 mg, Oral, 2 times daily    apixaban (ELIQUIS) 5 mg, Oral, 2 times daily    atorvastatin (LIPITOR) 80 mg, Oral, Nightly    clopidogrel (PLAVIX) 75 mg, Oral, Daily    cyanocobalamin (VITAMIN B12) 1,000 mcg, Daily, Injection     DULoxetine (CYMBALTA) 30 mg, Oral, Daily    empagliflozin (JARDIANCE) 10 MG Oral Tab Oral, Daily    Ergocalciferol (VITAMIN D OR) Oral, Taking 1000 IU once a day    famotidine (PEPCID) 20 mg, Oral, 2 times daily    furosemide (LASIX) 40 mg,  Oral, 2 times daily, Monday, Wednesday, Friday     isosorbide mononitrate ER (IMDUR) 30 mg, Oral, Daily    levothyroxine (SYNTHROID) 50 mcg, Oral, Before breakfast    magnesium oxide (MAG-OX) 400 mg, Oral, Daily    metoprolol succinate ER (TOPROL XL) 100 mg, Oral, Daily    multivitamin Oral Chew Tab 1 tablet, Oral, Daily    pantoprazole (PROTONIX) 40 mg, Oral, 2 times daily before meals    potassium chloride 10 MEQ Oral Tab CR 10 mEq, Oral, 2 times daily, Monday, Wednesday, Friday     sacubitril-valsartan 24-26 MG Oral Tab 1 tablet, Oral, 2 times daily    sertraline (ZOLOFT) 25 mg, Oral, Daily    spironolactone (ALDACTONE) 25 mg, Oral, Daily     Scheduled Meds:   sacubitril-valsartan  1 tablet Oral BID    spironolactone  25 mg Oral Daily    lidocaine-menthol  1 patch Transdermal Daily    atorvastatin  40 mg Oral Nightly    clopidogrel  75 mg Oral Daily    cyanocobalamin  1,000 mcg Oral Daily    DULoxetine  30 mg Oral Daily    famotidine  20 mg Oral Daily    furosemide  20 mg Oral Daily    levothyroxine  50 mcg Oral Before breakfast    [Held by provider] metoprolol succinate ER  100 mg Oral Daily Beta Blocker    magnesium oxide  400 mg Oral Daily    sertraline  25 mg Oral Daily     Continuous Infusions:  PRN Meds:  acetaminophen **OR** acetaminophen    labetalol    hydrALAzine    prochlorperazine    acetaminophen    melatonin    glycerin-hypromellose-    sodium chloride    polyethylene glycol (PEG 3350)    sennosides    bisacodyl    fleet enema    OBJECTIVE   VITAL SIGNS:   Temp:  [97 °F (36.1 °C)-98.5 °F (36.9 °C)] 97.7 °F (36.5 °C)  Pulse:  [68-70] 70  Resp:  [14-17] 17  BP: (154-189)/(47-71) 156/71  SpO2:  [95 %-100 %] 99 %    PHYSICAL EXAM:    NEUROLOGIC:    Mental Status:  A&O, answering questions,  follows simple commands, mild expressive speech difficulty and dysarthria  Cranial nerves: PERRL.  Visual fields full.  EOMI.  Right NLF flattening,  Hearing grossly intact. Tongue midline with normal  movements.   Motor: Mild right sided weakness with pronator drift.   Sensation: Intact to light touch bilaterally  Cerebellar: Normal Finger-To-Nose test      LABORATORY DATA:  Last 24 hour labs were reviewed in detail.  Recent Labs   Lab 07/15/24  0510 07/16/24 0447 07/17/24  0527    138 140   K 4.2 3.9 3.8    106 105   CO2 26.0 26.0 29.0   * 147* 134*   BUN 24* 19 21   CREATSERUM 1.25* 1.10* 1.01     Recent Labs   Lab 07/15/24  0510 07/15/24  1344 07/16/24  0447 07/17/24  0527 07/18/24  1037   WBC 8.2  --  8.3 9.2  --    HGB 7.3*   < > 7.4* 7.4* 7.3*   .0  --  265.0 298.0  --     < > = values in this interval not displayed.     Recent Labs   Lab 07/14/24  1319   ALT 16   AST 5*     Recent Labs   Lab 07/15/24  0510   MG 2.2     Last A1c value was 5.6% done 7/19/2024.     Lab Results   Component Value Date    LDL 73 07/19/2024    HDL 37 07/19/2024    TRIG 112 07/19/2024    VLDL 17 07/19/2024        Radiology:    MRI BRAIN (W+WO) (CPT=70553)    Result Date: 7/18/2024  CONCLUSION:  1. Findings consistent with multiple small acute infarcts predominantly within the white matter as detailed above concerning for an embolic event, correlate clinically.  Findings discussed with the patient's nurse, Deanna at the time of this dictation   LOCATION:  [facility]    Dictated by (CST): Marianna Rangel MD on 7/18/2024 at 5:45 PM     Finalized by (CST): Marianna Rangel MD on 7/18/2024 at 5:58 PM      ASSESSMENT/PLAN   88 year old female with:    Multiple acute infarcts, likely embolic. Request CTA of head and Neck, Echo. LDL 73, AIC 5.6%. Patient has high risk of bleeding due to hematoma and fall. Situation discussed with daughter. Advise to continue Plavix. Will consider adding aspirin or eliquis after reviewing results. OT/PT.   Anemia. HB stable.   Patient's daughter (POA) counseled      Principal Problem:    Contusion of left hip, initial encounter  Active Problems:    Anemia    Hyperglycemia    Elevated troponin I  level    Iron deficiency anemia, unspecified iron deficiency anemia type    Lethargy       Rojas Persaud MD  Neurohospitalist  [facility]    Disclaimer: This record was dictated using Dragon software. There may be errors due to voice recognition problems that were not realized and corrected during the completion of the note.

## 2024-07-19 NOTE — DISCHARGE PLANNING
Patient follow-up appointment information:     Visit Type: Stroke follow-up  Date of TIA/Stroke: 07/18/2024  Type of Stroke: Ischemic  Patient to follow-up: 4 weeks  OP Neurologist: Any available neurologist  New patient to neurology service: Yes  Anticipated discharge (if known): TBD  Discharge disposition (if known): [facility]    Please contact patient's daughter Lo at [phone] to schedule TIA/stroke follow-up appointment.     RN Stroke Navigator team  [phone]

## 2024-07-19 NOTE — PROGRESS NOTES
Progress Note  Jamee Johnson Patient Status:  Inpatient    1935 MRN [id number]   Location [facility] 3SW-A Attending Dacia Paulson, DO   Hosp Day # 0 PCP None Pcp     Subjective:  No acute events overnight.  Feels very tired, denies any cardiac symptoms currently.    Objective:  /71 (BP Location: Right arm)   Pulse 70   Temp 97.7 °F (36.5 °C) (Oral)   Resp 17   Wt 154 lb 14.4 oz (70.3 kg)   SpO2 99%   BMI 29.27 kg/m²     Telemetry: AV paced, HR 70s      Intake/Output:    Intake/Output Summary (Last 24 hours) at 2024 1239  Last data filed at 2024 0608  Gross per 24 hour   Intake --   Output 700 ml   Net -700 ml       Last 3 Weights   24 1735 154 lb 14.4 oz (70.3 kg)   24 1054 154 lb (69.9 kg)   19 2300 165 lb 1.6 oz (74.9 kg)   19 2110 162 lb 14.7 oz (73.9 kg)       Labs:  Recent Labs   Lab 07/15/24  0510 24  0447 24  0527   * 147* 134*   BUN 24* 19 21   CREATSERUM 1.25* 1.10* 1.01   EGFRCR 42* 49* 54*   CA 8.6 8.9 9.2    138 140   K 4.2 3.9 3.8    106 105   CO2 26.0 26.0 29.0     Recent Labs   Lab 07/15/24  0510 07/15/24  1344 24  0447 24  0527 24  1037   RBC 2.50*  --  2.47* 2.44*  --    HGB 7.3*   < > 7.4* 7.4* 7.3*   HCT 23.2*  --  23.1* 22.9*  --    MCV 92.8  --  93.5 93.9  --    MCH 29.2  --  30.0 30.3  --    MCHC 31.5  --  32.0 32.3  --    RDW 13.4  --  14.0 14.6  --    NEPRELIM 4.99  --  5.48 6.22  --    WBC 8.2  --  8.3 9.2  --    .0  --  265.0 298.0  --     < > = values in this interval not displayed.         Recent Labs   Lab 24  1319 24  1831 07/15/24  0510   TROPHS 187* 151*  --    CK  --   --  136       Review of Systems   Constitutional: Negative.   Respiratory: Negative.     Psychiatric/Behavioral:  Positive for altered mental status.        Physical Exam:    Gen: alert, oriented x 2, NAD, sleepy, forgetful    Heent: pupils equal, reactive. Mucous membranes moist.    Neck: no jvd  Cardiac: regular rate and rhythm, normal S1,S2, no murmur, gallop or rub   Lungs: CTA  Abd: soft, NT/ND +bs  Ext: left lower extremity edema and bruising edema  Skin: Warm, dry  Neuro: No focal deficits        Medications:     lidocaine-menthol  1 patch Transdermal Daily    atorvastatin  40 mg Oral Nightly    clopidogrel  75 mg Oral Daily    cyanocobalamin  1,000 mcg Oral Daily    DULoxetine  30 mg Oral Daily    famotidine  20 mg Oral Daily    furosemide  20 mg Oral Daily    levothyroxine  50 mcg Oral Before breakfast    [Held by provider] metoprolol succinate ER  100 mg Oral Daily Beta Blocker    magnesium oxide  400 mg Oral Daily    sertraline  25 mg Oral Daily      sodium chloride         Assessment:  Mechanical fall with left hip hematoma and acute blood loss anemia while being on eliquis and plavix  CT brain neg for acute findings  MRI brain showed multiple small acute infarcts within the white matter concern for embolic event  Holding off eliquis, plan to resume plavix if hgb remains stable   Acute blood loss anemia  Hemoglobin stable at 7.3 (baseline hgb ~13.0)   Holding Eliquis, plavix  Troponin elevation likely d/t demand ischemia   Trop 187>151  EKG, V paced, no acute ST changes    Acute metabolic encephalopathy likely multifactorial advanced age, hospital delirium, acute CVA    MRI brain showed small acute infarcts within the white matter   CAD with hx of multivessel PCI and JOS   Historically on plavix, eliquis, statin, bb  Hx of ischemic cardiomyopathy with now recovered LVEF  Echo 7/15/24 LVEF 65-70%, no rwma, mild TR  Historically on toprol, entresto, geovanna, jardiance, lasix   Hx of BiV ICD  S/P device interrogation showed 100% pacing, no acute arrhthymias   Permanent a-fib  On toprol  On Eliquis for stroke prevention, now held d/t above  HTN, BP elevated, will resume entresto today  HLP-on statin  CKD 3, crt stable at 1.01   DM2  Hx of CVA  Hypothyroidism -on  replacement    Plan:  Appears compensated cardiac status.  Continue lopressor.  Will resume entresto, geovanna, verified with pharmacy ok to crush-disscussed with Dr Harris.  Continue plavix, statin.  MRI with concern for embolic event with small acute infarcts within the white matter-pending neurology eval.  Off Eliqius  with thigh hematoma and acute anemia-monitor hemoglobin.   Would consider OP Watchman procedure once acute issues resolve.     Plan of care discussed with patient, RN.    Jessica Baltazar, APRN  7/19/2024  12:39 PM  [phone]        Note reviewed and labs reviewed.  Agree with above assessment and plan.  Compensated cardiac status - resume HF meds as outlined above.    LUIS DANIEL Harris MD

## 2024-07-19 NOTE — OCCUPATIONAL THERAPY NOTE
OCCUPATIONAL THERAPY EVALUATION - INPATIENT     Room Number: 386/386-A  Evaluation Date: 7/19/2024  Type of Evaluation: Re-eval   Presenting Problem: CVA    Physician Order: IP Consult to Occupational Therapy  Reason for Therapy: ADL/IADL Dysfunction and Discharge Planning    OCCUPATIONAL THERAPY ASSESSMENT   Patient is currently functioning below baseline with toileting, bathing, upper body dressing, lower body dressing, grooming, eating, bed mobility, transfers, static sitting balance, dynamic sitting balance, static standing balance, dynamic standing balance, maintaining seated position, functional standing tolerance, and performing household tasks. Prior to admission, patient's baseline is independent in all BADL and functional mobility via rollator prior to admission. She occasionally receives supervision for showering, however usually prefers to shower independently.  Patient is requiring maximum assist and dependent as a result of the following impairments: decreased functional strength, decreased functional reach, decreased endurance, impaired sitting and standing balance, impaired coordination, impaired motor planning, decreased muscular endurance, cognitive deficits (delayed processing, lethargy), medical status, limited B UE/LE ROM, decreased safety awareness, visual deficits, and decreased visual spatial awareness. Occupational Therapy will continue to follow for duration of hospitalization.    Patient will benefit from continued skilled OT Services to promote return to prior level of function and safety with continuous assistance and gradual rehabilitative therapy      History Related to Current Admission: Patient is a 88 year old female admitted on 7/14/2024 with with Presenting Problem: fall, L hip hematoma, AMS. Head CT negative for acute process, L hip xray negative for fx, L hip CT shows hematoma; per hospitalist note, patient also with acute metabolic encephalopathy Presenting Problem: CVA.  Co-Morbidities : CVA, HTN, HLD, DM   Addendum 7/16: patient with RRT called this am for decreased responsiveness; brain CT negative for acute process    MRI of the brain from 7/18 showed multiple small acute infarcts predominantly within the white matter as detailed above concerning for an embolic event.       MRI of the brain from 7/18:  CONCLUSION:    1. Findings consistent with multiple small acute infarcts predominantly within the white matter as detailed above concerning for an embolic event, correlate clinically.             Recommendations for nursing staff:   Transfers: kiara lift   Toileting location: bed     EVALUATION SESSION  Patient Start of Session: Pt was found in her bed with her daughter at the bedside.   FUNCTIONAL TRANSFER ASSESSMENT  Sit to Stand: Edge of Bed  Edge of Bed: Not Tested    BED MOBILITY  Supine to Sit : Dependent  Sit to Supine (OT): Dependent  Scooting: Dependent with drawsheet    BALANCE ASSESSMENT  Static Sitting: Maximum Assist  Sitting Unilateral: Maximum Assist  Static Standing: Not Tested    FUNCTIONAL ADL ASSESSMENT  Grooming Seated: Not Tested  LB Dressing Seated: Dependent (total to don B socks)      ACTIVITY TOLERANCE:                          O2 SATURATIONS       Cognition  Lethargic   Delayed processing   Pt oriented to self only     Upper extremity  B UE strength is 2+/5     EDUCATION PROVIDED  Patient: Role of Occupational Therapy; Plan of Care; Discharge Recommendations; Functional Transfer Techniques; Fall Prevention; Compensatory ADL Techniques  Patient's Response to Education: Verbalized Understanding; Requires Further Education  Family/Caregiver: Role of Occupational Therapy; Plan of Care; Discharge Recommendations; Functional Transfer Techniques; Compensatory ADL Techniques; Fall Prevention  Family/Caregiver's Response to Education: Verbalized Understanding; Requires Further Education      Equipment used: none   Demonstrates functional use, Would benefit from  additional trial     Therapist comments: supine>sit EOB with max Ax2>max A for static sitting balance>B UE AROM assessed, vision assessed-pt with either visual inattention or field cuts to both visual fields, recommend pt follow up with neuropthamology, daughter given resource for [facility] to follow up outpatient>supine>bed into chair position>transport to take pt off unit for CTA     Patient End of Session: In bed;Needs met;Call light within reach;RN aware of session/findings;All patient questions and concerns addressed;SCDs in place;Alarm set;Family present    OCCUPATIONAL PROFILE    HOME SITUATION  Type of Home: Assisted living facility ([facility])  Home Layout: One level  Lives With: Staff 24 hours;Alone    Toilet and Equipment: Comfort height toilet;Grab bar  Shower/Tub and Equipment: Walk-in shower;Grab bar;Shower chair  Other Equipment: None    Occupation/Status: retired     Drives: No  Patient Regularly Uses: Hearing aides    Prior Level of Function: Per daughter at bedside as patient is a poor historian at this time, patient typically independent in all BADL and functional mobility via rollator prior to admission. She occasionally receives supervision for showering, however usually prefers to shower independently. Sometimes sleeps in lift recliner. Daughter reports patient typically alert and oriented x4.     SUBJECTIVE   \"Thank you for coming.\"     PAIN ASSESSMENT  Rating: Unable to rate  Location: L LE  Management Techniques: Activity promotion;Repositioning    OBJECTIVE  Precautions: Bed/chair alarm;Hard of hearing;Aspiration  Fall Risk: High fall risk    WEIGHT BEARING RESTRICTION  Weight Bearing Restriction: None           L Lower Extremity: Weight Bearing as Tolerated    ASSESSMENTS    AM-PAC ‘6-Clicks’ Inpatient Daily Activity Short Form  -   Putting on and taking off regular lower body clothing?: Total  -   Bathing (including washing, rinsing, drying)?: A Lot  -    Toileting, which includes using toilet, bedpan or urinal? : Total  -   Putting on and taking off regular upper body clothing?: A Lot  -   Taking care of personal grooming such as brushing teeth?: A Lot  -   Eating meals?: A Lot    AM-PAC Score:  Score: 10  Approx Degree of Impairment: 74.7%  Standardized Score (AM-PAC Scale): 27.31    PLAN  OT Treatment Plan: Energy conservation/work simplification techniques;ADL training;Functional transfer training;Endurance training;Patient/Family education;Patient/Family training;Equipment eval/education;Compensatory technique education;Continued evaluation;Neuromuscluar reeducation;UE strengthening/ROM;Cognitive reorientation;Visual perceptual training;Fine motor coordination activities  Rehab Potential : Fair  Frequency: 5x/week  Number of Visits to Meet Established Goals: 5    ADL Goals  Patient will perform grooming with min A to wash face  Patient will perform LB dressing with max A and AE PRN    Functional Transfer Goals  Patient will perform bed mobility supine to sit with mod A  Patient will perform bed mobility sit to supine with mod A  Patient will perform toilet transfer to commode with max A     Additional Goals:  Pt will locate 4/5 ADL items on her tray table to increase visual scanning ability   Further assess vision  Administer SBT   Family will be independent with B UE AAROM HEP   Pt will sit EOB with no more than mod A for static sitting balance.       Patient Evaluation Complexity Level:   Occupational Profile/Medical History MODERATE - Expanded review of history including review of medical or therapy record   Specific performance deficits impacting engagement in ADL/IADL MODERATE  3 - 5 performance deficits   Client Assessment/Performance Deficits MODERATE - Comorbidities and min to mod modifications of tasks    Clinical Decision Making MODERATE - Analysis of occupational profile, detailed assessments, several treatment options    Overall Complexity MODERATE      OT Session Time: 30 minutes  Therapeutic Activity: 15 minutes

## 2024-07-19 NOTE — CM/SW NOTE
Met with pt's dtr and confirmed DC plan for [facility].  Discussed possible weekend discharge.  Updates sent to [place] via [facility].  Await medical clearance for discharge.  / to remain available for support and/or discharge planning.     [facility]  [phone]    Karine Alvarez [facility]  Discharge Planner  [phone]

## 2024-07-19 NOTE — PLAN OF CARE
Patient alert and oriented x1-3. Room air. Continuous pulse oximeter. Telemetry monitoring. V-paced. Non-cardiac electrolyte protocol. Last bowel movement 7/17. Regular diet with cardiac restrictions/soft easy to chew. MRI results communicated to attending and neurology. Neurology consult pending. Circumference of left thigh orders for 3 times a day. Hgb 7.3. Family at bedside for support. Patient needs assistance with ordering and set up. Medications to be crushed in ice cream. Stroke coordinator assessment completed. CTA pending. Plan is transfer to neuro unit.

## 2024-07-19 NOTE — PHYSICAL THERAPY NOTE
PHYSICAL THERAPY EVALUATION - INPATIENT     Room Number: 3620/3620-A  Evaluation Date: 7/19/2024  Type of Evaluation: Re-evaluation  Physician Order: PT Eval and Treat    Presenting Problem: unwitnessed fall with extensive hematoma to left hip  Co-Morbidities : HTN, CAD, CKD, DM, depression, CVA with residual L-side weakness (per daughter)  Reason for Therapy: Mobility Dysfunction and Discharge Planning    CT HIP BONE (LEFT) 7/14/24: Hematoma overlying the left greater trochanter      XRAY FEMUR (LEFT) 7/14/24: negative for fracture or malalignment     CT BRAIN/HEAD 7/14/24: (-) for acute processes    MRI BRAIN 7/18/24: Findings consistent with multiple small acute infarcts predominantly within the white matter as detailed above concerning for an embolic event, correlate clinically.    PHYSICAL THERAPY ASSESSMENT   Patient is currently functioning below baseline with bed mobility, transfers, gait, stair negotiation, maintaining seated position, standing prolonged periods, and performing household tasks.  Prior to admission, patient's baseline is [place] with rollator.  Patient is requiring maximum assist and dependent as a result of the following impairments: decreased functional strength, decreased endurance/aerobic capacity, impaired static and dynamic standing balance, impaired coordination, impaired motor planning, decreased muscular endurance, cognitive deficits (incr confusion, decr alertness/lethargy, delayed processing), medical status, and limited BUE/BLE ROM. Physical Therapy will continue to follow for duration of hospitalization.      Patient will benefit from continued skilled PT Services to promote return to prior level of function and safety with continuous assistance and gradual rehabilitative therapy .    PLAN  PT Treatment Plan: Body mechanics;Bed mobility;Coordination;Endurance;Energy conservation;Patient education;Family education;Gait training;Neuromuscular re-educate;Stair training;Balance  training;Transfer training;Stoop training;Strengthening;Range of motion  Rehab Potential : Fair  Frequency (Obs): 3-5x/week  Number of Visits to Meet Established Goals: 7      CURRENT GOALS    Goal #1 Patient is able to demonstrate supine - sit EOB @ level: minimum assistance     Goal #2 Patient is able to demonstrate transfers Sit to/from Stand at assistance level: minimum assistance     Goal #3 Patient is able to ambulate 10 feet with assist device: walker - rolling at assistance level: moderate assistance     Goal #4    Goal #5    Goal #6    Goal Comments: Goals established on 7/19/2024    PHYSICAL THERAPY MEDICAL/SOCIAL HISTORY  History related to current admission: Patient is a 88 year old female admitted on 7/14/2024 from home for unwitnessed fall.  Pt diagnosed with hematoma/contusion of left hip. RRT called on 7/16/24 due to decreased responsiveness, brain CT negative for acute processes. MRI was completed on 7/18/24.     HOME SITUATION  Type of Home: Assisted living facility ([facility])   Home Layout: One level                Lives With: Staff 24 hours;Alone  Drives: No  Patient Owned Equipment: Rollator  Patient Regularly Uses: Hearing aides    Prior Level of [place]:   From initial eval completed on 7/15/24: Per pt's daughter (pt unable to provide info due to current mentation status).   Lives at [facility] - in supportive living environment (has assistance with medication management and showers). Ambulates with rollator at baseline. Typically A&O x 4. Hx of CVA with some residual left side weakness. No other recent falls that daughter knows of. Per dtr, unsure of how long pt was down for after the fall - she was found in the bathroom. Has lift recliner chair, sometimes sleeps in that sometimes sleeps in flat bed. Wears hearing aides- can't find one of them.     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.\"    OBJECTIVE  Precautions: Bed/chair alarm;Hard of hearing;Aspiration  Fall Risk: High  fall risk    WEIGHT BEARING RESTRICTION  Weight Bearing Restriction: None           L Lower Extremity: Weight Bearing as Tolerated    PAIN ASSESSMENT  Rating: Unable to rate  Location: let hip  Management Techniques: Activity promotion;Body mechanics;Repositioning    COGNITION  Overall Cognitive Status:  Impaired  Arousal/Alertness:  delayed responses to stimuli and lethargic  Orientation Level:  oriented to place, oriented to person, disoriented to time, and disoriented to situation  Memory:  impaired working memory and decreased recall of recent events  Following Commands:  follows one-step commands inconsistently  Initiation: cues to initiate tasks and hand over hand to initiate tasks  Motor Planning: impaired  Safety Judgement:  decreased awareness of need for assistance and decreased awareness of need for safety  Awareness of Errors:  assistance required to identify errors made, assistance required to correct errors made, and decreased awareness of errors   Awareness of Deficits:  decreased awareness of deficits  Problem Solving:  assistance required to identify errors made, assistance required to generate solutions, and assistance required to implement solutions    RANGE OF MOTION AND STRENGTH ASSESSMENT  Upper extremity ROM and strength - see OT note     Lower extremity strength is grossly 2+/5       BALANCE  Static Sitting: Poor  Dynamic Sitting: Poor -  Static Standing: Not tested  Dynamic Standing: Not tested    ADDITIONAL TESTS                 Modified Neo: 4                  ACTIVITY TOLERANCE                         O2 WALK       NEUROLOGICAL FINDINGS                        AM-PAC '6-Clicks' INPATIENT SHORT FORM - BASIC MOBILITY  How much difficulty does the patient currently have...  Patient Difficulty: Turning over in bed (including adjusting bedclothes, sheets and blankets)?: A Lot   Patient Difficulty: Sitting down on and standing up from a chair with arms (e.g., wheelchair, bedside commode,  etc.): A Lot   Patient Difficulty: Moving from lying on back to sitting on the side of the bed?: A Lot   How much help from another person does the patient currently need...   Help from Another: Moving to and from a bed to a chair (including a wheelchair)?: Total   Help from Another: Need to walk in hospital room?: Total   Help from Another: Climbing 3-5 steps with a railing?: Total       AM-PAC Score:  Raw Score: 9   Approx Degree of Impairment: 81.38%   Standardized Score (AM-PAC Scale): 30.55   CMS Modifier (G-Code): CM    FUNCTIONAL ABILITY STATUS  Gait Assessment   Functional Mobility/Gait Assessment  Gait Assistance: Not tested    Skilled Therapy Provided     Bed Mobility:  Rolling: NT  Supine to sit: dependent   Sit to supine: dependent     Transfer Mobility: not safe to test at this time    Therapist's Comments:   Patient presents sitting up in bed. Daughter present at bedside.  Bed mobility completed supine>soit EOB at maxA x 2. In sitting requires maxA for static sitting balance, demonstrates lean to the right. BLE knee flexion and DF/PF assessed- strength 2+/5 grossly. In sitting, could not find neutral position required maxA. Pt with increased difficulty maintaining focus on task requiring cues to re-direct. Attempted visual assessment- pt seems to have visual cuts peripherally to both eyes. Pt returned to chair position in bed. Transport arrived to take pt off floor for CTA.     Exercise/Education Provided:  Bed mobility  Body mechanics  Energy conservation  Functional activity tolerated  Posture    Patient End of Session: In bed;Needs met;With  staff;Call light within reach;RN aware of session/findings;All patient questions and concerns addressed;Family present;Discussed recommendations with /      Patient Evaluation Complexity Level:  History Moderate - 1 or 2 personal factors and/or co-morbidities   Examination of body systems Moderate - addressing a total of 3 or more  elements   Clinical Presentation  Moderate - Evolving   Clinical Decision Making Moderate Complexity     PT Session Time: 30 minutes  Therapeutic Activity: 15 minutes

## 2024-07-19 NOTE — PROGRESS NOTES
[facility]   part of [facility]     Hospitalist Progress Note     Jamee Johnson Patient Status:  Observation    1935 MRN [id number]   Location [facility] 3SW-A Attending Manpreet Ingram,    Hosp Day # 0 PCP None Pcp     Chief Complaint: Fall    Subjective:     Resting in bed comfortably, exam unchanged from prior. Daughter at bedside. Discussed MRI results with daughter face-face and pt's sons over the telephone. Updated on plan of care and answered all questions     Objective:    Review of Systems:   A comprehensive review of systems was completed; pertinent positive and negatives stated in subjective.    Vital signs:  Temp:  [97 °F (36.1 °C)-98.5 °F (36.9 °C)] 97.7 °F (36.5 °C)  Pulse:  [68-70] 70  Resp:  [14-17] 17  BP: (154-189)/(47-71) 156/71  SpO2:  [95 %-100 %] 99 %    Physical Exam:    General: No acute distress, lethargic, but following commands appropriately  Respiratory: No wheezes, no rhonchi  Cardiovascular: S1, S2, paced  Abdomen: Soft, Non-tender, non-distended, positive bowel sounds  Extremities: No edema. Left hip large area of swelling/ecchymosis     Diagnostic Data:    Labs:  Recent Labs   Lab 24  1319 07/15/24  0510 07/15/24  1344 07/15/24  1857 07/16/24  0447 07/17/24  0524  1037   WBC 11.5* 8.2  --   --  8.3 9.2  --    HGB 8.6* 7.3* 7.4* 7.2* 7.4* 7.4* 7.3*   MCV 92.1 92.8  --   --  93.5 93.9  --    .0 258.0  --   --  265.0 298.0  --      Recent Labs   Lab 24  1319 07/15/24  0510 24  05   * 120* 147* 134*   BUN 27* 24* 19 21   CREATSERUM 1.45* 1.25* 1.10* 1.01   CA 9.3 8.6 8.9 9.2   ALB 3.3*  --   --   --     140 138 140   K 4.4 4.2 3.9 3.8    106 106 105   CO2 26.0 26.0 26.0 29.0   ALKPHO 69  --   --   --    AST 5*  --   --   --    ALT 16  --   --   --    BILT 0.8  --   --   --    TP 6.3*  --   --   --      Estimated Creatinine Clearance: 29.6 mL/min (based on SCr of 1.01 mg/dL).    Recent Labs   Lab  07/14/24  1319 07/14/24  1831 07/15/24  0510   TROPHS 187* 151*  --    CK  --   --  136     No results for input(s): \"PTP\", \"INR\" in the last 168 hours.     Microbiology  No results found for this visit on 07/14/24.    Imaging: Reviewed in Epic.    Medications:    sacubitril-valsartan  1 tablet Oral BID    spironolactone  25 mg Oral Daily    lidocaine-menthol  1 patch Transdermal Daily    atorvastatin  40 mg Oral Nightly    clopidogrel  75 mg Oral Daily    cyanocobalamin  1,000 mcg Oral Daily    DULoxetine  30 mg Oral Daily    famotidine  20 mg Oral Daily    furosemide  20 mg Oral Daily    levothyroxine  50 mcg Oral Before breakfast    [Held by provider] metoprolol succinate ER  100 mg Oral Daily Beta Blocker    magnesium oxide  400 mg Oral Daily    sertraline  25 mg Oral Daily       Assessment & Plan:      #Acute multiple small infarcts; likely embolic event  - Eliquis on hold d/t blood loss anemia/hematoma  - stroke protocol  - transfer to stroke floor  - neurology consulted  - statin  - will need medication optimization, ?consider possible Watchman placement in future    #Fall x2  -CT brain negative for acute abnormalities  -EKG paced rhythm   -Monitor on telemetry   -PPM interrogated - no events  -PT/OT     #Left hip hematoma  -CT reviewed  -CTA LLE with possible small bleed  -Eliquis discontinued  -okay to resume plavix per cardiology      #Acute on chronic anemia, likely due to blood loss from hematoma  -Baseline hgb is 12 from January this year  -Hgb now stable  -Iron studies reviewed > IV iron while inpatient    #Acute metabolic encephalopathy  -Also hospital-acquired delirium contributing  -Daughter reports her memory has been slipping recently  -Outpatient neuropsych testing  -CT head, ammonia, ABG unremarkable  -Avoid narcotics  -MRI brain ordered     #Elevated Troponin  -downtrended  -Echo unremarkable  -Monitor on telemetry     #Chronic A. Fib s/p PPM  #CAD s/p stents  #Prior ischemic cardiomyopathy  with revascularization and CRT-D with normalized EF   #Mod to sev MR  -Statin, Metoprolol, plavix  -resume entresto and aldactone  -Hold jardiance  -eliquis discontinued (possibly indefinitely, discussed with family risk/benefits) > possible consideration of Watchman in future      #HLD   -Statin     #CKD 3  -Stable     #DM Type 2 with A1c 6.3  -Diet controlled. Can hold off accuchecks and ISS     #Hypothyroidism   -Synthroid     #Depression   -Duloxetine/zoloft    #Hypothyroidism  -Levothyroxine    #Hx of CVA  - now with recurrent stroke > likely embolic event off Eliquis   - resume plavix. Continue statin  - rest as above    Dacia Paulson, DO      Supplementary Documentation:     Quality:  DVT Mechanical Prophylaxis:   SCDs, Early ambuation  DVT Pharmacologic Prophylaxis   Medication   None     Code Status: Full Code  [place]: External urinary catheter in place  YVON: 7/19/2024TBD    Discharge is dependent on: clinical state  At this point Ms. Johnson is expected to be discharge to: [facility]    The 21st Century Cures Act makes medical notes like these available to patients in the interest of transparency. Please be advised this is a medical document. Medical documents are intended to carry relevant information, facts as evident, and the clinical opinion of the practitioner. The medical note is intended as peer to peer communication and may appear blunt or direct. It is written in medical language and may contain abbreviations or verbiage that are unfamiliar.

## 2024-07-19 NOTE — SLP NOTE
SPEECH DAILY NOTE - INPATIENT    ASSESSMENT & PLAN   ASSESSMENT  Pt seen for dysphagia tx to assess tolerance with recommended diet, ensure proper utilization of aspiration precautions and provide pt/family education.  Patient seen with recommended diet of soft/easy to chew and thin liquids with use of small bites and sips and slow rate. RN reported earlier in the day that patient tolerating recommended diet and medications crushed in applesauce. Patient seated upright and midline in bed with distractions eliminated. She was taking small bites and sips at a slow rate. Liquids via straw.   Patient with safe and efficient po intake for soft/easy to chew solids and thin liquids with utilization of aspiration precautions.   Follow up for meal monitor and communication/cognition assessment per stroke protocol.      Diet Recommendations - Solids: Soft/ Easy to chew  Diet Recommendations - Liquids: Thin Liquids    Compensatory Strategies Recommended: Small bites and sips  Aspiration Precautions: Upright position;Slow rate;Small bites and sips  Medication Administration Recommendations: Crushed in puree    Patient Experiencing Pain: No                Treatment Plan  Treatment Plan/Recommendations: Aspiration precautions (meal monitor)    Interdisciplinary Communication: Discussed with RN  Disussed with other staff          GOALS  Goal #1 The patient will tolerate soft/easy to chew consistency and thin liquids without overt signs or symptoms of aspiration with 95 % accuracy over 1-2 session(s).  In Progress   Goal #2 The patient/family/caregiver will demonstrate understanding and implementation of aspiration precautions and swallow strategies independently over 1-2 session(s).     In Progress   Goal #3 The patient will utilize compensatory strategies as outlined by  BSSE (clinical evaluation) including Slow rate, Small bites, Small sips, Upright 90 degrees, Eliminate distractions with min assistance 95 % of the time across 2  sessions.  In Progress    Goal #4  Per stroke protocol, patient will participate in cog/comm evaluation.   Goal established.                                          FOLLOW UP  Follow Up Needed (Documentation Required): Yes  SLP Follow-up Date: 07/22/24  Number of Visits to Meet Established Goals: 1    Session: 1 of 2    If you have any questions, please contact WALT Alejandre

## 2024-07-20 PROBLEM — G93.40 ENCEPHALOPATHY ACUTE: Status: ACTIVE | Noted: 2024-07-20

## 2024-07-20 LAB
ANION GAP SERPL CALC-SCNC: 8 MMOL/L (ref 0–18)
BASOPHILS # BLD AUTO: 0.03 X10(3) UL (ref 0–0.2)
BASOPHILS NFR BLD AUTO: 0.3 %
BUN BLD-MCNC: 24 MG/DL (ref 9–23)
CALCIUM BLD-MCNC: 9.6 MG/DL (ref 8.7–10.4)
CHLORIDE SERPL-SCNC: 106 MMOL/L (ref 98–112)
CO2 SERPL-SCNC: 28 MMOL/L (ref 21–32)
CREAT BLD-MCNC: 1.04 MG/DL
EGFRCR SERPLBLD CKD-EPI 2021: 52 ML/MIN/1.73M2 (ref 60–?)
EOSINOPHIL # BLD AUTO: 0.14 X10(3) UL (ref 0–0.7)
EOSINOPHIL NFR BLD AUTO: 1.5 %
ERYTHROCYTE [DISTWIDTH] IN BLOOD BY AUTOMATED COUNT: 18.9 %
GLUCOSE BLD-MCNC: 145 MG/DL (ref 70–99)
GLUCOSE BLD-MCNC: 160 MG/DL (ref 70–99)
GLUCOSE BLD-MCNC: 179 MG/DL (ref 70–99)
GLUCOSE BLD-MCNC: 184 MG/DL (ref 70–99)
GLUCOSE BLD-MCNC: 204 MG/DL (ref 70–99)
HCT VFR BLD AUTO: 25.2 %
HCT VFR BLD AUTO: 25.2 %
HGB BLD-MCNC: 7.8 G/DL
HGB BLD-MCNC: 8 G/DL
IMM GRANULOCYTES # BLD AUTO: 0.12 X10(3) UL (ref 0–1)
IMM GRANULOCYTES NFR BLD: 1.3 %
LYMPHOCYTES # BLD AUTO: 1.89 X10(3) UL (ref 1–4)
LYMPHOCYTES NFR BLD AUTO: 19.9 %
MCH RBC QN AUTO: 30.7 PG (ref 26–34)
MCHC RBC AUTO-ENTMCNC: 31 G/DL (ref 31–37)
MCV RBC AUTO: 99.2 FL
MONOCYTES # BLD AUTO: 0.69 X10(3) UL (ref 0.1–1)
MONOCYTES NFR BLD AUTO: 7.3 %
NEUTROPHILS # BLD AUTO: 6.63 X10 (3) UL (ref 1.5–7.7)
NEUTROPHILS # BLD AUTO: 6.63 X10(3) UL (ref 1.5–7.7)
NEUTROPHILS NFR BLD AUTO: 69.7 %
OSMOLALITY SERPL CALC.SUM OF ELEC: 301 MOSM/KG (ref 275–295)
PLATELET # BLD AUTO: 327 10(3)UL (ref 150–450)
POTASSIUM SERPL-SCNC: 3.6 MMOL/L (ref 3.5–5.1)
POTASSIUM SERPL-SCNC: 4.7 MMOL/L (ref 3.5–5.1)
RBC # BLD AUTO: 2.54 X10(6)UL
SODIUM SERPL-SCNC: 142 MMOL/L (ref 136–145)
WBC # BLD AUTO: 9.5 X10(3) UL (ref 4–11)

## 2024-07-20 PROCEDURE — 99232 SBSQ HOSP IP/OBS MODERATE 35: CPT | Performed by: INTERNAL MEDICINE

## 2024-07-20 PROCEDURE — 99233 SBSQ HOSP IP/OBS HIGH 50: CPT | Performed by: OTHER

## 2024-07-20 RX ORDER — POTASSIUM CHLORIDE 20 MEQ/1
40 TABLET, EXTENDED RELEASE ORAL EVERY 4 HOURS
Status: COMPLETED | OUTPATIENT
Start: 2024-07-20 | End: 2024-07-20

## 2024-07-20 RX ORDER — ASPIRIN 81 MG/1
81 TABLET ORAL DAILY
Qty: 21 TABLET | Refills: 0 | Status: SHIPPED | OUTPATIENT
Start: 2024-07-20 | End: 2024-08-10

## 2024-07-20 NOTE — CM/SW NOTE
Conditional DC order placed on 7/18. DC planning to [facility]. Insurance auth has been approved through 7/30. Message sent to RN to inquire on medical clearance.     Addendum 1:00-   SW called [facility] ([phone]), spoke to admissions. Admissions informed SW that pt will admit to rm 239A and if discharging to please call report prior to pt admission. Notified RN, DC order placed pending neuro clearance.     Will set up transport once pt cleared by all consults.    [facility] report number: [phone].     LEOPOLDO Fraser

## 2024-07-20 NOTE — PROGRESS NOTES
Progress Note  Jamee Johnson Patient Status:  Inpatient    1935 MRN [id number]   Location [facility] 3NE-A Attending Dacia Paulson, DO   Hosp Day # 1 PCP None Pcp     Subjective:  In bed on exam, no acute distress. Denies cp, sob.     Objective:  /49 (BP Location: Right arm)   Pulse 69   Temp 97.8 °F (36.6 °C) (Oral)   Resp 26   Wt 154 lb 14.4 oz (70.3 kg)   SpO2 100%   BMI 29.27 kg/m²     Telemetry: v paced      Intake/Output:  No intake or output data in the 24 hours ending 24 0941    Last 3 Weights   24 1735 154 lb 14.4 oz (70.3 kg)   24 1054 154 lb (69.9 kg)   19 2300 165 lb 1.6 oz (74.9 kg)   19 2110 162 lb 14.7 oz (73.9 kg)       Labs:  Recent Labs   Lab 24  0447 24  0527 24  0722   * 134* 160*   BUN  24*   CREATSERUM 1.10* 1.01 1.04*   EGFRCR 49* 54* 52*   CA 8.9 9.2 9.6    140 142   K 3.9 3.8 3.6    105 106   CO2 26.0 29.0 28.0     Recent Labs   Lab 24  0447 24  0527 24  1037 24  0722   RBC 2.47* 2.44*  --  2.54*   HGB 7.4* 7.4* 7.3* 7.8*   HCT 23.1* 22.9*  --  25.2*   MCV 93.5 93.9  --  99.2   MCH 30.0 30.3  --  30.7   MCHC 32.0 32.3  --  31.0   RDW 14.0 14.6  --  18.9   NEPRELIM 5.48 6.22  --  6.63   WBC 8.3 9.2  --  9.5   .0 298.0  --  327.0         Recent Labs   Lab 24  1319 24  1831 07/15/24  0510   TROPHS 187* 151*  --    CK  --   --  136         Physical Exam:    Gen: alert, oriented, NAD  Heent: pupils equal, reactive. Mucous membranes moist.   Neck: no jvd  Cardiac: regular rate and rhythm, normal S1,S2  Lungs: CTA  Abd: soft, NT/ND +bs  Ext: no edema. Left thigh with ace wrap in place  Skin: Warm, dry  Neuro: No focal deficits        Medications:     potassium chloride  40 mEq Oral Q4H    sacubitril-valsartan  1 tablet Oral BID    spironolactone  25 mg Oral Daily    lidocaine-menthol  1 patch Transdermal Daily    atorvastatin  40 mg Oral Nightly     clopidogrel  75 mg Oral Daily    cyanocobalamin  1,000 mcg Oral Daily    DULoxetine  30 mg Oral Daily    famotidine  20 mg Oral Daily    furosemide  20 mg Oral Daily    levothyroxine  50 mcg Oral Before breakfast    metoprolol succinate ER  100 mg Oral Daily Beta Blocker    magnesium oxide  400 mg Oral Daily    sertraline  25 mg Oral Daily             Assessment:  Mechanical fall with left hip hematoma and acute blood loss anemia while being on eliquis and plavix  CT brain neg for acute findings  MRI brain showed multiple small acute infarcts within the white matter concern for embolic event  Holding off eliquis, plan to resume plavix if hgb remains stable   Neuro following.  Multiple acute infarcts, likely embolic.   Neuro following  CTA head/neck complted.   High risk of bleeding/fall. Plavix added.   Neuro considering asa or eliquis.   Acute blood loss anemia  Hemoglobin stable (baseline hgb ~13.0)   Holding Eliquis, plavix  Troponin elevation likely d/t demand ischemia   Trop 187>151  EKG, V paced, no acute ST changes    Acute metabolic encephalopathy likely multifactorial advanced age, hospital delirium, acute CVA    MRI brain showed small acute infarcts within the white matter   CAD with hx of multivessel PCI and JOS   Historically on plavix, eliquis, statin, bb  Hx of ischemic cardiomyopathy with now recovered LVEF  Echo 7/15/24 LVEF 65-70%, no rwma, mild TR  Historically on toprol, entresto, geovanna, jardiance, lasix   Hx of BiV ICD  S/P device interrogation showed 100% pacing, no acute arrhthymias   Permanent a-fib  On toprol  On Eliquis for stroke prevention, now held d/t above  HTN--improved  HLP-on statin  CKD 3, crt stable  DM2  Hx of CVA  Hypothyroidism -on replacement    Plan:  Hgb 7.8 today, stable  Cont present cardiac regimen.   On plavix. Neuro considering asa or eliquis addition. High risk for bleeding.  OP consideration for Watchman.     Plan of care discussed with patient, RN.    Maribel HILLIARD  Flores, PATRICIA  7/20/2024  9:41 AM

## 2024-07-20 NOTE — PROGRESS NOTES
[facility]   part of [facility]     Hospitalist Progress Note     Jamee Johnson Patient Status:  Observation    1935 MRN [id number]   Location [facility] 3SW-A Attending Manpreet Ingram DO   Hosp Day # 1 PCP None Pcp     Chief Complaint: Fall    Subjective:     Resting in bed, more awake/alert this morning, oriented     Objective:    Review of Systems:   A comprehensive review of systems was completed; pertinent positive and negatives stated in subjective.    Vital signs:  Temp:  [97.6 °F (36.4 °C)-98.1 °F (36.7 °C)] 97.8 °F (36.6 °C)  Pulse:  [69-70] 69  Resp:  [16-30] 26  BP: (125-165)/(49-73) 125/49  SpO2:  [84 %-100 %] 100 %    Physical Exam:    General: No acute distress, lethargic, but following commands appropriately  Respiratory: No wheezes, no rhonchi  Cardiovascular: S1, S2, paced  Abdomen: Soft, Non-tender, non-distended, positive bowel sounds  Extremities: No edema. Left hip large area of swelling/ecchymosis     Diagnostic Data:    Labs:  Recent Labs   Lab 24  1319 07/15/24  0510 07/15/24  1344 07/15/24  1857 24  1037 24  0722   WBC 11.5* 8.2  --   --  8.3 9.2  --  9.5   HGB 8.6* 7.3*   < > 7.2* 7.4* 7.4* 7.3* 7.8*   MCV 92.1 92.8  --   --  93.5 93.9  --  99.2   .0 258.0  --   --  265.0 298.0  --  327.0    < > = values in this interval not displayed.     Recent Labs   Lab 24  1319 07/15/24  0510 24  0524  0722   *   < > 147* 134* 160*   BUN 27*   < > 19 21 24*   CREATSERUM 1.45*   < > 1.10* 1.01 1.04*   CA 9.3   < > 8.9 9.2 9.6   ALB 3.3*  --   --   --   --       < > 138 140 142   K 4.4   < > 3.9 3.8 3.6      < > 106 105 106   CO2 26.0   < > 26.0 29.0 28.0   ALKPHO 69  --   --   --   --    AST 5*  --   --   --   --    ALT 16  --   --   --   --    BILT 0.8  --   --   --   --    TP 6.3*  --   --   --   --     < > = values in this interval not displayed.     Estimated  Creatinine Clearance: 28.8 mL/min (A) (based on SCr of 1.04 mg/dL (H)).    Recent Labs   Lab 07/14/24  1319 07/14/24  1831 07/15/24  0510   TROPHS 187* 151*  --    CK  --   --  136     No results for input(s): \"PTP\", \"INR\" in the last 168 hours.     Microbiology  No results found for this visit on 07/14/24.    Imaging: Reviewed in Epic.    Medications:    sacubitril-valsartan  1 tablet Oral BID    spironolactone  25 mg Oral Daily    lidocaine-menthol  1 patch Transdermal Daily    atorvastatin  40 mg Oral Nightly    clopidogrel  75 mg Oral Daily    cyanocobalamin  1,000 mcg Oral Daily    DULoxetine  30 mg Oral Daily    famotidine  20 mg Oral Daily    furosemide  20 mg Oral Daily    levothyroxine  50 mcg Oral Before breakfast    metoprolol succinate ER  100 mg Oral Daily Beta Blocker    magnesium oxide  400 mg Oral Daily    sertraline  25 mg Oral Daily       Assessment & Plan:      #Acute multiple small infarcts; likely embolic event  - Eliquis on hold d/t blood loss anemia/hematoma  - stroke protocol  - statin, plavix  - will need medication optimization, but high risk for bleeding > ?consider possible Watchman placement in future  - neuro following  - PT/OT > YIN     #Left hip hematoma  -CT reviewed  -CTA LLE with possible small bleed  -Eliquis discontinued  -okay to resume plavix per cardiology      #Acute on chronic anemia, likely due to blood loss from hematoma  -Baseline hgb is 12 from January this year  -Hgb now stable  -completed course of IV iron    #Pulmonary nodules  - incidental finding    #Elevated Troponin  -downtrended  -Echo unremarkable  -Monitor on telemetry     #Chronic A. Fib s/p PPM  #CAD s/p stents  #Prior ischemic cardiomyopathy with revascularization and CRT-D with normalized EF   #Mod to sev MR  -Statin, Metoprolol, plavix, entresto, lasix and aldactone  -Hold jardiance  -eliquis discontinued (possibly indefinitely, discussed with family risk/benefits) > possible consideration of Watchman in  future      #HLD   -Statin     #CKD 3  -Stable     #DM Type 2 with A1c 6.3  -Diet controlled. Can hold off accuchecks and ISS     #Hypothyroidism   -Synthroid     #Depression   -Duloxetine/zoloft    #Hypothyroidism  -Levothyroxine    #Hx of CVA  - now with recurrent stroke > likely embolic event off Eliquis   - resume plavix. Continue statin  - rest as above    Dacia Paulson, DO      Supplementary Documentation:     Quality:  DVT Mechanical Prophylaxis:   SCDs, Early ambuation  DVT Pharmacologic Prophylaxis   Medication   None     Code Status: Full Code  Mathis: External urinary catheter in place  YVON: 7/19/2024TBD    Discharge is dependent on: clinical state  At this point Ms. Johnson is expected to be discharge to: [facility]    The 21st Century Cures Act makes medical notes like these available to patients in the interest of transparency. Please be advised this is a medical document. Medical documents are intended to carry relevant information, facts as evident, and the clinical opinion of the practitioner. The medical note is intended as peer to peer communication and may appear blunt or direct. It is written in medical language and may contain abbreviations or verbiage that are unfamiliar.    No

## 2024-07-20 NOTE — PLAN OF CARE
Assumed care at 0730.  Patient is alert and oriented x1-2. Delayed responses  Room air, 2L NC at night.  V paced on tele  Non cardiac electrolyte protocol, potassium replaced PO  Purewick in place.  Q6hr accuchecks for stroke protocol  NIH daily and neuro checks E3ivpkd.  Takes meds crushed, in ice cream  Soft easy to chew diet, feeder. Max assist.  Generalized weakness.   PT/OT  Measure left thigh TID.  Will continue current plan of care.   Problem: Patient/Family Goals  Goal: Patient/Family Long Term Goal  Description: Patient's Long Term Goal: less falls    Interventions:  - PT/OT evals  - pain meds    - See additional Care Plan goals for specific interventions  Outcome: Progressing  Goal: Patient/Family Short Term Goal  Description: Patient's Short Term Goal: discharge    Interventions:   - CT   - PT/OT evals  - See additional Care Plan goals for specific interventions  Outcome: Progressing     Problem: Delirium  Goal: Minimize duration of delirium  Description: Interventions:  - Encourage use of hearing aids, eye glasses  - Promote highest level of mobility daily  - Provide frequent reorientation  - Promote wakefulness i.e. lights on, blinds open  - Promote sleep, encourage patient's normal rest cycle i.e. lights off, TV off, minimize noise and interruptions  - Encourage family to assist in orientation and promotion of home routines  Outcome: Progressing

## 2024-07-20 NOTE — PROGRESS NOTES
History of Presenting Illness:  Patient seen.  No new weakness, numbness, paresthesias.  Intermittently noted to be slightly confused.  Nursing staff tells me that occasionally she is hallucinating.  No seizure-like activity.  Daughter not available at this time.    Vitals:   Vitals:    07/20/24 1200   BP: 138/66   Pulse: 70   Resp: 24   Temp: 97.7 °F (36.5 °C)          Examination:    Awake , alert, dysarthric, expressive an receptive language normal.  Occasionally disoriented, hallucinatory.  Pupils round and reactive to light, extra ocular movement normal, no facial weakness, hearing normal.  Motor exam limited due to multiple issues..  Finger to Nose testing normal.     Investigations:    CTA BRAIN + CTA CAROTIDS (CPT=70496/32567)    Result Date: 7/19/2024  CONCLUSION:   1. Please refer to the brain MRI from 7/18/2024 for better visualization of the scattered punctate acute infarcts within the cerebral hemispheres.  2. There is a background of moderate chronic small vessel ischemic disease.  There are punctate chronic infarcts within the basal ganglia and thalami.  3. No acute intracranial hemorrhage or hydrocephalus.  4. There is a 1.5 x 1 mm aneurysm projecting inferiorly at the left aspect of the anterior communicating artery complex.  5. Moderate atherosclerotic irregularity is seen at the left A1 segment.  There is a mild to moderate narrowing at the left carotid bulb.  There is a mild narrowing at the right carotid bulb.   There is a mild to moderate narrowing at the proximal left vertebral artery.  Extensive calcified atherosclerotic plaque is seen at the aortic arch.  6. There are 3 mm nodules at the lung apices.   The findings include multiple, incidentally detected, solid pulmonary nodules, measuring less than 6mm.  2017 guidelines from the Fleischner Society for the follow-up and management of incidentally detected indeterminate pulmonary nodules in persons at least 35 years of age depend on nodule  size (average length and width) and underlying risk factors (including smoking and other risk factors). Please consider the following recommendations after clinical assessment of risk factors.  For <6mm solid nodules: In low risk patients, no follow-up required.  If suspicious morphology or upper lobe location, consider 12 month follow-up.  In high risk patients, optional CT in 12 months.    LOCATION:  [place]   Dictated by (CST): Stromberg, LeRoy, MD on 7/19/2024 at 3:17 PM     Finalized by (CST): Stromberg, LeRoy, MD on 7/19/2024 at 3:38 PM       MRI BRAIN (W+WO) (CPT=70553)    Result Date: 7/18/2024  CONCLUSION:  1. Findings consistent with multiple small acute infarcts predominantly within the white matter as detailed above concerning for an embolic event, correlate clinically.  Findings discussed with the patient's nurse, Deanna at the time of this dictation   LOCATION:  [facility]    Dictated by (CST): Marianna Rangel MD on 7/18/2024 at 5:45 PM     Finalized by (CST): Marianna Rangel MD on 7/18/2024 at 5:58 PM            Lab Results   Component Value Date    A1C 5.6 07/19/2024        Lab Results   Component Value Date    LDL 73 07/19/2024    HDL 37 (L) 07/19/2024    TRIG 112 07/19/2024        Recent Labs   Lab 07/16/24 0447 07/17/24 0527 07/18/24  1037 07/20/24  0722   RBC 2.47* 2.44*  --  2.54*   HGB 7.4* 7.4* 7.3* 7.8*   HCT 23.1* 22.9*  --  25.2*   MCV 93.5 93.9  --  99.2   MCH 30.0 30.3  --  30.7   MCHC 32.0 32.3  --  31.0   RDW 14.0 14.6  --  18.9   NEPRELIM 5.48 6.22  --  6.63   WBC 8.3 9.2  --  9.5   .0 298.0  --  327.0       Recent Labs   Lab 07/16/24 0447 07/17/24 0527 07/20/24  0722   * 134* 160*   BUN 19 21 24*   CREATSERUM 1.10* 1.01 1.04*   EGFRCR 49* 54* 52*   CA 8.9 9.2 9.6    140 142   K 3.9 3.8 3.6    105 106   CO2 26.0 29.0 28.0       Impression/MDM.'    Multiple acute infarcts, likely embolic. CTA of head and Neck did not report any hemodynamically significant stenosis. Echo did  not report significant thrombi or shunting. LDL 73, AIC 5.6%..  Risk of bleeding.  Hide due to multiple factors including ongoing hematoma, decreased mobility and balance issues as well as low hemoglobin.  Given the circumstances patient may not be the best candidate for anticoagulation.  Discussed the situation with the daughter yesterday who had also agreed.  I would recommend dual antiplatelet for 3 weeks and reevaluation.  If patient is then stable and anemia and hematoma has improved, patient can be switched to antiplatelet and DOAC then.  Daughter not available at this time.  Will discuss it further when she is available.  Mild encephalopathy.  Most likely toxic/metabolic.  Also contributed by hospital stay.  Anemia.  Hemoglobin slightly better today.  Nursing staff appraised of assessment and plan.

## 2024-07-20 NOTE — CM/SW NOTE
07/20/24 1626   Discharge disposition   Expected discharge disposition subacute   Post Acute Care Provider [facility]   Discharge transportation [facility]     DC Planning for Sunday at 11:30 AM. Ambulance scheduled, PCS completed.     Pt to dc to rm [facility]A at [facility] RN to call [phone] for report.     LEOPOLDO Fraser

## 2024-07-20 NOTE — PROGRESS NOTES
A+O to self. Lethargic, delayed responses. Opens eyes and says one word answers.   RA  Tele Vpaced  Neuro checks q 4. Patient kept closing eyes when asked to move arms. Unable to assess sensation and BLE movement. Was able to move feet when prompted.  Unable to administer bedtime medications. Patient lethargic.  SCDs in place  L thigh circumference measures 22 inches, unchanged from day shift measurement.  Edema noted to B hands, LLE, non pitting  Accucheck QID  Safety/fall precautions in place  No c/o pain or s/s of distress.

## 2024-07-21 VITALS
TEMPERATURE: 98 F | OXYGEN SATURATION: 100 % | SYSTOLIC BLOOD PRESSURE: 130 MMHG | RESPIRATION RATE: 22 BRPM | BODY MASS INDEX: 29 KG/M2 | HEART RATE: 70 BPM | DIASTOLIC BLOOD PRESSURE: 61 MMHG | WEIGHT: 154.88 LBS

## 2024-07-21 LAB — GLUCOSE BLD-MCNC: 121 MG/DL (ref 70–99)

## 2024-07-21 PROCEDURE — 99239 HOSP IP/OBS DSCHRG MGMT >30: CPT | Performed by: INTERNAL MEDICINE

## 2024-07-21 NOTE — PLAN OF CARE
Problem: Patient/Family Goals  Goal: Patient/Family Long Term Goal  Description: Patient's Long Term Goal: less falls    Interventions:  - PT/OT evals  - pain meds    - See additional Care Plan goals for specific interventions  Outcome: Adequate for Discharge  Goal: Patient/Family Short Term Goal  Description: Patient's Short Term Goal: discharge    Interventions:   - CT   - PT/OT evals  - See additional Care Plan goals for specific interventions  Outcome: Adequate for Discharge     Problem: Delirium  Goal: Minimize duration of delirium  Description: Interventions:  - Encourage use of hearing aids, eye glasses  - Promote highest level of mobility daily  - Provide frequent reorientation  - Promote wakefulness i.e. lights on, blinds open  - Promote sleep, encourage patient's normal rest cycle i.e. lights off, TV off, minimize noise and interruptions  - Encourage family to assist in orientation and promotion of home routines  Outcome: Adequate for Discharge

## 2024-07-21 NOTE — PROGRESS NOTES
A+Ox3, self, place, time  RA  Tele vpaced  Incontinent of urine. Purewick in place  Neuro checks q 4, no acute changes noted  QID accucheck  C/o of generalized pain. PRN tylenol provided  L thigh measures 22.75 inches  Safety/fall precautions in place

## 2024-07-21 NOTE — DISCHARGE INSTRUCTIONS
Baby Aspirin (81 mg) and Plavix 75 mg for 3 weeks. You must follow up with neurology in 2 weeks to be re-evaluated for the safety of resuming Eliquis. If your hemoglobin is stable and your hematoma is resolved, then you will probably be switched to aspirin and Eliquis (no more plavix). It is very important that you call to schedule this appointment ASA so that you can be seen by a neurologist in 2 weeks. The office phone number is [phone] or you can call the central scheduling department at [phone].

## 2024-07-21 NOTE — PROGRESS NOTES
Progress Note  Jamee Johnson Patient Status:  Inpatient    1935 MRN [id number]   Location [facility] 3NE-A Attending Dacia Paulson, DO   Hosp Day # 2 PCP None Pcp     Subjective:  In bed on exam, no acute distress. Denies cp, sob. Thigh feels ok.    Objective:  /62 (BP Location: Right arm)   Pulse 69   Temp 97.8 °F (36.6 °C) (Oral)   Resp 24   Wt 154 lb 14.4 oz (70.3 kg)   SpO2 100%   BMI 29.27 kg/m²     Telemetry: v paced      Intake/Output:    Intake/Output Summary (Last 24 hours) at 2024 0829  Last data filed at 2024 0400  Gross per 24 hour   Intake --   Output 1650 ml   Net -1650 ml       Last 3 Weights   24 1735 154 lb 14.4 oz (70.3 kg)   24 1054 154 lb (69.9 kg)   19 2300 165 lb 1.6 oz (74.9 kg)   19 2110 162 lb 14.7 oz (73.9 kg)       Labs:  Recent Labs   Lab 247 24  0527 24  0722 24  1836   * 134* 160*  --    BUN *  --    CREATSERUM 1.10* 1.01 1.04*  --    EGFRCR 49* 54* 52*  --    CA 8.9 9.2 9.6  --     140 142  --    K 3.9 3.8 3.6 4.7    105 106  --    CO2 26.0 29.0 28.0  --      Recent Labs   Lab 24  0447 24  0527 24  1037 24  0722 24  1605   RBC 2.47* 2.44*  --  2.54*  --    HGB 7.4* 7.4* 7.3* 7.8* 8.0*   HCT 23.1* 22.9*  --  25.2* 25.2*   MCV 93.5 93.9  --  99.2  --    MCH 30.0 30.3  --  30.7  --    MCHC 32.0 32.3  --  31.0  --    RDW 14.0 14.6  --  18.9  --    NEPRELIM 5.48 6.22  --  6.63  --    WBC 8.3 9.2  --  9.5  --    .0 298.0  --  327.0  --          Recent Labs   Lab 24  1319 24  1831 07/15/24  0510   TROPHS 187* 151*  --    CK  --   --  136         Physical Exam:    Gen: alert, oriented, NAD  Heent: pupils equal, reactive. Mucous membranes moist.   Neck: no jvd  Cardiac: regular rate and rhythm, normal S1,S2  Lungs: CTA  Abd: soft, NT/ND +bs  Ext: no edema. Left thigh with ace wrap in place and ecchymosis noted.  Skin: Warm,  dry  Neuro: No focal deficits        Medications:     sacubitril-valsartan  1 tablet Oral BID    spironolactone  25 mg Oral Daily    lidocaine-menthol  1 patch Transdermal Daily    atorvastatin  40 mg Oral Nightly    clopidogrel  75 mg Oral Daily    cyanocobalamin  1,000 mcg Oral Daily    DULoxetine  30 mg Oral Daily    famotidine  20 mg Oral Daily    furosemide  20 mg Oral Daily    levothyroxine  50 mcg Oral Before breakfast    metoprolol succinate ER  100 mg Oral Daily Beta Blocker    magnesium oxide  400 mg Oral Daily    sertraline  25 mg Oral Daily             Assessment:  Mechanical fall with left hip hematoma and acute blood loss anemia while being on eliquis and plavix  CT brain neg for acute findings  MRI brain showed multiple small acute infarcts within the white matter concern for embolic event  Holding off eliquis, plan to resume plavix if hgb remains stable   Neuro following.  Multiple acute infarcts, likely embolic.   Neuro following  CTA head/neck complted.   High risk of bleeding/fall. Plavix added.   Neuro considering asa or eliquis.   Acute blood loss anemia  Hemoglobin stable (baseline hgb ~13.0)   Holding Eliquis, plavix  Troponin elevation likely d/t demand ischemia   Trop 187>151  EKG, V paced, no acute ST changes    Acute metabolic encephalopathy likely multifactorial advanced age, hospital delirium, acute CVA    MRI brain showed small acute infarcts within the white matter   CAD with hx of multivessel PCI and JOS   Historically on plavix, eliquis, statin, bb  Hx of ischemic cardiomyopathy with now recovered LVEF  Echo 7/19/24-LVEF 60-65%, no rwmas. No atrial shunt.   Historically on toprol, entresto, geovanna, jardiance, lasix   Hx of BiV ICD  S/P device interrogation showed 100% pacing, no acute arrhthymias   Permanent a-fib  On toprol  On Eliquis for stroke prevention, now held d/t above  HTN--improved  HLP-on statin  CKD 3, crt stable  DM2  Hx of CVA  Hypothyroidism -on  replacement    Plan:  Cont present cardiac regimen.   Neuro recs reviewed. Plans for dapt x3 weeks. High risk for doac currently. Reconsideration at that time.   OP consideration for Watchman.   Ok for dc from cardiology standpoint. F/u with Dr. Verdugo/BUBBAN 2 weeks.    Plan of care discussed with patient, RN.    Maribel Tanner, APRN  7/21/2024  8:29 AM      Patient seen and examined independently.  Note reviewed and labs reviewed.  Agree with above assessment and plan.  Compensated cardiac status.  Needs Watchman eval as long term solution.  DOACs on hold for now due to high risk (bleeding/falls/lives alone).    LUIS DANIEL Harris MD

## 2024-07-21 NOTE — DISCHARGE SUMMARY
[facility]     Jamee Johnson Patient Status:  Inpatient    1935 MRN [id number]   Location [facility] 3NE-A Attending Dacia Paulson, DO   Hosp Day # 2 PCP None Pcp     Date of Admission: 2024  Date of Discharge:   ***    Discharge Disposition: Home or Self Care    Discharge Diagnosis:  ***    History of Present Illness: ***    Brief Synopsis: ***    Lace+ Score: 68  59-90 High Risk  29-58 Medium Risk  0-28   Low Risk       TCM Follow-Up Recommendation:  {Care Managers will evaluate the need for follow-up for all patients ages 50+, and high/moderate risk patients ages 25-49. Low risk patients (LACE < 29) will only be evaluated if the \"Still recommend for TCM follow-up\" option is selected from this list.:[id number]}      Procedures during hospitalization:   ***    Incidental or significant findings and recommendations (brief descriptions):  ***    Lab/Test results pending at Discharge:   ***    Consultants:  ***    Discharge Medication List:     Discharge Medications        START taking these medications        Instructions Prescription details   aspirin 81 MG Tbec      Take 1 tablet (81 mg total) by mouth daily for 21 days.   Stop taking on: August 10, 2024  Quantity: 21 tablet  Refills: 0            CONTINUE taking these medications        Instructions Prescription details   acetaminophen 500 MG Tabs  Commonly known as: Tylenol Extra Strength      Take 1 tablet (500 mg total) by mouth 2 (two) times daily.   Refills: 0     atorvastatin 80 MG Tabs  Commonly known as: Lipitor      Take 1 tablet (80 mg total) by mouth nightly.   Quantity: 30 tablet  Refills: 3     clopidogrel 75 MG Tabs  Commonly known as: Plavix      Take 1 tablet (75 mg total) by mouth daily.   Refills: 0     cyanocobalamin 1000 MCG Tabs  Commonly known as: Vitamin B12      1 tablet (1,000 mcg total) daily. Injection   Refills: 0     DULoxetine 30 MG Cpep  Commonly known as: Cymbalta      Take 1 capsule  (30 mg total) by mouth daily.   Refills: 0     famotidine 20 MG Tabs  Commonly known as: Pepcid      Take 1 tablet (20 mg total) by mouth 2 (two) times daily.   Refills: 0     furosemide 40 MG Tabs  Commonly known as: Lasix      Take 1 tablet (40 mg total) by mouth 2 (two) times daily. Monday, Wednesday, Friday   Refills: 0     Jardiance 10 MG Tabs  Generic drug: empagliflozin      Take by mouth daily.   Refills: 0     levothyroxine 50 MCG Tabs  Commonly known as: Synthroid      Take 1 tablet (50 mcg total) by mouth before breakfast.   Refills: 0     magnesium oxide 400 MG Tabs  Commonly known as: Mag-Ox      Take 1 tablet (400 mg total) by mouth daily.   Refills: 0     metoprolol succinate  MG Tb24  Commonly known as: Toprol XL      Take 1 tablet (100 mg total) by mouth daily.   Refills: 0     multivitamin Chew      Chew 1 tablet by mouth daily.   Refills: 0     pantoprazole 40 MG Tbec  Commonly known as: Protonix      Take 1 tablet (40 mg total) by mouth 2 (two) times daily before meals.   Refills: 0     potassium chloride 10 MEQ Tbcr  Commonly known as: Klor-Con M10      Take 1 tablet (10 mEq total) by mouth 2 (two) times daily. Monday, Wednesday, Friday   Refills: 0     sacubitril-valsartan 24-26 MG Tabs  Commonly known as: Entresto      Take 1 tablet by mouth 2 (two) times daily.   Refills: 0     sertraline 25 MG Tabs  Commonly known as: Zoloft      Take 1 tablet (25 mg total) by mouth daily.   Refills: 0     spironolactone 25 MG Tabs  Commonly known as: Aldactone      Take 1 tablet (25 mg total) by mouth daily.   Refills: 0     VITAMIN D OR      Take by mouth. Taking 1000 IU once a day   Refills: 0            STOP taking these medications      apixaban 5 MG Tabs  Commonly known as: Eliquis        isosorbide mononitrate ER 30 MG Tb24  Commonly known as: Imdur                  Where to Get Your Medications        These medications were sent to [id number] [address] AT  [address], [phone], [phone]  [address], [facility] [address]      Phone: [phone]   aspirin 81 MG Tbec         [place] ***    Follow-up appointment:   Pcp, None  [facility] [address]    Schedule an appointment as soon as possible for a visit in 1 week(s)      Stephani Puckett DO  [address]  [address]  [facility] [address] [phone]    Schedule an appointment as soon as possible for a visit in 2 week(s)  Make an appointment as soon as possible for a follow up appointment with neurology (any available neurologist is fine) in 2 weeks. You must be seen in 2 weeks to be reevaluated for the safety of resuming Eliquis.    Appointments for Next 30 Days 7/21/2024 - 8/20/2024      None            Vital signs:  Temp:  [97.7 °F (36.5 °C)-98.3 °F (36.8 °C)] 97.8 °F (36.6 °C)  Pulse:  [69-83] 69  Resp:  [17-26] 24  BP: (130-139)/(55-66) 139/62  SpO2:  [96 %-100 %] 100 %    Physical Exam:    General: No acute distress   Lungs: clear to auscultation  Cardiovascular: S1, S2  Abdomen: Soft  ***    -----------------------------------------------------------------------------------------------  PATIENT DISCHARGE INSTRUCTIONS: See electronic chart    Dacia Paulson DO    Total time spent on discharge planning:  *** minutes     The 21st Century Cures Act makes medical notes like these available to patients in the interest of transparency. Please be advised this is a medical document. Medical documents are intended to carry relevant information, facts as evident, and the clinical opinion of the practitioner. The medical note is intended as peer to peer communication and may appear blunt or direct. It is written in medical language and may contain abbreviations or verbiage that are unfamiliar.      [address]  [place] [address]  [phone]    Schedule an appointment as soon as possible for a visit in 2 week(s)  Make an appointment as soon as possible for a follow up appointment with neurology (any available neurologist is fine) in 2 weeks. You must be seen in 2 weeks to be reevaluated for the safety of resuming Eliquis.    Duke Verdugo MD  [address] [address]  [phone]    Follow up in 2 week(s)  Office will call you for follow up appt.    Appointments for Next 30 Days 2024 - 2024      None            Vital signs:   /61 (BP Location: Right arm)   Pulse 70   Temp 98 °F (36.7 °C) (Oral)   Resp 22   Wt 154 lb 14.4 oz (70.3 kg)   SpO2 100%   BMI 29.27 kg/m²       Physical Exam:    General: No acute distress   Lungs: clear to auscultation  Cardiovascular: S1, S2  Abdomen: Soft      -----------------------------------------------------------------------------------------------  PATIENT DISCHARGE INSTRUCTIONS: See electronic chart    Dacia Paulson,     Total time spent on discharge plannin minutes     The  Century Cures Act makes medical notes like these available to patients in the interest of transparency. Please be advised this is a medical document. Medical documents are intended to carry relevant information, facts as evident, and the clinical opinion of the practitioner. The medical note is intended as peer to peer communication and may appear blunt or direct. It is written in medical language and may contain abbreviations or verbiage that are unfamiliar.

## 2024-07-22 LAB
ATRIAL RATE: 68 BPM
Q-T INTERVAL: 462 MS
QRS DURATION: 138 MS
QTC CALCULATION (BEZET): 498 MS
R AXIS: -77 DEGREES
T AXIS: 66 DEGREES
VENTRICULAR RATE: 70 BPM

## 2024-07-22 NOTE — PAYOR COMM NOTE
--------------  DISCHARGE REVIEW    Payor: [employer]  Subscriber #:  [id number]  Authorization Number: [id number]    Admit date: 7/19/24  Admit time:   9:18 AM  Discharge Date: 7/21/2024 12:39 PM     Admitting Physician: Jesse Valencia MD  Attending Physician:  No att. providers found  Primary Care Physician: Pcp, None

## 2024-07-22 NOTE — PAYOR COMM NOTE
--------------  ADMISSION REVIEW-----OBSERVATION ON 7/14, INPATIENT ORDER ON 7/19      Payor: [employer]  Subscriber #:  [id number]  Authorization Number: [id number]    Admit date: 7/19/24  Admit time:  9:18 AM     Admission Orders    Admit to inpatient Once [[id number]]    Electronically signed by: Dacia Paulson DO on 07/19/24 1157 Status: Completed   Mode: Ordering in Per protocol: cosign required mode Communicated by: Harpreet Britton RN   Ordering user: Harpreet Britton RN 07/19/24 0918 Ordering provider: Dacia Paulson DO   Authorized by: Dacia Paulson DO   Frequency: Once 07/19/24 0918 - 1  occurrence   Questionnaire    Question Answer   Diagnosis Contusion of left hip, initial encounter     Updates    Level of care: Acute Service: Medical     Place in observation Once [[id number]]    Electronically signed by: Gabe Prieto MD on 07/14/24 1403 Status: Completed   Ordering user: Gabe Prieto MD 07/14/24 1403 Ordering provider: Gabe Prieto MD   Authorized by: Gabe Prieto MD   Frequency: Once 07/14/24 1707 - 1  occurrence Released by: Flavia Francis RN 07/14/24 1706     Questionnaire    Question Answer   Diagnosis Contusion of left hip, initial encounter     Updates    Level of care: Telemetry Patient class: Observation   Service: Medical         REVIEW DOCUMENTATION:     ED Provider Notes          Patient Seen in: [facility] Emergency Department      History     Chief Complaint   Patient presents with    Hip Pain    Fall     Stated Complaint: Left hip pain, unwitnessed bruising, on thinners    Subjective:   HPI    This is a 88-year-old female who lives at [facility] she stated that she did fall 2 days ago.  And she sustained a bruise over her left hip.  She states that she was walking today and a similar episode she is not sure if her legs gave out and she fell onto her bottom.  She did not think she hit her head.  She did not pass out.  She was not  dizzy or lightheaded prior to the fall she denies any chest pain shortness of breath the patient does have a history of CVA previously, diabetes esophageal reflux hypertension, kidney stones..  The patient states that she thinks her leg go a lot on her.  The 2 days ago and then today the patient has no headache she did not think she hit her head but she is not 100% sure denies any neck pain denies any chest pain denies any abdominal pain denies any nausea, vomiting, she states she has some bruising over the left hip.  But she has some mild pain in the left hip.  She denies any knee pain.  She denies any right lower extremity pain she denies any back pain chest pain abdominal pain.    Objective:   Past Medical History:    CVA (cerebral vascular accident) (HCC)    Diabetes (HCC)    Esophageal reflux    HLD (hyperlipidemia)    HTN (hypertension)    Kidney stones     Review of Systems    Positive for stated Chief Complaint: Hip Pain and Fall    Other systems are as noted in HPI.  Constitutional and vital signs reviewed.      All other systems reviewed and negative except as noted above.    Physical Exam     ED Triage Vitals [07/14/24 1317]   /82   Pulse 92   Resp 15   Temp    Temp src    SpO2 100 %   O2 Device None (Room air)       Current Vitals:   Vital Signs  BP: 105/68  Pulse: 70  Resp: 19  MAP (mmHg): 79    Oxygen Therapy  SpO2: 100 %  O2 Device: None (Room air)            Physical Exam    General: .  Pleasant female no signs of trauma to head or neck.  The patient is in no respiratory distress    HEENT: Atraumatic, conjunctiva are not pale.  There is no icterus.  Oral mucosa Is wet.  No facial trauma.  The neck is supple.    LUNGS: Clear to auscultation, there is no wheezing or retraction.  No crackles.    CV: Cardiovascular is regular without murmurs or rubs.    ABD: The abdomen is soft nondistended nontender.  There is no rebound.  There is no guarding.    EXT: There is good pulses bilaterally.  There is  no calf tenderness.  There is no rash noted.  There is no edema is a fairly large bruise on her left hip.  She has some mild pain on palpation of the hip, femur but she is able to bend it and has normal range of motion of the hip.  At the knee.  Previous surgery on the knee noted.  No right lower extremity tenderness no upper extremity tenderness noted.    NEURO: Alert and oriented x 3.  She knows what year is what month it is.  Muscle strength and sensory exam is grossly normal.  And the patient is neurologically intact with no focal findings.      ED Course     Labs Reviewed   COMP METABOLIC PANEL (14) - Abnormal; Notable for the following components:       Result Value    Glucose 162 (*)     BUN 27 (*)     Creatinine 1.45 (*)     Calculated Osmolality 301 (*)     eGFR-Cr 35 (*)     AST 5 (*)     Total Protein 6.3 (*)     Albumin 3.3 (*)     All other components within normal limits   TROPONIN I HIGH SENSITIVITY - Abnormal; Notable for the following components:    Troponin I (High Sensitivity) 187 (*)     All other components within normal limits   CBC W/ DIFFERENTIAL - Abnormal; Notable for the following components:    WBC 11.5 (*)     RBC 2.90 (*)     HGB 8.6 (*)     HCT 26.7 (*)     All other components within normal limits   LIPID PANEL - Normal   CBC WITH DIFFERENTIAL WITH PLATELET    Narrative:     The following orders were created for panel order CBC With Differential With Platelet.  Procedure                               Abnormality         Status                     ---------                               -----------         ------                     CBC W/ DIFFERENTIAL[[id number]]          Abnormal            Final result                 Please view results for these tests on the individual orders.   TYPE AND SCREEN    Narrative:     The following orders were created for panel order Type and screen.  Procedure                               Abnormality         Status                     ---------                                -----------         ------                     ABORH (Blood Type)[[id number]]                               Final result               Antibody Screen[[id number]]                                  Final result                 Please view results for these tests on the individual orders.   ABORH (BLOOD TYPE)   ANTIBODY SCREEN   ABORH CONFIRMATION   RAINBOW DRAW BLUE         The patient was placed on monitors, IV was started, blood was drawn.  Patient unsure if she actually hit her head.  Most likely mechanical fall.  But will check electrolytes.  Check her x-rays of the hip to rule out there is no fracture.      Fairly large bruise over the left hip.  Will check electrolytes.,  And to rule out intracranial bleed with a CT of the brain.  MDM      The EKG shows ventricular paced rhythm.  The rest of the EKG including rate rhythm axis and intervals I agree with the EKG report . The rate is 71 compared to an old EKG it showed previously accelerated junctional rhythm that EKG was from 2019    Admission disposition: 7/14/2024  4:14 PM         The patient was typed and screened, troponin was slightly elevated at 187 comprehensive shows findings of some renal insufficiency.  My reading reviewing old records the creatinine is not significantly worse.  CBC shows a hemoglobin 8.6 white count 11.5          The patient's last hemoglobin was 12.5.  Significant drop.  Concerned that there is being may be get more worse with progression dilution felt it would be reasonable to go ahead and admit this patient with a 2 significant abnormality patient was given IV fluids.  Workup was done to rule out fracture, dislocation.            I personally reviewed the radiographs and my individual interpretation shows    X-ray shows no fracture,.  CT of the brain shows volume loss,  No fracture.  No bleed.  Also reviewed official report and it shows     XR HIP W OR WO PELVIS 2 OR 3 VIEWS, LEFT (CPT=73502)    Result Date:  7/14/2024  PROCEDURE:  XR HIP W OR WO PELVIS 2 OR 3 VIEWS, LEFT (CPT=73502)  TECHNIQUE:  Unilateral 2 to 3 views of the hip and pelvis if performed.  COMPARISON:  None.  INDICATIONS:  Left hip pain, unwitnessed bruising, on thinners  PATIENT STATED HISTORY: (As transcribed by Technologist)  Patient stated falling today and has left hip pain.               CONCLUSION:   Negative for fracture or malalignment of the pelvis or hips.  Mild/moderate osteoarthritis of bilateral hips.  Obturator rings are intact.  Normal sacroiliac joints and pubic symphysis.  Lower lumbar spondylosis.   LOCATION:  [facility]   Dictated by (CST): Shanna Cuenca MD on 7/14/2024 at 3:22 PM     Finalized by (CST): Shanna Cuenca MD on 7/14/2024 at 3:22 PM       XR FEMUR MIN 2 VIEWS LEFT (CPT=73552)    Result Date: 7/14/2024  PROCEDURE:  XR FEMUR MIN 2 VIEWS LEFT (CPT=73552)  TECHNIQUE:  AP and lateral views were obtained.  COMPARISON:  None.  INDICATIONS:  Left hip pain, unwitnessed bruising, on thinners  PATIENT STATED HISTORY: (As transcribed by Technologist)  Patient stated falling today and has left hip pain.               CONCLUSION:   Negative for fracture or malalignment.  Mild osteoarthritis of left hip.  Left total knee prosthesis noted without complicating features.  No appreciable knee joint effusion.  No focal soft tissue swelling.   LOCATION:  [facility]   Dictated by (CST): Shanna Cuenca MD on 7/14/2024 at 3:21 PM     Finalized by (CST): Shanna Cuenca MD on 7/14/2024 at 3:22 PM       CT BRAIN OR HEAD (97835)    Result Date: 7/14/2024            PROCEDURE:  CT BRAIN OR HEAD (83841)  COMPARISON:  [facility] , CT, CT STROKE CTA BRAIN/CTA NECK (W IV)(CPT=70496/30212), 12/25/2019, 9:21 PM.  INDICATIONS:  Left hip pain, unwitnessed bruising, on thinners  TECHNIQUE:  Noncontrast CT scanning is performed through the brain. Dose reduction techniques were used. Dose information is transmitted to the ACR (American College of Radiology) NRDR (National Radiology  Data Registry) which includes the Dose Index Registry.  PATIENT STATED HISTORY: (As transcribed by Technologist)  Patient is here for unwitnessed fall 7/10. She is taking blood thinners.   FINDINGS: No evidence of intracranial hemorrhage or extra-axial fluid collection. Lucencies in the deep periventricular white matter are likely sequelae of chronic small vessel ischemic disease. Prominence of the sulci is noted. No mass effect. Mild mucoperiosteal thickening of the paranasal sinuses.  Visualized portions of the mastoid air cells are unremarkable. Visualized portions of the orbits are unremarkable. IMPRESSION: Global parenchymal loss is noted.  Sequelae of chronic small vessel ischemic disease is noted. No evidence of intracranial hemorrhage or extra-axial fluid collection.    LOCATION:  [place] [address]   Dictated by (CST): Robi Colin MD on 7/14/2024 at 2:47 PM     Finalized by (CST): Robi Colin MD on 7/14/2024 at 2:48 PM        Discussed this case with the patient and the patient I discussed this case with the hospitalist the patient will need to be admitted for to see if there hemoglobins are progressive getting worse.  The patient will need to be admitted for further medical troponins probably an incidental finding she has no chest pain or shortness of breath patient will be admitted for further evaluation treatment.  Medical Decision Making      Disposition and Plan     Clinical Impression:  1. Contusion of left hip, initial encounter    2. Elevated troponin I level    3. Iron deficiency anemia, unspecified iron deficiency anemia type         Disposition:  Admit  7/14/2024  4:14 pm    Hospital Problems       Present on Admission             ICD-10-CM Noted POA    * (Principal) Contusion of left hip, initial encounter S70.02XA 7/14/2024 Unknown    Anemia D64.9 7/14/2024 Yes    Hyperglycemia R73.9 7/14/2024 Yes           Signed by Gabe Prieto MD on 7/14/2024  4:25 PM         7/15   Chief Complaint:  Fall     Subjective:      Patient reports left hip pain. No fever, chest pain, SOB or nausea.     Objective:    Review of Systems:   A comprehensive review of systems was completed; pertinent positive and negatives stated in subjective.     Vital signs:  Temp:  [97.5 °F (36.4 °C)-98 °F (36.7 °C)] 97.5 °F (36.4 °C)  Pulse:  [68-92] 71  Resp:  [13-20] 16  BP: (103-129)/(53-83) 119/53  SpO2:  [95 %-100 %] 98 %     Physical Exam:    General: No acute distress, awake and alert but confused  Respiratory: No wheezes, no rhonchi  Cardiovascular: S1, S2, paced  Abdomen: Soft, Non-tender, non-distended, positive bowel sounds  Extremities: No edema. Left hip large area of swelling/ecchymosis      Diagnostic Data:    Labs:       Recent Labs   Lab 07/14/24  1319 07/15/24  0510   WBC 11.5* 8.2   HGB 8.6* 7.3*   MCV 92.1 92.8   .0 258.0           Recent Labs   Lab 07/14/24  1319 07/15/24  0510   * 120*   BUN 27* 24*   CREATSERUM 1.45* 1.25*   CA 9.3 8.6   ALB 3.3*  --     140   K 4.4 4.2    106   CO2 26.0 26.0   ALKPHO 69  --    AST 5*  --    ALT 16  --    BILT 0.8  --    TP 6.3*  --       CrCl cannot be calculated (Unknown ideal weight.).          Recent Labs   Lab 07/14/24 1319 07/14/24  1831   TROPHS 187* 151*      No results for input(s): \"PTP\", \"INR\" in the last 168 hours.      Microbiology  No results found for this visit on 07/14/24.     Imaging: Reviewed in Epic.     Medications:    [Held by provider] apixaban  5 mg Oral BID    atorvastatin  40 mg Oral Nightly    clopidogrel  75 mg Oral Daily    cyanocobalamin  1,000 mcg Oral Daily    DULoxetine  30 mg Oral Daily    famotidine  20 mg Oral Daily    furosemide  20 mg Oral Daily    isosorbide mononitrate ER  30 mg Oral Daily    levothyroxine  50 mcg Oral Before breakfast    metoprolol succinate ER  100 mg Oral Daily Beta Blocker    sacubitril-valsartan  1 tablet Oral BID    spironolactone  25 mg Oral Daily    magnesium oxide  400 mg Oral Daily     sertraline  25 mg Oral Daily         Assessment & Plan:       #Fall x2  -CT brain negative for acute abnormalities  -EKG Vent paced rhythm   -Monitor on telemetry   -Interrogate pacemaker  -PT/OT     #Left hip hematoma  -CT reviewed  -Check CTA LLE hip given drop in hemoglobin  -Hold eliquis and plavix     #Acute on chronic anemia, likely due to blood loss from hematoma  -Baseline hgb is 12 from January this year  -Repeat hgb later today. Transfuse if under 7  -Iron studies reviewed, will give IV iron     #Elevated Troponin  -Trend down 187 -> 151. Possible demand?  -Echo  -Denies CP  -Monitor on telemetry     #Chronic A. Fib s/p PPM  #CAD s/p stents  #Prior ischemic cardiomyopathy with revascularization and CRT-D with normalized EF   #Mod to sev MR  -Statin, Imdur, Metoprolol, Entresto, Aldactone, Furosemide   -Hold plavix  -Hold jardiance  -Hold eliquis     #Acute metabolic encephalopathy  -Also hospital-acquired delirium contributing  -Daughter reports her memory has been slipping recently  -Outpatient neuropsych testing     #HLD   -Statin     #CKD 3  -Stable     #DM Type 2 with A1c 6.3  -Diet controlled. Can hold off accuchecks and ISS     #Hypothyroidism   -Synthroid      #Depression   -Duloxetine/zoloft     #Hypothyroidism  -Levothyroxine     #Hx of CVA  -Hold plavix. Continue statin     Discussed with patient, RN, daughter.        Manpreet Ingram, DO    7/16   Chief Complaint: Fall     Subjective:      Patient had RRT for increased lethargy. ABG and CT head unremarkable. Did not receive narcotic or benzodiazepines overnight. She is more alert now. Pain controlled.      Objective:    Review of Systems:   A comprehensive review of systems was completed; pertinent positive and negatives stated in subjective.     Vital signs:  Temp:  [97.3 °F (36.3 °C)-98.3 °F (36.8 °C)] 98.1 °F (36.7 °C)  Pulse:  [68-74] 69  Resp:  [17-18] 17  BP: (110-135)/(47-66) 116/47  SpO2:  [97 %-100 %] 97 %     Physical Exam:    General: No  acute distress, awake and alert but confused  Respiratory: No wheezes, no rhonchi  Cardiovascular: S1, S2, paced  Abdomen: Soft, Non-tender, non-distended, positive bowel sounds  Extremities: No edema. Left hip large area of swelling/ecchymosis      Diagnostic Data:    Labs:          Recent Labs   Lab 07/14/24  1319 07/15/24  0510 07/15/24  1344 07/15/24  1857 07/16/24  0447   WBC 11.5* 8.2  --   --  8.3   HGB 8.6* 7.3* 7.4* 7.2* 7.4*   MCV 92.1 92.8  --   --  93.5   .0 258.0  --   --  265.0            Recent Labs   Lab 07/14/24  1319 07/15/24  0510 07/16/24  0447   * 120* 147*   BUN 27* 24* 19   CREATSERUM 1.45* 1.25* 1.10*   CA 9.3 8.6 8.9   ALB 3.3*  --   --     140 138   K 4.4 4.2 3.9    106 106   CO2 26.0 26.0 26.0   ALKPHO 69  --   --    AST 5*  --   --    ALT 16  --   --    BILT 0.8  --   --    TP 6.3*  --   --       CrCl cannot be calculated (Unknown ideal weight.).           Recent Labs   Lab 07/14/24  1319 07/14/24  1831 07/15/24  0510   TROPHS 187* 151*  --    CK  --   --  136      No results for input(s): \"PTP\", \"INR\" in the last 168 hours.      Microbiology  No results found for this visit on 07/14/24.     Imaging: Reviewed in Epic.     Medications:    sodium ferric gluconate  125 mg Intravenous Daily    lidocaine-menthol  1 patch Transdermal Daily    atorvastatin  40 mg Oral Nightly    [Held by provider] clopidogrel  75 mg Oral Daily    cyanocobalamin  1,000 mcg Oral Daily    DULoxetine  30 mg Oral Daily    famotidine  20 mg Oral Daily    furosemide  20 mg Oral Daily    levothyroxine  50 mcg Oral Before breakfast    metoprolol succinate ER  100 mg Oral Daily Beta Blocker    magnesium oxide  400 mg Oral Daily    sertraline  25 mg Oral Daily         Assessment & Plan:       #Fall x2  -CT brain negative for acute abnormalities  -EKG paced rhythm   -Monitor on telemetry   -PPM interrogated - no events  -PT/OT     #Left hip hematoma  -CT reviewed  -CTA LLE with possible small  bleed. Discussed with IR, if actively bleeding then it is very small and should tamponade. Hgb is stable  -Hold eliquis and plavix     #Acute on chronic anemia, likely due to blood loss from hematoma  -Baseline hgb is 12 from January this year  -Hgb now stable  -Iron studies reviewed, will give IV iron     #Elevated Troponin  -Trend down 187 -> 151. Possible demand?  -Echo unremarkable  -Denies CP  -Monitor on telemetry     #Chronic A. Fib s/p PPM  #CAD s/p stents  #Prior ischemic cardiomyopathy with revascularization and CRT-D with normalized EF   #Mod to sev MR  -Statin, Metoprolol, Furosemide   -Discontinue imdur, entresto and aldactone  -Hold plavix for at least a few more days  -Hold jardiance  -Hold eliquis (possibly indefinitely, discussed with family risk/benefits)     #Acute metabolic encephalopathy  -Also hospital-acquired delirium contributing  -Daughter reports her memory has been slipping recently  -Outpatient neuropsych testing  -CT head, ammonia, ABG unremarkable  -Avoid narcotics     #HLD   -Statin     #CKD 3  -Stable     #DM Type 2 with A1c 6.3  -Diet controlled. Can hold off accuchecks and ISS     #Hypothyroidism   -Synthroid      #Depression   -Duloxetine/zoloft     #Hypothyroidism  -Levothyroxine     #Hx of CVA  -Hold plavix. Continue statin     Discussed with patient, RN, son-in-law, Dr. Shah.        Manpreet Ingram, DO     7/17   Chief Complaint: Fall     Subjective:      Lethargic, but opens eyes and shake/nods head appropriately to questions and following commands      Objective:    Review of Systems:   A comprehensive review of systems was completed; pertinent positive and negatives stated in subjective.     Vital signs:  Temp:  [97.4 °F (36.3 °C)-98.5 °F (36.9 °C)] 98.5 °F (36.9 °C)  Pulse:  [68-70] 68  Resp:  [16-18] 18  BP: (116-151)/(37-74) 151/62  SpO2:  [94 %-98 %] 97 %     Physical Exam:    General: No acute distress, lethargic, but following commands appropriately  Respiratory: No  wheezes, no rhonchi  Cardiovascular: S1, S2, paced  Abdomen: Soft, Non-tender, non-distended, positive bowel sounds  Extremities: No edema. Left hip large area of swelling/ecchymosis      Diagnostic Data:    Labs:           Recent Labs   Lab 07/14/24  1319 07/15/24  0510 07/15/24  1344 07/15/24  1857 07/16/24 0447 07/17/24  0527   WBC 11.5* 8.2  --   --  8.3 9.2   HGB 8.6* 7.3* 7.4* 7.2* 7.4* 7.4*   MCV 92.1 92.8  --   --  93.5 93.9   .0 258.0  --   --  265.0 298.0             Recent Labs   Lab 07/14/24  1319 07/15/24  0510 07/16/24 0447 07/17/24  0527   * 120* 147* 134*   BUN 27* 24* 19 21   CREATSERUM 1.45* 1.25* 1.10* 1.01   CA 9.3 8.6 8.9 9.2   ALB 3.3*  --   --   --     140 138 140   K 4.4 4.2 3.9 3.8    106 106 105   CO2 26.0 26.0 26.0 29.0   ALKPHO 69  --   --   --    AST 5*  --   --   --    ALT 16  --   --   --    BILT 0.8  --   --   --    TP 6.3*  --   --   --       CrCl cannot be calculated (Unknown ideal weight.).           Recent Labs   Lab 07/14/24  1319 07/14/24  1831 07/15/24  0510   TROPHS 187* 151*  --    CK  --   --  136      No results for input(s): \"PTP\", \"INR\" in the last 168 hours.      Microbiology  No results found for this visit on 07/14/24.     Imaging: Reviewed in Epic.     Medications:    sodium ferric gluconate  125 mg Intravenous Daily    lidocaine-menthol  1 patch Transdermal Daily    atorvastatin  40 mg Oral Nightly    [Held by provider] clopidogrel  75 mg Oral Daily    cyanocobalamin  1,000 mcg Oral Daily    DULoxetine  30 mg Oral Daily    famotidine  20 mg Oral Daily    furosemide  20 mg Oral Daily    levothyroxine  50 mcg Oral Before breakfast    metoprolol succinate ER  100 mg Oral Daily Beta Blocker    magnesium oxide  400 mg Oral Daily    sertraline  25 mg Oral Daily         Assessment & Plan:       #Fall x2  -CT brain negative for acute abnormalities  -EKG paced rhythm   -Monitor on telemetry   -PPM interrogated - no events  -PT/OT     #Left hip  hematoma  -CT reviewed  -CTA LLE with possible small bleed  -Eliquis discontinued  -okay to resume plavix per cardiology      #Acute on chronic anemia, likely due to blood loss from hematoma  -Baseline hgb is 12 from January this year  -Hgb now stable  -Iron studies reviewed > IV iron while inpatient     #Elevated Troponin  -downtrended  -Echo unremarkable  -Monitor on telemetry     #Chronic A. Fib s/p PPM  #CAD s/p stents  #Prior ischemic cardiomyopathy with revascularization and CRT-D with normalized EF   #Mod to sev MR  -Statin, Metoprolol, Furosemide   -Discontinue imdur, entresto and aldactone  -Hold plavix for at least a few more days  -Hold jardiance  -Hold eliquis (possibly indefinitely, discussed with family risk/benefits)     #Acute metabolic encephalopathy  -Also hospital-acquired delirium contributing  -Daughter reports her memory has been slipping recently  -Outpatient neuropsych testing  -CT head, ammonia, ABG unremarkable  -Avoid narcotics  -MRI brain ordered     #HLD   -Statin     #CKD 3  -Stable     #DM Type 2 with A1c 6.3  -Diet controlled. Can hold off accuchecks and ISS     #Hypothyroidism   -Synthroid      #Depression   -Duloxetine/zoloft     #Hypothyroidism  -Levothyroxine     #Hx of CVA  -Hold plavix. Continue statin     Dacia Paulson, DO        7/18   Chief Complaint: Fall     Subjective:      More awake/alert this morning. Knows date/president/daughter's name. Doesn't remember that she is in the hospital. Daughter at bedside. Concerned about pt's fatigue/lethargy. Updated on plan of care and answered all questions. No focal deficits on neuro exam.     Objective:    Review of Systems:   A comprehensive review of systems was completed; pertinent positive and negatives stated in subjective.     Vital signs:  Temp:  [97.6 °F (36.4 °C)-98.4 °F (36.9 °C)] 98.4 °F (36.9 °C)  Pulse:  [69-70] 70  Resp:  [16-18] 18  BP: (114-166)/(45-67) 114/46  SpO2:  [96 %-98 %] 96 %     Physical Exam:     General: No acute distress, lethargic, but following commands appropriately  Respiratory: No wheezes, no rhonchi  Cardiovascular: S1, S2, paced  Abdomen: Soft, Non-tender, non-distended, positive bowel sounds  Extremities: No edema. Left hip large area of swelling/ecchymosis      Diagnostic Data:    Labs:            Recent Labs   Lab 07/14/24  1319 07/15/24  0510 07/15/24  1344 07/15/24  1857 07/16/24  0447 07/17/24  0527 07/18/24  1037   WBC 11.5* 8.2  --   --  8.3 9.2  --    HGB 8.6* 7.3* 7.4* 7.2* 7.4* 7.4* 7.3*   MCV 92.1 92.8  --   --  93.5 93.9  --    .0 258.0  --   --  265.0 298.0  --              Recent Labs   Lab 07/14/24  1319 07/15/24  0510 07/16/24  0447 07/17/24  0527   * 120* 147* 134*   BUN 27* 24* 19 21   CREATSERUM 1.45* 1.25* 1.10* 1.01   CA 9.3 8.6 8.9 9.2   ALB 3.3*  --   --   --     140 138 140   K 4.4 4.2 3.9 3.8    106 106 105   CO2 26.0 26.0 26.0 29.0   ALKPHO 69  --   --   --    AST 5*  --   --   --    ALT 16  --   --   --    BILT 0.8  --   --   --    TP 6.3*  --   --   --       Estimated Creatinine Clearance: 29.6 mL/min (based on SCr of 1.01 mg/dL).           Recent Labs   Lab 07/14/24  1319 07/14/24  1831 07/15/24  0510   TROPHS 187* 151*  --    CK  --   --  136         Medications:    metoprolol  5 mg Intravenous Q6H    lidocaine-menthol  1 patch Transdermal Daily    atorvastatin  40 mg Oral Nightly    clopidogrel  75 mg Oral Daily    cyanocobalamin  1,000 mcg Oral Daily    DULoxetine  30 mg Oral Daily    famotidine  20 mg Oral Daily    furosemide  20 mg Oral Daily    levothyroxine  50 mcg Oral Before breakfast    [Held by provider] metoprolol succinate ER  100 mg Oral Daily Beta Blocker    magnesium oxide  400 mg Oral Daily    sertraline  25 mg Oral Daily         Assessment & Plan:       #Fall x2  -CT brain negative for acute abnormalities  -EKG paced rhythm   -Monitor on telemetry   -PPM interrogated - no events  -PT/OT     #Left hip hematoma  -CT  reviewed  -CTA LLE with possible small bleed  -Eliquis discontinued  -okay to resume plavix per cardiology      #Acute on chronic anemia, likely due to blood loss from hematoma  -Baseline hgb is 12 from January this year  -Hgb now stable  -Iron studies reviewed > IV iron while inpatient     #Acute metabolic encephalopathy  -Also hospital-acquired delirium contributing  -Daughter reports her memory has been slipping recently  -Outpatient neuropsych testing  -CT head, ammonia, ABG unremarkable  -Avoid narcotics  -MRI brain ordered     #Elevated Troponin  -downtrended  -Echo unremarkable  -Monitor on telemetry     #Chronic A. Fib s/p PPM  #CAD s/p stents  #Prior ischemic cardiomyopathy with revascularization and CRT-D with normalized EF   #Mod to sev MR  -Statin, Metoprolol, Furosemide   -Discontinue imdur, entresto and aldactone  -resume plavix today  -Hold jardiance  -eliquis discontinued (possibly indefinitely, discussed with family risk/benefits)     #HLD   -Statin     #CKD 3  -Stable     #DM Type 2 with A1c 6.3  -Diet controlled. Can hold off accuchecks and ISS     #Hypothyroidism   -Synthroid      #Depression   -Duloxetine/zoloft     #Hypothyroidism  -Levothyroxine     #Hx of CVA  -resume plavix. Continue statin     Dacia Paulson, DO     7/19   Chief Complaint: Fall     Subjective:      Resting in bed comfortably, exam unchanged from prior. Daughter at bedside. Discussed MRI results with daughter face-face and pt's sons over the telephone. Updated on plan of care and answered all questions      Objective:    Review of Systems:   A comprehensive review of systems was completed; pertinent positive and negatives stated in subjective.     Vital signs:  Temp:  [97 °F (36.1 °C)-98.5 °F (36.9 °C)] 97.7 °F (36.5 °C)  Pulse:  [68-70] 70  Resp:  [14-17] 17  BP: (154-189)/(47-71) 156/71  SpO2:  [95 %-100 %] 99 %     Physical Exam:    General: No acute distress, lethargic, but following commands  appropriately  Respiratory: No wheezes, no rhonchi  Cardiovascular: S1, S2, paced  Abdomen: Soft, Non-tender, non-distended, positive bowel sounds  Extremities: No edema. Left hip large area of swelling/ecchymosis      Diagnostic Data:    Labs:            Recent Labs   Lab 07/14/24  1319 07/15/24  0510 07/15/24  1344 07/15/24  1857 07/16/24  0447 07/17/24  0527 07/18/24  1037   WBC 11.5* 8.2  --   --  8.3 9.2  --    HGB 8.6* 7.3* 7.4* 7.2* 7.4* 7.4* 7.3*   MCV 92.1 92.8  --   --  93.5 93.9  --    .0 258.0  --   --  265.0 298.0  --              Recent Labs   Lab 07/14/24  1319 07/15/24  0510 07/16/24  0447 07/17/24  0527   * 120* 147* 134*   BUN 27* 24* 19 21   CREATSERUM 1.45* 1.25* 1.10* 1.01   CA 9.3 8.6 8.9 9.2   ALB 3.3*  --   --   --     140 138 140   K 4.4 4.2 3.9 3.8    106 106 105   CO2 26.0 26.0 26.0 29.0   ALKPHO 69  --   --   --    AST 5*  --   --   --    ALT 16  --   --   --    BILT 0.8  --   --   --    TP 6.3*  --   --   --       Estimated Creatinine Clearance: 29.6 mL/min (based on SCr of 1.01 mg/dL).           Recent Labs   Lab 07/14/24  1319 07/14/24  1831 07/15/24  0510   TROPHS 187* 151*  --    CK  --   --  136       Medications:    sacubitril-valsartan  1 tablet Oral BID    spironolactone  25 mg Oral Daily    lidocaine-menthol  1 patch Transdermal Daily    atorvastatin  40 mg Oral Nightly    clopidogrel  75 mg Oral Daily    cyanocobalamin  1,000 mcg Oral Daily    DULoxetine  30 mg Oral Daily    famotidine  20 mg Oral Daily    furosemide  20 mg Oral Daily    levothyroxine  50 mcg Oral Before breakfast    [Held by provider] metoprolol succinate ER  100 mg Oral Daily Beta Blocker    magnesium oxide  400 mg Oral Daily    sertraline  25 mg Oral Daily         Assessment & Plan:       #Acute multiple small infarcts; likely embolic event  - Eliquis on hold d/t blood loss anemia/hematoma  - stroke protocol  - transfer to stroke floor  - neurology consulted  - statin  - will need  medication optimization, ?consider possible Watchman placement in future     #Fall x2  -CT brain negative for acute abnormalities  -EKG paced rhythm   -Monitor on telemetry   -PPM interrogated - no events  -PT/OT     #Left hip hematoma  -CT reviewed  -CTA LLE with possible small bleed  -Eliquis discontinued  -okay to resume plavix per cardiology      #Acute on chronic anemia, likely due to blood loss from hematoma  -Baseline hgb is 12 from January this year  -Hgb now stable  -Iron studies reviewed > IV iron while inpatient     #Acute metabolic encephalopathy  -Also hospital-acquired delirium contributing  -Daughter reports her memory has been slipping recently  -Outpatient neuropsych testing  -CT head, ammonia, ABG unremarkable  -Avoid narcotics  -MRI brain ordered     #Elevated Troponin  -downtrended  -Echo unremarkable  -Monitor on telemetry     #Chronic A. Fib s/p PPM  #CAD s/p stents  #Prior ischemic cardiomyopathy with revascularization and CRT-D with normalized EF   #Mod to sev MR  -Statin, Metoprolol, plavix  -resume entresto and aldactone  -Hold jardiance  -eliquis discontinued (possibly indefinitely, discussed with family risk/benefits) > possible consideration of Watchman in future      #HLD   -Statin     #CKD 3  -Stable     #DM Type 2 with A1c 6.3  -Diet controlled. Can hold off accuchecks and ISS     #Hypothyroidism   -Synthroid      #Depression   -Duloxetine/zoloft     #Hypothyroidism  -Levothyroxine     #Hx of CVA  - now with recurrent stroke > likely embolic event off Eliquis   - resume plavix. Continue statin  - rest as above     Dacia Paulson, DO          7/20  Chief Complaint: Fall     Subjective:      Resting in bed, more awake/alert this morning, oriented      Objective:    Review of Systems:   A comprehensive review of systems was completed; pertinent positive and negatives stated in subjective.     Vital signs:  Temp:  [97.6 °F (36.4 °C)-98.1 °F (36.7 °C)] 97.8 °F (36.6 °C)  Pulse:   [69-70] 69  Resp:  [16-30] 26  BP: (125-165)/(49-73) 125/49  SpO2:  [84 %-100 %] 100 %     Physical Exam:    General: No acute distress, lethargic, but following commands appropriately  Respiratory: No wheezes, no rhonchi  Cardiovascular: S1, S2, paced  Abdomen: Soft, Non-tender, non-distended, positive bowel sounds  Extremities: No edema. Left hip large area of swelling/ecchymosis      Diagnostic Data:    Labs:             Recent Labs   Lab 07/14/24  1319 07/15/24  0510 07/15/24  1344 07/15/24  1857 07/16/24 0447 07/17/24 0527 07/18/24  1037 07/20/24  0722   WBC 11.5* 8.2  --   --  8.3 9.2  --  9.5   HGB 8.6* 7.3*   < > 7.2* 7.4* 7.4* 7.3* 7.8*   MCV 92.1 92.8  --   --  93.5 93.9  --  99.2   .0 258.0  --   --  265.0 298.0  --  327.0    < > = values in this interval not displayed.              Recent Labs   Lab 07/14/24  1319 07/15/24  0510 07/16/24  0447 07/17/24  0527 07/20/24  0722   *   < > 147* 134* 160*   BUN 27*   < > 19 21 24*   CREATSERUM 1.45*   < > 1.10* 1.01 1.04*   CA 9.3   < > 8.9 9.2 9.6   ALB 3.3*  --   --   --   --       < > 138 140 142   K 4.4   < > 3.9 3.8 3.6      < > 106 105 106   CO2 26.0   < > 26.0 29.0 28.0   ALKPHO 69  --   --   --   --    AST 5*  --   --   --   --    ALT 16  --   --   --   --    BILT 0.8  --   --   --   --    TP 6.3*  --   --   --   --     < > = values in this interval not displayed.      Estimated Creatinine Clearance: 28.8 mL/min (A) (based on SCr of 1.04 mg/dL (H)).           Recent Labs   Lab 07/14/24  1319 07/14/24  1831 07/15/24  0510   TROPHS 187* 151*  --    CK  --   --  136       Medications:    sacubitril-valsartan  1 tablet Oral BID    spironolactone  25 mg Oral Daily    lidocaine-menthol  1 patch Transdermal Daily    atorvastatin  40 mg Oral Nightly    clopidogrel  75 mg Oral Daily    cyanocobalamin  1,000 mcg Oral Daily    DULoxetine  30 mg Oral Daily    famotidine  20 mg Oral Daily    furosemide  20 mg Oral Daily    levothyroxine   50 mcg Oral Before breakfast    metoprolol succinate ER  100 mg Oral Daily Beta Blocker    magnesium oxide  400 mg Oral Daily    sertraline  25 mg Oral Daily         Assessment & Plan:       #Acute multiple small infarcts; likely embolic event  - Eliquis on hold d/t blood loss anemia/hematoma  - stroke protocol  - statin, plavix  - will need medication optimization, but high risk for bleeding > ?consider possible Watchman placement in future  - neuro following  - PT/OT > [facility]     #Left hip hematoma  -CT reviewed  -CTA LLE with possible small bleed  -Eliquis discontinued  -okay to resume plavix per cardiology      #Acute on chronic anemia, likely due to blood loss from hematoma  -Baseline hgb is 12 from January this year  -Hgb now stable  -completed course of IV iron     #Pulmonary nodules  - incidental finding     #Elevated Troponin  -downtrended  -Echo unremarkable  -Monitor on telemetry     #Chronic A. Fib s/p PPM  #CAD s/p stents  #Prior ischemic cardiomyopathy with revascularization and CRT-D with normalized EF   #Mod to sev MR  -Statin, Metoprolol, plavix, entresto, lasix and aldactone  -Hold jardiance  -eliquis discontinued (possibly indefinitely, discussed with family risk/benefits) > possible consideration of Watchman in future      #HLD   -Statin     #CKD 3  -Stable     #DM Type 2 with A1c 6.3  -Diet controlled. Can hold off accuchecks and ISS     #Hypothyroidism   -Synthroid      #Depression   -Duloxetine/zoloft     #Hypothyroidism  -Levothyroxine     #Hx of CVA  - now with recurrent stroke > likely embolic event off Eliquis   - resume plavix. Continue statin  - rest as above     Dacia Paulson DO        Medication Administration Report  for Alex Jamee JOSÉ MIGUEL as of 07/12/24 through 07/21/24  Legend:       Medications 07/12 07/13 07/14 07/15 07/16 07/17 07/18 07/19 07/20 07/21   Completed Medications   gadoterate meglumine (Dotarem) 7.5 MMOL/15ML injection 15 mL  Dose: 15 mL  Freq: IMG once as needed  Route: IV  PRN Reason: contrast  Start: 07/18/24 1711 End: 07/18/24 1711   Admin Instructions:   Keep at room temp; Administer per Protocol          [id number]           iopamidol 76% (ISOVUE-370) injection for power injector  Dose: 75 mL  Freq: IMG once as needed Route: IV  PRN Reason: contrast  Start: 07/19/24 1452 End: 07/19/24 1430           [id number]          iopamidol 76% (ISOVUE-370) injection for power injector  Dose: 90 mL  Freq: IMG once as needed Route: IV  PRN Reason: contrast  Start: 07/15/24 1759 End: 07/15/24 1759       [id number]              metoprolol tartrate (Lopressor) tab 100 mg  Dose: 100 mg  Freq: Once Route: OR  Start: 07/19/24 0615 End: 07/19/24 0635           90140          metoprolol tartrate (Lopressor) tab 50 mg  Dose: 50 mg  Freq: Once Route: OR  Start: 07/19/24 0115 End: 07/19/24 0127           13395          potassium chloride (Klor-Con M20) tab 40 mEq  Dose: 40 mEq  Freq: Every 4 hours Route: OR  Start: 07/20/24 0845 End: 07/20/24 1358   Admin Instructions:   Do not crush            27750     00796         sodium ferric gluconate (Ferrlecit) 125 mg in sodium chloride 0.9% 100mL IVPB premix  Dose: 125 mg  Freq: Daily Route: IV  Start: 07/15/24 1130 End: 07/17/24 1000       44275     76290      121106     879556      167681     771972            Discontinued Medications   Medications 07/12 07/13 07/14 07/15 07/16 07/17 07/18 07/19 07/20 07/21   acetaminophen (Tylenol Extra Strength) tab 500 mg  Dose: 500 mg  Freq: Every 4 hours PRN Route: OR  PRN Reasons: mild pain,Fever  PRN Comment: equal to or greater than 100.4  Start: 07/14/24 1706 End: 07/21/24 1440   Admin Instructions:   do not initiate oral therapy until 6-8 hours after the last IV acetaminophen dose if IV acetaminophen was used previously       535294     (7442) [C]46          890072                                     apixaban (Eliquis) tab 5 mg  Dose: 5 mg  Freq: 2 times daily Route: OR  Start: 07/14/24 2100 End: 07/16/24 1032       855745      0758 [C]18     840069     (2100)20      (0900)21     695474             atorvastatin (Lipitor) tab 40 mg  Dose: 40 mg  Freq: Nightly Route: OR  Start: 07/14/24 1809 End: 07/21/24 1440      102005      (2100) [C]24      (2100) [C]25      (2100)26      (2100)27      (2100) [C]28 200929                      clopidogrel (Plavix) tab 75 mg  Dose: 75 mg  Freq: Daily Route: OR  Start: 07/15/24 0930 End: 07/21/24 1440       731306     1134 [C]31      (0930)32      (0930)33      (0930)34     1336 [C]35      322664      109129      953945        cyanocobalamin (Vitamin B12) tab 1,000 mcg  Dose: 1,000 mcg  Freq: Daily Route: OR  Start: 07/15/24 0930 End: 07/21/24 1440 091939      896620      558846      (0930)42      042904      621143      491436        DULoxetine (Cymbalta) DR cap 30 mg  Dose: 30 mg  Freq: Daily Route: OR  Start: 07/15/24 0930 End: 07/21/24 1440   Admin Instructions:   Capsule may be opened; do not crush contents. May mix with applesauce       893365      205886      145684      (0930)49      341960      112968      591763        famotidine (Pepcid) tab 20 mg  Dose: 20 mg  Freq: Daily Route: OR  Start: 07/14/24 1900 End: 07/21/24 1440      605309      123473      735023      167824      (0930)57      186808      544948      270824                     furosemide (Lasix) tab 20 mg  Dose: 20 mg  Freq: Daily Route: OR  Start: 07/15/24 0930 End: 07/21/24 1440       462804      122712      366563      (0930)64      188636      573001      404079                                  isosorbide mononitrate ER (Imdur) 24 hr tab 30 mg  Dose: 30 mg  Freq: Daily Route: OR  Start: 07/15/24 0930 End: 07/16/24 1031   Admin Instructions:   Do not crush       [id number] [phone])69                          lactated ringers infusion  Rate: 50 mL/hr  Freq: Continuous Route: IV  Start: 07/17/24 1945 End: 07/18/24 0836         810879      463152           levothyroxine (Synthroid) tab 50 mcg  Dose: 50 mcg  Freq:  Before breakfast Route: OR  Start: 07/15/24 0700 End: 07/21/24 1440   Admin Instructions:   Give on an empty stomach. Hold tube feedings 1 hour before AND after.       138326      (0700) [C]73      739166      (0700)75      744877      002583      708301        lidocaine-menthol 4-1 % patch 1 patch  Dose: 1 patch  Freq: Daily Route: TD  Start: 07/15/24 2045 End: 07/21/24 1440   Order specific questions:          2154)58 100614 615581      924933     735003      (0930)84      (0930)86 760491     727329      745535     1231 [C]89        magnesium oxide (Mag-Ox) tab 400 mg  Dose: 400 mg  Freq: Daily Route: OR  Start: 07/15/24 0930 End: 07/21/24 1440       068907      894194      138542      (0930)93      718635      530812      919241                                               metoprolol (Lopressor) 5 mg/5mL injection 5 mg  Dose: 5 mg  Freq: Every 6 hours Route: IV  Start: 07/18/24 1000 End: 07/18/24 1804          985224     (1600)98           metoprolol succinate ER (Toprol XL) 24 hr tab 100 mg  Dose: 100 mg  Freq: Daily Beta Blocker Route: OR  Start: 07/15/24 0600 End: 07/21/24 1440   Admin Instructions:   Do not crush       564797      1154 [C]100      2935717      (0600)102     0930 [C]103      (0600)104     1800 [C]105      8122549      5367790                                                                            ondansetron (Zofran) 4 MG/2ML injection 4 mg  Dose: 4 mg  Freq: Every 6 hours PRN Route: IV  PRN Reasons: Nausea,vomiting  Start: 07/14/24 1706 End: 07/19/24 0924   Admin Instructions:   Default antiemetic sequence (unless otherwise preferred by patient):  1. ondansetron (Zofran) 2. metoclopramide (Reglan)  Wait 15 minutes before proceeding to next medication in sequence.  Follow therapeutic duplication policy.       [id number]                                                     sacubitril-valsartan (Entresto) 24-26 MG per tab 1 tablet  Dose: 1 tablet  Freq: 2 times daily Route:  OR  Start: 07/19/24 1300 End: 07/21/24 1440           6404510     (2100) [C]110      9910875     0474418      7338761        sacubitril-valsartan (Entresto) 24-26 MG per tab 1 tablet  Dose: 1 tablet  Freq: 2 times daily Route: OR  Start: 07/14/24 2100 End: 07/16/24 1030      7086779      7899418     (2100) [C]116      0823 [C]117     (0900)118     3150445             sennosides (Senokot) tab 17.2 mg  Dose: 17.2 mg  Freq: Nightly PRN Route: OR  PRN Comment: constipation, as needed if no bowel movement that day  Start: 07/14/24 1706 End: 07/21/24 1440   Admin Instructions:   Use after MiraLAX.    Use prior to Dulcolax or Fleet's Enema.                sertraline (Zoloft) tab 25 mg  Dose: 25 mg  Freq: Daily Route: OR  Start: 07/15/24 0930 End: 07/21/24 1440       1513047      0116077      1843315      (0930)123      2057702      3018933      4880485        sodium chloride (Saline Mist) 0.65 % nasal solution 1 spray  Dose: 1 spray  Freq: Every 3 hours PRN Route: Each Nare  PRN Reason: Dryness  Start: 07/14/24 1706 End: 07/21/24 1440                sodium chloride 0.9% infusion  Rate: 75 mL/hr  Freq: Continuous Route: IV  Start: 07/19/24 0915 End: 07/19/24 1326   Admin Instructions:   After 48 hours saline lock if taking PO           (0915) [C]127                       sodium chloride 0.9% infusion  Rate: 125 mL/hr Dose: 125 mL/hr  Freq: Continuous Route: IV  Start: 07/14/24 1330 End: 07/14/24 1808      [id number]     [id number]     [id number]               spironolactone (Aldactone) tab 25 mg  Dose: 25 mg  Freq: Daily Route: OR  Start: 07/19/24 1300 End: 07/21/24 1440   Admin Instructions:   CAUTION: HAZARDOUS DRUG           [id number]      9156858      0263886        spironolactone (Aldactone) tab 25 mg  Dose: 25 mg  Freq: Daily Route: OR  Start: 07/15/24 0930 End: 07/16/24 1031   Admin Instructions:   CAUTION: HAZARDOUS DRUG       [id number] [phone]             traMADol (Ultram) tab 50 mg  Dose: 50 mg  Freq: Every 8 hours PRN  Route: OR  PRN Reason: moderate pain  Start: 07/15/24 1110 End: 07/15/24 2041   Admin Instructions:   Max dose 400 mg/day       [id number]              Medications 07/12 07/13 07/14 07/15 07/16 07/17 07/18 07/19 07/20 07/21             Vitals (last day) before discharge       Date/Time Temp Pulse Resp BP SpO2 Weight O2 Device O2 Flow Rate (L/min) [facility]    07/21/24 0800 98 °F (36.7 °C) 70 22 130/61 100 % -- -- --     07/21/24 0400 97.8 °F (36.6 °C) -- 24 -- -- -- None (Room air) --     07/21/24 0400 -- 69 -- 139/62 100 % -- -- -- [place]    07/21/24 0000 -- 69 26 137/64 100 % -- None (Room air) -- KS    07/20/24 2000 98 °F (36.7 °C) 70 24 130/55 99 % -- -- --     07/20/24 2000 -- -- -- -- -- -- None (Room air) -- [place]    07/20/24 1600 98.3 °F (36.8 °C) 70 26 132/57 96 % -- Nasal cannula 2 L/min BC    07/20/24 1500 -- 83 17 -- -- -- -- -- BC    07/20/24 1200 97.7 °F (36.5 °C) 70 24 138/66 99 % -- Nasal cannula 2 L/min BC    07/20/24 0800 97.8 °F (36.6 °C) 69 26 125/49 100 % -- Nasal cannula 2 L/min     07/20/24 0601 -- 69 16 -- 99 % -- Nasal cannula 2 L/min KS    07/20/24 0600 -- 69 25 -- 84 % -- Nasal cannula 2 L/min KS    07/20/24 0400 98 °F (36.7 °C) 70 24 165/71 98 % -- None (Room air) -- [place]    07/20/24 0000 98.1 °F (36.7 °C) 69 24 161/69 97 % -- None (Room air) -- KS          CIWA Scores (last 8 days) before discharge       None

## 2024-07-23 ENCOUNTER — SNF ADMIT/H&P (OUTPATIENT)
Dept: INTERNAL MEDICINE CLINIC | Age: 89
End: 2024-07-23

## 2024-07-23 DIAGNOSIS — I10 ESSENTIAL HYPERTENSION: ICD-10-CM

## 2024-07-23 DIAGNOSIS — D64.9 CHRONIC ANEMIA: ICD-10-CM

## 2024-07-23 DIAGNOSIS — E11.9 NON-INSULIN DEPENDENT TYPE 2 DIABETES MELLITUS (HCC): ICD-10-CM

## 2024-07-23 DIAGNOSIS — I74.9 EMBOLIC INFARCTION (HCC): Primary | ICD-10-CM

## 2024-07-23 DIAGNOSIS — R53.1 GENERALIZED WEAKNESS: ICD-10-CM

## 2024-07-23 DIAGNOSIS — E03.8 OTHER SPECIFIED HYPOTHYROIDISM: ICD-10-CM
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
## 2024-07-23 DIAGNOSIS — K21.00 GASTROESOPHAGEAL REFLUX DISEASE WITH ESOPHAGITIS WITHOUT HEMORRHAGE: ICD-10-CM

## 2024-07-23 DIAGNOSIS — F32.0 CURRENT MILD EPISODE OF MAJOR DEPRESSIVE DISORDER, UNSPECIFIED WHETHER RECURRENT (HCC): ICD-10-CM

## 2024-07-23 DIAGNOSIS — I48.20 CHRONIC A-FIB (HCC): ICD-10-CM

## 2024-07-23 DIAGNOSIS — R53.81 PHYSICAL DECONDITIONING: ICD-10-CM

## 2024-07-23 PROCEDURE — 3074F SYST BP LT 130 MM HG: CPT | Performed by: NURSE PRACTITIONER

## 2024-07-23 PROCEDURE — 1111F DSCHRG MED/CURRENT MED MERGE: CPT | Performed by: NURSE PRACTITIONER

## 2024-07-23 PROCEDURE — 1160F RVW MEDS BY RX/DR IN RCRD: CPT | Performed by: NURSE PRACTITIONER

## 2024-07-23 PROCEDURE — 99310 SBSQ NF CARE HIGH MDM 45: CPT | Performed by: NURSE PRACTITIONER

## 2024-07-23 PROCEDURE — 1159F MED LIST DOCD IN RCRD: CPT | Performed by: NURSE PRACTITIONER

## 2024-07-23 PROCEDURE — 3078F DIAST BP <80 MM HG: CPT | Performed by: NURSE PRACTITIONER

## 2024-07-24 ENCOUNTER — TELEPHONE (OUTPATIENT)
Dept: NEUROLOGY | Facility: CLINIC | Age: 89
End: 2024-07-24

## 2024-07-24 VITALS
RESPIRATION RATE: 18 BRPM | TEMPERATURE: 98 F | BODY MASS INDEX: 27 KG/M2 | SYSTOLIC BLOOD PRESSURE: 127 MMHG | HEART RATE: 75 BPM | WEIGHT: 144.38 LBS | DIASTOLIC BLOOD PRESSURE: 67 MMHG | OXYGEN SATURATION: 95 %

## 2024-07-24 RX ORDER — ONDANSETRON 4 MG/1
4 TABLET, FILM COATED ORAL EVERY 8 HOURS PRN
COMMUNITY

## 2024-07-24 NOTE — TELEPHONE ENCOUNTER
Patient had hospital stay due to fall and stroke-like symptoms, referred to [facility]. Added in as new patient for next available date, marked a higher priority on wait list. Follow-up disposition states to follow up within 2 weeks of discharge. No appts available within date range.     Point of contact is daughter/GUS Lo.     Forwarded to nurses

## 2024-07-24 NOTE — PROGRESS NOTES
Jamee Johnson.   37. APRN Initial encounter 24 5 pm    Met with patient at bedside.  She is quiet, pleasant and cooperative.  She states she would like to return home as soon as possible.  She is participating in therapy and knows she needs PT/OT before she can discharge.    Therapy Update:  sit to stand with max assist of 1 with verbal cues; standing balance less than 10 seconds x 3.  Jamee Johnson  : 1935  Age 88 year old  female patient is admitted to [facility] for rehabilitation and strengthening.      [facility] Admission   24 to 24.    Chief complaint:   fall, LLE hematoma, blood loss-anemia, multiple small infarcts    HPI - hospital note   88 yr old female with PMH of CVA, HTN, HLD, DM who presented to the ED after a fall.  Pt initially fell 2 days ago, and again occurred today.  She says she was walking when her legs just \"gave out\".  Denies LOC, did not strike her head.  Denied cp, sob, fever, chills, no n/v.  No dizziness or lightheadedness.  She fell onto her right side, but felt pain on her left.           Pt admitted with fall and LLE hematoma w/ acute blood loss anemia. She was treated supportively and Eliquis was discontinued. She was given IV iron for iron deficiency. Cardiology was consulted and recommended holding Eliquis indefinitely given hematoma/anemia, and high risk for bleeding. Her admission was complicated by AMS. CTH was negative but subsequent MRI brain showed acute multiple small infarcts, likely embolic. Neurology was consulted and pt was placed on stroke protocol. Her medication regimen was optimized, she was placed on DAPT(dual antiplatelet therapy) for further stroke prevention, but Eliquis continued to be held d/t high risk for bleeding. Her clinical condition improved and  was discharged to [facility]. Future consideration of Watchman procedure as pt is high risk for stroke, but also high risk for bleeding on AC.     Past Medical  History:    Calculus of kidney    CVA (cerebral vascular accident) (HCC)    Depression    Diabetes (HCC)    Esophageal reflux    High blood pressure    High cholesterol    HLD (hyperlipidemia)    HTN (hypertension)    Incontinence    Kidney stones    Neuropathy    Osteoarthritis    Stroke (HCC)    Visual impairment     Past Surgical History:   Procedure Laterality Date    Excis lumbar disk,one level      Knee replacement surgery      Removal gallbladder      Total abdom hysterectomy       Family History   Problem Relation Age of Onset    Hypertension Mother     Heart Disorder Mother     Diabetes Paternal Grandmother     Cancer Sister         kidney CA    Stroke Sister     Diabetes Paternal Aunt      Social History     Socioeconomic History    Marital status:    Tobacco Use    Smoking status: Never    Smokeless tobacco: Never   Vaping Use    Vaping status: Never Used   Substance and Sexual Activity    Alcohol use: Never    Drug use: Never     Social Determinants of Health     Financial Resource Strain: Low Risk  (2/7/2022)    Received from [employer], [facility]    Financial Resource Strain     In the past year, have you or any family members you live with been unable to get any of the following when it was really needed? Check all that apply.: None   Food Insecurity: No Food Insecurity (7/14/2024)    Food Insecurity     Food Insecurity: Never true   Transportation Needs: No Transportation Needs (7/14/2024)    Transportation Needs     Lack of Transportation: No   Physical Activity: Inactive (2/7/2022)    Received from [employer], [facility]    Exercise Vital Sign     Days of Exercise per Week: 0 days     Minutes of Exercise per Session: 0 min   Stress: Low Risk  (2/7/2022)    Received from [employer], [facility]    Stress     Stress is when someone feels tense, nervous, anxious, or can't sleep at night because their mind is troubled. How  stressed are you? : A little bit   Social Connections: Unknown (2/7/2022)    Received from [facility], [facility]    Social Connections     How often do you see or talk to people that you care about and feel close to? (For example: talking to friends on the phone, visiting friends or family, going to [employer] or club meetings): 5 or more times a week   Housing Stability: Low Risk  (7/14/2024)    Housing Stability     Housing Instability: No     Immunization History   Administered Date(s) Administered    Covid-19 Vaccine Moderna 100 mcg/0.5 ml 03/09/2021    Covid-19 Vaccine Moderna Bivalent 50mcg/0.5mL 12+ years 10/17/2022    Fluzone Vaccine Medicare () 12/27/2019      Allergies   Allergen Reactions    Statins OTHER (SEE COMMENTS)     Gets extremely weak    Sulfa Antibiotics RASH     CODE STATUS:  Full Code    ADVANCED CARE PLANNING TEAM: Will need updates to the family.    CURRENT MEDICATIONS - reviewed and updated on [facility]  Tylenol 500 mg 2 x day  Apixaban on hold d/t L left hematoma and anemia  ASA 81 mg daily for 21 days  Plavix 75 mg daily  Cyanocobalamin 1000 daily  Duloxetine 30 mg daily  Jardiance 10 mg daily  Vitamin D 1000 daily  Famotidine 20 mg 2 x day  Lasix 40 mg  M/W/F  Levothyroxine 50 mcg qam  Magnes. Oxide 400 mg daily  Metoprolol succ  mg daily  MVI daily  Zofran 4 mg q 8 hrs prn  Pantoprazole 40 mg 2 x day  Potassium 10 meq 2 x day  Sacubitril valsartan 24-26 one 2 x day  Sertraline 25 mg daily  Spironolactone 25 mg daily     SUBJECTIVE : denies chest pain, SOB, palpitations. Denies n/v/d/c. Denies chills, fevers.    PHYSICAL EXAM: 127/67. P 75. RR 18. POx 95%. T 97.7. wgt 144.4#  GENERAL HEALTH:well developed, well nourished, in no apparent distress   LINES, TUBES, DRAINS:  none  SKIN: warm, dry  WOUND: see wound assessment per staff.    EYES: Pupils round and equal; EOMI, no jaundice. conjunctiva normal;no nystagmus, no drainage from eyes  HENT: no nasal  drainage, mucous membranes pink.   NECK: supple  BREAST: deferred exam   RESPIRATORY: lungs clear  CARDIOVASCULAR:  rate 75  ABDOMEN:  BS+, soft, nontender, no guarding. Good appetite.  :Deferred  LYMPHATIC:lymphedema   MUSCULOSKELETAL: weakness upper and lower extremities; left > R  EXTREMITIES/VASCULAR: no edema.  NEUROLOGIC:follows commands  PSYCHIATRIC: alert and oriented x 3; affect appropriate     Patient has 1 daughter; Lo Weinstein   [phone]. Emergency Contact #   Richard Johnson [phone] Son  Emergency contact #2.    Lab Results  7/22/24  Wbc 9.6. HGB 9.5.  Plts 422.  Na 140. K 4.4.  Cl 103. CO2 27. BUN 23. Creat 0.94.  Gfr >60.    A/P    Acute multiple small infarcts; likely embolic.  CTA Head and Neck, did not report significant stenosis.  Aspirin 81 mg daily  Plavix 75 mg daily  Monitor mental status  PT/OT/ST  Trend weekly labs    Left hip hematoma  Closely monitor for bleeding  Eliquis has been on hold  High fall risk  Trend labs     chronic anemia, likely due to blood loss from hematoma  Weekly to bi-weekly CBC's    HTN:   Sacubitrin Valsartan 24/26 two x daily  Lasix 40 mg q M/W/F  Potassium 20 meq q M/W/F  Spironolactone 25 mg daily    Pulmonary nodules  -  f/u with pulmonologist    Chronic A. Fib  Metoprolol  mg daily  Aspirin 81 mg daily  F/U with cardiologist-possible candidate for watchman    DM:  Jardiance 10 mg daily  A1C 6.3    Depression  Cymbalta 30 mg daily  Sertraline 25 mg daily  Psychiatrist available    Hypothyroidism  -  levothyroxine 50 mg daily    GERD:  famotidine 20n mg 2 x day    Weakness/deconditioning - L > R  PT/OT/ST    *Follow-Up with PCP within 1-2 weeks following YIN discharge.   *Follow-Up with specialists as recommended.    Future Appointments   Date Time Provider Department Center   10/18/2024  9:15 AM Stephani Puckett DO [facility]     *Greater than 45 minutes spent w/ patient and staff, including but not limited to/ reviewing present status,  needs, abilities with disciplines, reviewing medical records, vital signs, labs, completing med rec and entering orders to establish plan of care in [facility].       Note to patient: The 21st Century Cures Act makes medical notes like these available to patients in the interest of transparency. However, this is a medical document intended as peer to peer communication. It is written in medical language and may contain abbreviations or verbiage that are unfamiliar. It may appear blunt or direct. Medical documents are intended to carry relevant information, facts as evident, and the clinical opinion of the practitioner who signs the document.    PATRICIA Hale (Daya)  07/23/24   5 pm  [facility] Post Acute Care Team

## 2024-07-25 ENCOUNTER — SNF VISIT (OUTPATIENT)
Dept: INTERNAL MEDICINE CLINIC | Age: 89
End: 2024-07-25

## 2024-07-25 DIAGNOSIS — R53.1 GENERALIZED WEAKNESS: ICD-10-CM

## 2024-07-25 DIAGNOSIS — R53.81 PHYSICAL DECONDITIONING: ICD-10-CM

## 2024-07-25 DIAGNOSIS — I74.9 EMBOLIC INFARCTION (HCC): Primary | ICD-10-CM

## 2024-07-25 DIAGNOSIS — S80.12XS HEMATOMA OF LEG, LEFT, SEQUELA: ICD-10-CM
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
## 2024-07-25 DIAGNOSIS — I10 ESSENTIAL HYPERTENSION: ICD-10-CM

## 2024-07-25 DIAGNOSIS — I48.20 CHRONIC A-FIB (HCC): ICD-10-CM

## 2024-07-25 DIAGNOSIS — D64.9 CHRONIC ANEMIA: ICD-10-CM

## 2024-07-29 VITALS
DIASTOLIC BLOOD PRESSURE: 69 MMHG | SYSTOLIC BLOOD PRESSURE: 115 MMHG | RESPIRATION RATE: 18 BRPM | OXYGEN SATURATION: 97 % | TEMPERATURE: 98 F | WEIGHT: 143.81 LBS | BODY MASS INDEX: 27 KG/M2 | HEART RATE: 70 BPM

## 2024-07-30 ENCOUNTER — SNF VISIT (OUTPATIENT)
Dept: INTERNAL MEDICINE CLINIC | Age: 89
End: 2024-07-30

## 2024-07-30 DIAGNOSIS — S80.12XS HEMATOMA OF LEG, LEFT, SEQUELA: ICD-10-CM

## 2024-07-30 DIAGNOSIS — R53.81 PHYSICAL DECONDITIONING: ICD-10-CM

## 2024-07-30 DIAGNOSIS — I74.9 EMBOLIC INFARCTION (HCC): ICD-10-CM
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
## 2024-07-30 DIAGNOSIS — I48.20 CHRONIC A-FIB (HCC): ICD-10-CM

## 2024-07-30 NOTE — PROGRESS NOTES
Jamee Johnson.  37. APN Encounter 25, 5 pm.    Met with patient and patient's son visiting from [place].  Son is unsure why his mother is not eating well; typically has a good appetite.  Patient reports she is not hungry and doesn't like the food.  Left leg with large firm hematoma from hip to knee; discoloration left lateral leg.  Patient c/o pain on palpation.   Therapy Update: Able to participate in therapy with max assistance.  Able to ambulate 10 feet x 1 with max assist and w/c follow.  Participating with sit to stand exercises upper and lower extremities.    Jamee Johnson  : 1935  Age 88 year old  female patient is admitted to [facility] for rehabilitation and strengthening.      [facility] Admission   24 to 24.    Chief complaint:   fall, LLE hematoma, blood loss-anemia, multiple small infarcts    HPI - hospital note   88 yr old female with PMH of CVA, HTN, HLD, DM who presented to the ED after a fall.  Pt initially fell 2 days ago, and again occurred today.  She says she was walking when her legs just \"gave out\".  Denies LOC, did not strike her head.  Denied cp, sob, fever, chills, no n/v.  No dizziness or lightheadedness.  She fell onto her right side, but felt pain on her left.           Pt admitted with a fall and LLE hematoma w/ acute blood loss anemia. She was treated supportively and Eliquis was discontinued. She was given IV iron for iron deficiency. Cardiology was consulted and recommended holding Eliquis indefinitely given hematoma/anemia, and high risk for bleeding. Her admission was complicated by AMS. CTH was negative but subsequent MRI brain showed acute multiple small infarcts, likely embolic. Neurology was consulted and pt was placed on stroke protocol. Her medication regimen was optimized, she was placed on DAPT(dual antiplatelet therapy) for further stroke prevention, but Eliquis continued to be held d/t high risk for bleeding. Her  clinical condition improved and  was discharged to [facility]. Future consideration of Watchman procedure as pt is high risk for stroke, but also high risk for bleeding on AC.     Past Medical History:    Calculus of kidney    CVA (cerebral vascular accident) (HCC)    Depression    Diabetes (HCC)    Esophageal reflux    High blood pressure    High cholesterol    HLD (hyperlipidemia)    HTN (hypertension)    Incontinence    Kidney stones    Neuropathy    Osteoarthritis    Stroke (HCC)    Visual impairment     Past Surgical History:   Procedure Laterality Date    Excis lumbar disk,one level      Knee replacement surgery      Removal gallbladder      Total abdom hysterectomy       Family History   Problem Relation Age of Onset    Hypertension Mother     Heart Disorder Mother     Diabetes Paternal Grandmother     Cancer Sister         kidney CA    Stroke Sister     Diabetes Paternal Aunt      Social History     Socioeconomic History    Marital status:    Tobacco Use    Smoking status: Never    Smokeless tobacco: Never   Vaping Use    Vaping status: Never Used   Substance and Sexual Activity    Alcohol use: Never    Drug use: Never     Social Determinants of Health     Financial Resource Strain: Low Risk  (2/7/2022)    Received from [employer], [facility]    Financial Resource Strain     In the past year, have you or any family members you live with been unable to get any of the following when it was really needed? Check all that apply.: None   Food Insecurity: No Food Insecurity (7/14/2024)    Food Insecurity     Food Insecurity: Never true   Transportation Needs: No Transportation Needs (7/14/2024)    Transportation Needs     Lack of Transportation: No   Physical Activity: Inactive (2/7/2022)    Received [facility] [employer], [facility]    Exercise Vital Sign     Days of Exercise per Week: 0 days     Minutes of Exercise per Session: 0 min   Stress: Low Risk  (2/7/2022)     Received from [facility], [facility]    Stress     Stress is when someone feels tense, nervous, anxious, or can't sleep at night because their mind is troubled. How stressed are you? : A little bit   Social Connections: Unknown (2/7/2022)    Received from [facility], [facility]    Social Connections     How often do you see or talk to people that you care about and feel close to? (For example: talking to friends on the phone, visiting friends or family, going to Religion or club meetings): 5 or more times a week   Housing Stability: Low Risk  (7/14/2024)    Housing Stability     Housing Instability: No     Immunization History   Administered Date(s) Administered    Covid-19 Vaccine Moderna 100 mcg/0.5 ml 03/09/2021    Covid-19 Vaccine Moderna Bivalent 50mcg/0.5mL 12+ years 10/17/2022    Fluzone Vaccine Medicare () 12/27/2019      Allergies   Allergen Reactions    Statins OTHER (SEE COMMENTS)     Gets extremely weak    Sulfa Antibiotics RASH     CODE STATUS:  Full Code    ADVANCED CARE PLANNING TEAM: Will need updates with family.    CURRENT MEDICATIONS - reviewed and updated on SNF [facility]  Tylenol 500 mg 2 x day  Apixaban on hold d/t  left leg hematoma and anemia  ASA 81 mg daily for 21 days  Plavix 75 mg daily  Cyanocobalamin 1000 daily  Duloxetine 30 mg daily  Jardiance 10 mg daily  Vitamin D 1000 daily  Famotidine 20 mg 2 x day  Lasix 40 mg  M/W/F  Levothyroxine 50 mcg qam  Magnes. Oxide 400 mg daily  Metoprolol succ  mg daily  MVI daily  Zofran 4 mg  2 x day  Pantoprazole 40 mg 2 x day  Potassium 10 meq 2 x day  Sacubitril valsartan 24-26 one 2 x day  Sertraline 25 mg daily  Spironolactone 25 mg daily     SUBJECTIVE : denies chest pain, SOB, palpitations. Denies n/v/d/c. Denies chills, fevers.    PHYSICAL EXAM: 115/69.  P 70. RR 18. POx 97%. T 97.5. wgt  143.8# >144.4#  GENERAL HEALTH:well developed, well nourished, in no apparent distress   LINES, TUBES,  DRAINS:  none  SKIN: warm, dry  WOUND: see wound assessment per staff.    EYES: Pupils round and equal; no jaundice. conjunctiva normal; no drainage.  HENT: no nasal drainage, mucous membranes pink.   NECK: supple  BREAST: deferred exam   RESPIRATORY: lungs clear  CARDIOVASCULAR:  rate 70  ABDOMEN:  BS+, soft, nontender, no guarding. Poor appetite.  :Deferred  LYMPHATIC:lymphedema   MUSCULOSKELETAL: weakness upper and lower extremities; left > R  EXTREMITIES/VASCULAR: no edema.  NEUROLOGIC:follows commands  PSYCHIATRIC: alert and oriented x 3; affect appropriate     Patient has 1 daughter; Lo Weinstein   [phone]. Emergency Contact #   Richard Johnson [phone] Son  Emergency contact #2.    Lab Results  7/22524  Wbc  8.3. HGB 10.1.  Plts 397.  Na 138. K 4.2.  Cl 106. CO2 29. BUN 26. Creat 1.1.  Gfr 44.    A/P    Acute multiple small infarcts; likely embolic.  CTA Head and Neck, did not report significant stenosis.  Aspirin 81 mg daily  Plavix 75 mg daily  Monitor mental status  PT/OT/ST  Trend weekly labs    Left hip/leg hematoma  monitor for bleeding  Eliquis has been on hold since hospital discharge.  High fall risk  Trend labs     chronic anemia, likely due to blood loss from hematoma  Weekly to bi-weekly CBC's    HTN:   Sacubitrin Valsartan 24/26 two x daily  Lasix 40 mg q M/W/F  Potassium 20 meq q M/W/F  Spironolactone 25 mg daily    Pulmonary nodules  -  f/u with pulmonologist    Chronic A. Fib  Metoprolol  mg daily  Aspirin 81 mg daily  F/U with cardiologist-possible candidate for watchman    DM:  Jardiance 10 mg daily.  A1C 6.3    Depression  Cymbalta 30 mg daily  Sertraline 25 mg daily  Psychiatrist available    Hypothyroidism  -  levothyroxine 50 mg daily    GERD:  famotidine 20n mg 2 x day    Weakness/deconditioning - L > R  PT/OT/ST    *Follow-Up with PCP within 1-2 weeks following YIN discharge.   *Follow-Up with specialists as recommended.    Future Appointments   Date Time Provider  Department Center   10/18/2024  9:15 AM Stephani Puckett DO [facility]     *Greater than 35 minutes spent w/ patient and staff, including but not limited to/ reviewing present status, needs, abilities with disciplines, reviewing medical records, vital signs, labs, completing med rec and entering orders to establish plan of care in [facility].       Note to patient: The 21st Century Cures Act makes medical notes like these available to patients in the interest of transparency. However, this is a medical document intended as peer to peer communication. It is written in medical language and may contain abbreviations or verbiage that are unfamiliar. It may appear blunt or direct. Medical documents are intended to carry relevant information, facts as evident, and the clinical opinion of the practitioner who signs the document.    PATRICIA Hale (Daya)  07/25/24   5 pm  [facility] Post Acute Care Team

## 2024-08-01 ENCOUNTER — SNF VISIT (OUTPATIENT)
Dept: INTERNAL MEDICINE CLINIC | Age: 89
End: 2024-08-01
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
## 2024-08-01 DIAGNOSIS — R53.81 PHYSICAL DECONDITIONING: ICD-10-CM

## 2024-08-01 DIAGNOSIS — S80.12XS HEMATOMA OF LEG, LEFT, SEQUELA: ICD-10-CM

## 2024-08-01 DIAGNOSIS — R53.1 GENERALIZED WEAKNESS: ICD-10-CM

## 2024-08-01 DIAGNOSIS — I48.20 CHRONIC A-FIB (HCC): ICD-10-CM

## 2024-08-01 DIAGNOSIS — I74.9 EMBOLIC INFARCTION (HCC): ICD-10-CM

## 2024-08-01 PROCEDURE — 99309 SBSQ NF CARE MODERATE MDM 30: CPT | Performed by: NURSE PRACTITIONER

## 2024-08-05 VITALS
DIASTOLIC BLOOD PRESSURE: 78 MMHG | WEIGHT: 147.19 LBS | SYSTOLIC BLOOD PRESSURE: 138 MMHG | BODY MASS INDEX: 28 KG/M2 | RESPIRATION RATE: 18 BRPM | OXYGEN SATURATION: 96 % | HEART RATE: 72 BPM | TEMPERATURE: 98 F

## 2024-08-05 VITALS
OXYGEN SATURATION: 97 % | TEMPERATURE: 98 F | WEIGHT: 144.63 LBS | DIASTOLIC BLOOD PRESSURE: 64 MMHG | BODY MASS INDEX: 27 KG/M2 | SYSTOLIC BLOOD PRESSURE: 140 MMHG | HEART RATE: 76 BPM

## 2024-08-06 NOTE — PROGRESS NOTES
Jamee Johnson>  37. APN Encounter 24. 3:30 pm.    Met with patient at bedside.  Palpated Left lateral thigh and hip with large firm hematoma; + discoloration.  Patient denies pain or difficulty doing activities d/t left hip hematoma.  Following cbc's 2 x week.    Jamee Johnson  : 1935. 88 year old  female patient is admitted to [facility] for rehabilitation and strengthening.      [facility]   [facility] to 24.    Chief complaint:  L hip and thigh hematoma, weakness    HPI - hospital note   88 yr old female with PMH of CVA, HTN, HLD, DM who presented to the ED after a fall.  Pt initially fell 2 days ago, and again occurred today.  She says she was walking when her legs just \"gave out\".  Denies LOC, did not strike her head.  Denied cp, sob, fever, chills, no n/v.  No dizziness or lightheadedness.  She fell onto her right side, but felt pain on her left.           Pt admitted with a fall and LLE hematoma w/ acute blood loss anemia. She was treated supportively and Eliquis was discontinued. She was given IV iron for iron deficiency. Cardiology was consulted and recommended holding Eliquis indefinitely given hematoma/anemia, and high risk for bleeding. Her admission was complicated by AMS. CTH was negative but subsequent MRI brain showed acute multiple small infarcts, likely embolic. Neurology was consulted and pt was placed on stroke protocol. Her medication regimen was optimized, she was placed on DAPT(dual antiplatelet therapy) for further stroke prevention, but Eliquis continued to be held d/t high risk for bleeding. Her clinical condition improved and  was discharged to [facility]. Future consideration of Watchman procedure as pt is high risk for stroke, but also high risk for bleeding on AC.     Past Medical History:    Calculus of kidney    CVA (cerebral vascular accident) (HCC)    Depression    Diabetes (HCC)    Esophageal reflux    High blood pressure    High  cholesterol    HLD (hyperlipidemia)    HTN (hypertension)    Incontinence    Kidney stones    Neuropathy    Osteoarthritis    Stroke (HCC)    Visual impairment     Past Surgical History:   Procedure Laterality Date    Excis lumbar disk,one level      Knee replacement surgery      Removal gallbladder      Total abdom hysterectomy       Family History   Problem Relation Age of Onset    Hypertension Mother     Heart Disorder Mother     Diabetes Paternal Grandmother     Cancer Sister         kidney CA    Stroke Sister     Diabetes Paternal Aunt      Social History     Socioeconomic History    Marital status:    Tobacco Use    Smoking status: Never    Smokeless tobacco: Never   Vaping Use    Vaping status: Never Used   Substance and Sexual Activity    Alcohol use: Never    Drug use: Never     Social Determinants of Health     Financial Resource Strain: Low Risk  (2/7/2022)    Received from [facility], [facility]    Financial Resource Strain     In the past year, have you or any family members you live with been unable to get any of the following when it was really needed? Check all that apply.: None   Food Insecurity: No Food Insecurity (7/14/2024)    Food Insecurity     Food Insecurity: Never true   Transportation Needs: No Transportation Needs (7/14/2024)    Transportation Needs     Lack of Transportation: No   Physical Activity: Inactive (2/7/2022)    [facility]    Exercise Vital Sign     Days of Exercise per Week: 0 days     Minutes of Exercise per Session: 0 min   Stress: Low Risk  (2/7/2022)    Received from [facility], [facility]    Stress     Stress is when someone feels tense, nervous, anxious, or can't sleep at night because their mind is troubled. How stressed are you? : A little bit   Social Connections: Unknown (2/7/2022)    [facility]    Social  Connections     How often do you see or talk to people that you care about and feel close to? (For example: talking to friends on the phone, visiting friends or family, going to [employer] or club meetings): 5 or more times a week   Housing Stability: Low Risk  (7/14/2024)    Housing Stability     Housing Instability: No     Immunization History   Administered Date(s) Administered    Covid-19 Vaccine Moderna 100 mcg/0.5 ml 03/09/2021    Covid-19 Vaccine Moderna Bivalent 50mcg/0.5mL 12+ years 10/17/2022    Fluzone Vaccine Medicare () 12/27/2019      Allergies   Allergen Reactions    Statins OTHER (SEE COMMENTS)     Gets extremely weak    Sulfa Antibiotics RASH     CODE STATUS:  Full Code    ADVANCED CARE PLANNING TEAM: Will need updates with family.    CURRENT MEDICATIONS - reviewed and updated on SNF [facility]  Tylenol 500 mg 2 x day  Apixaban (eliquis)  on hold d/t  left leg hematoma and anemia  ASA 81 mg daily for 21 days  Plavix 75 mg daily  Cyanocobalamin 1000 daily  Duloxetine 30 mg daily  Jardiance 10 mg daily  Vitamin D 1000 daily  Famotidine 20 mg 2 x day  Lasix 40 mg  M/W/F  Levothyroxine 50 mcg qam  Magnes. Oxide 400 mg daily  Metoprolol succ  mg daily  MVI daily  Zofran 4 mg  2 x day  Pantoprazole 40 mg 2 x day  Potassium 10 meq 2 x day  Sacubitril valsartan 24-26 one 2 x day  Sertraline 25 mg daily  Spironolactone 25 mg daily     SUBJECTIVE : denies chest pain, SOB, palpitations. Denies n/v/d/c. Denies chills, fevers.    PHYSICAL EXAM:  140/64. P 76.  POx 97%. T 98.1. wgt 144.6#  GENERAL HEALTH:well developed, well nourished, in no apparent distress   LINES, TUBES, DRAINS:  none  SKIN: warm, dry  WOUND: see wound assessment per staff.    EYES: Pupils round and equal; no jaundice. conjunctiva normal  HENT: no nasal drainage, mucous membranes pink.   NECK: supple  BREAST: deferred exam   RESPIRATORY: lungs clear  CARDIOVASCULAR:  rate 76  ABDOMEN:  BS+, soft, nontender, no guarding. Good  appetite.  :Deferred  LYMPHATIC:lymphedema   MUSCULOSKELETAL: weakness upper and lower extremities; left > R  EXTREMITIES/VASCULAR: no edema.  NEUROLOGIC:follows commands  PSYCHIATRIC: alert and oriented x 3; affect appropriate  pleasant, cooperative.     Patient has 1 daughter; Lo Weinstein   [phone]. Emergency Contact #   Richard Johnson [phone] Son  Emergency contact #2.    Lab Results  7/30/24  Wbc  7.9. HGB 10.4.  Plts 379.  Na 141  K 4.2.  Cl 104. CO2 28. BUN 21. Creat 1.1.  Gfr 43.    A/P    Acute multiple small infarcts; likely embolic.  CTA Head and Neck, no significant stenosis.  Aspirin 81 mg daily  Plavix 75 mg daily  Monitor mental status  PT/OT/ST  Trend labs    Left hip/leg hematoma - large, firm, discoloration.  Monitor for bleeding  Eliquis on hold since hospital discharge.  High fall risk  - Trend labs     chronic anemia, likely due to blood loss from hematoma  Weekly to bi-weekly CBC's. HGB 10.4.    HTN:   Sacubitrin Valsartan 24/26 two x daily  Lasix 40 mg q M/W/F  Potassium 20 meq q M/W/F  Spironolactone 25 mg daily    Pulmonary nodules  -  f/u with pulmonologist    Chronic A. Fib  Metoprolol  mg daily  Aspirin 81 mg daily  F/U with cardiologist-possible candidate for watchman    DM:  Jardiance 10 mg daily.  A1C 6.3    Depression  Cymbalta 30 mg daily  Sertraline 25 mg daily  Psychiatrist available    Hypothyroidism  -  levothyroxine 50 mg daily    GERD:  famotidine 20n mg 2 x day    Weakness/deconditioning - L > R  PT/OT    *Follow-Up with PCP within 1-2 weeks following [facility] discharge.   *Follow-Up with specialists as recommended.    Future Appointments   Date Time Provider Department Center   10/18/2024  9:15 AM Stephani Puckett DO [facility]      Greater than 35 minutes spent w/ patient and staff, including but not limited to/ reviewing present status, needs, abilities with disciplines, reviewing medical records, vital signs, labs, completing med rec and entering  orders to establish plan of care in [facility].       Note to patient: The 21st Century Cures Act makes medical notes like these available to patients in the interest of transparency. However, this is a medical document intended as peer to peer communication. It is written in medical language and may contain abbreviations or verbiage that are unfamiliar. It may appear blunt or direct. Medical documents are intended to carry relevant information, facts as evident, and the clinical opinion of the practitioner who signs the document.    PATRICIA Hale (Daya)  07/30/24   3:30 pm  [facility] Post Acute Care Team

## 2024-08-06 NOTE — PROGRESS NOTES
Jamee Johnson.   37. APN Encounter 24.  11:30 am.    Met with patient at bedside prior to lunch sitting in her wheelchair.  Left thigh and hip palpated noting firm large hematoma from hip to thigh.  Patient denies pain of left leg.  Trending CBC's.    Therapy update:  Patient able to ambulate with a walker with mod assist of 1 and close w/c follow 30 ft x 1 and 10 ft x 1.    Jamee Johnson  : 1935. 88 year old  female patient is admitted to [facility] for rehabilitation and strengthening.      [facility] Admission   24 to 24.    Chief complaint:  L hip and thigh hematoma, weakness    HPI - hospital note   88 yr old female with PMH of CVA, HTN, HLD, DM who presented to the ED after a fall.  Pt initially fell 2 days ago, and again occurred today.  She says she was walking when her legs just \"gave out\".  Denies LOC, did not strike her head.  Denied cp, sob, fever, chills, no n/v.  No dizziness or lightheadedness.  She fell onto her right side, but felt pain on her left.           Pt admitted with a fall and LLE hematoma w/ acute blood loss anemia. She was treated supportively and Eliquis was discontinued. She was given IV iron for iron deficiency. Cardiology was consulted and recommended holding Eliquis indefinitely given hematoma/anemia, and high risk for bleeding. Her admission was complicated by AMS. CTH was negative but subsequent MRI brain showed acute multiple small infarcts, likely embolic. Neurology was consulted and pt was placed on stroke protocol. Her medication regimen was optimized, she was placed on DAPT(dual antiplatelet therapy) for further stroke prevention, but Eliquis continued to be held d/t high risk for bleeding. Her clinical condition improved and  was discharged to [facility]. Future consideration of Watchman procedure as pt is high risk for stroke, but also high risk for bleeding on AC.     Past Medical History:    Calculus of kidney    CVA (cerebral  vascular accident) (HCC)    Depression    Diabetes (HCC)    Esophageal reflux    High blood pressure    High cholesterol    HLD (hyperlipidemia)    HTN (hypertension)    Incontinence    Kidney stones    Neuropathy    Osteoarthritis    Stroke (HCC)    Visual impairment     Past Surgical History:   Procedure Laterality Date    Excis lumbar disk,one level      Knee replacement surgery      Removal gallbladder      Total abdom hysterectomy       Family History   Problem Relation Age of Onset    Hypertension Mother     Heart Disorder Mother     Diabetes Paternal Grandmother     Cancer Sister         kidney CA    Stroke Sister     Diabetes Paternal Aunt      Social History     Socioeconomic History    Marital status:    Tobacco Use    Smoking status: Never    Smokeless tobacco: Never   Vaping Use    Vaping status: Never Used   Substance and Sexual Activity    Alcohol use: Never    Drug use: Never     Social Determinants of Health     Financial Resource Strain: Low Risk  (2/7/2022)    Received from [facility], [facility]    Financial Resource Strain     In the past year, have you or any family members you live with been unable to get any of the following when it was really needed? Check all that apply.: None   Food Insecurity: No Food Insecurity (7/14/2024)    Food Insecurity     Food Insecurity: Never true   Transportation Needs: No Transportation Needs (7/14/2024)    Transportation Needs     Lack of Transportation: No   Physical Activity: Inactive (2/7/2022)    [facility]    Exercise Vital Sign     Days of Exercise per Week: 0 days     Minutes of Exercise per Session: 0 min   Stress: Low Risk  (2/7/2022)    Received from [facility], [facility]    Stress     Stress is when someone feels tense, nervous, anxious, or can't sleep at night because their mind is troubled. How stressed are you? : A little bit   Social  Connections: Unknown (2/7/2022)    [facility]    Social Connections     How often do you see or talk to people that you care about and feel close to? (For example: talking to friends on the phone, visiting friends or family, going to [employer] or club meetings): 5 or more times a week   Housing Stability: Low Risk  (7/14/2024)    Housing Stability     Housing Instability: No     Immunization History   Administered Date(s) Administered    Covid-19 Vaccine Moderna 100 mcg/0.5 ml 03/09/2021    Covid-19 Vaccine Moderna Bivalent 50mcg/0.5mL 12+ years 10/17/2022    Fluzone Vaccine Medicare () 12/27/2019      Allergies   Allergen Reactions    Statins OTHER (SEE COMMENTS)     Gets extremely weak    Sulfa Antibiotics RASH     CODE STATUS:  Full Code    ADVANCED CARE PLANNING TEAM: Will need updates with family.    CURRENT MEDICATIONS - reviewed and updated on SNF EM[facility]  Tylenol 500 mg 2 x day  Apixaban (eliquis)  on hold d/t  left leg hematoma and anemia  ASA 81 mg daily for 21 days  Plavix 75 mg daily  Cyanocobalamin 1000 daily  Duloxetine 30 mg daily  Jardiance 10 mg daily  Vitamin D 1000 daily  Famotidine 20 mg 2 x day  Lasix 40 mg  M/W/F  Levothyroxine 50 mcg qam  Magnes. Oxide 400 mg daily  Metoprolol succ  mg daily  MVI daily  Zofran 4 mg  2 x day  Pantoprazole 40 mg 2 x day  Potassium 10 meq 2 x day  Sacubitril valsartan 24-26 one 2 x day  Sertraline 25 mg daily  Spironolactone 25 mg daily     SUBJECTIVE : denies chest pain, SOB, palpitations. Denies n/v/d/c. Denies chills, fevers.    PHYSICAL EXAM:  138/78. P72. RR 18. POx 96%. T 97.4. wgt 147.2#  GENERAL HEALTH:well developed, well nourished, in no apparent distress   LINES, TUBES, DRAINS:  none  SKIN: warm, dry  WOUND: see wound assessment per staff.    EYES: Pupils round and equal; no jaundice. conjunctiva normal  HENT: no nasal drainage, mucous membranes pink.   NECK: supple  BREAST: deferred exam    RESPIRATORY: lungs clear  CARDIOVASCULAR:  rate 72  ABDOMEN:  BS+, soft, nontender, no guarding. Good appetite.  :Deferred  LYMPHATIC:lymphedema   MUSCULOSKELETAL: weakness upper and lower extremities; left > R  EXTREMITIES/VASCULAR: no edema.  NEUROLOGIC:follows commands  PSYCHIATRIC: alert and oriented x 3; affect appropriate  pleasant, cooperative.     Patient has 1 daughter; Lo Weinstein   [phone]. Emergency Contact #   Richard Johnson [phone] Son  Emergency contact #2.    Lab Results  7/30/24  Wbc  7.9. HGB 10.4.  Plts 379.  Na 141  K 4.2.  Cl 104. CO2 28. BUN 21. Creat 1.1.  Gfr 43.    A/P    Multiple small infarcts; likely embolic.  CTA Head and Neck, no significant stenosis.  Aspirin 81 mg daily  Plavix 75 mg daily  Monitor mental status  PT/OT/ST  Trend labs    Left hip/leg hematoma - large, firm, discoloration.  Monitor for bleeding  Eliquis on hold since hospital discharge.  High fall risk  - Trend labs     chronic anemia, likely due to blood loss from hematoma  Weekly to bi-weekly CBC's. HGB 10.4.    HTN:   Sacubitrin Valsartan 24/26 two x daily  Lasix 40 mg q M/W/F  Potassium 20 meq q M/W/F  Spironolactone 25 mg daily    Pulmonary nodules  -  f/u with pulmonologist    Chronic A-Fib  Metoprolol  mg daily  Aspirin 81 mg daily  F/U with cardiologist-possible candidate for watchman    DM:  Jardiance 10 mg daily.  A1C 6.3    Depression  Cymbalta 30 mg daily  Sertraline 25 mg daily  Psychiatrist available    Hypothyroidism  -  levothyroxine 50 mg daily    GERD:  famotidine 20 mg 2 x day    Weakness/deconditioning - L > R  PT/OT    *Follow-Up with PCP within 1-2 weeks following YIN discharge.   *Follow-Up with specialists as recommended.    Future Appointments   Date Time Provider Department Center   10/18/2024  9:15 AM Stephani Puckett DO [facility]      Greater than 35 minutes spent w/ patient and staff, including but not limited to/ reviewing present status, needs, abilities with  disciplines, reviewing medical records, vital signs, labs, completing med rec and entering orders to establish plan of care in [facility].       Note to patient: The 21st Century Cures Act makes medical notes like these available to patients in the interest of transparency. However, this is a medical document intended as peer to peer communication. It is written in medical language and may contain abbreviations or verbiage that are unfamiliar. It may appear blunt or direct. Medical documents are intended to carry relevant information, facts as evident, and the clinical opinion of the practitioner who signs the document.    PATRICIA Hale (Daya)  08/1//24    1130 am  [facility] Post Acute Care Team

## 2024-08-07 ENCOUNTER — SNF VISIT (OUTPATIENT)
Dept: INTERNAL MEDICINE CLINIC | Age: 89
End: 2024-08-07

## 2024-08-07 DIAGNOSIS — R53.81 PHYSICAL DECONDITIONING: ICD-10-CM

## 2024-08-07 DIAGNOSIS — S80.12XS HEMATOMA OF LEG, LEFT, SEQUELA: ICD-10-CM

## 2024-08-07 DIAGNOSIS — I10 ESSENTIAL HYPERTENSION: ICD-10-CM

## 2024-08-07 DIAGNOSIS — I48.20 CHRONIC A-FIB (HCC): ICD-10-CM

## 2024-08-07 DIAGNOSIS — I74.9 EMBOLIC INFARCTION (HCC): ICD-10-CM

## 2024-08-07 DIAGNOSIS — E11.9 NON-INSULIN DEPENDENT TYPE 2 DIABETES MELLITUS (HCC): ICD-10-CM
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
## 2024-08-07 DIAGNOSIS — D64.9 CHRONIC ANEMIA: ICD-10-CM

## 2024-08-07 PROCEDURE — 99309 SBSQ NF CARE MODERATE MDM 30: CPT | Performed by: NURSE PRACTITIONER

## 2024-08-09 ENCOUNTER — OFFICE VISIT (OUTPATIENT)
Dept: NEUROLOGY | Facility: CLINIC | Age: 89
End: 2024-08-09
Payer: MEDICARE

## 2024-08-09 ENCOUNTER — SNF VISIT (OUTPATIENT)
Dept: INTERNAL MEDICINE CLINIC | Age: 89
End: 2024-08-09

## 2024-08-09 VITALS
SYSTOLIC BLOOD PRESSURE: 100 MMHG | HEART RATE: 69 BPM | OXYGEN SATURATION: 98 % | BODY MASS INDEX: 27 KG/M2 | DIASTOLIC BLOOD PRESSURE: 62 MMHG | WEIGHT: 144 LBS

## 2024-08-09 DIAGNOSIS — I48.20 CHRONIC A-FIB (HCC): ICD-10-CM

## 2024-08-09 DIAGNOSIS — R53.1 GENERALIZED WEAKNESS: ICD-10-CM

## 2024-08-09 DIAGNOSIS — I63.9 CEREBROVASCULAR ACCIDENT (CVA), UNSPECIFIED MECHANISM (HCC): Primary | ICD-10-CM

## 2024-08-09 DIAGNOSIS — S80.12XS HEMATOMA OF LEG, LEFT, SEQUELA: ICD-10-CM

## 2024-08-09 DIAGNOSIS — I74.9 EMBOLIC INFARCTION (HCC): ICD-10-CM

## 2024-08-09 DIAGNOSIS — D64.9 CHRONIC ANEMIA: ICD-10-CM
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
## 2024-08-09 DIAGNOSIS — R53.81 PHYSICAL DECONDITIONING: ICD-10-CM

## 2024-08-09 PROBLEM — N18.30 CKD (CHRONIC KIDNEY DISEASE) STAGE 3, GFR 30-59 ML/MIN (HCC): Chronic | Status: ACTIVE | Noted: 2024-08-09

## 2024-08-09 PROCEDURE — 99215 OFFICE O/P EST HI 40 MIN: CPT | Performed by: OTHER

## 2024-08-09 PROCEDURE — 99309 SBSQ NF CARE MODERATE MDM 30: CPT | Performed by: NURSE PRACTITIONER

## 2024-08-09 NOTE — PATIENT INSTRUCTIONS
Refill policies:    Allow 2-3 business days for refills; controlled substances may take longer.  Contact your pharmacy at least 5 days prior to running out of medication and have them send an electronic request or submit request through the “request refill” option in your [email or web address] account.  Refills are not addressed on weekends; covering physicians do not authorize routine medications on weekends.  No narcotics or controlled substances are refilled after noon on Fridays or by on call physicians.  By law, narcotics must be electronically prescribed.  A 30 day supply with no refills is the maximum allowed.  If your prescription is due for a refill, you may be due for a follow up appointment.  To best provide you care, patients receiving routine medications need to be seen at least once a year.  Patients receiving narcotic/controlled substance medications need to be seen at least once every 3 months.  In the event that your preferred pharmacy does not have the requested medication in stock (e.g. Backordered), it is your responsibility to find another pharmacy that has the requested medication available.  We will gladly send a new prescription to that pharmacy at your request.    Scheduling Tests:    If your physician has ordered radiology tests such as MRI or CT scans, please contact Central Scheduling at [phone] right away to schedule the test.  Once scheduled, the [facility] Centralized Referral Team will work with your insurance carrier to obtain pre-certification or prior authorization.  Depending on your insurance carrier, approval may take 3-10 days.  It is highly recommended patients assure they have received an authorization before having a test performed.  If test is done without insurance authorization, patient may be responsible for the entire amount billed.      Precertification and Prior Authorizations:  If your physician has recommended that you have a procedure or additional testing performed the [facility]  Centralized Referral Team will contact your insurance carrier to obtain pre-certification or prior authorization.    You are strongly encouraged to contact your insurance carrier to verify that your procedure/test has been approved and is a COVERED benefit.  Although the [facility] Centralized Referral Team does its due diligence, the insurance carrier gives the disclaimer that \"Although the procedure is authorized, this does not guarantee payment.\"    Ultimately the patient is responsible for payment.   Thank you for your understanding in this matter.  Paperwork Completion:  If you require FMLA or disability paperwork for your recovery, please make sure to either drop it off or have it faxed to our office at [phone]. Be sure the form has your name and date of birth on it.  The form will be faxed to our Forms Department and they will complete it for you.  There is a 25$ fee for all forms that need to be filled out.  Please be aware there is a 10-14 day turnaround time.  You will need to sign a release of information (KEIKO) form if your paperwork does not come with one.  You may call the Forms Department with any questions at [phone].  Their fax number is [phone].

## 2024-08-09 NOTE — PROGRESS NOTES
Patient is here today with her daughter. Daughter states Patient had hematoma on her left thihg, hip, pelvis. Senior living staff found her on floor not remembering much.

## 2024-08-12 NOTE — PROGRESS NOTES
[facility] Neurology Outpatient Progress Note  Date of service: 8/9/2024    Assessment:     ICD-10-CM    1. Cerebrovascular accident (CVA), unspecified mechanism (HCC)  I63.9 CT BRAIN OR HEAD (17675) [[id number]]      Multiple acute infarcts, likely embolic. CTA of head and Neck did not report any hemodynamically significant stenosis.   A-fib    Plan:      Procedures    CT BRAIN OR HEAD (85023) [[id number]]   Stroke education given  Stroke risk factors management by primary \  Cardiologist to follow  Monitor bleeding  Cont current apixaban 2.5mg bid and asa 81mg (off plavix)  Cont lipitor  Fall precaution  See orders and medications filed with this encounter. The patient indicates understanding of these issues and agrees with the plan.  Discussed with patient/family regarding assessment, work up, care plan and possible adverse and side effects of the medications.  RTC 3-4 months  Pt should go ER for any new or worsening symptoms and contact office   A total of 40 minutes were spent on patient care,  more than 50% was spent counseling patient regarding studies' results, assessment, treatment options and care plan.    Subjective:   History:  Patient here for a follow-up visit for stroke.   She was admitted to [facility] on 7/20, neuro service saw pt.  Patient is here today with her daughter. Daughter states Patient had hematoma on her left thihg, hip, pelvis. Pt is on anticoag and asa 81 mg, she is following with her cardiologist.     History/Other:   REVIEW OF SYSTEMS:  A 10-point system was reviewed. Pertinent positives and negatives are noted as above       Current Outpatient Medications:     ondansetron (ZOFRAN) 4 mg tablet, Take 1 tablet (4 mg total) by mouth every 8 (eight) hours as needed for Nausea., Disp: , Rfl:     apixaban 2.5 MG Oral Tab, Take 1 tablet (2.5 mg total) by mouth 2 (two) times daily., Disp: , Rfl:     cyanocobalamin 1000 MCG Oral Tab, 1 tablet (1,000 mcg total) daily. Injection, Disp: , Rfl:     DULoxetine 30  MG Oral Cap DR Particles, Take 1 capsule (30 mg total) by mouth daily., Disp: , Rfl:     sacubitril-valsartan 24-26 MG Oral Tab, Take 1 tablet by mouth 2 (two) times daily., Disp: , Rfl:     famotidine 20 MG Oral Tab, Take 1 tablet (20 mg total) by mouth 2 (two) times daily., Disp: , Rfl:     empagliflozin (JARDIANCE) 10 MG Oral Tab, Take by mouth daily., Disp: , Rfl:     levothyroxine 50 MCG Oral Tab, Take 1 tablet (50 mcg total) by mouth before breakfast., Disp: , Rfl:     magnesium oxide 400 MG Oral Tab, Take 1 tablet (400 mg total) by mouth daily., Disp: , Rfl:     metoprolol succinate  MG Oral Tablet 24 Hr, Take 1 tablet (100 mg total) by mouth daily., Disp: , Rfl:     potassium chloride 10 MEQ Oral Tab CR, Take 1 tablet (10 mEq total) by mouth 2 (two) times daily. Monday, Wednesday, Friday, Disp: , Rfl:     sertraline 25 MG Oral Tab, Take 1 tablet (25 mg total) by mouth daily., Disp: , Rfl:     spironolactone 25 MG Oral Tab, Take 1 tablet (25 mg total) by mouth daily., Disp: , Rfl:     acetaminophen 500 MG Oral Tab, Take 1 tablet (500 mg total) by mouth 2 (two) times daily., Disp: , Rfl:     multivitamin Oral Chew Tab, Chew 1 tablet by mouth daily., Disp: , Rfl:     Pantoprazole Sodium 40 MG Oral Tab EC, Take 1 tablet (40 mg total) by mouth 2 (two) times daily before meals., Disp: , Rfl:     Ergocalciferol (VITAMIN D OR), Take by mouth. Taking 1000 IU once a day , Disp: , Rfl:     furosemide 40 MG Oral Tab, Take 1 tablet (40 mg total) by mouth 2 (two) times daily. Monday, Wednesday, Friday (Patient not taking: Reported on 8/9/2024), Disp: , Rfl:     atorvastatin 80 MG Oral Tab, Take 1 tablet (80 mg total) by mouth nightly. (Patient not taking: Reported on 8/9/2024), Disp: 30 tablet, Rfl: 3    Clopidogrel Bisulfate 75 MG Oral Tab, Take 1 tablet (75 mg total) by mouth daily. (Patient not taking: Reported on 8/9/2024), Disp: , Rfl:   Allergies:  Allergies   Allergen Reactions    Amoxicillin Coughing,  ITCHING and RASH    Statins OTHER (SEE COMMENTS)     Gets extremely weak    Enalapril Coughing    Latex ITCHING    Sulfa Antibiotics RASH     Past Medical History:    Calculus of kidney    CVA (cerebral vascular accident) (HCC)    Depression    Diabetes (HCC)    Esophageal reflux    High blood pressure    High cholesterol    HLD (hyperlipidemia)    HTN (hypertension)    Incontinence    Kidney stones    Neuropathy    Osteoarthritis    Stroke (HCC)    Visual impairment     Past Surgical History:   Procedure Laterality Date    Excis lumbar disk,one level      Knee replacement surgery      Pacemaker monitor      Removal gallbladder      Removal of kidney stone      Total abdom hysterectomy       Social History:  Social History     Tobacco Use    Smoking status: Never    Smokeless tobacco: Never   Substance Use Topics    Alcohol use: Never     Family History   Problem Relation Age of Onset    Hypertension Mother     Heart Disorder Mother     Diabetes Paternal Grandmother     Cancer Sister         kidney CA    Stroke Sister     Diabetes Paternal Aunt       Objective:   Neurological Examination:  /62   Pulse 69   Wt 144 lb (65.3 kg)   LMP  (LMP Unknown)   SpO2 98%   BMI 27.22 kg/m²   Mental status: A & O  Language: no aphasia  Speech: no dysarthria  CN II-XII: intact   Motor strength: limited, on wheelchair, upper extremities strength fair  Tone: normal  Coordination: normal  Sensory: symmetric  Gait: deferred    Test reviewed on 8/12/2024      Kingsley \"Avery\" MD Stefan  Neurology  [facility]  8/12/2024, 9:26 AM  CC: None Pcp

## 2024-08-13 VITALS
TEMPERATURE: 97 F | RESPIRATION RATE: 18 BRPM | OXYGEN SATURATION: 97 % | SYSTOLIC BLOOD PRESSURE: 132 MMHG | BODY MASS INDEX: 27 KG/M2 | DIASTOLIC BLOOD PRESSURE: 76 MMHG | WEIGHT: 141 LBS | HEART RATE: 79 BPM

## 2024-08-13 VITALS
OXYGEN SATURATION: 96 % | DIASTOLIC BLOOD PRESSURE: 75 MMHG | SYSTOLIC BLOOD PRESSURE: 140 MMHG | WEIGHT: 141.63 LBS | TEMPERATURE: 97 F | HEART RATE: 78 BPM | BODY MASS INDEX: 27 KG/M2 | RESPIRATION RATE: 18 BRPM

## 2024-08-13 RX ORDER — ASPIRIN 81 MG/1
81 TABLET ORAL DAILY
COMMUNITY

## 2024-08-13 NOTE — PROGRESS NOTES
Jamee Johnson   37.  APN  SNF Encounter 24 4 pm.    Met with patient at bedside stating she saw a physician today.  Patient met with neurologist, Dr. Aviles.  Patient returned from visit with no new orders.  Patient offered no new complaints except chronic resolving large hematoma left thigh; denies pain.  Patient anxious to discharge from rehab.    Therapy Update:  able to ambulate 40 ft x 2 with min assist and w/c follow.  Able to complete bilateral upper and lower extrmity exercises to improve strength and endurance.    Patient had office visit  24 w/ cardiologist.  Plavix discontinued.  Take 81 mg aspirin  Resumed Eliquis 2.5 mg 2 x daily    HPI - hospital note   88 yr old female with PMH of CVA, HTN, HLD, DM who presented to the ED after a fall.  Pt initially fell 2 days ago, and again occurred today.  She says she was walking when her legs just \"gave out\".  Denies LOC, did not strike her head.  Denied cp, sob, fever, chills, no n/v.  No dizziness or lightheadedness.  She fell onto her right side, but felt pain on her left.           Pt admitted with a fall and LLE hematoma w/ acute blood loss anemia. She was treated supportively and Eliquis was discontinued. She was given IV iron for iron deficiency. Cardiology was consulted and recommended holding Eliquis indefinitely given hematoma/anemia, and high risk for bleeding. Her admission was complicated by AMS. CTH was negative but subsequent MRI brain showed acute multiple small infarcts, likely embolic. Neurology was consulted and pt was placed on stroke protocol. Her medication regimen was optimized, she was placed on DAPT(dual antiplatelet therapy) for further stroke prevention, but Eliquis continued to be held d/t high risk for bleeding. Her clinical condition improved and  was discharged to [facility]. Future consideration of Watchman procedure as pt is high risk for stroke, but also high risk for bleeding on AC.     Past Medical History:     Calculus of kidney    CVA (cerebral vascular accident) (HCC)    Depression    Diabetes (HCC)    Esophageal reflux    High blood pressure    High cholesterol    HLD (hyperlipidemia)    HTN (hypertension)    Incontinence    Kidney stones    Neuropathy    Osteoarthritis    Stroke (HCC)    Visual impairment     Past Surgical History:   Procedure Laterality Date    Excis lumbar disk,one level      Knee replacement surgery      Pacemaker monitor      Removal gallbladder      Removal of kidney stone      Total abdom hysterectomy       Family History   Problem Relation Age of Onset    Hypertension Mother     Heart Disorder Mother     Diabetes Paternal Grandmother     Cancer Sister         kidney CA    Stroke Sister     Diabetes Paternal Aunt      Social History     Socioeconomic History    Marital status:    Tobacco Use    Smoking status: Never    Smokeless tobacco: Never   Vaping Use    Vaping status: Never Used   Substance and Sexual Activity    Alcohol use: Never    Drug use: Never   Other Topics Concern    Caffeine Concern Yes     Comment: occ    Exercise Yes     Comment: PT     Social Determinants of Health     Financial Resource Strain: Low Risk  (2/7/2022)    [facility]    Financial Resource Strain     In the past year, have you or any family members you live with been unable to get any of the following when it was really needed? Check all that apply.: None   Food Insecurity: No Food Insecurity (7/14/2024)    Food Insecurity     Food Insecurity: Never true   Transportation Needs: No Transportation Needs (7/14/2024)    Transportation Needs     Lack of Transportation: No   Physical Activity: Inactive (2/7/2022)    [facility]    Exercise Vital Sign     Days of Exercise per Week: 0 days     Minutes of Exercise per Session: 0 min   Stress: Low Risk  (2/7/2022)    Received from [facility]    Stress     Stress is when someone feels tense, nervous, anxious, or can't sleep at night because their mind is troubled. How stressed are you? : A little bit   Social Connections: Unknown (2/7/2022)    Received from [facility], [facility]    Social Connections     How often do you see or talk to people that you care about and feel close to? (For example: talking to friends on the phone, visiting friends or family, going to Christianity or club meetings): 5 or more times a week   Housing Stability: Low Risk  (7/14/2024)    Housing Stability     Housing Instability: No     Immunization History   Administered Date(s) Administered    Covid-19 Vaccine Moderna 100 mcg/0.5 ml 03/09/2021    Covid-19 Vaccine Moderna Bivalent 50mcg/0.5mL 12+ years 10/17/2022    Fluzone Vaccine Medicare () 12/27/2019      Allergies   Allergen Reactions    Amoxicillin Coughing, ITCHING and RASH    Statins OTHER (SEE COMMENTS)     Gets extremely weak    Enalapril Coughing    Latex ITCHING    Sulfa Antibiotics RASH     CODE STATUS:  Full Code    ADVANCED CARE PLANNING TEAM: Will need updates with family.    CURRENT MEDICATIONS - reviewed and updated on [facility]  Tylenol 500 mg 2 x day  Apixaban (eliquis) 2.5 mg bid RESUMED per cardiologist 8/6/24  ASA 81 mg daily  CONTINUE per cardiologist 8/6/24  Plavix 75 mg daily DISCONTINUED per cardiologist 8/6/24  Cyanocobalamin 1000 daily  Duloxetine 30 mg daily  Jardiance 10 mg daily  Vitamin D 1000 daily  Famotidine 20 mg 2 x day  Lasix 40 mg  M/W/F  Levothyroxine 50 mcg qam  Magnes. Oxide 400 mg daily  Metoprolol succ  mg daily  MVI daily  Zofran 4 mg  2 x day  Pantoprazole 40 mg 2 x day  Potassium 10 meq 2 x day  Sacubitril valsartan 24-26 one 2 x day  Sertraline 25 mg daily  Spironolactone 25 mg daily     SUBJECTIVE : denies chest pain, SOB, palpitations, cough. . Denies n/v/d/c. Denies chills, fevers. C/O minimal left thigh/hip pain.     PHYSICAL EXAM:  140/75.  P 78. RR 18. POx 96%. T 97.3. wgt 141.6#  GENERAL HEALTH:well developed, well nourished, in no apparent distress   LINES, TUBES, DRAINS:  none  SKIN: warm, dry  WOUND: see wound assessment per staff.    EYES: Pupils round and equal; no jaundice. conjunctiva normal  HENT: no nasal drainage, mucous membranes pink.   NECK: supple  BREAST: deferred exam   RESPIRATORY: lungs clear  CARDIOVASCULAR:  rate 78  ABDOMEN:  BS+, soft, nontender, no guarding. Good appetite-weight stable.  :Deferred  MUSCULOSKELETAL: weakness upper and lower extremities; left > R  EXTREMITIES/VASCULAR: trace edema.  L thigh, L hip hematoma seen by cardiologist 8/6.  NEUROLOGIC:follows commands  PSYCHIATRIC: alert and oriented x 3; affect appropriate  pleasant, cooperative.  Anxious to discharge.    Patient has 1 daughter; Lo Weinstein   [phone]. Emergency Contact # 1.  Richard Johnson [phone] - Son lives in [place].  Emergency contact #2.     Lab Results  8/6/24  Wbc  6.9. HGB 12.3 from 10.4.  Plts 338.  Na 141  K 4.0.  Cl 102. CO2 28. BUN 16. Creat 1.16.  Gfr 44    A/P    Acute multiple small infarcts; likely embolic.  CTA Head and Neck, no significant stenosis.  Aspirin 81 mg daily -  continue per cardiologist 8/6/24  Plavix 75 mg daily discontinued per cardiologist 8/6.  Resumed Eliquis 2.5 mg 2 x day 8/6/24 per cardiologist  PT/OT  Trend labs    Left hip/leg hematoma -  firm, large.  Monitor for bleeding   HGB up to 12.3.  Eliquis resumed 2.5mg 2 x day per cardiologist on 8/6/24.  High fall risk  - Trend labs     chronic anemia, likely due to blood loss from hematoma  Weekly to bi-weekly CBC's. HGB 12.3 from 10.4.    HTN:   Sacubitrin Valsartan 24/26 two x daily  Lasix 40 mg q M/W/F  Potassium 20 meq q M/W/F  Spironolactone 25 mg daily    Pulmonary nodules  -  f/u with pulmonologist    Chronic A-Fib  Metoprolol  mg daily  Aspirin 81 mg daily  F/U with cardiologist-possible candidate for watchman  F/U w/ cardiologist in 6  months.    DM:  Jardiance 10 mg daily.  A1C 6.3    Depression  Cymbalta 30 mg daily  Sertraline 25 mg daily  Psychiatrist available at [facility]    Hypothyroidism  -  levothyroxine 50 mg daily    GERD:  famotidine 20 mg 2 x day    Weakness/deconditioning - L > R  PT/OT    Dispo:  Tentative discharge to [facility]    *Follow-Up with PCP within 1-2 weeks following [facility] discharge.   *Follow-Up with cardiologist in 6 months.     Future Appointments   Date Time Provider Department Center   10/18/2024  9:15 AM Stephani Puckett DO [facility]      Greater than 35 minutes spent w/ patient and staff, including but not limited to/ reviewing present status, needs, abilities with disciplines, reviewing medical records, vital signs, labs, completing med rec and entering orders to establish plan of care in [facility].       Note to patient: The 21st Century Cures Act makes medical notes like these available to patients in the interest of transparency. However, this is a medical document intended as peer to peer communication. It is written in medical language and may contain abbreviations or verbiage that are unfamiliar. It may appear blunt or direct. Medical documents are intended to carry relevant information, facts as evident, and the clinical opinion of the practitioner who signs the document.    PATRICIA Hale (Daya)  08/9/24     4 pm  [facility] Post Acute Care Team

## 2024-08-13 NOTE — PROGRESS NOTES
Jamee Johnson.   37.  APN Encounter 24.  11:30 am    Met with patient at bedside. No complaints except minimal pain left hip and thigh.  Patient had office visit yesterday with cardiologist on 24.  Plavix discontinued.  Take 81 mg aspirin  Resumed Eliquis 2.5 mg 2 x daily    Therapy Update:  ambulating 40 ft with min assist and close w/c follow w/verbal cues.    Chief complaint:  L hip and L thigh hematoma, weakness    HPI - hospital note   88 yr old female with PMH of CVA, HTN, HLD, DM who presented to the ED after a fall.  Pt initially fell 2 days ago, and again occurred today.  She says she was walking when her legs just \"gave out\".  Denies LOC, did not strike her head.  Denied cp, sob, fever, chills, no n/v.  No dizziness or lightheadedness.  She fell onto her right side, but felt pain on her left.           Pt admitted with a fall and LLE hematoma w/ acute blood loss anemia. She was treated supportively and Eliquis was discontinued. She was given IV iron for iron deficiency. Cardiology was consulted and recommended holding Eliquis indefinitely given hematoma/anemia, and high risk for bleeding. Her admission was complicated by AMS. CTH was negative but subsequent MRI brain showed acute multiple small infarcts, likely embolic. Neurology was consulted and pt was placed on stroke protocol. Her medication regimen was optimized, she was placed on DAPT(dual antiplatelet therapy) for further stroke prevention, but Eliquis continued to be held d/t high risk for bleeding. Her clinical condition improved and  was discharged to [facility]. Future consideration of Watchman procedure as pt is high risk for stroke, but also high risk for bleeding on AC.     Past Medical History:    Calculus of kidney    CVA (cerebral vascular accident) (HCC)    Depression    Diabetes (HCC)    Esophageal reflux    High blood pressure    High cholesterol    HLD (hyperlipidemia)    HTN (hypertension)    Incontinence    Kidney stones     Neuropathy    Osteoarthritis    Stroke (HCC)    Visual impairment     Past Surgical History:   Procedure Laterality Date    Excis lumbar disk,one level      Knee replacement surgery      Pacemaker monitor      Removal gallbladder      Removal of kidney stone      Total abdom hysterectomy       Family History   Problem Relation Age of Onset    Hypertension Mother     Heart Disorder Mother     Diabetes Paternal Grandmother     Cancer Sister         kidney CA    Stroke Sister     Diabetes Paternal Aunt      Social History     Socioeconomic History    Marital status:    Tobacco Use    Smoking status: Never    Smokeless tobacco: Never   Vaping Use    Vaping status: Never Used   Substance and Sexual Activity    Alcohol use: Never    Drug use: Never   Other Topics Concern    Caffeine Concern Yes     Comment: occ    Exercise Yes     Comment: PT     Social Determinants of Health     Financial Resource Strain: Low Risk  (2/7/2022)    Received from [facility], [facility]    Financial Resource Strain     In the past year, have you or any family members you live with been unable to get any of the following when it was really needed? Check all that apply.: None   Food Insecurity: No Food Insecurity (7/14/2024)    Food Insecurity     Food Insecurity: Never true   Transportation Needs: No Transportation Needs (7/14/2024)    Transportation Needs     Lack of Transportation: No   Physical Activity: Inactive (2/7/2022)    Received from "[facility]    Exercise Vital Sign     Days of Exercise per Week: 0 days     Minutes of Exercise per Session: 0 min   Stress: Low Risk  (2/7/2022)    Received from [facility], [facility]    Stress     Stress is when someone feels tense, nervous, anxious, or can't sleep at night because their mind is troubled. How stressed are you? : A little bit   Social Connections: Unknown (2/7/2022)    [facility]    Social Connections     How often do you see or talk to people that you care about and feel close to? (For example: talking to friends on the phone, visiting friends or family, going to Spiritism or club meetings): 5 or more times a week   Housing Stability: Low Risk  (7/14/2024)    Housing Stability     Housing Instability: No     Immunization History   Administered Date(s) Administered    Covid-19 Vaccine Moderna 100 mcg/0.5 ml 03/09/2021    Covid-19 Vaccine Moderna Bivalent 50mcg/0.5mL 12+ years 10/17/2022    Fluzone Vaccine Medicare () 12/27/2019      Allergies   Allergen Reactions    Amoxicillin Coughing, ITCHING and RASH    Statins OTHER (SEE COMMENTS)     Gets extremely weak    Enalapril Coughing    Latex ITCHING    Sulfa Antibiotics RASH     CODE STATUS:  Full Code    ADVANCED CARE PLANNING TEAM: Will need updates with family.    CURRENT MEDICATIONS - reviewed and updated on [facility]  Tylenol 500 mg 2 x day  Apixaban (eliquis) 2.5 mg bid RESUMED per cardiologist 8/6/24  ASA 81 mg daily  CONTINUE  Plavix 75 mg daily DISCONTINUED per cardiologist 8/6/24  Cyanocobalamin 1000 daily  Duloxetine 30 mg daily  Jardiance 10 mg daily  Vitamin D 1000 daily  Famotidine 20 mg 2 x day  Lasix 40 mg  M/W/F  Levothyroxine 50 mcg qam  Magnes. Oxide 400 mg daily  Metoprolol succ  mg daily  MVI daily  Zofran 4 mg  2 x day  Pantoprazole 40 mg 2 x day  Potassium 10 meq 2 x day  Sacubitril valsartan 24-26 one 2 x day  Sertraline 25 mg daily  Spironolactone 25 mg daily     SUBJECTIVE : denies chest pain, SOB, palpitations, cough. . Denies n/v/d/c. Denies chills, fevers.    PHYSICAL EXAM:  132/76. P 79. RR 18. POx 97%. T 97.2. wgt 141#  GENERAL HEALTH:well developed, well nourished, in no apparent distress   LINES, TUBES, DRAINS:  none  SKIN: warm, dry  WOUND: see wound assessment per staff.    EYES: Pupils round and equal; no jaundice. conjunctiva normal  HENT: no nasal drainage,  mucous membranes pink.   NECK: supple  BREAST: deferred exam   RESPIRATORY: lungs clear  CARDIOVASCULAR:  rate 79  ABDOMEN:  BS+, soft, nontender, no guarding. Good appetite.  :Deferred  MUSCULOSKELETAL: weakness upper and lower extremities; left > R  EXTREMITIES/VASCULAR: trace to 1+ improving. L thigh, L hip hematoma; noted by cardiologist 8/6.  NEUROLOGIC:follows commands  PSYCHIATRIC: alert and oriented x 3; affect appropriate  pleasant, cooperative.  Anxious to discharge.    Patient has 1 daughter; Lo Weinstein   [phone]. Emergency Contact # 1.  Richard Johnson [phone] - Son lives in [place].  Emergency contact #2.     Lab Results  7/6/24  Wbc  6.9. HGB 12.3 from 10.4.  Plts 338.  Na 141  K 4.0.  Cl 102. CO2 28. BUN 16. Creat 1.16.  Gfr 44    A/P    Acute multiple small infarcts; likely embolic.  CTA Head and Neck, no significant stenosis.  Aspirin 81 mg daily -  continue  Plavix 75 mg daily discontinued per cardiologist 8/6.  Resumed Eliquis 2.5 mg 2 x day 8/6/24  PT/OT  Trend labs    Left hip/leg hematoma - large, firm, discoloration.  Monitor for bleeding   HGB up to 12.3.  Eliquis resumed 2.5mg 2 x day per cardiologist on 8/6/24.  High fall risk  - Trend labs     chronic anemia, likely due to blood loss from hematoma  Weekly to bi-weekly CBC's. HGB 12.3 from 10.4.    HTN:   Sacubitrin Valsartan 24/26 two x daily  Lasix 40 mg q M/W/F  Potassium 20 meq q M/W/F  Spironolactone 25 mg daily    Pulmonary nodules  -  f/u with pulmonologist    Chronic A-Fib  Metoprolol  mg daily  Aspirin 81 mg daily  F/U with cardiologist-possible candidate for watchman  F/U in 6 months.    DM:  Jardiance 10 mg daily.  A1C 6.3    Depression  Cymbalta 30 mg daily  Sertraline 25 mg daily  Psychiatrist available at [facility]    Hypothyroidism  -  levothyroxine 50 mg daily    GERD:  famotidine 20 mg 2 x day    Weakness/deconditioning - L > R  PT/OT    Dispo:  tentative discharge to [facility]    *Follow-Up with PCP  within 1-2 weeks following [facility] discharge.   *Follow-Up with cardiologist in 6 months.     Future Appointments   Date Time Provider Department Center   10/18/2024  9:15 AM Stephani Puckett DO [facility]      Greater than 35 minutes spent w/ patient and staff, including but not limited to/ reviewing present status, needs, abilities with disciplines, reviewing medical records, vital signs, labs, completing med rec and entering orders to establish plan of care in [facility].       Note to patient: The 21st Century Cures Act makes medical notes like these available to patients in the interest of transparency. However, this is a medical document intended as peer to peer communication. It is written in medical language and may contain abbreviations or verbiage that are unfamiliar. It may appear blunt or direct. Medical documents are intended to carry relevant information, facts as evident, and the clinical opinion of the practitioner who signs the document.    Catrachita Armstrong) PATRICIA Vega  08/7/24    1130 am  [facility] Post Acute Care Team

## 2024-08-15 ENCOUNTER — SNF VISIT (OUTPATIENT)
Dept: INTERNAL MEDICINE CLINIC | Age: 89
End: 2024-08-15
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
## 2024-08-15 DIAGNOSIS — D64.9 CHRONIC ANEMIA: ICD-10-CM

## 2024-08-15 DIAGNOSIS — R53.81 PHYSICAL DECONDITIONING: ICD-10-CM

## 2024-08-15 DIAGNOSIS — S80.12XS HEMATOMA OF LEG, LEFT, SEQUELA: ICD-10-CM

## 2024-08-15 DIAGNOSIS — I74.9 EMBOLIC INFARCTION (HCC): ICD-10-CM

## 2024-08-15 DIAGNOSIS — R53.1 GENERALIZED WEAKNESS: ICD-10-CM

## 2024-08-15 PROCEDURE — 99308 SBSQ NF CARE LOW MDM 20: CPT | Performed by: NURSE PRACTITIONER

## 2024-08-16 VITALS
HEART RATE: 75 BPM | SYSTOLIC BLOOD PRESSURE: 119 MMHG | DIASTOLIC BLOOD PRESSURE: 68 MMHG | WEIGHT: 148 LBS | BODY MASS INDEX: 28 KG/M2 | TEMPERATURE: 98 F | RESPIRATION RATE: 18 BRPM | OXYGEN SATURATION: 96 %

## 2024-08-16 RX ORDER — NYSTATIN 100000 [USP'U]/G
1 POWDER TOPICAL 3 TIMES DAILY
COMMUNITY

## 2024-08-16 NOTE — PROGRESS NOTES
Jamee Johnson.   37. APN Encounter 8/15/24. 7 pm    Met with patient during therapy and after dinner.  [facility]  Patient is progressing well in therapy.      Therapy Update:  Observed ambulating with walker and  feet x 3.  Unable to ambulate without walker noting poor safety awareness.    Observed left leg/hip hematoma during therapy with assistance to stand.  Ecchymosis resolved but still has large firm hematoma; no pain.  Warm packs tid for comfort.    Recent office visit  24 w/ cardiologist.  Plavix discontinued.  Take 81 mg aspirin  Resumed Eliquis 2.5 mg 2 x daily    HPI - hospital note   88 yr old female with PMH of CVA, HTN, HLD, DM who presented to the ED after a fall.  Pt initially fell 2 days ago, and again occurred today.  She says she was walking when her legs just \"gave out\".  Denies LOC, did not strike her head.  Denied cp, sob, fever, chills, no n/v.  No dizziness or lightheadedness.  She fell onto her right side, but felt pain on her left.           Pt admitted with a fall and LLE hematoma w/ acute blood loss anemia. She was treated supportively and Eliquis was discontinued. She was given IV iron for iron deficiency. Cardiology was consulted and recommended holding Eliquis indefinitely given hematoma/anemia, and high risk for bleeding. Her admission was complicated by AMS. CTH was negative but subsequent MRI brain showed acute multiple small infarcts, likely embolic. Neurology was consulted and pt was placed on stroke protocol. Her medication regimen was optimized, she was placed on DAPT(dual antiplatelet therapy) for further stroke prevention, but Eliquis continued to be held d/t high risk for bleeding. Her clinical condition improved and  was discharged to [facility]. Future consideration of Watchman procedure as pt is high risk for stroke, but also high risk for bleeding on AC.     Past Medical History:    Calculus of kidney     CVA (cerebral vascular accident) (HCC)    Depression    Diabetes (HCC)    Esophageal reflux    High blood pressure    High cholesterol    HLD (hyperlipidemia)    HTN (hypertension)    Incontinence    Kidney stones    Neuropathy    Osteoarthritis    Stroke (HCC)    Visual impairment     Past Surgical History:   Procedure Laterality Date    Excis lumbar disk,one level      Knee replacement surgery      Pacemaker monitor      Removal gallbladder      Removal of kidney stone      Total abdom hysterectomy       Family History   Problem Relation Age of Onset    Hypertension Mother     Heart Disorder Mother     Diabetes Paternal Grandmother     Cancer Sister         kidney CA    Stroke Sister     Diabetes Paternal Aunt      Social History     Socioeconomic History    Marital status:    Tobacco Use    Smoking status: Never    Smokeless tobacco: Never   Vaping Use    Vaping status: Never Used   Substance and Sexual Activity    Alcohol use: Never    Drug use: Never   Other Topics Concern    Caffeine Concern Yes     Comment: occ    Exercise Yes     Comment: PT     Social Determinants of Health     Financial Resource Strain: Low Risk  (2/7/2022)    [facility]    Financial Resource Strain     In the past year, have you or any family members you live with been unable to get any of the following when it was really needed? Check all that apply.: None   Food Insecurity: No Food Insecurity (7/14/2024)    Food Insecurity     Food Insecurity: Never true   Transportation Needs: No Transportation Needs (7/14/2024)    Transportation Needs     Lack of Transportation: No   Physical Activity: Inactive (2/7/2022)    [facility]    Exercise Vital Sign     Days of Exercise per Week: 0 days     Minutes of Exercise per Session: 0 min   Stress: Low Risk  (2/7/2022)    [facility]    Stress      Stress is when someone feels tense, nervous, anxious, or can't sleep at night because their mind is troubled. How stressed are you? : A little bit   Social Connections: Unknown (2/7/2022)    [facility]    Social Connections     How often do you see or talk to people that you care about and feel close to? (For example: talking to friends on the phone, visiting friends or family, going to Muslim or club meetings): 5 or more times a week   Housing Stability: Low Risk  (7/14/2024)    Housing Stability     Housing Instability: No     Immunization History   Administered Date(s) Administered    Covid-19 Vaccine Moderna 100 mcg/0.5 ml 03/09/2021    Covid-19 Vaccine Moderna Bivalent 50mcg/0.5mL 12+ years 10/17/2022    Fluzone Vaccine Medicare () 12/27/2019      Allergies   Allergen Reactions    Amoxicillin Coughing, ITCHING and RASH    Statins OTHER (SEE COMMENTS)     Gets extremely weak    Enalapril Coughing    Latex ITCHING    Sulfa Antibiotics RASH     CODE STATUS:  Full Code    ADVANCED CARE PLANNING TEAM: Will need updates with family.    CURRENT MEDICATIONS - reviewed and updated on [facility]  Tylenol 500 mg 2 x day  Apixaban (eliquis) 2.5 mg bid RESUMED per cardiologist 8/6/24  ASA 81 mg daily  CONTINUE per cardiologist 8/6/24  Plavix 75 mg daily DISCONTINUED per cardiologist 8/6/24  Cyanocobalamin 1000 daily  Duloxetine 30 mg daily  Jardiance 10 mg daily  Vitamin D 1000 daily  Famotidine 20 mg 2 x day  Lasix 40 mg  M/W/F  Levothyroxine 50 mcg qam  Magnes. Oxide 400 mg daily  Metoprolol succ  mg daily  MVI daily  Zofran 4 mg  2 x day  Pantoprazole 40 mg 2 x day  Potassium 10 meq 2 x day  Sacubitril valsartan 24-26 one 2 x day  Sertraline 25 mg daily  Spironolactone 25 mg daily     SUBJECTIVE : denies chest pain, SOB, palpitations, cough. . Denies n/v/d/c. Denies chills, fevers. C/O feeling firmness of left thigh and hip/hematoma.  Denies pain left thigh      PHYSICAL EXAM:  119/68.  P75/ RR 18.  POx 96%. T 97.5 wgt 148# from 141.6#  GENERAL HEALTH:well developed, well nourished, in no apparent distress   LINES, TUBES, DRAINS:  none  SKIN: warm, dry  WOUND: see wound assessment per staff.    EYES: Pupils round and equal; no jaundice. conjunctiva normal  HENT: no nasal drainage, mucous membranes pink.   NECK: supple  BREAST: deferred exam   RESPIRATORY: lungs clear  CARDIOVASCULAR:  rate 75  ABDOMEN:  BS+, soft, nontender, no guarding. Good appetite-weight stable.  :Deferred  MUSCULOSKELETAL: weakness upper and lower extremities; left > R  EXTREMITIES/VASCULAR: trace edema.  L thigh, L hip hematoma seen by cardiologist 8/6.  NEUROLOGIC:follows commands  PSYCHIATRIC: alert and oriented x 3; affect appropriate  pleasant, cooperative.  Anxious to discharge.    Patient has 1 daughter; oL Weinstein   [phone]. Emergency Contact # 1.  Richard Johnson [phone] - Son lives in [place].  Emergency contact #2.     Lab Results  8/13/24  Wbc  8.3.  HGB 13.2 from 10.4.  Plts 263.   Na 142  K 4.2.  Cl 105. CO2 27. BUN 19. Creat 1.19.  Gfr 43    A/P    Acute multiple small infarcts; likely embolic.  CTA Head and Neck, no significant stenosis.  Aspirin 81 mg daily -  continue per cardiologist 8/6/24  Plavix 75 mg daily discontinued per cardiologist 8/6.  Resumed Eliquis 2.5 mg 2 x day 8/6/24 per cardiologist  PT/OT  F/U with cardiologist and PCP post discharge    Left hip/leg hematoma -  firm, large, ecchymosis resolving.  Monitor for bleeding   HGB up to 13.2 from 12.3; 10.4.   Eliquis resumed 2.5mg 2 x day per cardiologist on 8/6/24.  High fall risk   F/U PCP and cardiologist.     chronic anemia, likely due to blood loss from hematoma  Weekly to bi-weekly CBC's. HGB 13.2 from 12.3; 10.4.    HTN:   Sacubitrin Valsartan 24/26 two x daily  Lasix 40 mg q M/W/F  Potassium 20 meq q M/W/F  Spironolactone 25 mg daily    Pulmonary nodules  -  f/u with pulmonologist    Chronic  A-Fib  Metoprolol  mg daily  Aspirin 81 mg daily  F/U with cardiologist-possible candidate for watchman    DM:  Jardiance 10 mg daily.  A1C 6.3    Depression  Cymbalta 30 mg daily  Sertraline 25 mg daily    Hypothyroidism  -  levothyroxine 50 mg daily    GERD:  famotidine 20 mg 2 x day    Weakness/deconditioning - L > R  Home with home health services.    Dispo:  Tentative discharge to [facility] in [place] 8/17/24.    *Follow-Up with PCP within 1-2 weeks following [facility] discharge.   *Follow-Up with cardiologist in 6 months or as needed.      Future Appointments   Date Time Provider Department Center   10/18/2024  9:15 AM Stephani Puckett, DO [facility]      Greater than 35 minutes spent w/ patient and staff, including but not limited to/ reviewing present status, needs, abilities with disciplines, reviewing medical records, vital signs, labs, completing med rec and entering orders to establish plan of care in [facility].       Note to patient: The 21st Century Cures Act makes medical notes like these available to patients in the interest of transparency. However, this is a medical document intended as peer to peer communication. It is written in medical language and may contain abbreviations or verbiage that are unfamiliar. It may appear blunt or direct. Medical documents are intended to carry relevant information, facts as evident, and the clinical opinion of the practitioner who signs the document.    Catrachita Vega (Daya), PATRICIA  08/15/24.  7 pm   [facility] Post Acute Care Team

## 2024-10-15 ENCOUNTER — TELEPHONE (OUTPATIENT)
Dept: CARDIOLOGY CLINIC | Facility: HOSPITAL | Age: 89
End: 2024-10-15

## 2024-10-15 NOTE — TELEPHONE ENCOUNTER
Watchman referral, Dr. Verdugo, would like to hold off on the procedure for now. Will contact [facility] if wish to move forward in the future.

## 2024-12-06 ENCOUNTER — HOSPITAL ENCOUNTER (EMERGENCY)
Facility: HOSPITAL | Age: 89
Discharge: HOME | End: 2024-12-07
Attending: STUDENT IN AN ORGANIZED HEALTH CARE EDUCATION/TRAINING PROGRAM
Payer: MEDICARE

## 2024-12-06 ENCOUNTER — APPOINTMENT (OUTPATIENT)
Dept: CT IMAGING | Facility: HOSPITAL | Age: 89
End: 2024-12-06
Attending: STUDENT IN AN ORGANIZED HEALTH CARE EDUCATION/TRAINING PROGRAM
Payer: MEDICARE

## 2024-12-06 VITALS
RESPIRATION RATE: 23 BRPM | SYSTOLIC BLOOD PRESSURE: 156 MMHG | OXYGEN SATURATION: 100 % | DIASTOLIC BLOOD PRESSURE: 90 MMHG | HEART RATE: 71 BPM | TEMPERATURE: 99 F

## 2024-12-06 DIAGNOSIS — S09.90XA INJURY OF HEAD, INITIAL ENCOUNTER: Primary | ICD-10-CM

## 2024-12-06 DIAGNOSIS — S01.312A LACERATION OF AURICLE OF LEFT EAR, INITIAL ENCOUNTER: ICD-10-CM
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
## 2024-12-06 PROCEDURE — 99284 EMERGENCY DEPT VISIT MOD MDM: CPT

## 2024-12-06 PROCEDURE — 12011 RPR F/E/E/N/L/M 2.5 CM/<: CPT

## 2024-12-06 PROCEDURE — 70450 CT HEAD/BRAIN W/O DYE: CPT | Performed by: STUDENT IN AN ORGANIZED HEALTH CARE EDUCATION/TRAINING PROGRAM

## 2024-12-07 NOTE — ED PROVIDER NOTES
Patient Seen in: [facility] Emergency Department      History     Chief Complaint   Patient presents with    Fall     Stated Complaint:     Subjective:   HPI    89-year-old female who presents to the emergency department following a fall.  Patient is on anticoagulation.  She was in the process of trying to turn off the [place] lights on the [employer] tree when she lifted her foot to push on the light button she lost her balance and was unable to grab onto her walker to steady herself and fell to the ground.  Fall resulted in a cut to her left ear.  Patient denies any weakness, any neck, back, chest, abdomen or lower extremity injury.  She did feel some pain to the back of her left shoulder but is able to move it fully.    Objective:   Past Medical History:    Atrial fibrillation (HCC)    Calculus of kidney    CVA (cerebral vascular accident) (HCC)    Depression    Diabetes (HCC)    Esophageal reflux    High blood pressure    High cholesterol    HLD (hyperlipidemia)    HTN (hypertension)    Incontinence    Kidney stones    Neuropathy    Osteoarthritis    Stroke (HCC)    Visual impairment              Past Surgical History:   Procedure Laterality Date    Excis lumbar disk,one level      Knee replacement surgery      Pacemaker monitor      Removal gallbladder      Removal of kidney stone      Total abdom hysterectomy                  No pertinent social history.            Review of Systems    Positive for stated complaint:   Other systems are as noted in HPI.  Constitutional and vital signs reviewed.      All other systems reviewed and negative except as noted above.    Physical Exam     ED Triage Vitals   BP 12/06/24 2229 (!) 167/97   Pulse 12/06/24 2228 91   Resp 12/06/24 2228 17   Temp 12/06/24 2228 98.5 °F (36.9 °C)   Temp src 12/06/24 2228 Oral   SpO2 12/06/24 2228 98 %   O2 Device 12/06/24 2228 None (Room air)       Current:/90   Pulse 71   Temp 98.5 °F (36.9 °C) (Oral)   Resp 23   LMP  (LMP  Unknown)   SpO2 100%         Physical Exam    Constitutional: The patient is oriented to person, place, and time, appears well-developed and well-nourished.   HENT:   Head: Normocephalic.   Right Ear: External ear normal. No hemotympanum.   Left Ear: Call with a very superficial V shape laceration. no hemotympanum.   Nose: Nose normal.   Mouth/Throat: Oropharynx is clear and moist.   Eyes: Conjunctivae and EOM are normal. Pupils are equal, round, and reactive to light.   Neck: Normal range of motion and full passive range of motion without pain. Neck supple.   Cardiovascular: Normal rate, regular rhythm, normal heart sounds and intact distal pulses.    Pulmonary/Chest: Effort normal and breath sounds normal, no tenderness.   Abdominal: Soft. Bowel sounds are normal. There is no tenderness.   Musculoskeletal: Normal range of motion.   Neurological: alert and oriented to person, place, and time, normal strength and normal reflexes, No cranial nerve deficit or sensory deficit. GCS eye subscore is 4. GCS verbal subscore is 5. GCS motor subscore is 6.   Skin: Skin is warm and dry.          ED Course/ My interpretations:   Labs Reviewed - No data to display      My independent imaging interpretation:      Procedures    CT BRAIN OR HEAD (CPT=70450)      No intracranial bleed  All available radiology reports for this visit reviewed.      Medications given:  No orders of the defined types were placed in this encounter.                        MDM      Patient was evaluated thoroughly in the ER for a head injury.  Based on patient's history, risk factors and physical exam, risks and benefits of at home observation vs. CT scan of the head were discussed with the patient. At this time it is our agreement that the patient would benefit from CT scan of the brain.     Wound was irrigated extensively and was repaired. Patients tetanus is up to date.        Procedure:  Laceration repair  The wound was irrigated with normal saline.  The wound was prepped and draped in the normal sterile fashion.  The wound was explored for foreign bodies and none were found. The edges were reapproximated using strip and liquid adhesive.  Bleeding was well-controlled. The patient tolerated the procedure well.  Location of the wound left auricle.  Length of the wound approximately 1 cm.    CT of the brain was obtained and showed no skull fracture, no intracranial abnormality.  Patient's neurologic exam remained within normal limits. And they can be safely discharged home.    Patient will recheck with physician time as instructed. They were given instructions in regards to reasons to return to emergency department should they worsen in any way, they demonstrated understanding and are comfortable with the plan.        Disposition and Plan     Clinical Impression:  1. Injury of head, initial encounter    2. Fall, initial encounter    3. Laceration of auricle of left ear, initial encounter         Disposition:  Discharge  12/6/2024 11:53 pm    Follow-up:  Jurgen Mccurdy MD  [address] [place] [address]-  [facility] [address]  [phone]    Follow up in 3 day(s)  For recheck          Medications Prescribed:  Current Discharge Medication List              Supplementary Documentation:                                                       Documentation created with the aid of Dragon voice recognition software.  Although efforts were made to ensure the accuracy of the note, some inaccuracies may persist.

## 2024-12-07 NOTE — ED INITIAL ASSESSMENT (HPI)
Pt here via EMS with c/o mechanical fall while trying to reach down to turn off her [employer] tree. Pt states she lost her balance when she tried reaching for her walker. Denies LOC, did hit her head but does not remember on what. Small scrape to Left ear noted. No other visible injury. No c/o pain anywhere else. Pt on Eliquis

## 2025-01-16 ENCOUNTER — TELEPHONE (OUTPATIENT)
Dept: INTERNAL MEDICINE | Age: 89
End: 2025-01-16

## 2025-01-27 ENCOUNTER — APPOINTMENT (OUTPATIENT)
Dept: CT IMAGING | Facility: HOSPITAL | Age: 89
End: 2025-01-27
Attending: EMERGENCY MEDICINE
Payer: MEDICARE

## 2025-01-27 ENCOUNTER — HOSPITAL ENCOUNTER (EMERGENCY)
Facility: HOSPITAL | Age: 89
Discharge: HOME | End: 2025-01-27
Attending: EMERGENCY MEDICINE
Payer: MEDICARE

## 2025-01-27 VITALS
OXYGEN SATURATION: 96 % | DIASTOLIC BLOOD PRESSURE: 73 MMHG | RESPIRATION RATE: 16 BRPM | HEART RATE: 72 BPM | SYSTOLIC BLOOD PRESSURE: 112 MMHG | TEMPERATURE: 98 F

## 2025-01-27 DIAGNOSIS — S09.90XA CLOSED HEAD INJURY, INITIAL ENCOUNTER: Primary | ICD-10-CM

## 2025-01-27 DIAGNOSIS — S61.512A TEAR OF SKIN OF LEFT WRIST, INITIAL ENCOUNTER: ICD-10-CM

## 2025-01-27 DIAGNOSIS — S00.83XA CONTUSION OF FACE, INITIAL ENCOUNTER: ICD-10-CM

## 2025-01-27 PROCEDURE — 99284 EMERGENCY DEPT VISIT MOD MDM: CPT

## 2025-01-27 PROCEDURE — 70486 CT MAXILLOFACIAL W/O DYE: CPT | Performed by: EMERGENCY MEDICINE

## 2025-01-27 PROCEDURE — 70450 CT HEAD/BRAIN W/O DYE: CPT | Performed by: EMERGENCY MEDICINE

## 2025-01-27 PROCEDURE — 76377 3D RENDER W/INTRP POSTPROCES: CPT | Performed by: EMERGENCY MEDICINE

## 2025-01-27 NOTE — ED INITIAL ASSESSMENT (HPI)
Patient from [facility], sent for eval for fall on thinners. Was attempting to turn on lamp, fell out of recliner into night stand. Hit face on left cheek. Left wrist skin tear. Unwitnessed. Patient denies LOC. On ASA and Eliquis. Pacemaker

## 2025-01-28 NOTE — ED PROVIDER NOTES
Patient Seen in: [facility] Emergency Department      History     Chief Complaint   Patient presents with    Fall     Stated Complaint: fall on thinners    Subjective:   HPI      Patient here for mechanical fall.  She was reaching to turn on a lamp and she fell out of her recliner.  She did strike the leg left side of her face and sustained abrasions and skin tears on her left wrist.  She denies any neck or back pain.  No chest or abdominal pain.  No LOC.  Anticoagulated on Eliquis.    Objective:     Past Medical History:    Atrial fibrillation (HCC)    Calculus of kidney    CVA (cerebral vascular accident) (HCC)    Depression    Diabetes (HCC)    Esophageal reflux    High blood pressure    High cholesterol    HLD (hyperlipidemia)    HTN (hypertension)    Incontinence    Kidney stones    Neuropathy    Osteoarthritis    Stroke (HCC)    Visual impairment              Past Surgical History:   Procedure Laterality Date    Excis lumbar disk,one level      Knee replacement surgery      Pacemaker monitor      Removal gallbladder      Removal of kidney stone      Total abdom hysterectomy                  Social History     Socioeconomic History    Marital status:    Tobacco Use    Smoking status: Never    Smokeless tobacco: Never   Vaping Use    Vaping status: Never Used   Substance and Sexual Activity    Alcohol use: Never    Drug use: Never   Other Topics Concern    Caffeine Concern Yes     Comment: occ    Exercise Yes     Comment: PT     Social Drivers of Health     Financial Resource Strain: Low Risk  (2/7/2022)    [facility]    Financial Resource Strain     In the past year, have you or any family members you live with been unable to get any of the following when it was really needed? Check all that apply.: None   Food Insecurity: No Food Insecurity (7/14/2024)    Food Insecurity     Food Insecurity: Never true   Transportation Needs: No Transportation  Needs (7/14/2024)    Transportation Needs     Lack of Transportation: No   Physical Activity: Inactive (2/7/2022)    Received from [facility]    Exercise Vital Sign     Days of Exercise per Week: 0 days     Minutes of Exercise per Session: 0 min   Stress: Low Risk  (2/7/2022)    [facility] from [facility], [facility]    Stress     Stress is when someone feels tense, nervous, anxious, or can't sleep at night because their mind is troubled. How stressed are you? : A little bit   Social Connections: Unknown (2/7/2022)    [facility]    Social Connections     How often do you see or talk to people that you care about and feel close to? (For example: talking to friends on the phone, visiting friends or family, going to [employer] or club meetings): 5 or more times a week   Housing Stability: Low Risk  (7/14/2024)    Housing Stability     Housing Instability: No                  Physical Exam     ED Triage Vitals [01/27/25 1730]   BP (!) 163/105   Pulse 84   Resp 22   Temp 98.1 °F (36.7 °C)   Temp src Temporal   SpO2 98 %   O2 Device None (Room air)       Current Vitals:   Vital Signs  BP: 112/73  Pulse: 72  Resp: 16  Temp: 98.1 °F (36.7 °C)  Temp src: Temporal  MAP (mmHg): 80    Oxygen Therapy  SpO2: 96 %  O2 Device: None (Room air)        Physical Exam    Physical Exam   Constitutional: Awake, alert, well appearing  Head: Normocephalic and atraumatic.   Eyes: Conjunctivae are normal. Pupils are equal, round, and reactive to light.   Neck: Normal range of motion. No JVD  Cardiovascular: Normal rate, regular rhythm  Pulmonary/Chest: Normal effort.  No accessory muscle use.  No cyanosis.  Abdominal: Soft. Not distended.  Neurological: Pt is alert and oriented to person, place, and time. no cranial nerve deficits. Speech fluent      No midline cervical spinal tenderness    Unable to range both hips without any issue,  pelvis stable  Belly benign no chest wall tenderness    Multiple very superficial skin tears on her left wrist.  There is no bony tenderness.  There is no pain with axial loading of the thumb no snuffbox tenderness.  No tenderness of the bones of the hand.  Forage of motion of wrist and elbow without issues      ED Course   Labs Reviewed - No data to display               Skin tears were dressed and cleansed by my staff.    CT FACIAL BONES (CPT=70486)    Result Date: 1/27/2025  CONCLUSION:  No acute displaced osseous fracture is identified.   LOCATION:  [id number]   Dictated by (CST): Stromberg, LeRoy, MD on 1/27/2025 at 7:53 PM     Finalized by (CST): Stromberg, LeRoy, MD on 1/27/2025 at 7:58 PM       CT BRAIN OR HEAD (CPT=70450)    Result Date: 1/27/2025  CONCLUSION:  No acute intracranial abnormality identified.  Stable chronic ischemic changes as above. If there is clinical concern for acute ischemia/infarction, an MRI of the brain would be recommended for further evaluation.  LOCATION:  [facility]   Dictated by (CST): Hiram Puentes MD on 1/27/2025 at 7:43 PM     Finalized by (CST): Hiram Puentes MD on 1/27/2025 at 7:44 PM             MDM            Differential diagnoses considered:   Life-threatening intracranial hemorrhage, skull fracture, facial bone fracture all considered    Imaging negative.  Discussed slightly increased risk for delayed bleed given age and anticoagulant status and reasons to return.    -I discussed obtaining wrist film but exam is reassuring the patient declined as well.      I visualized the radiology studies, my independent interpretation: No large intracranial hemorrhage noted on CT scan of brain    *Discussion of ongoing management of this patient's care included: n/a  *Comorbidities contributing to the complexity of decision making:  anticoagulation Eliquis      Shared decision making was done by: patient, myself, family at bedside        Medical Decision Making      Disposition and  Plan     Clinical Impression:  1. Closed head injury, initial encounter    2. Contusion of face, initial encounter    3. Tear of skin of left wrist, initial encounter         Disposition:  Discharge  1/27/2025  8:07 pm    Follow-up:  Jurgen Mccurdy MD  [address] [place] [address]-  Unit [facility] [address]  [phone]    Follow up            Medications Prescribed:  Discharge Medication List as of 1/27/2025  8:13 PM              Supplementary Documentation:

## 2025-04-26 ENCOUNTER — APPOINTMENT (OUTPATIENT)
Dept: CT IMAGING | Facility: HOSPITAL | Age: 89
End: 2025-04-26
Attending: EMERGENCY MEDICINE
Payer: MEDICARE

## 2025-04-26 ENCOUNTER — HOSPITAL ENCOUNTER (EMERGENCY)
Facility: HOSPITAL | Age: 89
Discharge: HOME | End: 2025-04-26
Attending: EMERGENCY MEDICINE
Payer: MEDICARE

## 2025-04-26 VITALS
OXYGEN SATURATION: 100 % | BODY MASS INDEX: 29.27 KG/M2 | SYSTOLIC BLOOD PRESSURE: 120 MMHG | DIASTOLIC BLOOD PRESSURE: 66 MMHG | HEIGHT: 61 IN | TEMPERATURE: 98 F | HEART RATE: 70 BPM | WEIGHT: 155 LBS | RESPIRATION RATE: 20 BRPM

## 2025-04-26 DIAGNOSIS — K92.2 LOWER GI BLEEDING: Primary | ICD-10-CM

## 2025-04-26 LAB
ALBUMIN SERPL-MCNC: 4.4 G/DL (ref 3.2–4.8)
ALBUMIN/GLOB SERPL: 1.8 {RATIO} (ref 1–2)
ALP LIVER SERPL-CCNC: 134 U/L (ref 55–142)
ALT SERPL-CCNC: 157 U/L (ref 10–49)
ANION GAP SERPL CALC-SCNC: 9 MMOL/L (ref 0–18)
ANTIBODY SCREEN: NEGATIVE
AST SERPL-CCNC: 144 U/L (ref ?–34)
BASOPHILS # BLD AUTO: 0.07 X10(3) UL (ref 0–0.2)
BASOPHILS NFR BLD AUTO: 0.4 %
BILIRUB SERPL-MCNC: 1 MG/DL (ref 0.2–1.1)
BUN BLD-MCNC: 22 MG/DL (ref 9–23)
CALCIUM BLD-MCNC: 9.7 MG/DL (ref 8.7–10.6)
CHLORIDE SERPL-SCNC: 101 MMOL/L (ref 98–112)
CO2 SERPL-SCNC: 27 MMOL/L (ref 21–32)
CREAT BLD-MCNC: 1.61 MG/DL (ref 0.55–1.02)
EGFRCR SERPLBLD CKD-EPI 2021: 31 ML/MIN/1.73M2 (ref 60–?)
EOSINOPHIL # BLD AUTO: 0.04 X10(3) UL (ref 0–0.7)
EOSINOPHIL NFR BLD AUTO: 0.2 %
ERYTHROCYTE [DISTWIDTH] IN BLOOD BY AUTOMATED COUNT: 13.9 %
GLOBULIN PLAS-MCNC: 2.5 G/DL (ref 2–3.5)
GLUCOSE BLD-MCNC: 212 MG/DL (ref 70–99)
HCT VFR BLD AUTO: 44.4 % (ref 35–48)
HGB BLD-MCNC: 14.7 G/DL (ref 12–16)
IMM GRANULOCYTES # BLD AUTO: 0.12 X10(3) UL (ref 0–1)
IMM GRANULOCYTES NFR BLD: 0.6 %
LYMPHOCYTES # BLD AUTO: 1.56 X10(3) UL (ref 1–4)
LYMPHOCYTES NFR BLD AUTO: 8.3 %
MCH RBC QN AUTO: 31.3 PG (ref 26–34)
MCHC RBC AUTO-ENTMCNC: 33.1 G/DL (ref 31–37)
MCV RBC AUTO: 94.5 FL (ref 80–100)
MONOCYTES # BLD AUTO: 0.65 X10(3) UL (ref 0.1–1)
MONOCYTES NFR BLD AUTO: 3.4 %
NEUTROPHILS # BLD AUTO: 16.41 X10 (3) UL (ref 1.5–7.7)
NEUTROPHILS # BLD AUTO: 16.41 X10(3) UL (ref 1.5–7.7)
NEUTROPHILS NFR BLD AUTO: 87.1 %
OSMOLALITY SERPL CALC.SUM OF ELEC: 294 MOSM/KG (ref 275–295)
PLATELET # BLD AUTO: 242 10(3)UL (ref 150–450)
PLATELETS.RETICULATED NFR BLD AUTO: 3.6 % (ref 0–7)
POTASSIUM SERPL-SCNC: 4.2 MMOL/L (ref 3.5–5.1)
PROT SERPL-MCNC: 6.9 G/DL (ref 5.7–8.2)
RBC # BLD AUTO: 4.7 X10(6)UL (ref 3.8–5.3)
RH BLOOD TYPE: POSITIVE
SODIUM SERPL-SCNC: 137 MMOL/L (ref 136–145)
WBC # BLD AUTO: 18.9 X10(3) UL (ref 4–11)

## 2025-04-26 PROCEDURE — 86900 BLOOD TYPING SEROLOGIC ABO: CPT | Performed by: EMERGENCY MEDICINE

## 2025-04-26 PROCEDURE — 86901 BLOOD TYPING SEROLOGIC RH(D): CPT | Performed by: EMERGENCY MEDICINE

## 2025-04-26 PROCEDURE — 96360 HYDRATION IV INFUSION INIT: CPT

## 2025-04-26 PROCEDURE — 86850 RBC ANTIBODY SCREEN: CPT | Performed by: EMERGENCY MEDICINE

## 2025-04-26 PROCEDURE — 96361 HYDRATE IV INFUSION ADD-ON: CPT

## 2025-04-26 PROCEDURE — 99285 EMERGENCY DEPT VISIT HI MDM: CPT

## 2025-04-26 PROCEDURE — 85025 COMPLETE CBC W/AUTO DIFF WBC: CPT | Performed by: EMERGENCY MEDICINE

## 2025-04-26 PROCEDURE — 80053 COMPREHEN METABOLIC PANEL: CPT | Performed by: EMERGENCY MEDICINE

## 2025-04-26 PROCEDURE — 99284 EMERGENCY DEPT VISIT MOD MDM: CPT

## 2025-04-26 PROCEDURE — 74177 CT ABD & PELVIS W/CONTRAST: CPT | Performed by: EMERGENCY MEDICINE

## 2025-04-26 RX ORDER — SODIUM CHLORIDE 9 MG/ML
INJECTION, SOLUTION INTRAVENOUS CONTINUOUS
Status: DISCONTINUED | OUTPATIENT
Start: 2025-04-26 | End: 2025-04-26

## 2025-04-26 NOTE — ED PROVIDER NOTES
Patient Seen in: [facility] Emergency Department      History     Chief Complaint   Patient presents with    Blood In Stool     Stated Complaint: blood in stool-this is baseline    Subjective:     HPI    89-year-old woman atrial fibrillation (apixaban, clopidogrel), diabetes (Jardiance), hypothyroidism (Synthroid), and hypertension (metoprolol) who was recently diagnosed with UTI.  She started taking an antibiotic last night.  She then started having some non-bloody vomiting.  No diarrhea.  It was reported that she had a bloody bowel movement.  She is having some left-sided abdominal discomfort.  She resides at [facility], an independent living facility with some assistance available.    Objective:   Past Medical History:    Atrial fibrillation (HCC)    Calculus of kidney    CVA (cerebral vascular accident) (HCC)    Depression    Diabetes (HCC)    Esophageal reflux    High blood pressure    High cholesterol    HLD (hyperlipidemia)    HTN (hypertension)    Incontinence    Kidney stones    Neuropathy    Osteoarthritis    Stroke (HCC)    Visual impairment              Past Surgical History:   Procedure Laterality Date    Excis lumbar disk,one level      Knee replacement surgery      Pacemaker monitor      Removal gallbladder      Removal of kidney stone      Total abdom hysterectomy                  Social History     Socioeconomic History    Marital status:    Tobacco Use    Smoking status: Never    Smokeless tobacco: Never   Vaping Use    Vaping status: Never Used   Substance and Sexual Activity    Alcohol use: Never    Drug use: Never   Other Topics Concern    Caffeine Concern Yes     Comment: occ    Exercise Yes     Comment: PT     Social Drivers of Health     Food Insecurity: No Food Insecurity (7/14/2024)    Food Insecurity     Food Insecurity: Never true   Transportation Needs: No Transportation Needs (7/14/2024)    Transportation Needs     Lack of Transportation: No   Housing Stability: Low  Risk  (7/14/2024)    Housing Stability     Housing Instability: No              Review of Systems    Positive for stated complaint: blood in stool-this is baseline  Other systems are as noted in HPI.  Constitutional and vital signs reviewed.      All other systems reviewed and negative except as noted above.    Physical Exam     ED Triage Vitals [04/26/25 1226]   /67   Pulse 87   Resp 18   Temp 97.8 °F (36.6 °C)   Temp src Temporal   SpO2 99 %   O2 Device None (Room air)       Current:/66   Pulse 70   Temp 97.8 °F (36.6 °C) (Temporal)   Resp 22   Ht 154.9 cm (5' 1\")   Wt 70.3 kg   LMP  (LMP Unknown)   SpO2 99%   BMI 29.29 kg/m²   General:  Vitals as listed.  No acute distress   HEENT: Sclerae anicteric.  Conjunctivae show no pallor.  Oropharynx clear, mucous membranes moist   Lungs: good air exchanger   Abdomen: Moderate left lower quadrant abdominal tenderness.  No abdominal masses.  No peritoneal signs  Rectal: Patient has small amount of dark red mucus in the vault  Extremities: no edema, normal peripheral pulses   Neuro: Alert oriented and nonfocal      ED Course     Labs Reviewed   COMP METABOLIC PANEL (14) - Abnormal; Notable for the following components:       Result Value    Glucose 212 (*)     Creatinine 1.61 (*)     eGFR-Cr 31 (*)      (*)      (*)     All other components within normal limits   CBC WITH DIFFERENTIAL WITH PLATELET - Abnormal; Notable for the following components:    WBC 18.9 (*)     Neutrophil Absolute Prelim 16.41 (*)     Neutrophil Absolute 16.41 (*)     All other components within normal limits   TYPE AND SCREEN    Narrative:     The following orders were created for panel order Type and screen.  Procedure                               Abnormality         Status                     ---------                               -----------         ------                     ABORH (Blood Type)[[id number]]                               Final result                Antibody Screen[[id number]]                                  Final result                 Please view results for these tests on the individual orders.   ABORH (BLOOD TYPE)   ANTIBODY SCREEN   RAINBOW DRAW LAVENDER   RAINBOW DRAW LIGHT GREEN   RAINBOW DRAW BLUE   C. DIFFICILE(TOXIGENIC)PCR   OCCULT BLOOD, STOOL   STOOL CULTURE W/SHIGATOXIN    Narrative:     The following orders were created for panel order Stool Culture W/Shigatoxin.  Procedure                               Abnormality         Status                     ---------                               -----------         ------                     Stool Culture[[id number]]                                                               Shigatoxin[[id number]]                                                                    Please view results for these tests on the individual orders.   STOOL CULTURE(P)   SHIGATOXIN     CT ABDOMEN+PELVIS(CONTRAST ONLY)(CPT=74177)  Result Date: 4/26/2025  CONCLUSION:  1. Abnormal appearance of the urinary bladder.  Appearance consistent with nonspecific cystitis. 2. Additional urothelial enhancement involving the proximal right renal collecting system.  This may reflect upper urinary tract infection.  Correlate for pyelonephritis. 3. Uncomplicated colonic diverticulosis. 4. Focal fluid adjacent/inferior to the left greater trochanter.  Differential considerations include bursitis and liquified hematoma.  5. Details as above.  Continued clinical correlation recommended.    LOCATION:  [facility]   Dictated by (CST): Marin Leone MD on 4/26/2025 at 3:34 PM     Finalized by (CST): Marin Leone MD on 4/26/2025 at 3:43 PM       ED COURSE and MDM     Sources of the medical history included the patient and her family    Differential diagnosis before testing included internal hemorrhoidal bleeding versus massive diverticular bleed, a medical condition that poses a threat to life.    I reviewed prior external notes including hemoglobin measured  on 7/20/2024 which was 8 due to a hematoma while she was on Eliquis    Hydrated with normal saline.    Patient observed in the emergency department for 4 hours.  No further GI bleeding.  She was hemodynamically stable.      She is to follow-up with her primary care physician and GI.    I have discussed with the patient the results of testing, differential diagnosis, and treatment plan. They expressed clear understanding of these instructions and agrees to the plan provided.    Disposition and Plan     Clinical Impression:  1. Lower GI bleeding         Disposition:  Discharge  4/26/2025  4:41 pm    Follow-up:  Jurgen Mccurdy MD  [facility]-  [facility] [address] [phone]    Schedule an appointment as soon as possible for a visit in 1 week(s)      [facility] [place] [address]  Schedule an appointment as soon as possible for a visit in 1 week(s)          Medications Prescribed:  Current Discharge Medication List        Critical Care Note  A total of 35 minutes of critical care time (exclusive of billable procedures) was administered to manage the patient's lower GI bleed in an elderly woman requiring close observation.  This involved direct patient intervention, complex decision making, and/or extensive discussions with the patient, family, and clinical staff.

## 2025-04-26 NOTE — ED INITIAL ASSESSMENT (HPI)
Pt to ER via [facility] from [facility], pt has blood in stool at baseline. Per staff, stool appeared more \"jelly-like and different than usual\". Takes eliquis, has no complaints.

## 2025-05-09 ENCOUNTER — APPOINTMENT (OUTPATIENT)
Dept: CT IMAGING | Facility: HOSPITAL | Age: 89
End: 2025-05-09
Attending: EMERGENCY MEDICINE
Payer: MEDICARE

## 2025-05-09 ENCOUNTER — APPOINTMENT (OUTPATIENT)
Dept: GENERAL RADIOLOGY | Facility: HOSPITAL | Age: 89
End: 2025-05-09
Attending: EMERGENCY MEDICINE
Payer: MEDICARE

## 2025-05-09 ENCOUNTER — HOSPITAL ENCOUNTER (OUTPATIENT)
Facility: HOSPITAL | Age: 89
Setting detail: OBSERVATION
Discharge: NURSING, REHAB OR LONG-TERM CARE | End: 2025-05-14
Attending: EMERGENCY MEDICINE | Admitting: INTERNAL MEDICINE
Payer: MEDICARE

## 2025-05-09 DIAGNOSIS — R47.01 APHASIA: Primary | ICD-10-CM

## 2025-05-09 LAB
ALBUMIN SERPL-MCNC: 4.8 G/DL (ref 3.2–4.8)
ALBUMIN/GLOB SERPL: 1.8 {RATIO} (ref 1–2)
ALP LIVER SERPL-CCNC: 107 U/L (ref 55–142)
ALT SERPL-CCNC: 173 U/L (ref 10–49)
ANION GAP SERPL CALC-SCNC: 8 MMOL/L (ref 0–18)
APTT PPP: 27.6 SECONDS (ref 23–36)
AST SERPL-CCNC: 82 U/L (ref ?–34)
BASOPHILS # BLD AUTO: 0.07 X10(3) UL (ref 0–0.2)
BASOPHILS NFR BLD AUTO: 0.7 %
BILIRUB SERPL-MCNC: 0.5 MG/DL (ref 0.2–1.1)
BILIRUB UR QL STRIP.AUTO: NEGATIVE
BUN BLD-MCNC: 26 MG/DL (ref 9–23)
CALCIUM BLD-MCNC: 9.9 MG/DL (ref 8.7–10.6)
CHLORIDE SERPL-SCNC: 101 MMOL/L (ref 98–112)
CHOLEST SERPL-MCNC: 224 MG/DL (ref ?–200)
CLARITY UR REFRACT.AUTO: CLEAR
CO2 SERPL-SCNC: 30 MMOL/L (ref 21–32)
CREAT BLD-MCNC: 2.01 MG/DL (ref 0.55–1.02)
EGFRCR SERPLBLD CKD-EPI 2021: 23 ML/MIN/1.73M2 (ref 60–?)
EOSINOPHIL # BLD AUTO: 0.37 X10(3) UL (ref 0–0.7)
EOSINOPHIL NFR BLD AUTO: 3.9 %
ERYTHROCYTE [DISTWIDTH] IN BLOOD BY AUTOMATED COUNT: 14.1 %
EST. AVERAGE GLUCOSE BLD GHB EST-MCNC: 151 MG/DL (ref 68–126)
FLUAV + FLUBV RNA SPEC NAA+PROBE: NEGATIVE
FLUAV + FLUBV RNA SPEC NAA+PROBE: NEGATIVE
GLOBULIN PLAS-MCNC: 2.7 G/DL (ref 2–3.5)
GLUCOSE BLD-MCNC: 106 MG/DL (ref 70–99)
GLUCOSE BLD-MCNC: 212 MG/DL (ref 70–99)
GLUCOSE BLD-MCNC: 243 MG/DL (ref 70–99)
GLUCOSE UR STRIP.AUTO-MCNC: 1000 MG/DL
HBA1C MFR BLD: 6.9 % (ref ?–5.7)
HCT VFR BLD AUTO: 50.9 % (ref 35–48)
HDLC SERPL-MCNC: 37 MG/DL (ref 40–59)
HGB BLD-MCNC: 16.5 G/DL (ref 12–16)
IMM GRANULOCYTES # BLD AUTO: 0.04 X10(3) UL (ref 0–1)
IMM GRANULOCYTES NFR BLD: 0.4 %
INR BLD: 1.12 (ref 0.8–1.2)
KETONES UR STRIP.AUTO-MCNC: NEGATIVE MG/DL
LDLC SERPL CALC-MCNC: 143 MG/DL (ref ?–100)
LEUKOCYTE ESTERASE UR QL STRIP.AUTO: 500
LYMPHOCYTES # BLD AUTO: 2.52 X10(3) UL (ref 1–4)
LYMPHOCYTES NFR BLD AUTO: 26.2 %
MCH RBC QN AUTO: 30.3 PG (ref 26–34)
MCHC RBC AUTO-ENTMCNC: 32.4 G/DL (ref 31–37)
MCV RBC AUTO: 93.6 FL (ref 80–100)
MONOCYTES # BLD AUTO: 0.51 X10(3) UL (ref 0.1–1)
MONOCYTES NFR BLD AUTO: 5.3 %
NEUTROPHILS # BLD AUTO: 6.1 X10 (3) UL (ref 1.5–7.7)
NEUTROPHILS # BLD AUTO: 6.1 X10(3) UL (ref 1.5–7.7)
NEUTROPHILS NFR BLD AUTO: 63.5 %
NITRITE UR QL STRIP.AUTO: NEGATIVE
NONHDLC SERPL-MCNC: 187 MG/DL (ref ?–130)
OSMOLALITY SERPL CALC.SUM OF ELEC: 301 MOSM/KG (ref 275–295)
PH UR STRIP.AUTO: 7.5 [PH] (ref 5–8)
PLATELET # BLD AUTO: 276 10(3)UL (ref 150–450)
POTASSIUM SERPL-SCNC: 4.7 MMOL/L (ref 3.5–5.1)
PROT SERPL-MCNC: 7.5 G/DL (ref 5.7–8.2)
PROT UR STRIP.AUTO-MCNC: NEGATIVE MG/DL
PROTHROMBIN TIME: 14.5 SECONDS (ref 11.6–14.8)
Q-T INTERVAL: 452 MS
QRS DURATION: 126 MS
QTC CALCULATION (BEZET): 488 MS
R AXIS: -87 DEGREES
RBC # BLD AUTO: 5.44 X10(6)UL (ref 3.8–5.3)
RBC UR QL AUTO: NEGATIVE
RSV RNA SPEC NAA+PROBE: NEGATIVE
SARS-COV-2 RNA RESP QL NAA+PROBE: NOT DETECTED
SODIUM SERPL-SCNC: 139 MMOL/L (ref 136–145)
SP GR UR STRIP.AUTO: 1.02 (ref 1–1.03)
T AXIS: 66 DEGREES
TRIGL SERPL-MCNC: 240 MG/DL (ref 30–149)
TROPONIN I SERPL HS-MCNC: 12 NG/L (ref ?–34)
UROBILINOGEN UR STRIP.AUTO-MCNC: NORMAL MG/DL
VENTRICULAR RATE: 70 BPM
VLDLC SERPL CALC-MCNC: 45 MG/DL (ref 0–30)
WBC # BLD AUTO: 9.6 X10(3) UL (ref 4–11)
WBC #/AREA URNS AUTO: >50 /HPF

## 2025-05-09 PROCEDURE — 70496 CT ANGIOGRAPHY HEAD: CPT | Performed by: EMERGENCY MEDICINE

## 2025-05-09 PROCEDURE — 99497 ADVNCD CARE PLAN 30 MIN: CPT | Performed by: HOSPITALIST

## 2025-05-09 PROCEDURE — 71045 X-RAY EXAM CHEST 1 VIEW: CPT | Performed by: EMERGENCY MEDICINE

## 2025-05-09 PROCEDURE — 99223 1ST HOSP IP/OBS HIGH 75: CPT | Performed by: HOSPITALIST

## 2025-05-09 PROCEDURE — 70450 CT HEAD/BRAIN W/O DYE: CPT | Performed by: EMERGENCY MEDICINE

## 2025-05-09 PROCEDURE — 70498 CT ANGIOGRAPHY NECK: CPT | Performed by: EMERGENCY MEDICINE

## 2025-05-09 RX ORDER — ONDANSETRON 2 MG/ML
4 INJECTION INTRAMUSCULAR; INTRAVENOUS EVERY 6 HOURS PRN
Status: DISCONTINUED | OUTPATIENT
Start: 2025-05-09 | End: 2025-05-14

## 2025-05-09 RX ORDER — METOCLOPRAMIDE HYDROCHLORIDE 5 MG/ML
10 INJECTION INTRAMUSCULAR; INTRAVENOUS EVERY 8 HOURS PRN
Status: DISCONTINUED | OUTPATIENT
Start: 2025-05-09 | End: 2025-05-09 | Stop reason: SDUPTHER

## 2025-05-09 RX ORDER — HYDRALAZINE HYDROCHLORIDE 20 MG/ML
10 INJECTION INTRAMUSCULAR; INTRAVENOUS EVERY 2 HOUR PRN
Status: DISCONTINUED | OUTPATIENT
Start: 2025-05-09 | End: 2025-05-14

## 2025-05-09 RX ORDER — ACETAMINOPHEN 500 MG
500 TABLET ORAL EVERY 4 HOURS PRN
Status: DISCONTINUED | OUTPATIENT
Start: 2025-05-09 | End: 2025-05-14

## 2025-05-09 RX ORDER — SODIUM CHLORIDE 9 MG/ML
INJECTION, SOLUTION INTRAVENOUS CONTINUOUS
Status: ACTIVE | OUTPATIENT
Start: 2025-05-09 | End: 2025-05-11

## 2025-05-09 RX ORDER — ASPIRIN 325 MG
325 TABLET ORAL DAILY
Status: DISCONTINUED | OUTPATIENT
Start: 2025-05-09 | End: 2025-05-10

## 2025-05-09 RX ORDER — NITROGLYCERIN 0.4 MG/1
0.4 TABLET SUBLINGUAL EVERY 5 MIN PRN
COMMUNITY

## 2025-05-09 RX ORDER — HEPARIN SODIUM 5000 [USP'U]/ML
5000 INJECTION, SOLUTION INTRAVENOUS; SUBCUTANEOUS EVERY 12 HOURS SCHEDULED
Status: DISCONTINUED | OUTPATIENT
Start: 2025-05-09 | End: 2025-05-09 | Stop reason: SDUPTHER

## 2025-05-09 RX ORDER — NICOTINE POLACRILEX 4 MG
15 LOZENGE BUCCAL
Status: DISCONTINUED | OUTPATIENT
Start: 2025-05-09 | End: 2025-05-14

## 2025-05-09 RX ORDER — NICOTINE POLACRILEX 4 MG
30 LOZENGE BUCCAL
Status: DISCONTINUED | OUTPATIENT
Start: 2025-05-09 | End: 2025-05-14

## 2025-05-09 RX ORDER — ACETAMINOPHEN AND CODEINE PHOSPHATE 300; 30 MG/1; MG/1
1 TABLET ORAL
COMMUNITY
End: 2025-05-14

## 2025-05-09 RX ORDER — LABETALOL HYDROCHLORIDE 5 MG/ML
10 INJECTION, SOLUTION INTRAVENOUS EVERY 10 MIN PRN
Status: DISCONTINUED | OUTPATIENT
Start: 2025-05-09 | End: 2025-05-14

## 2025-05-09 RX ORDER — ONDANSETRON 2 MG/ML
4 INJECTION INTRAMUSCULAR; INTRAVENOUS EVERY 6 HOURS PRN
Status: DISCONTINUED | OUTPATIENT
Start: 2025-05-09 | End: 2025-05-09 | Stop reason: SDUPTHER

## 2025-05-09 RX ORDER — ATORVASTATIN CALCIUM 40 MG/1
40 TABLET, FILM COATED ORAL NIGHTLY
Status: DISCONTINUED | OUTPATIENT
Start: 2025-05-09 | End: 2025-05-09

## 2025-05-09 RX ORDER — SENNOSIDES 8.6 MG
17.2 TABLET ORAL NIGHTLY PRN
Status: DISCONTINUED | OUTPATIENT
Start: 2025-05-09 | End: 2025-05-14

## 2025-05-09 RX ORDER — ASPIRIN 300 MG/1
300 SUPPOSITORY RECTAL DAILY
Status: DISCONTINUED | OUTPATIENT
Start: 2025-05-09 | End: 2025-05-10

## 2025-05-09 RX ORDER — IBUPROFEN 600 MG/1
600 TABLET, FILM COATED ORAL 2 TIMES DAILY PRN
COMMUNITY

## 2025-05-09 RX ORDER — HEPARIN SODIUM 5000 [USP'U]/ML
5000 INJECTION, SOLUTION INTRAVENOUS; SUBCUTANEOUS EVERY 12 HOURS SCHEDULED
Status: DISCONTINUED | OUTPATIENT
Start: 2025-05-09 | End: 2025-05-10

## 2025-05-09 RX ORDER — ACETAMINOPHEN 650 MG/1
650 SUPPOSITORY RECTAL EVERY 4 HOURS PRN
Status: DISCONTINUED | OUTPATIENT
Start: 2025-05-09 | End: 2025-05-14

## 2025-05-09 RX ORDER — IBUPROFEN 800 MG/1
800 TABLET, FILM COATED ORAL EVERY 8 HOURS PRN
COMMUNITY
End: 2025-05-14

## 2025-05-09 RX ORDER — DEXTROSE MONOHYDRATE 25 G/50ML
50 INJECTION, SOLUTION INTRAVENOUS
Status: DISCONTINUED | OUTPATIENT
Start: 2025-05-09 | End: 2025-05-14

## 2025-05-09 RX ORDER — METOCLOPRAMIDE HYDROCHLORIDE 5 MG/ML
5 INJECTION INTRAMUSCULAR; INTRAVENOUS EVERY 8 HOURS PRN
Status: DISCONTINUED | OUTPATIENT
Start: 2025-05-09 | End: 2025-05-14

## 2025-05-09 RX ORDER — LORATADINE 10 MG/1
10 TABLET ORAL DAILY
COMMUNITY

## 2025-05-09 RX ORDER — POLYETHYLENE GLYCOL 3350 17 G/17G
17 POWDER, FOR SOLUTION ORAL DAILY PRN
Status: DISCONTINUED | OUTPATIENT
Start: 2025-05-09 | End: 2025-05-14

## 2025-05-09 RX ORDER — ACETAMINOPHEN 325 MG/1
650 TABLET ORAL EVERY 4 HOURS PRN
Status: DISCONTINUED | OUTPATIENT
Start: 2025-05-09 | End: 2025-05-14

## 2025-05-09 RX ORDER — BISACODYL 10 MG
10 SUPPOSITORY, RECTAL RECTAL
Status: DISCONTINUED | OUTPATIENT
Start: 2025-05-09 | End: 2025-05-14

## 2025-05-09 NOTE — ED INITIAL ASSESSMENT (HPI)
Patient in per EMS from [facility] 0800 today as going to breakfast. 0930 became aphasic and alert to self with slurred speech.   Baseline performs own ADLs per medics.  Previous hx ischemic stroke   MD RN PCTand stroke tera at [facility]

## 2025-05-09 NOTE — SIGNIFICANT EVENT
Stroke alert initiated in ED  Initial NIHSS 2 for left facial droop and LOC questions  Last Known Normal at 0930 during breakfast  Pre-morbid mRS 3    Pt accompanied to CT dept    NIH Stroke Scale  1a  Level of consciousness: 0   1b. LOC questions:  1 month in correct   1c. LOC commands: 0   2.  Best Gaze: 0   3. Visual: 0   4. Facial Palsy: 1 left facial droop   5a. Motor left arm: 0   5b.  Motor right arm: 0   6a. Motor left le   6b  Motor right le   7. Limb Ataxia: 0   8.  Sensory: 0   9. Best Language:  0   10. Dysarthria: 0   11. Extinction and Inattention: 0     Total:   2          Per Dr Alvarez, patient is not a candidate for TNK, exclusions include DOAC use  Case discussed with Dr Medina, MANUEL MD

## 2025-05-09 NOTE — PLAN OF CARE
NURSING ADMISSION NOTE  Patient admitted via Cart  Oriented to room.  Safety precautions initiated.  Bed in low position.  Call light in reach.    Assumed care at 1500, new admit from ED into room 7605.  A&O x3-4, RA, V-paced on tele, VSS.  Q2 neuros per stroke protocol until 2300.   Slurred speech, occasional left leg drift.   Strict NPO d/t slurred speech, mouth swabbed per request.  Denies pain.   IVF infusing.  IV abx.  Safety and comfort measures in place.  Patient and daughter updated on POC.    Plan for MRI, SLP, PT/OT.

## 2025-05-09 NOTE — ED PROVIDER NOTES
Patient Seen in: [facility] Emergency Department      History     Chief Complaint   Patient presents with    Stroke     Stated Complaint:     Subjective:   HPI    Patient arrives as a code stroke.    Apparently at 830 she was at her baseline.  Around 930 staff at her facility noticed that she had a hard time finding words and when she was speaking there is concerned that her speech might be slurred.    No report of any falls or trauma.  On low-dose Eliquis      History of Present Illness               Objective:     Past Medical History:    Atrial fibrillation (HCC)    Calculus of kidney    CVA (cerebral vascular accident) (HCC)    Depression    Diabetes (HCC)    Esophageal reflux    High blood pressure    High cholesterol    HLD (hyperlipidemia)    HTN (hypertension)    Incontinence    Kidney stones    Neuropathy    Osteoarthritis    Stroke (HCC)    Visual impairment              Past Surgical History:   Procedure Laterality Date    Excis lumbar disk,one level      Knee replacement surgery      Pacemaker monitor      Removal gallbladder      Removal of kidney stone      Total abdom hysterectomy                  Social History     Socioeconomic History    Marital status:    Tobacco Use    Smoking status: Never    Smokeless tobacco: Never   Vaping Use    Vaping status: Never Used   Substance and Sexual Activity    Alcohol use: Never    Drug use: Never   Other Topics Concern    Caffeine Concern Yes     Comment: occ    Exercise Yes     Comment: PT     Social Drivers of Health     Food Insecurity: No Food Insecurity (7/14/2024)    Food Insecurity     Food Insecurity: Never true   Transportation Needs: No Transportation Needs (7/14/2024)    Transportation Needs     Lack of Transportation: No   Housing Stability: Low Risk  (7/14/2024)    Housing Stability     Housing Instability: No                                Physical Exam     ED Triage Vitals [05/09/25 1151]   /87   Pulse 87   Resp 14   Temp 98.8  °F (37.1 °C)   Temp src Temporal   SpO2 100 %   O2 Device None (Room air)       Current Vitals:   Vital Signs  BP: 143/72  Pulse: 77  Resp: 19  Temp: 98.8 °F (37.1 °C)  Temp src: Temporal  MAP (mmHg): 92    Oxygen Therapy  SpO2: 100 %  O2 Device: None (Room air)        Physical Exam    Constitutional:  Appears well-developed and well-nourished. no Distress.  Head: Normocephalic and atraumatic.   Nose: Nose normal.   Eyes: EOM are normal. Pupils are equal, round, and reactive to light.   Neck: Normal range of motion. Neck supple. No JVD present.   Cardiovascular: Normal rate and regular rhythm.    Pulmonary/Chest: Effort normal and breath sounds normal. No stridor.   Abdominal: Soft. There is no tenderness. There is no guarding.   Musculoskeletal: Exhibits no edema or tenderness.     Neurological: Pt is oriented to person, place, and time.    Left lower facial droop  There is no nystagmus.  Speech is fluent and not slurred.   There is no word finding difficulty.    No neglect. No gaze  No visual field deficit.    There is no pronator drift.    Strength is 5 out of 5 in the upper extremities bilaterally.    Sensation is symmetric in the upper extremities and not diminished.    There is no lower extremity drift  Sensation is normal in the lower extremities and not diminished.  It is intact to fine touch.    There is full strength with hip flexion, knee flexion and extension, ankle plantarflexion, and at the EHL.  This is true bilaterally.      Finger to nose intact.  Heel to shin intact.      Skin: Skin is warm and dry.   Psychiatric: Normal mood and affect. Thought content normal.     Physical Exam                ED Course     Labs Reviewed   COMP METABOLIC PANEL (14) - Abnormal; Notable for the following components:       Result Value    Glucose 243 (*)     BUN 26 (*)     Creatinine 2.01 (*)     Calculated Osmolality 301 (*)     eGFR-Cr 23 (*)     AST 82 (*)      (*)     All other components within normal  limits   CBC WITH DIFFERENTIAL WITH PLATELET - Abnormal; Notable for the following components:    RBC 5.44 (*)     HGB 16.5 (*)     HCT 50.9 (*)     All other components within normal limits   POCT GLUCOSE - Abnormal; Notable for the following components:    POC Glucose 212 (*)     All other components within normal limits   PROTHROMBIN TIME (PT) - Normal   PTT, ACTIVATED - Normal   TROPONIN I HIGH SENSITIVITY - Normal   URINALYSIS WITH CULTURE REFLEX   RAINBOW DRAW LAVENDER   RAINBOW DRAW LIGHT GREEN   RAINBOW DRAW BLUE   RAINBOW DRAW GOLD   SARS-COV-2/FLU A AND B/RSV BY PCR (GENEXPERT)     EKG    Rate, intervals and axes as noted on EKG Report.  Rate: 70  Rhythm: Paced rhythm  Reading:   Paced rhythm              Results            XR CHEST AP PORTABLE  (CPT=71045)  Result Date: 5/9/2025  CONCLUSION:  No evidence of active cardiopulmonary disease.   LOCATION:  [facility]      Dictated by (CST): Donnie Park MD on 5/09/2025 at 1:13 PM     Finalized by (CST): Donnie Park MD on 5/09/2025 at 1:13 PM       CT STROKE CTA BRAIN/CTA NECK (W IV)(CPT=70496/00924)  Result Date: 5/9/2025  CONCLUSION:  1. No acute intracranial pathology. 2. 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 3. Global atrophy and chronic small vessel ischemic changes of the cerebral white matter. 4. Stable atherosclerotic changes with no hemodynamically significant stenosis or acute abnormality in CT angiography of the brain and neck. 5. Incompletely visualized large bulla in right middle lobe.   LOCATION:  [facility]   Dictated by (CST): Donnie Park MD on 5/09/2025 at 12:28 PM     Finalized by (CST): Donnie Park MD on 5/09/2025 at 12:47 PM       CT STROKE BRAIN (NO IV)(CPT=70450)  Result Date: 5/9/2025  CONCLUSION:  No acute intracranial pathology.  Critical results were called to Dr. Medina at 1207 hours on 5/9/2025.  There was appropriate read back.    LOCATION:  [facility]   Dictated by (CST): Donnie Park MD on 5/09/2025  at 12:05 PM     Finalized by (CST): Donnie Park MD on 5/09/2025 at 12:08 PM                      MDM          Differential diagnoses considered: Life-threatening intracranial hemorrhage, acute stroke, TIA, encephalopathy all considered      -Not a candidate for TNK due to Eliquis use and low NIH  - No LVO  - Family arrived later and states that the patient has been having a hard time finding words for at least a couple days and still do not feel that she is at her baseline.      - Unclear if this is more of encephalopathic process due to some dehydration and potential UTI.    - Given her age comorbidities will plan to keep her for close observation and potentially further diagnostic testing.    Patient will be admitted primarily to the [place] hospitalist.     care has been discussed with the admitting physician.        I visualized the radiology studies, my independent interpretation: No large intraconal hemorrhage noted on CT scan of brain    *Discussion of ongoing management of this patient's care included:  stroke team, admitting physician  *Comorbidities contributing to the complexity of decision making: History of previous stroke    Shared decision making was done by: patient, myself.      Admission disposition: 5/9/2025  1:11 PM           Medical Decision Making      Disposition and Plan     Clinical Impression:  1. Aphasia         Disposition:  Admit  5/9/2025  1:11 pm    Follow-up:  No follow-up provider specified.        Medications Prescribed:  Current Discharge Medication List          Supplementary Documentation:         Hospital Problems       Present on Admission  Date Reviewed: 8/12/2024          ICD-10-CM Noted POA    * (Principal) Aphasia R47.01 5/9/2025 Unknown         TNK/ NI Documentation:    Date/Time last known well:   5/9/2025 8:00 AM    NIHSS on presentation: 2     Chief Complaint   Patient presents with    Stroke     IV Tenecteplase (TNK) administered: No; Patient is not a Candidate for IV  TNK due to: DOAC- patient on DOAC and took a dose less than 48 hours    Candidate for Endovascular thrombectomy (EVT): No; Patient is not a candidate for Endovascular Thrombectomy due to: No large vessel occlusion ( LVO)  on CTA/MRA imaging      Disposition: Admit

## 2025-05-09 NOTE — ED QUICK NOTES
Orders for admission, patient is aware of plan and ready to go upstairs. Any questions, please call ED RN Shamika at extension [phone].     Patient Covid vaccination status: Fully vaccinated     COVID Test Ordered in ED: SARS-CoV-2/Flu A and B/RSV by PCR ([facility])    COVID Suspicion at Admission: N/A    Running Infusions: Medication Infusions[1]     Mental Status/LOC at time of transport: A&Ox2    Other pertinent information:   CIWA score: N/A   NIH score:  1            [1]

## 2025-05-09 NOTE — H&P
[facility]     Jamee Johnson Patient Status:  Emergency    1935 MRN [id number]   Location [facility] EMERGENCY DEPARTMENT Attending Domo Medina MD   Hosp Day # 0 PCP Angle Mccurdy MD     Chief Complaint: I can't speak right    Subjective:    History of Present Illness:     Jamee Johnson is a 89 year old female with Pmhx CVA, HTN, HLD, DM2, CKD3, hypothyroidism, depression, CAD s.p PCI, afib presents to er w/ complaints of trouble speaking. Pt lives at assisted care facility and during AM rounds staff noted this issue. Daughter/son in law at the bedside. Pt cont to have difficulty finding her words. No other focal deficits appreciated. Pt otherwise denies any cp or sob, no fever, chills, n/v.     History/Other:    Past Medical History:  Past Medical History[1]  Past Surgical History:   Past Surgical History[2]   Family History:   Family History[3]  Social History:    reports that she has never smoked. She has never used smokeless tobacco. She reports that she does not drink alcohol and does not use drugs.     Allergies: Allergies[4]    Medications:  Medications Ordered Prior to Encounter[5]    Review of Systems:   A comprehensive review of systems was completed.    Pertinent positives and negatives noted in the HPI.    Objective:   Physical Exam:    /72   Pulse 77   Temp 98.8 °F (37.1 °C) (Temporal)   Resp 19   Wt 167 lb 12.3 oz (76.1 kg)   LMP  (LMP Unknown)   SpO2 100%   BMI 31.70 kg/m²   General: No acute distress, Alert  Respiratory: No rhonchi, no wheezes  Cardiovascular: S1, S2. Regular rate and rhythm  Abdomen: Soft, Non-tender, non-distended, positive bowel sounds  Neuro: No new focal deficits  Extremities: No edema      Results:    Labs:      Labs Last 24 Hours:    Recent Labs   Lab 25  1152   RBC 5.44*   HGB 16.5*   HCT 50.9*   MCV 93.6   MCH 30.3   MCHC 32.4   RDW 14.1   NEPRELIM 6.10   WBC 9.6   .0       Recent Labs   Lab  05/09/25  1152   *   BUN 26*   CREATSERUM 2.01*   EGFRCR 23*   CA 9.9   ALB 4.8      K 4.7      CO2 30.0   ALKPHO 107   AST 82*   *   BILT 0.5   TP 7.5       Estimated Glomerular Filtration Rate: 23 mL/min/1.73m2 (A) (result from lab).    Lab Results   Component Value Date    INR 1.12 05/09/2025    INR 0.97 12/25/2019       Recent Labs   Lab 05/09/25  1152   TROPHS 12       No results for input(s): \"TROP\", \"PBNP\" in the last 168 hours.    No results for input(s): \"PCT\" in the last 168 hours.    Imaging: Imaging data reviewed in Epic.    Assessment & Plan:        #Acute aphasia  -r/o cva, ct neg, MRI pending  -neuro to see  -pt/ot/st to see  -permissive htn  -cva orderset    #HTN    #HLD    #DM2  -A1c 5.6 as of July 2024  -ISS for now    #CAD sp PCI    #Chronic afib s/p PPM    #CKD3    #Pyuria  -iv abx, fu cultures    #Mild elevation in LFTs  -no RUQ pain, will trend for now    #Hypothyroidism    #Depression    #ACP  -full code, 16 mins spent face to face w/ patient and patient's family at the bedside. This was a voluntary conversation. We reviewed terms like cardiac arrest and the use of acls/cpr. Order updated on epic. Listed as full code before.           Plan of care discussed with er staff, patient, patient's family at the bedside    Trish Dasilva DO    Supplementary Documentation:     The 21st Century Cures Act makes medical notes like these available to patients in the interest of transparency. Please be advised this is a medical document. Medical documents are intended to carry relevant information, facts as evident, and the clinical opinion of the practitioner. The medical note is intended as peer to peer communication and may appear blunt or direct. It is written in medical language and may contain abbreviations or verbiage that are unfamiliar.                                       [1]   Past Medical History:   Atrial fibrillation (HCC)    Calculus of kidney    CVA (cerebral vascular  accident) (HCC)    Depression    Diabetes (HCC)    Esophageal reflux    High blood pressure    High cholesterol    HLD (hyperlipidemia)    HTN (hypertension)    Incontinence    Kidney stones    Neuropathy    Osteoarthritis    Stroke (HCC)    Visual impairment   [2]   Past Surgical History:  Procedure Laterality Date    Excis lumbar disk,one level      Knee replacement surgery      Pacemaker monitor      Removal gallbladder      Removal of kidney stone      Total abdom hysterectomy     [3]   Family History  Problem Relation Age of Onset    Hypertension Mother     Heart Disorder Mother     Diabetes Paternal Grandmother     Cancer Sister         kidney CA    Stroke Sister     Diabetes Paternal Aunt    [4]   Allergies  Allergen Reactions    Amoxicillin Coughing, ITCHING and RASH    Statins OTHER (SEE COMMENTS)     Gets extremely weak    Enalapril Coughing    Latex ITCHING    Sulfa Antibiotics RASH   [5]   Current Facility-Administered Medications on File Prior to Encounter   Medication Dose Route Frequency Provider Last Rate Last Admin    [COMPLETED] sodium chloride 0.9 % IV bolus 500 mL  500 mL Intravenous Once Rob Claros MD   Stopped at 04/26/25 1354    [COMPLETED] iopamidol 76% (ISOVUE-370) injection for power injector  80 mL Intravenous ONCE PRN Rob Claros MD   80 mL at 04/26/25 1509     Current Outpatient Medications on File Prior to Encounter   Medication Sig Dispense Refill    Nystatin 962160 UNIT/GM External Powder Apply 1 Application topically 3 (three) times daily. Perineal rash      aspirin 81 MG Oral Tab EC Take 1 tablet (81 mg total) by mouth daily.      ondansetron (ZOFRAN) 4 mg tablet Take 1 tablet (4 mg total) by mouth every 8 (eight) hours as needed for Nausea.      apixaban 2.5 MG Oral Tab Take 1 tablet (2.5 mg total) by mouth 2 (two) times daily.      cyanocobalamin 1000 MCG Oral Tab 1 tablet (1,000 mcg total) daily. Injection      DULoxetine 30 MG Oral Cap DR Particles Take 1 capsule  (30 mg total) by mouth daily.      sacubitril-valsartan 24-26 MG Oral Tab Take 1 tablet by mouth 2 (two) times daily.      famotidine 20 MG Oral Tab Take 1 tablet (20 mg total) by mouth 2 (two) times daily.      furosemide 40 MG Oral Tab Take 1 tablet (40 mg total) by mouth 2 (two) times daily. Monday, Wednesday, Friday      empagliflozin (JARDIANCE) 10 MG Oral Tab Take by mouth daily.      levothyroxine 50 MCG Oral Tab Take 1 tablet (50 mcg total) by mouth before breakfast.      magnesium oxide 400 MG Oral Tab Take 1 tablet (400 mg total) by mouth daily.      metoprolol succinate  MG Oral Tablet 24 Hr Take 1 tablet (100 mg total) by mouth daily.      potassium chloride 10 MEQ Oral Tab CR Take 1 tablet (10 mEq total) by mouth 2 (two) times daily. Monday, Wednesday, Friday      sertraline 25 MG Oral Tab Take 1 tablet (25 mg total) by mouth daily.      spironolactone 25 MG Oral Tab Take 1 tablet (25 mg total) by mouth daily.      acetaminophen 500 MG Oral Tab Take 1 tablet (500 mg total) by mouth 2 (two) times daily.      multivitamin Oral Chew Tab Chew 1 tablet by mouth daily.      atorvastatin 80 MG Oral Tab Take 1 tablet (80 mg total) by mouth nightly. (Patient not taking: Reported on 8/9/2024) 30 tablet 3    Pantoprazole Sodium 40 MG Oral Tab EC Take 1 tablet (40 mg total) by mouth 2 (two) times daily before meals.      Clopidogrel Bisulfate 75 MG Oral Tab Take 1 tablet (75 mg total) by mouth daily. (Patient not taking: Reported on 8/9/2024)      Ergocalciferol (VITAMIN D OR) Take by mouth. Taking 1000 IU once a day

## 2025-05-10 ENCOUNTER — NURSE ONLY (OUTPATIENT)
Dept: ELECTROPHYSIOLOGY | Facility: HOSPITAL | Age: 89
End: 2025-05-10
Attending: Other
Payer: MEDICARE

## 2025-05-10 LAB
ALBUMIN SERPL-MCNC: 4.3 G/DL (ref 3.2–4.8)
ALBUMIN/GLOB SERPL: 1.7 {RATIO} (ref 1–2)
ALP LIVER SERPL-CCNC: 101 U/L (ref 55–142)
ALT SERPL-CCNC: 151 U/L (ref 10–49)
ANION GAP SERPL CALC-SCNC: 11 MMOL/L (ref 0–18)
AST SERPL-CCNC: 68 U/L (ref ?–34)
BASOPHILS # BLD AUTO: 0.06 X10(3) UL (ref 0–0.2)
BASOPHILS NFR BLD AUTO: 0.7 %
BILIRUB SERPL-MCNC: 0.6 MG/DL (ref 0.2–1.1)
BUN BLD-MCNC: 23 MG/DL (ref 9–23)
CALCIUM BLD-MCNC: 9.8 MG/DL (ref 8.7–10.6)
CHLORIDE SERPL-SCNC: 105 MMOL/L (ref 98–112)
CO2 SERPL-SCNC: 27 MMOL/L (ref 21–32)
CREAT BLD-MCNC: 1.46 MG/DL (ref 0.55–1.02)
EGFRCR SERPLBLD CKD-EPI 2021: 34 ML/MIN/1.73M2 (ref 60–?)
EOSINOPHIL # BLD AUTO: 0.34 X10(3) UL (ref 0–0.7)
EOSINOPHIL NFR BLD AUTO: 3.7 %
ERYTHROCYTE [DISTWIDTH] IN BLOOD BY AUTOMATED COUNT: 13.9 %
EST. AVERAGE GLUCOSE BLD GHB EST-MCNC: 151 MG/DL (ref 68–126)
GLOBULIN PLAS-MCNC: 2.6 G/DL (ref 2–3.5)
GLUCOSE BLD-MCNC: 112 MG/DL (ref 70–99)
GLUCOSE BLD-MCNC: 114 MG/DL (ref 70–99)
GLUCOSE BLD-MCNC: 119 MG/DL (ref 70–99)
GLUCOSE BLD-MCNC: 122 MG/DL (ref 70–99)
GLUCOSE BLD-MCNC: 127 MG/DL (ref 70–99)
GLUCOSE BLD-MCNC: 195 MG/DL (ref 70–99)
HBA1C MFR BLD: 6.9 % (ref ?–5.7)
HCT VFR BLD AUTO: 47.2 % (ref 35–48)
HGB BLD-MCNC: 15.3 G/DL (ref 12–16)
IMM GRANULOCYTES # BLD AUTO: 0.04 X10(3) UL (ref 0–1)
IMM GRANULOCYTES NFR BLD: 0.4 %
LYMPHOCYTES # BLD AUTO: 2.33 X10(3) UL (ref 1–4)
LYMPHOCYTES NFR BLD AUTO: 25.7 %
MAGNESIUM SERPL-MCNC: 2.2 MG/DL (ref 1.6–2.6)
MCH RBC QN AUTO: 31.1 PG (ref 26–34)
MCHC RBC AUTO-ENTMCNC: 32.4 G/DL (ref 31–37)
MCV RBC AUTO: 95.9 FL (ref 80–100)
MONOCYTES # BLD AUTO: 0.5 X10(3) UL (ref 0.1–1)
MONOCYTES NFR BLD AUTO: 5.5 %
NEUTROPHILS # BLD AUTO: 5.81 X10 (3) UL (ref 1.5–7.7)
NEUTROPHILS # BLD AUTO: 5.81 X10(3) UL (ref 1.5–7.7)
NEUTROPHILS NFR BLD AUTO: 64 %
OSMOLALITY SERPL CALC.SUM OF ELEC: 301 MOSM/KG (ref 275–295)
PLATELET # BLD AUTO: 237 10(3)UL (ref 150–450)
POTASSIUM SERPL-SCNC: 4.2 MMOL/L (ref 3.5–5.1)
PROT SERPL-MCNC: 6.9 G/DL (ref 5.7–8.2)
RBC # BLD AUTO: 4.92 X10(6)UL (ref 3.8–5.3)
SODIUM SERPL-SCNC: 143 MMOL/L (ref 136–145)
WBC # BLD AUTO: 9.1 X10(3) UL (ref 4–11)

## 2025-05-10 PROCEDURE — 99233 SBSQ HOSP IP/OBS HIGH 50: CPT | Performed by: HOSPITALIST

## 2025-05-10 PROCEDURE — 95816 EEG AWAKE AND DROWSY: CPT | Performed by: OTHER

## 2025-05-10 PROCEDURE — 99223 1ST HOSP IP/OBS HIGH 75: CPT | Performed by: OTHER

## 2025-05-10 RX ORDER — ASPIRIN 81 MG/1
81 TABLET, CHEWABLE ORAL DAILY
Status: DISCONTINUED | OUTPATIENT
Start: 2025-05-11 | End: 2025-05-14

## 2025-05-10 RX ORDER — FUROSEMIDE 40 MG/1
40 TABLET ORAL
Status: DISCONTINUED | OUTPATIENT
Start: 2025-05-12 | End: 2025-05-14

## 2025-05-10 RX ORDER — SPIRONOLACTONE 25 MG/1
25 TABLET ORAL DAILY
Status: DISCONTINUED | OUTPATIENT
Start: 2025-05-11 | End: 2025-05-14

## 2025-05-10 RX ORDER — METOPROLOL SUCCINATE 100 MG/1
100 TABLET, EXTENDED RELEASE ORAL DAILY
Status: DISCONTINUED | OUTPATIENT
Start: 2025-05-10 | End: 2025-05-14

## 2025-05-10 NOTE — OCCUPATIONAL THERAPY NOTE
OCCUPATIONAL THERAPY EVALUATION - INPATIENT     Room Number: 7605/7605-A  Evaluation Date: 5/10/2025  Type of Evaluation: Initial  Presenting Problem: aphasia, MRI brain pending    Physician Order: IP Consult to Occupational Therapy  Reason for Therapy: ADL/IADL Dysfunction and Discharge Planning    OCCUPATIONAL THERAPY ASSESSMENT   Baseline function: staff assist dressing/bathing/toileting/wheechair ride to dining room. Not supposed to walk on her own with walker, but does at night for bathroom  Current function: Min assist with RW, mod LB ADL  Near baseline with ADL/transfers.   Deficits: cognition, balance, endurance, downward reach  Continue OT in hospital.    Patient will benefit from continued skilled OT Services at discharge to promote prior level of function and safety with additional support and return home with home health OT         History: Patient is a 89 year old female admitted on 5/9/2025 with Presenting Problem: aphasia, MRI brain pending. Co-Morbidities : CVA, HTN, HLD, DM2, CKD3, hypothyroidism, depression, CAD s.p PCI, afib    WEIGHT BEARING RESTRICTION  Weight Bearing Restriction: None                Recommendations for nursing staff:   Transfers: one assist with RW and gait belt  Toileting location: commode vs toilet pending endurance    EVALUATION SESSION:  ACTIVITY TOLERANCE: Vitals wfl on RA    COGNITION  Orientation Level:  oriented x4  Following Commands:  follows one step commands with increased time  Safety Judgement:  decreased awareness of need for safety  Slightly impulsive, feels very confused    UPPER EXTREMITY  ROM: within functional limits except B shoulders 2/3 range  Strength: within functional limits     EDUCATION PROVIDED  Patient Education : Role of Occupational Therapy; Functional Transfer Techniques; Plan of Care  Patient's Response to Education: Verbalized Understanding    PATIENT START OF SESSION: semi supine  FUNCTIONAL TRANSFER ASSESSMENT  Sit to Stand: Edge of  Bed  Edge of Bed: Minimal Assist    BED MOBILITY  Supine to Sit : Minimal Assist    BALANCE ASSESSMENT     FUNCTIONAL ADL ASSESSMENT  Grooming Seated: Supervision  LB Dressing Seated: Moderate Assist (assist socks, has assist at baseline)      EQUIPMENT USED: RW  Demonstrates functional use, Would benefit from additional trial      THERAPIST COMMENTS: ADL/mobility as noted. Supine > sit > socks > stand > impulsive return to sit > stand > ambulate to doorway with RW min (A) > chair min (A)> wash face supervision.     Patient End of Session: Up in chair, Call light within reach, Needs met, RN aware of session/findings, All patient questions and concerns addressed, Hospital anti-slip socks, Alarm set    OCCUPATIONAL PROFILE    HOME SITUATION  Type of Home: Assisted living facility ([facility])  Home Layout: One level  Lives With: Staff 24 hours    Toilet and Equipment: Comfort height toilet, Grab bar  Shower/Tub and Equipment: Walk-in shower, Grab bar, Shower chair                Patient Regularly Uses: Rolling walker    Prior Level of Function: Pt reports staff assist with LB dressing, bathing, toileting, walking to the bathroom with walker, and giving ride to the dining yu in a wheelchair. Pt is supposed to have assist for going to the bathroom, but at night she goes by herself.       SUBJECTIVE   Pt states \"I'm so confused!\" Multiple times during session. Unable to elaborate.     PAIN ASSESSMENT  Ratin  Location: denies       OBJECTIVE  Precautions: Bed/chair alarm  Fall Risk: High fall risk      ASSESSMENTS    AM-PAC ‘6-Clicks’ Inpatient Daily Activity Short Form  -   Putting on and taking off regular lower body clothing?: A Lot  -   Bathing (including washing, rinsing, drying)?: A Lot  -   Toileting, which includes using toilet, bedpan or urinal? : A Lot  -   Putting on and taking off regular upper body clothing?: A Little  -   Taking care of personal grooming such as brushing teeth?: A Little  -   Eating  meals?: A Little    AM-PAC Score:  Score: 15  Approx Degree of Impairment: 56.46%  Standardized Score (AM-PAC Scale): 34.69    ADDITIONAL TESTS     NEUROLOGICAL FINDINGS      COGNITION ASSESSMENTS       PLAN  OT Treatment Plan: Balance activities, ADL training, Functional transfer training, UE strengthening/ROM, Endurance training, Patient/Family education, Patient/Family training, Equipment eval/education, Compensatory technique education  Rehab Potential : Good  Frequency: 3-5x/week  Number of Visits to Meet Established Goals: 3    ADL Goals   Patient will perform grooming: with supervision and while standing at sink  Patient will perform toileting: with supervision    Functional Transfer Goals  Patient will transfer to toilet:  with supervision    UE Exercise Program Goal  Patient will be supervision with bilateral AROM HEP (home exercise program).    Therapist Goals  Phq9 as indicated pending MRI results    Patient Evaluation Complexity Level:   Occupational Profile/Medical History LOW - Brief history including review of medical or therapy records    Specific performance deficits impacting engagement in ADL/IADL LOW  1 - 3 performance deficits    Client Assessment/Performance Deficits LOW - No comorbidities nor modifications of tasks    Clinical Decision Making LOW - Analysis of occupational profile, problem-focused assessments, limited treatment options    Overall Complexity LOW     OT Session Time: 25 minutes  Self-Care Home Management: 10 minutes  Therapeutic Activity:  minutes  Neuromuscular Re-education:  minutes  Therapeutic Exercise:  minutes  Orthotic Management and Training:  minutes

## 2025-05-10 NOTE — PROCEDURES
EEG REPORT;    Reason for Examination: Aphasia    Technical Summary:   18 Channels of EEG and 1 Channel of EKG was performed utilizing internation 10/20 method.        Background Activity:   The background activity consisted of 8 Hz waveforms, reactive to eye opening/ external stimulation.    Abnormality:  Throughout the recording low to medium voltage, polymorphic, 3 to 6 Hz slow activity was noted diffusely over both hemispheres    Activation:    Hyperventilation:   Not Performed.    Photic Stimulation:  Driving response seen.No       Sleep:  Stage I sleep seen.     Impression:  This is an Abnormal EEG. No focal, lateralized or generalized epileptiform activity seen.   Mild diffuse slowing into delta and theta range was noted.  This constellation of findings can be seen in encephalopathy due to metabolic/toxic etiology, medication effects or diffuse cerebral injury.  Clinical correlation is recommended.        Rojas Persaud MD  [facility].

## 2025-05-10 NOTE — CM/SW NOTE
05/10/25 1500   Patient Info   Patient's Home Environment Assisted Living   Post Acute Care Provider Upon Admission   ([facility])     SW consulted for home health eval. Pt admitted 5/9/2025 for aphasia.  Prior to admission, patient's baseline is up with 1 and RW. Pt resides at [facility], will need to confirm DC plans with [facility] once known. Anticipated therapy need: Home with Home Healthcare, referrals initiated.     ANANYA/LAYNE to remain available.    LEOPOLDO Fraser

## 2025-05-10 NOTE — SLP NOTE
ADULT SWALLOWING EVALUATION    ASSESSMENT    ASSESSMENT/OVERALL IMPRESSION:  Pt is an 89 year old female with  PMHX of CVA, HLD qand DM who presents to the hospital after increased weakness and word finding difficulty occurred.   Pt speech has improved however pt is very lethargic and weak upon clinician arrival.  Neurology reports that her symptoms are more likely due to poor oral intake and UTI.  Will await MRI results for further information.      Her vocal quality is weak.  OM exam revealed functional ROM, strength and coodination.  Pt presented with PO trial of puree, thin and a cracker.  Pt with cough with first drink of water likely due to positioning and distraction.  Pt re-positioned and fully awake for rest of trials with good oral retrieval and containment.  Adequate OT time and functional hyolaryngeal elevation via palpation.  Pt taking small paced sips of water independently and no other s/s of aspiration noted during an extensive evaluation.  Discussed with pt and RN that pt is safe to eat regular thin diet when awake and alert only and with standard aspiration precautions in place (I.e sitting at 90 degrees, small bites and small sips.)  Will follow with a full meal to ensure diet tolerance.  All verbalized understanding.           RECOMMENDATIONS   Diet Recommendations - Solids: Regular  Diet Recommendations - Liquids: Thin Liquids                           Aspiration Precautions: Upright position, Slow rate, Small bites     Treatment Plan/Recommendations: Dysphagia therapy    HISTORY   MEDICAL HISTORY  Reason for Referral: R/O aspiration    Problem List  Principal Problem:    Aphasia      Past Medical History  Past Medical History[1]    Prior Living Situation: Unknown  Diet Prior to Admission: Regular, Thin liquids       Patient/Family Goals: none stated    SWALLOWING HISTORY  Current Diet Consistency: NPO  Dysphagia History: Assessed patient with thin liquids via spoon, cup, and straw, puree, and  solids.   Oral phase revealed functional oral acceptance, retrieval and mastication of solids with decreased mastication and increased transit times with solids and complete clearing of the oral cavity. Patient with better acceptance of po from straw rather than cup.   Pharyngeal phase revealed an apparent timely initiation of the pharyngeal phase with functional hyolaryngeal elevation on palpation. No coughing or throat clearing. Patient presents with mildly impaired oral phase and apparent intact pharyngeal phase of swallow.   Recommend soft/easy to chew solids and thin liquids via straw to facilitate retrieval.   Imaging Results:  Chest   CONCLUSION:  No evidence of active cardiopulmonary disease.     CT brain  CONCLUSION:    1. No acute intracranial pathology.   2. 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   3. Global atrophy and chronic small vessel ischemic changes of the cerebral white matter.   4. Stable atherosclerotic changes with no hemodynamically significant stenosis or acute abnormality in CT angiography of the brain and neck.   5. Incompletely visualized large bulla in right middle lobe.             SUBJECTIVE       OBJECTIVE   ORAL MOTOR EXAMINATION  Dentition: Natural, Functional  Symmetry: Within Functional Limits  Strength: Within Functional Limits  Tone: Within Functional Limits  Range of Motion: Within Functional Limits  Rate of Motion: Within Functional Limits    Voice Quality: Clear, Weak  Respiratory Status:  (RA)  Consistencies Trialed: Thin liquids, Puree, Hard solid  Method of Presentation: Staff/Clinician assistance  Patient Positioned: Upright, Midline    Oral Phase of Swallow: Impaired              Mastication: Impaired       Pharyngeal Phase of Swallow: Appears Impaired     Laryngeal Elevation Strength: Appears impaired     (Please note: Silent aspiration cannot be evaluated clinically. Videofluoroscopic Swallow Study is required to rule-out silent  aspiration.)       Comments:               GOALS  Goal #1 The patient will tolerate regular consistency and thin liquids without overt signs or symptoms of aspiration with 95 % accuracy over 1-2 session(s).  In Progress   Goal #2        Goal #3 The patient/family/caregiver will demonstrate understanding and implementation of aspiration precautions and swallow strategies independently over 1-2 session(s).  In Progress   Goal #4     Goal #5     Goal #6     Goal #7     Goal #8       FOLLOW UP  Treatment Plan/Recommendations: Dysphagia therapy  Duration: 1 week  Follow Up Needed (Documentation Required): Yes  SLP Follow-up Date: 05/12/25    Thank you for your referral.   If you have any questions, please contact Marjorie Garner SLP       [1]   Past Medical History:   Atrial fibrillation (HCC)    Calculus of kidney    CVA (cerebral vascular accident) (HCC)    Depression    Diabetes (HCC)    Esophageal reflux    High blood pressure    High cholesterol    HLD (hyperlipidemia)    HTN (hypertension)    Incontinence    Kidney stones    Neuropathy    Osteoarthritis    Stroke (HCC)    Visual impairment

## 2025-05-10 NOTE — PROGRESS NOTES
[facility]   part of [facility]     Hospitalist Progress Note     Jamee Johnson Patient Status:  Observation    1935 MRN [id number]   Location [facility] 7NE-A Attending Nohelia Burton,    Hosp Day # 0 PCP Angle Mccurdy MD     Chief Complaint: Aphasia    Subjective:     Patient continues to have some aphasia.  No other focal deficits appreciated.  Patient's son and daughter-in-law at the bedside.  All questions answered.    Objective:    Review of Systems:   A comprehensive review of systems was completed; pertinent positive and negatives stated in subjective.    Vital signs:  Temp:  [97 °F (36.1 °C)-98.2 °F (36.8 °C)] 98.1 °F (36.7 °C)  Pulse:  [70-77] 70  Resp:  [15-19] 18  BP: (121-163)/(67-89) 163/85  SpO2:  [91 %-100 %] 96 %    Physical Exam:    General: No acute distress  Respiratory: No wheezes, no rhonchi  Cardiovascular: S1, S2, regular rate and rhythm  Abdomen: Soft, Non-tender, non-distended, positive bowel sounds  Neuro: No new focal deficits.   Extremities: No edema      Diagnostic Data:    Labs:  Recent Labs   Lab 25  1152 05/10/25  0556   WBC 9.6 9.1   HGB 16.5* 15.3   MCV 93.6 95.9   .0 237.0   INR 1.12  --        Recent Labs   Lab 25  1152 05/10/25  0556   * 119*   BUN 26* 23   CREATSERUM 2.01* 1.46*   CA 9.9 9.8   ALB 4.8 4.3    143   K 4.7 4.2    105   CO2 30.0 27.0   ALKPHO 107 101   AST 82* 68*   * 151*   BILT 0.5 0.6   TP 7.5 6.9       Estimated Glomerular Filtration Rate: 34 mL/min/1.73m2 (A) (result from lab).    Recent Labs   Lab 25  1152   TROPHS 12       Recent Labs   Lab 25  1152   PTP 14.5   INR 1.12                  Microbiology    Hospital Encounter on 25   1. Urine Culture, Routine     Status: Abnormal (Preliminary result)    Collection Time: 25  1:19 PM    Specimen: Urine, clean catch   Result Value Ref Range    Urine Culture >100,000 CFU/ML Gram Negative Rodrick (A) N/A         Imaging:  Reviewed in Epic.    Medications: Scheduled Medications[1]    Assessment & Plan:      #Acute aphasia  -r/o cva, ct neg, MRI pending (aware of patient's pacemaker)  -neuro following  EEG pending  -pt/ot/st to see  -permissive htn  -cva orderset  -PTA: eliquis dose to be adjusted to xarelto, discussed w/ Dr Cuenca from cards     #HTN    #HLD     #DM2  -A1c 6.9 as of May 2025  -ISS for now     #CAD sp PCI    #Chronic afib s/p PPM     #CKD3     #Pyuria  -cont iv abx, Ucx shows >100 cfu gram neg jose manuel     #Mild elevation in LFTs, down trending  -no RUQ pain, will trend for now     #Hypothyroidism    #Depression     #ACP  -full code, discussed earlier on admission      Trsih Dasilva DO    Supplementary Documentation:     Quality:  DVT Mechanical Prophylaxis:   SCDs,    DVT Pharmacologic Prophylaxis   Medication    rivaroxaban (Xarelto) tab 15 mg      DVT Pharmacologic prophylaxis: Aspirin 162 mg         Code Status: Full Code  Mathis: No urinary catheter in place  Mathis Duration (in days):   Central line:    YVON:     Discharge is dependent on: CVA workup  At this point Ms. Johnson is expected to be discharge to: TBD    The 21st Century Cures Act makes medical notes like these available to patients in the interest of transparency. Please be advised this is a medical document. Medical documents are intended to carry relevant information, facts as evident, and the clinical opinion of the practitioner. The medical note is intended as peer to peer communication and may appear blunt or direct. It is written in medical language and may contain abbreviations or verbiage that are unfamiliar.                         [1]    [START ON 5/11/2025] aspirin  81 mg Oral Daily    rivaroxaban  15 mg Oral Daily with food    [START ON 5/12/2025] furosemide  40 mg Oral Once per day on Monday Wednesday Friday    metoprolol succinate ER  100 mg Oral Daily    [START ON 5/11/2025] spironolactone  25 mg Oral Daily    cefTRIAXone  1 g Intravenous Q24H     insulin aspart  1-10 Units Subcutaneous q6h

## 2025-05-10 NOTE — CONSULTS
[facility]  JONNA Neurology Consult Note    Jamee Johnson Patient Status:  Observation    1935 MRN [id number]   Location [facility] 7NE-A Attending Nohelia Burton,    Hosp Day # 0 PCP Angle Mccurdy MD     REASON FOR EVALUATION:  AMS, speech difficulty    HISTORY OF PRESENT ILLNESS:  Ms. Johnson is an 89-year-old woman with history of hypertension, dyslipidemia, atrial fibrillation on chronic anticoagulation and ischemic stroke who was hospitalized for progressive functional decline and difficulty with speaking.  She is a limited historian due to encephalopathy and the history is predominantly from chart review.  She has apparently been nauseated for at least the last several days, which she corroborates at the bedside currently that she still feels nauseated, and has not been eating or drinking much.  This also makes her feel very tired and weak.  In this setting, she was less responsive according to her facility staff and she was sent to the hospital as a concern for recurrent stroke.  She has no other specific complaints other than feeling nauseated and very tired.  She specifically denies any new weakness or numbness.    PAST MEDICAL HISTORY:  Past Medical History:    Atrial fibrillation (HCC)    Calculus of kidney    CVA (cerebral vascular accident) (HCC)    Depression    Diabetes (HCC)    Esophageal reflux    High blood pressure    High cholesterol    HLD (hyperlipidemia)    HTN (hypertension)    Incontinence    Kidney stones    Neuropathy    Osteoarthritis    Stroke (HCC)    Visual impairment       PAST SURGICAL HISTORY:  Past Surgical History:   Procedure Laterality Date    Excis lumbar disk,one level      Knee replacement surgery      Pacemaker monitor      Removal gallbladder      Removal of kidney stone      Total abdom hysterectomy         FAMILY HISTORY:  family history includes Cancer in her sister; Diabetes in her paternal aunt and paternal grandmother; Heart Disorder in her  mother; Hypertension in her mother; Stroke in her sister.    SOCIAL HISTORY:   reports that she has never smoked. She has never used smokeless tobacco. She reports that she does not drink alcohol and does not use drugs.    ALLERGIES:  Allergies   Allergen Reactions    Amoxicillin Coughing, ITCHING and RASH    Statins OTHER (SEE COMMENTS)     Gets extremely weak    Enalapril Coughing    Latex ITCHING    Sulfa Antibiotics RASH       MEDICATIONS:  No current outpatient medications on file.     Current Facility-Administered Medications   Medication Dose Route Frequency    apixaban (Eliquis) tab 2.5 mg  2.5 mg Oral BID    [START ON 5/11/2025] aspirin chewable tab 81 mg  81 mg Oral Daily    sodium chloride 0.9% infusion   Intravenous Continuous    acetaminophen (Tylenol) tab 650 mg  650 mg Oral Q4H PRN    Or    acetaminophen (Tylenol) rectal suppository 650 mg  650 mg Rectal Q4H PRN    labetalol (Trandate) 5 mg/mL injection 10 mg  10 mg Intravenous Q10 Min PRN    hydrALAzine (Apresoline) 20 mg/mL injection 10 mg  10 mg Intravenous Q2H PRN    ondansetron (Zofran) 4 MG/2ML injection 4 mg  4 mg Intravenous Q6H PRN    metoclopramide (Reglan) 5 mg/mL injection 5 mg  5 mg Intravenous Q8H PRN    acetaminophen (Tylenol Extra Strength) tab 500 mg  500 mg Oral Q4H PRN    melatonin tab 3 mg  3 mg Oral Nightly PRN    polyethylene glycol (PEG 3350) (Miralax) 17 g oral packet 17 g  17 g Oral Daily PRN    sennosides (Senokot) tab 17.2 mg  17.2 mg Oral Nightly PRN    bisacodyl (Dulcolax) 10 MG rectal suppository 10 mg  10 mg Rectal Daily PRN    cefTRIAXone (Rocephin) 1 g in sodium chloride 0.9% 100 mL IVPB-ADDV  1 g Intravenous Q24H    glucose (Dex4) 15 GM/59ML oral liquid 15 g  15 g Oral Q15 Min PRN    Or    glucose (Glutose) 40% oral gel 15 g  15 g Oral Q15 Min PRN    Or    glucose-vitamin C (Dex-4) chewable tab 4 tablet  4 tablet Oral Q15 Min PRN    Or    dextrose 50% injection 50 mL  50 mL Intravenous Q15 Min PRN    Or    glucose  (Dex4) 15 GM/59ML oral liquid 30 g  30 g Oral Q15 Min PRN    Or    glucose (Glutose) 40% oral gel 30 g  30 g Oral Q15 Min PRN    Or    glucose-vitamin C (Dex-4) chewable tab 8 tablet  8 tablet Oral Q15 Min PRN    insulin aspart (NovoLOG) 100 Units/mL FlexPen 1-10 Units  1-10 Units Subcutaneous q6h       REVIEW OF SYSTEMS:  A 10-point system was reviewed.  Pertinent positives and negatives are noted in HPI.      PHYSICAL EXAMINATION:  VITAL SIGNS: BP (!) 163/85 (BP Location: Right arm)   Pulse 70   Temp 98.1 °F (36.7 °C) (Oral)   Resp 18   Wt 167 lb 12.3 oz (76.1 kg)   LMP  (LMP Unknown)   SpO2 98%   BMI 31.70 kg/m²   GENERAL:  Patient is a 89 year old female in no acute distress.    NEUROLOGICAL:   Mental status: Eyes closed, will open to voice and light touch. Oriented to person, place but not time. She speaks in single word phrases, softly (hypophonia). Follows motor commands in all limbs, 1 and 2 step.  Cranial Nerves: conjugate gaze, face symmetric, tongue midline  Motor: follows commands in all limbs but gives only momentary effort, no passive antigravity strength in any limb    Sensory: Intact to light touch in all limbs  Tendon Reflexes: normal for habitus, no asymmetry  Gait: Deferred    DIAGNOSTIC DATA:  Labs:  Recent Labs   Lab 05/09/25  1152 05/10/25  0556   RBC 5.44* 4.92   HGB 16.5* 15.3   HCT 50.9* 47.2   MCV 93.6 95.9   MCH 30.3 31.1   MCHC 32.4 32.4   RDW 14.1 13.9   NEPRELIM 6.10 5.81   WBC 9.6 9.1   .0 237.0         Recent Labs   Lab 05/09/25  1152 05/10/25  0556   * 119*   BUN 26* 23   CREATSERUM 2.01* 1.46*   EGFRCR 23* 34*   CA 9.9 9.8    143   K 4.7 4.2    105   CO2 30.0 27.0         IMAGING:  XR CHEST AP PORTABLE  (CPT=71045)  Result Date: 5/9/2025  CONCLUSION:  No evidence of active cardiopulmonary disease.   LOCATION:  [facility]      Dictated by (CST): Donnie Park MD on 5/09/2025 at 1:13 PM     Finalized by (CST): Donnie Park MD on 5/09/2025 at 1:13 PM        CT STROKE CTA BRAIN/CTA NECK (W IV)(CPT=70496/04561)  Result Date: 5/9/2025  CONCLUSION:  1. No acute intracranial pathology. 2. 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 3. Global atrophy and chronic small vessel ischemic changes of the cerebral white matter. 4. Stable atherosclerotic changes with no hemodynamically significant stenosis or acute abnormality in CT angiography of the brain and neck. 5. Incompletely visualized large bulla in right middle lobe.   LOCATION:  [facility]   Dictated by (CST): Donnie Park MD on 5/09/2025 at 12:28 PM     Finalized by (CST): Donnie Park MD on 5/09/2025 at 12:47 PM       CT STROKE BRAIN (NO IV)(CPT=70450)  Result Date: 5/9/2025  CONCLUSION:  No acute intracranial pathology.  Critical results were called to Dr. Medina at 1207 hours on 5/9/2025.  There was appropriate read back.    LOCATION:  [facility]   Dictated by (CST): Donnie Park MD on 5/09/2025 at 12:05 PM     Finalized by (CST): Donnie Park MD on 5/09/2025 at 12:08 PM       CT ABDOMEN+PELVIS(CONTRAST ONLY)(CPT=74177)  Result Date: 4/26/2025  CONCLUSION:  1. Abnormal appearance of the urinary bladder.  Appearance consistent with nonspecific cystitis. 2. Additional urothelial enhancement involving the proximal right renal collecting system.  This may reflect upper urinary tract infection.  Correlate for pyelonephritis. 3. Uncomplicated colonic diverticulosis. 4. Focal fluid adjacent/inferior to the left greater trochanter.  Differential considerations include bursitis and liquified hematoma.  5. Details as above.  Continued clinical correlation recommended.    LOCATION:  [facility]   Dictated by (CST): Marin Leone MD on 4/26/2025 at 3:34 PM     Finalized by (CST): Marin Leone MD on 4/26/2025 at 3:43 PM           ASSESSMENT:  Ms. Johnson is an 89-year-old woman with hypertension, dyslipidemia and atrial fibrillation on anticoagulation who is likely experiencing multifactorial alteration in  mentation related to poor oral intake and urinary tract infection, less likely acute neurovascular episode or epilepsy.     PLAN:  Principal Problem:    Aphasia  She is not alert enough to call her difficulty with communication specifically \"aphasia\" and nothing I have come across in history, exam or diagnostics so far gives particular suspicion for acute cerebral ischemia, but of course she has high risk and we should screen for this in parallel with address her more obvious causes of encephalopathy. MRI brain without contrast is reasonable if achievable, but we need to ensure cardiac pacemaker compatibility and safety.    EEG    Agree with UTI treatment.  She also needs quality and regularity of sleep, nutrition, hydration and physical activity.  If she cannot achieve adequate enteral nutrition through oral intake then recommend consideration of instrumental feeding (nasogastric tube), even if temporarily, to provide proper enteral sustenance.    I am following.    Rui Constantino MD

## 2025-05-10 NOTE — PHYSICAL THERAPY NOTE
PHYSICAL THERAPY EVALUATION - INPATIENT     Room Number: 7605/7605-A  Evaluation Date: 5/10/2025  Type of Evaluation: Initial  Physician Order: PT Eval and Treat    Presenting Problem: aphasia  Co-Morbidities : CVA, HTN, HLD, DM2, CKD3, hypothyroidism, depression, CAD s.p PCI, afib  Reason for Therapy: Mobility Dysfunction and Discharge Planning    PHYSICAL THERAPY ASSESSMENT   Patient is a 89 year old female admitted 5/9/2025 for aphasia.  Prior to admission, patient's baseline is up with 1 and RW.  Patient is currently functioning below baseline with bed mobility, transfers, and gait.  Patient is requiring contact guard assist and minimal assist as a result of the following impairments: impaired motor planning, cognitive deficits (impaired motor planning, impaired task sequencing, impaired task initiation, impaired attention), and medical status.  Physical Therapy will continue to follow for duration of hospitalization.    Patient will benefit from continued skilled PT Services at discharge to promote prior level of function and safety with additional support and return home with home health PT.    PLAN DURING HOSPITALIZATION  Nursing Mobility Recommendation : 1 Assist  PT Device Recommendation: Rolling walker  PT Treatment Plan: Bed mobility, Body mechanics, Coordination, Endurance, Energy conservation, Patient education, Family education, Gait training, Range of motion, Neuromuscular re-educate, Strengthening, Transfer training, Balance training  Rehab Potential : Good  Frequency (Obs): 3-5x/week     CURRENT GOALS    Goal #1 Patient is able to demonstrate supine - sit EOB @ level: supervision     Goal #2 Patient is able to demonstrate transfers EOB to/from Chair/Wheelchair at assistance level: supervision     Goal #3 Patient is able to ambulate 150 feet with assist device: walker - rolling at assistance level: CGA     Goal #4    Goal #5    Goal #6    Goal Comments: Goals established on 5/10/2025      PHYSICAL  THERAPY MEDICAL/SOCIAL HISTORY  History related to current admission: Patient is a 89 year old female admitted on 2025: Presents with difficulty speaking and L sided weakness (has residual L sided weakness from hx of CVA), MRI pending    ED  24 and 25: fall    HOME SITUATION  Type of Home: Assisted living facility ([facility])  Home Layout: One level                     Lives With: Staff 24 hours        Patient Regularly Uses: Rolling walker      Prior Level of [place]: Walks with RW and assist x1 at baseline, states is suppose to always have a staff member with her when she's walking to the bathroom but at night time she goes by herself     SUBJECTIVE  \" I feel confused\" - oriented but impaired motor planning, task sequencing, memory    OBJECTIVE  Precautions: Bed/chair alarm  Fall Risk: High fall risk    WEIGHT BEARING RESTRICTION     PAIN ASSESSMENT  Ratin  Location:  (pt does not c.o pain)  Management Techniques: Activity promotion, Body mechanics, Repositioning    COGNITION  Overall Cognitive Status:  Impaired  Attention Span:  attends with cues to redirect and difficulty attending to directions  Orientation Level:  oriented to place, oriented to time, oriented to situation, and oriented to person  Initiation: cues to initiate tasks and hand over hand to initiate tasks  Motor Planning: impaired  Problem Solving:  assistance required to identify errors made, assistance required to generate solutions, and assistance required to implement solutions    RANGE OF MOTION AND STRENGTH ASSESSMENT  Upper extremity ROM and strength are within functional limits     Lower extremity ROM is within functional limits     Lower extremity strength is within functional limits     BALANCE  Static Sitting: Good  Dynamic Sitting: Good  Static Standing: Fair -  Dynamic Standing: Fair -    ADDITIONAL TESTS  Additional Tests: Modified Neo              Modified Williams: 3                  ACTIVITY  TOLERANCE  Pulse: 70  Heart Rate Source: Monitor     BP: (!) 163/85  BP Location: Right arm  BP Method: Automatic  Patient Position: Lying    O2 WALK       NEUROLOGICAL FINDINGS                        AM-PAC '6-Clicks' INPATIENT SHORT FORM - BASIC MOBILITY  How much difficulty does the patient currently have...  Patient Difficulty: Turning over in bed (including adjusting bedclothes, sheets and blankets)?: None   Patient Difficulty: Sitting down on and standing up from a chair with arms (e.g., wheelchair, bedside commode, etc.): A Little   Patient Difficulty: Moving from lying on back to sitting on the side of the bed?: A Little   How much help from another person does the patient currently need...   Help from Another: Moving to and from a bed to a chair (including a wheelchair)?: A Little   Help from Another: Need to walk in hospital room?: A Little   Help from Another: Climbing 3-5 steps with a railing?: A Little     AM-PAC Score:  Raw Score: 19   Approx Degree of Impairment: 41.77%   Standardized Score (AM-PAC Scale): 45.44   CMS Modifier (G-Code): CK    FUNCTIONAL ABILITY STATUS  Gait Assessment   Functional Mobility/Gait Assessment  Gait Assistance: Minimum assistance  Distance (ft): 20  Assistive Device: Rolling walker  Pattern: Shuffle    Skilled Therapy Provided     Bed Mobility:  Rolling: min A   Supine to sit: min A    Sit to supine: NT     Transfer Mobility:  Sit to stand: CGA   Stand to sit: CGA  Gait = min A     Therapist's Comments: Discussed case with RN prior to session initiation. Pt agreeable to participation in therapy. Gait belt donned for out of bed mobility.  Pt demonstrating difficulty with task initiation requiring frequent cuing to continue task, task sequencing/motor planning stopping task when verbal or tactile cuing stopped - appears visibly frustrated repeatedly stating \"I'm so confused\" (RN notified). With inc time and cuing able to participate in functional mobility CGA to min A.        Exercise/Education Provided:  Bed mobility  Body mechanics  Functional activity tolerated  Gait training  Transfer training    Patient End of Session: Up in chair, Needs met, Call light within reach, RN aware of session/findings, Hospital anti-slip socks, All patient questions and concerns addressed, Alarm set      Patient Evaluation Complexity Level:  History Low - no personal factors and/or co-morbidities   Examination of body systems Low -  addressing 1-2 elements   Clinical Presentation Low- Stable   Clinical Decision Making Low Complexity       PT Session Time: 24 minutes  Gait Training: 10 minutes  Therapeutic Activity: 5 minutes  Neuromuscular Re-education:  minutes  Therapeutic Exercise:  minutes

## 2025-05-10 NOTE — PLAN OF CARE
Assumed care at 0730.  A&OX4. Neuro deficits include Lt sided weakness, Intermittent aphasia & slurred speech. See flow sheets.  Tele - V-Paced. Denies CP & SOB.  Passed swallow test.  Tolerating diet well.  Up in chair & ambulated X2 asst & walker to BR.  Xarelto started today. EEG completed.  Family at bedside.  Care Ongoing.

## 2025-05-10 NOTE — CONSULTS
[facility]  Cardiology Consultation    Jamee Johnson Patient Status:  Observation    1935 MRN [id number]   Location [facility] 7NE-A Attending Nohelia Burton,    Hosp Day # 0 PCP Angle Mccurdy MD     Reason for Consultation:  TIA possible CVA, afib    History of Present Illness:  Jamee Johnson is a a(n) 89 year old female who presents with word finding difficulty and confusion.  She has followed in the past with my colleague Dr. Verdugo and Kimberly and recent office note has been copied into current EMR chart for reference.  She has recently been treated for UTI but more holistically due to a lot intolerance to antibiotics.   She denies chest pain, dyspnea, orthopnea, PND or LE swelling.  She denies palpitations, lightheadedness, dizziness, presyncope or syncope.  She denies fever, chills, nausea/vomiting, diarrhea or urinary complaints.    History:  Past Medical History[1]  Past Surgical History[2]  Family History[3]   reports that she has never smoked. She has never used smokeless tobacco. She reports that she does not drink alcohol and does not use drugs.    Allergies:  Allergies[4]    Medications:  Current Hospital Medications[5]    Review of Systems:  A comprehensive review of systems was negative if not otherwise mention in above HPI.    BP (!) 163/85 (BP Location: Right arm)   Pulse 70   Temp 98.1 °F (36.7 °C) (Oral)   Resp 18   Wt 167 lb 12.3 oz (76.1 kg)   LMP  (LMP Unknown)   SpO2 96%   BMI 31.70 kg/m²   Temp (24hrs), Av.6 °F (36.4 °C), Min:97 °F (36.1 °C), Max:98.2 °F (36.8 °C)       Intake/Output Summary (Last 24 hours) at 5/10/2025 1152  Last data filed at 5/10/2025 0529  Gross per 24 hour   Intake 1437 ml   Output 1450 ml   Net -13 ml     Wt Readings from Last 3 Encounters:   25 167 lb 12.3 oz (76.1 kg)   25 155 lb (70.3 kg)   08/15/24 148 lb (67.1 kg)       Physical Exam:   General: Alert and oriented x 3. No apparent distress. No  respiratory or constitutional distress.  HEENT: Normocephalic, anicteric sclera, neck supple.  Neck: No JVD, carotids 2+, no bruits.  Cardiac: Regular rate and rhythm. S1, S2 normal. No murmur, pericardial rub, S3.  Lungs: Clear without wheezes, rales, rhonchi or dullness.  Normal excursions and effort.  Abdomen: Soft, non-tender.   Extremities: Without clubbing, cyanosis or edema.  Peripheral pulses are 2+.  Neurologic: Alert and oriented, normal affect.  Skin: Warm and dry.     Laboratory Data:  Lab Results   Component Value Date    WBC 9.1 05/10/2025    HGB 15.3 05/10/2025    HCT 47.2 05/10/2025    .0 05/10/2025    CREATSERUM 1.46 05/10/2025    BUN 23 05/10/2025     05/10/2025    K 4.2 05/10/2025     05/10/2025    CO2 27.0 05/10/2025     05/10/2025    CA 9.8 05/10/2025    ALB 4.3 05/10/2025    ALKPHO 101 05/10/2025    BILT 0.6 05/10/2025    TP 6.9 05/10/2025    AST 68 05/10/2025     05/10/2025    MG 2.2 05/10/2025    PGLU 112 05/10/2025     Recent Labs   Lab 05/09/25  1152   TROPHS 12       Imaging:  EKG 5/9/2025: afib RBBB    Echo 7/19/24 Conclusions:     1. Left ventricle: The cavity size was normal. Wall thickness was at the      upper limits of normal. Systolic function was normal. The estimated      ejection fraction was 60-65%, by visual assessment. No diagnostic      evidence for regional wall motion abnormalities. Unable to assess LV      diastolic function.   2. Right ventricle: Pacer wire noted in the right ventricle.   3. Left atrium: The left atrial volume was markedly increased.   4. Atrial septum: Agitated saline contrast study showed no shunt, at      baseline or with provocation.   Impressions:  This study is a limited bubble study with a previous complete   study dated 7/15/2024: The bubble study is negative for a right to left   shunt.     Impression:  Principal Problem:    Aphasia  Hx multiple embolic CVA  Anemia  Permanent afib  High risk of  fall/bleed  HTN  HL  CKD  DM Type 2  Hypothyroidism    Recommendations:  -tele monitoring  -agree with neuro evaluation and await evaluation  -UTI evaluation per primary service  -clinical suspicion of acute TIA/CVA; per my review of chart patient had OAC and ASA/plavix discontinued due to call in July 2024.  She has been able to tolerate recently Eliquis 2.5 mg BID and ASA 81 mg daily.  She based on age/weight/renal function at times would qualify for Eliquis 2.5 mg BID but other times 5 mg BID.  I recommend switching to Xarelto 15 mg daily since CrCl less than 51 and continue aspirin 81 mg daily  -can consider MRI brain after consultation with EP colleague on Monday (she did have MRI brain during hospitalization July 2024)  -holding lasix today and will resume lasix 40 mg MWF  -resume oral BB and spironolactone  -monitor renal function and resume entresto in the next 1-2 days    Thank you for allowing me to participate in the care of your patient.    Josh Cuenca MD  5/10/2025  11:52 AM         [1]   Past Medical History:   Atrial fibrillation (HCC)    Calculus of kidney    CVA (cerebral vascular accident) (HCC)    Depression    Diabetes (HCC)    Esophageal reflux    High blood pressure    High cholesterol    HLD (hyperlipidemia)    HTN (hypertension)    Incontinence    Kidney stones    Neuropathy    Osteoarthritis    Stroke (HCC)    Visual impairment   [2]   Past Surgical History:  Procedure Laterality Date    Excis lumbar disk,one level      Knee replacement surgery      Pacemaker monitor      Removal gallbladder      Removal of kidney stone      Total abdom hysterectomy     [3]   Family History  Problem Relation Age of Onset    Hypertension Mother     Heart Disorder Mother     Diabetes Paternal Grandmother     Cancer Sister         kidney CA    Stroke Sister     Diabetes Paternal Aunt    [4]   Allergies  Allergen Reactions    Amoxicillin Coughing, ITCHING and RASH    Statins OTHER (SEE COMMENTS)     Gets extremely  weak    Enalapril Coughing    Latex ITCHING    Sulfa Antibiotics RASH   [5]   Current Facility-Administered Medications:     apixaban (Eliquis) tab 2.5 mg, 2.5 mg, Oral, BID    [START ON 5/11/2025] aspirin chewable tab 81 mg, 81 mg, Oral, Daily    sodium chloride 0.9% infusion, , Intravenous, Continuous    acetaminophen (Tylenol) tab 650 mg, 650 mg, Oral, Q4H PRN **OR** acetaminophen (Tylenol) rectal suppository 650 mg, 650 mg, Rectal, Q4H PRN    labetalol (Trandate) 5 mg/mL injection 10 mg, 10 mg, Intravenous, Q10 Min PRN    hydrALAzine (Apresoline) 20 mg/mL injection 10 mg, 10 mg, Intravenous, Q2H PRN    ondansetron (Zofran) 4 MG/2ML injection 4 mg, 4 mg, Intravenous, Q6H PRN    metoclopramide (Reglan) 5 mg/mL injection 5 mg, 5 mg, Intravenous, Q8H PRN    acetaminophen (Tylenol Extra Strength) tab 500 mg, 500 mg, Oral, Q4H PRN    melatonin tab 3 mg, 3 mg, Oral, Nightly PRN    polyethylene glycol (PEG 3350) (Miralax) 17 g oral packet 17 g, 17 g, Oral, Daily PRN    sennosides (Senokot) tab 17.2 mg, 17.2 mg, Oral, Nightly PRN    bisacodyl (Dulcolax) 10 MG rectal suppository 10 mg, 10 mg, Rectal, Daily PRN    cefTRIAXone (Rocephin) 1 g in sodium chloride 0.9% 100 mL IVPB-ADDV, 1 g, Intravenous, Q24H    glucose (Dex4) 15 GM/59ML oral liquid 15 g, 15 g, Oral, Q15 Min PRN **OR** glucose (Glutose) 40% oral gel 15 g, 15 g, Oral, Q15 Min PRN **OR** glucose-vitamin C (Dex-4) chewable tab 4 tablet, 4 tablet, Oral, Q15 Min PRN **OR** dextrose 50% injection 50 mL, 50 mL, Intravenous, Q15 Min PRN **OR** glucose (Dex4) 15 GM/59ML oral liquid 30 g, 30 g, Oral, Q15 Min PRN **OR** glucose (Glutose) 40% oral gel 30 g, 30 g, Oral, Q15 Min PRN **OR** glucose-vitamin C (Dex-4) chewable tab 8 tablet, 8 tablet, Oral, Q15 Min PRN    insulin aspart (NovoLOG) 100 Units/mL FlexPen 1-10 Units, 1-10 Units, Subcutaneous, q6h

## 2025-05-10 NOTE — PLAN OF CARE
Assumed care of pt @ 1930.  Rec'd pt in bed, resting.  Pt. Denies any pain/discomfort @ this time.  States \"I want to know who brought me here\".  Speech more clear throughout shift.  Slight left facial droop noted.  Left sided weakness unchanged.  Pt. Can state birthdate and current year with assistance.  IVF maintainted per MD orders.  NPO for swallow evaluation today.  Neuro checks Q4h

## 2025-05-10 NOTE — HISTORICAL OFFICE NOTE
[facility]  Outside Information  Continuity of Care Document  10/7/2024  NAE Johnson - 89 y.o. Female; born [birth year]  Summary of episode note  , generated on May 10, 2025May 10, 2025   CHIEF COMPLAINT    CHIEF COMPLAINT  Reason for Visit/Chief Complaint   Follow up for possible watchman   88-year-old female with a Medtronic ICD, diabetes, hypertension, history of atrial fibrillation and coronary disease as well as heart failure. Patient reports her ICD was put in during a hospitalization where she had to be shocked out of an irregular rhythm. She does not know if it was ventricular tachycardia or atrial fibrillationShe can walk 1 block but gets short of breathNo ICD shocks since the device was put in. No signs of infectionVisit June 4 , 2024  Patient is doing well. Device checks have been normal. Permanent A-fib VVIR 70  - Now sees Dr. Harris in our group. Prior ischemic cardiomyopathy with revascularization and CRT-D with normalized EFVisit October 8, 2024  Since my last visit the patient has had several falls. She is now in rehab for this. Her Eliquis was decreased to 2.5 twice daily and her clopidogrel was stopped. Aspirin was not started as per Dr. 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, [SNOMED-CT: 962066468690] 12/11/2023 - Active   Hypertension (HTN), primary, [SNOMED-CT: 10518726] 12/11/2023 - Active   CKD (chronic kidney disease) stage 3, GFR 30-59 ml/min, [SNOMED-CT: 615171239] 12/11/2023 - Active   Acute  cerebrovascular accident (CVA), [SNOMED-CT: 530588659] 12/8/2023 - Active   Atrial fibrillation, persistent - Afib, [SNOMED-CT: 693184036] 12/8/2023 - Active     ENCOUNTER    ENCOUNTER  Problem Effective Dates Date resolved Problem Status   Chronic Systolic HF (heart failure), Class III, [SNOMED-CT: 786080800] 1/23/2024 - Active   Hyperlipidemia, mixed, [SNOMED-CT: 260969806] 1/23/2024 - Active   History of PTCA 2, [SNOMED-CT: 647959310] 1/23/2024 - Active   CAD native (coronary artery disease), [SNOMED-CT: 31512455] 1/23/2024 - Active   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, [SNOMED-CT: 854827902495] 12/11/2023 - Active   Hypertension (HTN), primary, [SNOMED-CT: 52139114] 12/11/2023 - Active   CKD (chronic kidney disease) stage 3, GFR 30-59 ml/min, [SNOMED-CT: 472270979] 12/11/2023 - Active   Acute cerebrovascular accident (CVA), [SNOMED-CT: 429909954] 12/8/2023 - Active   Atrial fibrillation, persistent - Afib, [SNOMED-CT: 423906208] 12/8/2023 - Active     VITAL SIGNS    VITAL SIGNS  Date / Time: 10/8/2024   BP Systolic 140 mmHg   BP Diastolic 80 mmHg   Height 61 inches   Weight 155 lbs   Pulse Rate 71 bpm   BSA (Body Surface Area) 1.8 cc/m2   BMI (Body Mass Index) 29.3 cc/m2   Blood Pressure 140 / 80 mmHg     PHYSICAL EXAMINATION    PHYSICAL EXAMINATION  Header Details   Vitals Right Arm Sitting  / 80 mmHg, Pulse rate 71 bpm, Height in 5' 1\", BMI: 29.3, Weight in 154.32 lbs (or) 70 kgs, BSA : 1.76 cc/m²   General Appearance No Acute Distress, Well groomed, Normal Body habitus   Cardiovascular      ALLERGIES, ADVERSE REACTIONS, ALERTS  Reconcile with Patient's ChartALLERGIES, ADVERSE REACTIONS, ALERTS  Type Substance Reaction Severity Status   Allergies Statins-HMG-CoA Reductase Inhibitors [Snomed:69509359], [SNOMED: 76443300] Weakness [Snomed:92274583] Severe Active   Allergies Sulfa (Sulfonamide Antibiotics) [Snomed:950075783], [SNOMED: 398247799] Rash [Snomed:051677816] Severe Active      MEDICATIONS ADMINISTERED DURING VISIT    No data available    MEDICATIONS  Reconcile with Patient's ChartMEDICATIONS  Medication Start Date Route/Frequency Status   acetaminophen 325 mg tablet, [RxNorm: 593838] 12/11/2023 Take 1 tablet orally once a day. Active   cyanocobalamin (vit B-12) 1,000 mcg/mL injection solution, [RxNorm: 765955] 6/4/2024 Take 1 mL (injection) once a month Active   Debrox 6.5 % ear drops, [RxNorm: 988156] 10/8/2024 (otic (ear)) once a day. Active   DULoxetine 30 mg capsule,delayed release, [RxNorm: 823937] 12/11/2023 Take 1 capsule orally once a day. Active   Eliquis 2.5 mg tablet, [RxNorm: 2894569] 8/6/2024 Take 1 tablet orally 2 times a day. Active   Entresto 24 mg-26 mg tablet, [RxNorm: 5160283] 3/28/2024 Take 1 tablet orally 2 times a day. Active   famotidine 20 mg tablet, [RxNorm: 243459] 12/11/2023 Take 1 tablet orally 2 times a day. Active   furosemide 40 mg tablet, [RxNorm: 528163] 12/11/2023 Take 1 tablet orally three times a week. Monday, Wednesday, Friday Active   Jardiance 10 mg tablet, [RxNorm: 0310901] 12/11/2023 Take 1 tablet orally once a day. Active   Klor-Con M20 mEq tablet,extended release, [RxNorm: 1826891] 12/11/2023 Take 1 tablet orally once every Monday, Wednesday, Friday Active   levothyroxine 50 mcg capsule, [RxNorm: 450267] 12/11/2023 Take 1 capsule orally once a day. Active   loratadine 10 mg tablet, [RxNorm: 870940] 10/8/2024 Take 1 tablet orally once a day. Active   magnesium oxide 420 mg tablet, [RxNorm: 515383] 12/11/2023 Take 1 tablet orally once a day. Active   metoprolol succinate  mg tablet,extended release 24 hr, [RxNorm: 894164] 12/11/2023 Take 1 tablet orally 2 times a day. Active   multivitamin tablet, [RxNorm: 0] 6/4/2024 Take 1 tablet orally once a day. Active   Nitrostat 0.4 mg sublingual tablet, [RxNorm: 503685] 1/23/2024 Take 1 tablet (sublingual) once a day as needed for chest pain. she may have this at bedside Active   ondansetron HCL 4  mg tablet, [RxNorm: 605978] 12/11/2023 Take 1 tablet orally 3 times a day as needed. Active   sertraline 25 mg tablet, [RxNorm: 662557] 12/11/2023 Take 1 tablet orally once a day. Active   Vitamin D3 25 mcg (1,000 unit) capsule, [RxNorm: 573194] 12/11/2023 Take 1 capsule orally once a day. Active   aspirin 81 mg tablet,delayed release, [RxNorm: 916723] 10/8/2024 Take 1 tablet orally once a day. Active     ASSESSMENT    ===============================  Cardiac Testing Personally ReviewedECG Reviewed -A-fib BiV pacedEcho Results []Stress Test Results []Monitor Results []EP Procedures: []  ==============================Problem List:# Medtronic ICD  - Follows in our device clinic now # History of permanent atrial fibrillation  - Currently on Eliquis 2.5 mg twice daily  -Patient has a chads Vascor of 4 for age x 2, gender, vascular disease  - Has had several falls some of which have been severe. Plan for consideration of left atrial appendage closure  - Patient will go home and think about this. I have discussed it with the patient and her daughter  - Please notify Maribel Martin for left atrial appendage closure. Her creatinine in the past has been normal so we could proceed with CT as our preprocedure testing # Coronary artery disease prior stenting  - We have obtained records.  -Off clopidogrel. I have reinstituted aspirin 81 mg/day # Congestive heart failure  - We have received old records. Appears to have multivessel disease with stenting of multiple vessels. CRT-D with permanent A-fib and pacemaker dependency now BiV pacing with normalized EF  # []  Continue current meds except as outlined above.  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 Patient reports her ICD was put in during a hospitalization where she had to be shocked out of an irregular rhythm. She does not know if it was ventricular tachycardia or atrial fibrillationShe can walk 1 block but gets short of breathNo ICD shocks since the device was put in. No signs of infectionVisit June 4 , 2024  Patient is doing well. Device checks have been normal. Permanent A-fib VVIR 70  - Now sees Dr. Harris in our group. Prior ischemic cardiomyopathy with revascularization and CRT-D with normalized EFVisit October 8, 2024  Since my last visit the patient has had several falls. She is now in rehab for this. Her Eliquis was decreased to 2.5 twice daily and her clopidogrel was stopped. Aspirin was not started as per Dr. Harris's recommendations       Trans Thoracic Echocardiogram 1.The study quality is good. 2.The left ventricle is normal in size. Global left ventricular systolic function is normal. Left ventricular diastolic function is indeterminate. Mild concentric left ventricular hypertrophy is present. The left ventricular ejection fraction is  60-65%. No regional wall motion abnormality is noted.3.The right ventricle is normal in size. Right ventricular systolic function is normal. A linear echo density is noted in the right ventricle, suggestive of a pacemaker lead. 4.The left atrium is moderately enlarged based on the left atrium volume index of 48.0ml/m².5. Mild mitral annular calcification is noted. Mild (1+) mitral regurgitation. 6.Diffuse thickening of the aortic valve cusps is noted with normal cuspal excursion. 2/15/2024 10:00:00 AM Josh Cuenca MD         Document Information    Primary Care Provider Other Service Providers Document Coverage Dates   Duke Verdugo

## 2025-05-11 PROBLEM — G93.40 ENCEPHALOPATHY: Status: ACTIVE | Noted: 2024-07-20

## 2025-05-11 LAB
ALBUMIN SERPL-MCNC: 4 G/DL (ref 3.2–4.8)
ALBUMIN/GLOB SERPL: 1.7 {RATIO} (ref 1–2)
ALP LIVER SERPL-CCNC: 89 U/L (ref 55–142)
ALT SERPL-CCNC: 136 U/L (ref 10–49)
ANION GAP SERPL CALC-SCNC: 6 MMOL/L (ref 0–18)
AST SERPL-CCNC: 69 U/L (ref ?–34)
BILIRUB SERPL-MCNC: 0.5 MG/DL (ref 0.2–1.1)
BUN BLD-MCNC: 16 MG/DL (ref 9–23)
C DIFF TOX B STL QL: NEGATIVE
CALCIUM BLD-MCNC: 9 MG/DL (ref 8.7–10.6)
CHLORIDE SERPL-SCNC: 109 MMOL/L (ref 98–112)
CO2 SERPL-SCNC: 29 MMOL/L (ref 21–32)
CREAT BLD-MCNC: 1.35 MG/DL (ref 0.55–1.02)
EGFRCR SERPLBLD CKD-EPI 2021: 38 ML/MIN/1.73M2 (ref 60–?)
GLOBULIN PLAS-MCNC: 2.4 G/DL (ref 2–3.5)
GLUCOSE BLD-MCNC: 106 MG/DL (ref 70–99)
GLUCOSE BLD-MCNC: 160 MG/DL (ref 70–99)
GLUCOSE BLD-MCNC: 196 MG/DL (ref 70–99)
GLUCOSE BLD-MCNC: 88 MG/DL (ref 70–99)
GLUCOSE BLD-MCNC: 95 MG/DL (ref 70–99)
OSMOLALITY SERPL CALC.SUM OF ELEC: 300 MOSM/KG (ref 275–295)
POTASSIUM SERPL-SCNC: 4.2 MMOL/L (ref 3.5–5.1)
PROT SERPL-MCNC: 6.4 G/DL (ref 5.7–8.2)
SODIUM SERPL-SCNC: 144 MMOL/L (ref 136–145)

## 2025-05-11 PROCEDURE — 99232 SBSQ HOSP IP/OBS MODERATE 35: CPT | Performed by: INTERNAL MEDICINE

## 2025-05-11 PROCEDURE — 99233 SBSQ HOSP IP/OBS HIGH 50: CPT | Performed by: OTHER

## 2025-05-11 RX ORDER — LEVOTHYROXINE SODIUM 50 UG/1
50 TABLET ORAL
Status: DISCONTINUED | OUTPATIENT
Start: 2025-05-11 | End: 2025-05-14

## 2025-05-11 RX ORDER — FAMOTIDINE 20 MG/1
20 TABLET, FILM COATED ORAL DAILY
Status: DISCONTINUED | OUTPATIENT
Start: 2025-05-11 | End: 2025-05-14

## 2025-05-11 RX ORDER — SERTRALINE HYDROCHLORIDE 25 MG/1
25 TABLET, FILM COATED ORAL DAILY
Status: DISCONTINUED | OUTPATIENT
Start: 2025-05-11 | End: 2025-05-14

## 2025-05-11 RX ORDER — CETIRIZINE HYDROCHLORIDE 5 MG/1
5 TABLET ORAL NIGHTLY
Status: DISCONTINUED | OUTPATIENT
Start: 2025-05-11 | End: 2025-05-14

## 2025-05-11 RX ORDER — DULOXETIN HYDROCHLORIDE 30 MG/1
30 CAPSULE, DELAYED RELEASE ORAL DAILY
Status: DISCONTINUED | OUTPATIENT
Start: 2025-05-11 | End: 2025-05-14

## 2025-05-11 NOTE — PROGRESS NOTES
Progress Note  Jamee Johnson Patient Status:  Observation    1935 MRN [id number]   Location [facility] 7NE-A Attending Manpreet Ingram,    Hosp Day # 0 PCP Angle Mccurdy MD     Subjective:  sleepy    Objective:  /86 (BP Location: Right arm)   Pulse 70   Temp 98.2 °F (36.8 °C) (Temporal)   Resp 13   Wt 167 lb 12.3 oz (76.1 kg)   LMP  (LMP Unknown)   SpO2 100%   BMI 31.70 kg/m²     Telemetry: v paced      Intake/Output:    Intake/Output Summary (Last 24 hours) at 2025 0845  Last data filed at 2025 0800  Gross per 24 hour   Intake 175 ml   Output 650 ml   Net -475 ml       Last 3 Weights   25 1150 167 lb 12.3 oz (76.1 kg)   25 1226 155 lb (70.3 kg)   08/15/24 1900 148 lb (67.1 kg)       Labs:  Recent Labs   Lab 25  1152 05/10/25  0556 25  0549   * 119* 106*   BUN 26* 23 16   CREATSERUM 2.01* 1.46* 1.35*   EGFRCR 23* 34* 38*   CA 9.9 9.8 9.0   ALB 4.8 4.3 4.0    143 144   K 4.7 4.2 4.2    105 109   CO2 30.0 27.0 29.0   ALKPHO 107 101 89   AST 82* 68* 69*   * 151* 136*   BILT 0.5 0.6 0.5   TP 7.5 6.9 6.4     Recent Labs   Lab 25  1152 05/10/25  0556   RBC 5.44* 4.92   HGB 16.5* 15.3   HCT 50.9* 47.2   MCV 93.6 95.9   MCH 30.3 31.1   MCHC 32.4 32.4   RDW 14.1 13.9   NEPRELIM 6.10 5.81   WBC 9.6 9.1   .0 237.0         Recent Labs   Lab 25  1152   TROPHS 12     Lab Results   Component Value Date    INR 1.12 2025    INR 0.97 2019       Diagnostics:     Review of Systems   Constitutional: Positive for malaise/fatigue.   Cardiovascular:  Positive for irregular heartbeat.   Respiratory: Negative.         Physical Exam:    Gen: Alert, oriented x 3, in no apparent distress  Heent: Pupils equal, reactive. Mucous membranes moist.   Cardiac: irregular rate and rhythm, normal S1,S2  Lungs: Clear to auscultation  Abd: Soft, non tender, non distended  Ext: No edema  Skin: Warm,  dry          Medications:    Scheduled Medications[1]  Medication Infusions[2]        Assessment:  Aphasia  CTA stroke/Brain/CTA neck negative for acuter process, stable eneurysm, global atrophy, small vessel changes, stable atherosclerotic changes, large bulla   Abnormal EEG  BP parameters  mmHg  Hx of multiple embolic CVA  On ASA 81 mg po daily  Echo 7/19/24 with bubble study that was negative for right to left shunt  Permanent atrial fibrillation   Was on eliquis, Dr. Cuenca recommended switching to xarelto  Echo with normal LVEF 60-65%  UTI - on IV ceftriaxone. Was on Jardiance PTA  HOMAR - creatinine 2.0 on admission, 1.35 today  BiV ICD  CAD, hx of PCI/JOS  Ischemic cardiomyopathy with recovered LVEF  PTA meds BB, entresto, geovanna, furosemide , jardiance  On BB, geovanna now   HTN  Type II DM - hgb A1C 6.9%  Hyperlipidemia  CKD  Anemia - hgb 15.3    Plan:  DC home jardiance due to recurrent UTI's  Continue ASA, BB, xarelto, geovanna  Resume lasix 40 mg M, W, F  Resume entresto when BP parameters lifted and creatinine improves  Possible MRI brain      Plan of care discussed with patient, RN.    WELLINGTON Strickland  5/11/2025  8:45 AM    I have personally seen and examined patient.   I have independently formulated assessment and plan  I agree with the AP note    Aphasia  History of embolic CVA  Permanent A-fib  CAD s/p PCI  History of BiV ICD  Bleeding risk  Hypertension  Hyperlipidemia  Diabetes    Aphasia has improved.  Seen by neurology.  Notes reviewed.\  Altered mentation thought to be due to UTI.  Will hold/cancel cardiac MRI.  Continue aspirin, beta-blockers  She was switched yesterday to Xarelto due to concerns for possible CVA.  Neurology notes reviewed.    Patient will be seen tomorrow by electrophysiology    Thank you for the opportunity to participate in the care of your patient.     Amara Paredes MD  Interventional Cardiologist  [facility]          [1]    aspirin  81 mg Oral  Daily    rivaroxaban  15 mg Oral Daily with food    [START ON 5/12/2025] furosemide  40 mg Oral Once per day on Monday Wednesday Friday    metoprolol succinate ER  100 mg Oral Daily    spironolactone  25 mg Oral Daily    cefTRIAXone  1 g Intravenous Q24H    insulin aspart  1-10 Units Subcutaneous q6h   [2]    sodium chloride 75 mL/hr at 05/11/25 0700

## 2025-05-11 NOTE — PLAN OF CARE
Pt alert and oriented X3-4, confused at times  IVABX given  Stool sample sent  Neuro checks dc  Plan for MRI tomorrow      Problem: NEUROLOGICAL - ADULT  Goal: Achieves stable or improved neurological status  Description: INTERVENTIONS- Assess for and report changes in neurological status- Initiate measures to prevent increased intracranial pressure- Maintain blood pressure and fluid volume within ordered parameters to optimize cerebral perfusion and minimize risk of hemorrhage- Monitor temperature, glucose, and sodium. Initiate appropriate interventions as ordered  Outcome: Progressing

## 2025-05-11 NOTE — PROGRESS NOTES
[facility]  [facility] Neurology Progress Note    Jamee Johnson Patient Status:  Observation    1935 MRN [id number]   Location [facility] 7NE-A Attending Manpreet Ingram,    Hosp Day # 0 PCP Angle Mccurdy MD     SUBJECTIVE:  She is alert and conversant today.  She has no complaints other than feeling like her back is sore.  She thinks she is at home and she occasionally calls out for her  Beny, whom she thinks is in the house somewhere.    MEDICATIONS:  No current outpatient medications on file.     Current Facility-Administered Medications   Medication Dose Route Frequency    aspirin chewable tab 81 mg  81 mg Oral Daily    rivaroxaban (Xarelto) tab 15 mg  15 mg Oral Daily with food    [START ON 2025] furosemide (Lasix) tab 40 mg  40 mg Oral Once per day on     metoprolol succinate ER (Toprol XL) 24 hr tab 100 mg  100 mg Oral Daily    spironolactone (Aldactone) tab 25 mg  25 mg Oral Daily    sodium chloride 0.9% infusion   Intravenous Continuous    acetaminophen (Tylenol) tab 650 mg  650 mg Oral Q4H PRN    Or    acetaminophen (Tylenol) rectal suppository 650 mg  650 mg Rectal Q4H PRN    labetalol (Trandate) 5 mg/mL injection 10 mg  10 mg Intravenous Q10 Min PRN    hydrALAzine (Apresoline) 20 mg/mL injection 10 mg  10 mg Intravenous Q2H PRN    ondansetron (Zofran) 4 MG/2ML injection 4 mg  4 mg Intravenous Q6H PRN    metoclopramide (Reglan) 5 mg/mL injection 5 mg  5 mg Intravenous Q8H PRN    acetaminophen (Tylenol Extra Strength) tab 500 mg  500 mg Oral Q4H PRN    melatonin tab 3 mg  3 mg Oral Nightly PRN    polyethylene glycol (PEG 3350) (Miralax) 17 g oral packet 17 g  17 g Oral Daily PRN    sennosides (Senokot) tab 17.2 mg  17.2 mg Oral Nightly PRN    bisacodyl (Dulcolax) 10 MG rectal suppository 10 mg  10 mg Rectal Daily PRN    cefTRIAXone (Rocephin) 1 g in sodium chloride 0.9% 100 mL IVPB-ADDV  1 g Intravenous Q24H    glucose (Dex4) 15 GM/59ML oral liquid 15  g  15 g Oral Q15 Min PRN    Or    glucose (Glutose) 40% oral gel 15 g  15 g Oral Q15 Min PRN    Or    glucose-vitamin C (Dex-4) chewable tab 4 tablet  4 tablet Oral Q15 Min PRN    Or    dextrose 50% injection 50 mL  50 mL Intravenous Q15 Min PRN    Or    glucose (Dex4) 15 GM/59ML oral liquid 30 g  30 g Oral Q15 Min PRN    Or    glucose (Glutose) 40% oral gel 30 g  30 g Oral Q15 Min PRN    Or    glucose-vitamin C (Dex-4) chewable tab 8 tablet  8 tablet Oral Q15 Min PRN    insulin aspart (NovoLOG) 100 Units/mL FlexPen 1-10 Units  1-10 Units Subcutaneous q6h       PHYSICAL EXAMINATION:  VITAL SIGNS: /81 (BP Location: Right arm)   Pulse 71   Temp 98.4 °F (36.9 °C) (Oral)   Resp 18   Wt 167 lb 12.3 oz (76.1 kg)   LMP  (LMP Unknown)   SpO2 98%   BMI 31.70 kg/m²   GENERAL:  Patient is a 89 year old female in no acute distress.    NEUROLOGICAL:   Mental status: Oriented to person, not place or time. Calling for her .  Speech & Language: Fluent, no dysarthria  Comprehension: Intact  Cranial Nerves: conjugate gaze, face symmetric, tongue midline, shoulder shrug equal  Motor: follows commands with all limbs, no asymmetry  Tendon Reflexes: normal for habitus, no asymmetry  Gait: Deferred    DIAGNOSTIC DATA:  Labs:  Recent Labs   Lab 05/09/25  1152 05/10/25  0556   RBC 5.44* 4.92   HGB 16.5* 15.3   HCT 50.9* 47.2   MCV 93.6 95.9   MCH 30.3 31.1   MCHC 32.4 32.4   RDW 14.1 13.9   NEPRELIM 6.10 5.81   WBC 9.6 9.1   .0 237.0         Recent Labs   Lab 05/09/25  1152 05/10/25  0556 05/11/25  0549   * 119* 106*   BUN 26* 23 16   CREATSERUM 2.01* 1.46* 1.35*   EGFRCR 23* 34* 38*   CA 9.9 9.8 9.0    143 144   K 4.7 4.2 4.2    105 109   CO2 30.0 27.0 29.0         IMAGING:  XR CHEST AP PORTABLE  (CPT=71045)  Result Date: 5/9/2025  CONCLUSION:  No evidence of active cardiopulmonary disease.   LOCATION:  [facility]      Dictated by (CST): Donnie Park MD on 5/09/2025 at 1:13 PM     Finalized by  (CST): Donnie Park MD on 5/09/2025 at 1:13 PM       CT STROKE CTA BRAIN/CTA NECK (W IV)(CPT=70496/23312)  Result Date: 5/9/2025  CONCLUSION:  1. No acute intracranial pathology. 2. 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 3. Global atrophy and chronic small vessel ischemic changes of the cerebral white matter. 4. Stable atherosclerotic changes with no hemodynamically significant stenosis or acute abnormality in CT angiography of the brain and neck. 5. Incompletely visualized large bulla in right middle lobe.   LOCATION:  [facility]   Dictated by (CST): Donnie Park MD on 5/09/2025 at 12:28 PM     Finalized by (CST): Donnie Park MD on 5/09/2025 at 12:47 PM       CT STROKE BRAIN (NO IV)(CPT=70450)  Result Date: 5/9/2025  CONCLUSION:  No acute intracranial pathology.  Critical results were called to Dr. Medina at 1207 hours on 5/9/2025.  There was appropriate read back.    LOCATION:  [facility]   Dictated by (CST): Donnie Park MD on 5/09/2025 at 12:05 PM     Finalized by (CST): Donnie Park MD on 5/09/2025 at 12:08 PM       EEG 5/10/2025:  Impression:  This is an Abnormal EEG. No focal, lateralized or generalized epileptiform activity seen.   Mild diffuse slowing into delta and theta range was noted.  This constellation of findings can be seen in encephalopathy due to metabolic/toxic etiology, medication effects or diffuse cerebral injury.  Clinical correlation is recommended.      ASSESSMENT:  Ms. Johnson is an 89-year-old woman with hypertension, dyslipidemia and atrial fibrillation on anticoagulation who is likely experiencing multifactorial alteration in mentation related to poor oral intake and urinary tract infection, less likely acute neurovascular episode or epilepsy.     PLAN:  Principal Problem:    Aphasia  She is encephalopathic in the setting of UTI.  I do not have strong suspicion for acute cerebral ischemia such that if there is any risk of doing MRI brain, it should not  be done to avoid this risk.  I think if we treat her UTI and provide supportive care she will trend back toward baseline.    We are following.    Rui Constantino MD

## 2025-05-11 NOTE — PLAN OF CARE
Assumed care at 1930,  Patient is alert and oriented X 4  On RA, SR on tele, Vital signs stable.  Patient is currently on RA Neuro checks Q 4  Up X 2 with a walker.  POC discussed with patient.

## 2025-05-11 NOTE — PROGRESS NOTES
[facility]   part of [facility]     Hospitalist Progress Note     Jamee Johnson Patient Status:  Observation    1935 MRN [id number]   Location [facility] 7NE-A Attending Nohelia Burton,    Hosp Day # 0 PCP Angle Mccurdy MD     Chief Complaint: Aphasia    Subjective:     Patient awake and alert but does not know how she got here.     Objective:    Review of Systems:   A comprehensive review of systems was completed; pertinent positive and negatives stated in subjective.    Vital signs:  Temp:  [97.1 °F (36.2 °C)-98.6 °F (37 °C)] 98.4 °F (36.9 °C)  Pulse:  [70-80] 71  Resp:  [13-20] 18  BP: (128-160)/(71-93) 160/81  SpO2:  [96 %-100 %] 98 %    Physical Exam:    General: No acute distress, awake and alert  Respiratory: No wheezes, no rhonchi  Cardiovascular: S1, S2, IRIR  Abdomen: Soft, Non-tender, non-distended, positive bowel sounds  Neuro: SILT, motor exam symmetrical. Do not appreciate significant aphasia  Extremities: No edema    Diagnostic Data:    Labs:  Recent Labs   Lab 25  1152 05/10/25  0556   WBC 9.6 9.1   HGB 16.5* 15.3   MCV 93.6 95.9   .0 237.0   INR 1.12  --      Recent Labs   Lab 25  1152 05/10/25  0556 25  0549   * 119* 106*   BUN 26* 23 16   CREATSERUM 2.01* 1.46* 1.35*   CA 9.9 9.8 9.0   ALB 4.8 4.3 4.0    143 144   K 4.7 4.2 4.2    105 109   CO2 30.0 27.0 29.0   ALKPHO 107 101 89   AST 82* 68* 69*   * 151* 136*   BILT 0.5 0.6 0.5   TP 7.5 6.9 6.4     Estimated Glomerular Filtration Rate: 38 mL/min/1.73m2 (A) (result from lab).    Recent Labs   Lab 25  1152   TROPHS 12     Recent Labs   Lab 25  1152   PTP 14.5   INR 1.12      Microbiology  Hospital Encounter on 25   1. Urine Culture, Routine     Status: Abnormal    Collection Time: 25  1:19 PM    Specimen: Urine, clean catch   Result Value Ref Range    Urine Culture >100,000 CFU/ML Klebsiella pneumoniae (A) N/A       Susceptibility    Klebsiella  pneumoniae -  (no method available)     Ampicillin >=32 Resistant      Ampicillin + Sulbactam 8 Sensitive      Cefazolin <=4 Sensitive      Ciprofloxacin <=0.25 Sensitive      Gentamicin <=1 Sensitive      Meropenem <=0.25 Sensitive      Levofloxacin <=0.12 Sensitive      Nitrofurantoin 128 Resistant      Piperacillin + Tazobactam <=4 Sensitive      Trimethoprim/Sulfa <=20 Sensitive      Imaging: Reviewed in Epic.    Medications: Scheduled Medications[1]    Assessment & Plan:      #Acute aphasia  -Rule out cva, CT neg, MRI pending (aware of patient's pacemaker)  -Possibly related to underlying UTI?  -EEG without seizure activity  -Neuro following   -PT/OT recommending HHC  -Previous echo 07/2024 without shunt  -PTA: eliquis dose to be adjusted to xarelto, discussed w/ Dr Cuenca from cards     #HTN  -Metoprolol, aldactone, three times per week lasix  -Continue entresto    #HLD  -Does not tolerate statins     #DM2  -A1c 6.9 as of May 2025  -ISS for now     #CAD sp PCI  #Ischemic cardiomyopathy with recovered EF  -ASA  -Entresto, metoprolol, aldactone, three times per week lasix    #Chronic afib s/p PPM  -Xarelto    #HOMAR on CKD3  -Improved     #Klebsiella UTI  -Ceftriaxone, switch to PO on discharge     #Mild elevation in LFTs  -No RUQ pain, LFTs trending down     #Hypothyroidism  -Levothyroxine     #Depression  -Duloxetine, zoloft    #GERD  -Pepcid      Manpreet Ingram,     Supplementary Documentation:     Quality:  DVT Mechanical Prophylaxis:   SCDs,    DVT Pharmacologic Prophylaxis   Medication    rivaroxaban (Xarelto) tab 15 mg      DVT Pharmacologic prophylaxis: Aspirin 162 mg       Code Status: Full Code  Mathis: No urinary catheter in place  YVON: 5/12/2025    Discharge is dependent on: CVA workup  At this point Ms. Johnson is expected to be discharge to: Home    The 21st Century Cures Act makes medical notes like these available to patients in the interest of transparency. Please be advised this is a medical  document. Medical documents are intended to carry relevant information, facts as evident, and the clinical opinion of the practitioner. The medical note is intended as peer to peer communication and may appear blunt or direct. It is written in medical language and may contain abbreviations or verbiage that are unfamiliar.               [1]    aspirin  81 mg Oral Daily    rivaroxaban  15 mg Oral Daily with food    [START ON 5/12/2025] furosemide  40 mg Oral Once per day on Monday Wednesday Friday    metoprolol succinate ER  100 mg Oral Daily    spironolactone  25 mg Oral Daily    cefTRIAXone  1 g Intravenous Q24H    insulin aspart  1-10 Units Subcutaneous q6h

## 2025-05-12 PROBLEM — Z51.5 PALLIATIVE CARE ENCOUNTER: Status: ACTIVE | Noted: 2025-05-12

## 2025-05-12 PROBLEM — Z71.89 COUNSELING REGARDING ADVANCE CARE PLANNING AND GOALS OF CARE: Status: ACTIVE | Noted: 2025-05-12

## 2025-05-12 LAB
ANION GAP SERPL CALC-SCNC: 7 MMOL/L (ref 0–18)
BUN BLD-MCNC: 12 MG/DL (ref 9–23)
CALCIUM BLD-MCNC: 9.1 MG/DL (ref 8.7–10.6)
CHLORIDE SERPL-SCNC: 111 MMOL/L (ref 98–112)
CO2 SERPL-SCNC: 22 MMOL/L (ref 21–32)
CREAT BLD-MCNC: 0.99 MG/DL (ref 0.55–1.02)
EGFRCR SERPLBLD CKD-EPI 2021: 55 ML/MIN/1.73M2 (ref 60–?)
GLUCOSE BLD-MCNC: 108 MG/DL (ref 70–99)
GLUCOSE BLD-MCNC: 135 MG/DL (ref 70–99)
GLUCOSE BLD-MCNC: 154 MG/DL (ref 70–99)
GLUCOSE BLD-MCNC: 157 MG/DL (ref 70–99)
GLUCOSE BLD-MCNC: 171 MG/DL (ref 70–99)
OSMOLALITY SERPL CALC.SUM OF ELEC: 290 MOSM/KG (ref 275–295)
POTASSIUM SERPL-SCNC: 3.7 MMOL/L (ref 3.5–5.1)
SODIUM SERPL-SCNC: 140 MMOL/L (ref 136–145)

## 2025-05-12 PROCEDURE — 99232 SBSQ HOSP IP/OBS MODERATE 35: CPT

## 2025-05-12 PROCEDURE — 99221 1ST HOSP IP/OBS SF/LOW 40: CPT | Performed by: NURSE PRACTITIONER

## 2025-05-12 PROCEDURE — 99233 SBSQ HOSP IP/OBS HIGH 50: CPT | Performed by: OTHER

## 2025-05-12 PROCEDURE — 99232 SBSQ HOSP IP/OBS MODERATE 35: CPT | Performed by: INTERNAL MEDICINE

## 2025-05-12 RX ORDER — POTASSIUM CHLORIDE 1500 MG/1
40 TABLET, EXTENDED RELEASE ORAL ONCE
Status: COMPLETED | OUTPATIENT
Start: 2025-05-12 | End: 2025-05-12

## 2025-05-12 RX ORDER — CEFUROXIME AXETIL 500 MG/1
500 TABLET ORAL 2 TIMES DAILY
Qty: 6 TABLET | Refills: 0 | Status: SHIPPED | OUTPATIENT
Start: 2025-05-12 | End: 2025-05-14

## 2025-05-12 NOTE — CM/SW NOTE
ANANYA called [facility] [phone]) for DC planning. SW to receive call back from RN at facility.     Addendum:  ANANYA spoke with Nurse, Vinay at [facility]. Aware of potential DC plans and recs. Will fax clinical and therapy orders to [facility] at Fax: [phone]. If any new orders, they kindly ask that new orders are sent to [facility]. RN will need to call report to [facility] for DC.     [facility].   Ph: [phone] opt 6  Fax: [phone]  [facility] [place], [place] [address] [place] [address] report number: [phone].     LEOPOLDO Fraser

## 2025-05-12 NOTE — SLP NOTE
SPEECH DAILY NOTE - INPATIENT    ASSESSMENT & PLAN   ASSESSMENT  Pt seen for dysphagia tx to assess tolerance with recommended diet, ensure proper utilization of aspiration precautions and provide pt/family education.  Patient received alert in bed. He reported good tolerance of PO intake since initial SLP evaluation. PO trials of thin liquids and regular solids provided. Bolus acceptance was adequate without evidence of anterior bolus loss. Mastication and AP bolus transit were thorough and efficient without evidence of oral residue. No overt s/s of aspiration observed and patient denied odynophagia and globus sensation across consistencies. Recommend patient continue a regular diet and thin liquids.  Patient also seen today to evaluate expressive and receptive language. Western Aphasia Battery Bedside completed with a score of 96. Mild deficits noted with sentence repetition, but suspect this may have been d/t memory. Naming was intact. Patient able to follow commands and answer yes/no questions accurately. Speech was fluent without evidence of anomia, paraphasia, or dysarthria.  Recommend patient continue a regular diet and thin liquids. SLP will continue to follow to monitor diet tolerance and complete cognitive-linguistic evaluation if indicated. Education provided re: results and recommendations.    Diet Recommendations - Solids: Regular  Diet Recommendations - Liquids: Thin Liquids       Aspiration Precautions: Upright position, Slow rate, Small bites       Patient Experiencing Pain: No                Treatment Plan  Treatment Plan/Recommendations: Cognitive communication therapy    Interdisciplinary Communication: NA            GOALS  Goal #1 The patient will tolerate regular consistency and thin liquids without overt signs or symptoms of aspiration with 95 % accuracy over 11-2 session(s).  In Progress   Goal #2 The patient/family/caregiver will demonstrate understanding and implementation of aspiration  precautions and swallow strategies independently over 1-2 session(s).    In Progress   Goal #3 Cognitive-linguistic evaluation  In Progress   Goal #4     Goal #5     Goal #6     Goal #7     Goal #8       FOLLOW UP  Follow Up Needed (Documentation Required): Yes  SLP Follow-up Date: 05/13/25  Duration: 1 week    Session: 1    If you have any questions, please contact Marcos Nguyen, SLP

## 2025-05-12 NOTE — PROGRESS NOTES
Progress Note  Jamee Johnson Patient Status:  Observation    1935 MRN [id number]   Location [facility] 7NE-A Attending Manpreet Ingram, DO   Hosp Day # 0 PCP Angle Mccurdy MD     Subjective:  sleeping    Objective:  /58 (BP Location: Left arm)   Pulse 70   Temp 97.5 °F (36.4 °C) (Oral)   Resp 16   Wt 167 lb 12.3 oz (76.1 kg)   LMP  (LMP Unknown)   SpO2 98%   BMI 31.70 kg/m²     Telemetry: AF      Intake/Output:    Intake/Output Summary (Last 24 hours) at 2025 1106  Last data filed at 2025 0800  Gross per 24 hour   Intake 300 ml   Output 1450 ml   Net -1150 ml       Last 3 Weights   25 1150 167 lb 12.3 oz (76.1 kg)   25 1226 155 lb (70.3 kg)   08/15/24 1900 148 lb (67.1 kg)       Labs:  Recent Labs   Lab 25  1152 05/10/25  0556 25  0549 25  0547   * 119* 106* 108*   BUN 26* 23 16 12   CREATSERUM 2.01* 1.46* 1.35* 0.99   EGFRCR 23* 34* 38* 55*   CA 9.9 9.8 9.0 9.1   ALB 4.8 4.3 4.0  --     143 144 140   K 4.7 4.2 4.2 3.7    105 109 111   CO2 30.0 27.0 29.0 22.0   ALKPHO 107 101 89  --    AST 82* 68* 69*  --    * 151* 136*  --    BILT 0.5 0.6 0.5  --    TP 7.5 6.9 6.4  --      Recent Labs   Lab 25  1152 05/10/25  0556   RBC 5.44* 4.92   HGB 16.5* 15.3   HCT 50.9* 47.2   MCV 93.6 95.9   MCH 30.3 31.1   MCHC 32.4 32.4   RDW 14.1 13.9   NEPRELIM 6.10 5.81   WBC 9.6 9.1   .0 237.0         Recent Labs   Lab 25  1152   TROPHS 12     Lab Results   Component Value Date    INR 1.12 2025    INR 0.97 2019       Diagnostics:     Review of Systems   Unable to perform ROS: Other       Physical Exam:    Gen:in no apparent distress  Heent: Pupils equal, reactive. Mucous membranes moist.   Neck: No JVD  Cardiac: irregular rate and rhythm, normal S1,S2  Lungs: Clear to auscultation  Abd: Soft, non tender, non distended  Ext: No edema  Skin: Warm, dry          Medications:    Scheduled  Medications[1]  Medication Infusions[2]        Assessment:  Aphasia  Encephalopathy in the setting of a UTI, not a strong suspicion for acute cerebral ischemia per neurology. No plans for MRI.   CTA stroke/Brain/CTA neck negative for acute process, stable eneurysm, global atrophy, small vessel changes, stable atherosclerotic changes, large bulla   Abnormal EEG  BP parameters  mmHg  Hx of multiple embolic CVA  On ASA 81 mg po daily  Echo 7/19/24 with bubble study that was negative for right to left shunt  Permanent atrial fibrillation   Was on eliquis, Dr. Cuenca recommended switching to xarelto  Echo with normal LVEF 60-65%  UTI - on IV ceftriaxone. Was on Jardiance PTA, this has been discontinued permanently.   HOMAR - creatinine 2.0 on admission, 1.35 today  BiV ICD  CAD, hx of PCI/JOS  Ischemic cardiomyopathy with recovered LVEF  PTA meds BB, entresto, geovanna, furosemide , jardiance  On BB, geovanna now   HTN  Type II DM - hgb A1C 6.9%  Hyperlipidemia  CKD  Anemia - hgb 15.3    Plan:  Continue ASA, BB, xarelto and geovanna  Resume entresto when BP parameters lifted  Continue lasix  DC jardiance.     Plan of care discussed with patient, RN.    WELLINGTON Strickland  5/12/2025  11:06 AM    Patient seen and examined independently.  Note reviewed and labs reviewed. Agree with above assessment and plan.    Aphasia likely due to Sepsis/UTI  Hx of CVA  Permanent A Fib  Type II DM  HTN    Recommendations  Currently resting, no overnight events  Continue BB, aspirin, xarelto  Cardiac monitoring      Chantel Mcfarland MD  Cardiologist  [facility]  5/12/2025 12:10 PM      Note to the patient: The 21st Century Cures Act makes medical notes like these available to patients in the interest of transparency. However, be advised that this is a medical document. It is intended as peer to peer communication. It is written in medical language and may contain abbreviations or verbiage that are unfamiliar. It may appear blunt  or direct. Medical documents are intended to carry relevant information, facts as evident, and clinical opinion of the practitioner.     Disclaimer: Components of this note were documented using voice recognition system and are subject to errors not corrected at proofreading. Contact the author of this note for any clarifications.          [1]    DULoxetine  30 mg Oral Daily    famotidine  20 mg Oral Daily    levothyroxine  50 mcg Oral Before breakfast    cetirizine  5 mg Oral Nightly    sertraline  25 mg Oral Daily    sacubitril-valsartan  1 tablet Oral BID    aspirin  81 mg Oral Daily    rivaroxaban  15 mg Oral Daily with food    furosemide  40 mg Oral Once per day on Monday Wednesday Friday    metoprolol succinate ER  100 mg Oral Daily    spironolactone  25 mg Oral Daily    cefTRIAXone  1 g Intravenous Q24H    insulin aspart  1-10 Units Subcutaneous q6h   [2]

## 2025-05-12 NOTE — PROGRESS NOTES
[facility]  [facility]NA Neurology Progress Note    Jamee Johnson Patient Status:  Observation    1935 MRN [id number]   Location [facility] 7NE-A Attending Manpreet Ingram,    Hosp Day # 0 PCP Angle Mccurdy MD     CC: Altered mental status    Subjective:  Jamee Johnson is an 89-year-old woman with PMHx significant for hypertension, dyslipidemia and atrial fibrillation on anticoagulation who is likely experiencing multifactorial alteration in mentation related to poor oral intake and urinary tract infection, less likely acute neurovascular episode or epilepsy. Work up so far revealed CT head and CTA head and neck without evidence of acute ischemia or bleed. MRI brain is pending. Pt has improved, back to baseline mental status.     Seen for a follow up visit today. Currently, awake, alert and oriented x 4. Denies any neurological deficits at this time. Denies headache, diplopia, dysphagia. No new focal weakness or paresthesias.            MEDICATIONS:  Prior to Admission Medications[1]  Current Hospital Medications[2]    REVIEW OF SYSTEMS:  A 10-point system was reviewed.  Pertinent positives and negatives are noted in HPI.      PHYSICAL EXAMINATION:  VITAL SIGNS: /58 (BP Location: Left arm)   Pulse 70   Temp 97.5 °F (36.4 °C) (Oral)   Resp 16   Wt 167 lb 12.3 oz (76.1 kg)   LMP  (LMP Unknown)   SpO2 98%   BMI 31.70 kg/m²   GENERAL:  Patient is a 89 year old female in no acute distress.  HEENT:  Normocephalic, atraumatic  ABD: Soft, non tender  SKIN: Warm, dry, no rashes    NEUROLOGICAL:   Mental status: Oriented to person, place, situation and time , no hallucinations, calm and cooperative  Speech: Fluent, no obvious aphasia, mild dysarthria(dry mouth)  Memory and comprehension: Intact   Cranial Nerves: VFF, PERRL 3mm brisk, EOMI, no nystagmus, facial sensation intact, face symmetric, tongue midline, shoulder shrug equal, remainder CN intact  Motor: No drift, no focal arm or leg  weakness   Sensory: Intact to light touch  Coordination: FTN intact  Gait: Deferred      Imaging/Diagnostics:  XR CHEST AP PORTABLE  (CPT=71045)  Result Date: 5/9/2025  CONCLUSION:  No evidence of active cardiopulmonary disease.   LOCATION:  [facility]      Dictated by (CST): Donnie Park MD on 5/09/2025 at 1:13 PM     Finalized by (CST): Donnie Park MD on 5/09/2025 at 1:13 PM       CT STROKE CTA BRAIN/CTA NECK (W IV)(CPT=70496/70610)  Result Date: 5/9/2025  CONCLUSION:  1. No acute intracranial pathology. 2. 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 3. Global atrophy and chronic small vessel ischemic changes of the cerebral white matter. 4. Stable atherosclerotic changes with no hemodynamically significant stenosis or acute abnormality in CT angiography of the brain and neck. 5. Incompletely visualized large bulla in right middle lobe.   LOCATION:  [facility]   Dictated by (CST): Donnie Park MD on 5/09/2025 at 12:28 PM     Finalized by (CST): Donnie Park MD on 5/09/2025 at 12:47 PM       CT STROKE BRAIN (NO IV)(CPT=70450)  Result Date: 5/9/2025  CONCLUSION:  No acute intracranial pathology.  Critical results were called to Dr. Medina at 1207 hours on 5/9/2025.  There was appropriate read back.    LOCATION:  [facility]   Dictated by (CST): Donnie Park MD on 5/09/2025 at 12:05 PM     Finalized by (CST): Donnie Park MD on 5/09/2025 at 12:08 PM           Labs:  Recent Labs   Lab 05/09/25  1152 05/10/25  0556   RBC 5.44* 4.92   HGB 16.5* 15.3   HCT 50.9* 47.2   MCV 93.6 95.9   MCH 30.3 31.1   MCHC 32.4 32.4   RDW 14.1 13.9   NEPRELIM 6.10 5.81   WBC 9.6 9.1   .0 237.0         Recent Labs   Lab 05/10/25  0556 05/11/25  0549 05/12/25  0547   * 106* 108*   BUN 23 16 12   CREATSERUM 1.46* 1.35* 0.99   EGFRCR 34* 38* 55*   CA 9.8 9.0 9.1    144 140   K 4.2 4.2 3.7    109 111   CO2 27.0 29.0 22.0     EEG REPORT;     Reason for Examination: Aphasia     Impression:  This  is an Abnormal EEG. No focal, lateralized or generalized epileptiform activity seen.   Mild diffuse slowing into delta and theta range was noted.  This constellation of findings can be seen in encephalopathy due to metabolic/toxic etiology, medication effects or diffuse cerebral injury.  Clinical correlation is recommended.    Pre-morbid mRS 0      Assessment and Plan:    An 89 years old female with:     Encephalopathy - likely infectious/metabolic in a setting of current UTI and HOMAR. Acute ischemic stroke or focal seizure activity is also in the differential in a setting of HTN, HLD and afib, was on Eliquis. 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  MRI brain - pending  Stroke labs: A1c 6/9, lipid panel with , trigl 240. Not on statin, allergic. Consider adding fenofibrate vs ezetimibe for lipids management and stroke risk reduction.  Routine EEG 5/10 - mild diffuse slowing, no EFA  PT/OT  Safety precautions  UTI - likely contributed to AMS, now back to baseline as being treated with abx, further management per Medicine. Supportive care with adequate nutrition and hydration.   Discussed with pt, discussed with nursing and dr. Persaud. To follow with further recommendations if indicated.     Is this a shared or split note between Advanced Practice Provider and Physician? Yes       Rajwinder GOMEZ  [facility]  5/12/2025, 9:32 AM      Impression/plan/MDM:  Patient seen and examined personally.  Investigations reviewed.    Encephalopathy secondary to metabolic etiology.  Patient back to baseline.  EEG did not show any epileptiform activity.  Concern for stroke.  CT scan of the head did not show any acute intracranial abnormality.  CTA of head and neck did not report LVO/critical stenosis.  , A1c 6.9.  Will wait for MRI results.  Seems less likely that patient had a  stroke.  Patient currently on DOAC for paroxysmal atrial fibrillation.  Continue aspirin and statin for stroke prophylaxis along with DOAC.  Further recommendations regarding DOAC as per cardiology.            [1]   No current outpatient medications on file.   [2]   Current Facility-Administered Medications   Medication Dose Route Frequency    potassium chloride (Klor-Con M20) tab 40 mEq  40 mEq Oral Once    DULoxetine (Cymbalta) DR cap 30 mg  30 mg Oral Daily    famotidine (Pepcid) tab 20 mg  20 mg Oral Daily    levothyroxine (Synthroid) tab 50 mcg  50 mcg Oral Before breakfast    cetirizine (ZyrTEC) tab 5 mg  5 mg Oral Nightly    sertraline (Zoloft) tab 25 mg  25 mg Oral Daily    sacubitril-valsartan (Entresto) 24-26 MG per tab 1 tablet  1 tablet Oral BID    aspirin chewable tab 81 mg  81 mg Oral Daily    rivaroxaban (Xarelto) tab 15 mg  15 mg Oral Daily with food    furosemide (Lasix) tab 40 mg  40 mg Oral Once per day on Monday Wednesday Friday    metoprolol succinate ER (Toprol XL) 24 hr tab 100 mg  100 mg Oral Daily    spironolactone (Aldactone) tab 25 mg  25 mg Oral Daily    acetaminophen (Tylenol) tab 650 mg  650 mg Oral Q4H PRN    Or    acetaminophen (Tylenol) rectal suppository 650 mg  650 mg Rectal Q4H PRN    labetalol (Trandate) 5 mg/mL injection 10 mg  10 mg Intravenous Q10 Min PRN    hydrALAzine (Apresoline) 20 mg/mL injection 10 mg  10 mg Intravenous Q2H PRN    ondansetron (Zofran) 4 MG/2ML injection 4 mg  4 mg Intravenous Q6H PRN    metoclopramide (Reglan) 5 mg/mL injection 5 mg  5 mg Intravenous Q8H PRN    acetaminophen (Tylenol Extra Strength) tab 500 mg  500 mg Oral Q4H PRN    melatonin tab 3 mg  3 mg Oral Nightly PRN    polyethylene glycol (PEG 3350) (Miralax) 17 g oral packet 17 g  17 g Oral Daily PRN    sennosides (Senokot) tab 17.2 mg  17.2 mg Oral Nightly PRN    bisacodyl (Dulcolax) 10 MG rectal suppository 10 mg  10 mg Rectal Daily PRN    cefTRIAXone (Rocephin) 1 g in sodium chloride 0.9%  100 mL IVPB-ADDV  1 g Intravenous Q24H    glucose (Dex4) 15 GM/59ML oral liquid 15 g  15 g Oral Q15 Min PRN    Or    glucose (Glutose) 40% oral gel 15 g  15 g Oral Q15 Min PRN    Or    glucose-vitamin C (Dex-4) chewable tab 4 tablet  4 tablet Oral Q15 Min PRN    Or    dextrose 50% injection 50 mL  50 mL Intravenous Q15 Min PRN    Or    glucose (Dex4) 15 GM/59ML oral liquid 30 g  30 g Oral Q15 Min PRN    Or    glucose (Glutose) 40% oral gel 30 g  30 g Oral Q15 Min PRN    Or    glucose-vitamin C (Dex-4) chewable tab 8 tablet  8 tablet Oral Q15 Min PRN    insulin aspart (NovoLOG) 100 Units/mL FlexPen 1-10 Units  1-10 Units Subcutaneous q6h

## 2025-05-12 NOTE — PLAN OF CARE
Assumed care at 0730  A&Ox3-4, VSS, up with 1-2 walker   Tolerating diet   IVF infusing per order   Voiding per deisi   SLP eval complete   Patient and family updated on POC   All questions answered   Call light within reach

## 2025-05-12 NOTE — PROGRESS NOTES
[facility]   part of [facility]     Hospitalist Progress Note     Jamee Johnson Patient Status:  Observation    1935 MRN [id number]   Location [facility] 7NE-A Attending Nohelia Burton,    Hosp Day # 0 PCP Angle Mccurdy MD     Chief Complaint: Aphasia    Subjective:     Patient states she feels fine. She has no complaints.     Objective:    Review of Systems:   A comprehensive review of systems was completed; pertinent positive and negatives stated in subjective.    Vital signs:  Temp:  [97.5 °F (36.4 °C)-98 °F (36.7 °C)] 97.5 °F (36.4 °C)  Pulse:  [70] 70  Resp:  [16-18] 16  BP: (121-151)/(58-84) 146/58  SpO2:  [95 %-98 %] 98 %    Physical Exam:    General: No acute distress, awake and alert  Respiratory: No wheezes, no rhonchi  Cardiovascular: S1, S2, paced  Abdomen: Soft, Non-tender, non-distended, positive bowel sounds  Neuro: SILT, motor exam symmetrical. Do not appreciate significant aphasia  Extremities: No edema    Diagnostic Data:    Labs:  Recent Labs   Lab 25  1152 05/10/25  0556   WBC 9.6 9.1   HGB 16.5* 15.3   MCV 93.6 95.9   .0 237.0   INR 1.12  --      Recent Labs   Lab 25  1152 05/10/25  0556 25  0549 25  0547   * 119* 106* 108*   BUN 26* 23 16 12   CREATSERUM 2.01* 1.46* 1.35* 0.99   CA 9.9 9.8 9.0 9.1   ALB 4.8 4.3 4.0  --     143 144 140   K 4.7 4.2 4.2 3.7    105 109 111   CO2 30.0 27.0 29.0 22.0   ALKPHO 107 101 89  --    AST 82* 68* 69*  --    * 151* 136*  --    BILT 0.5 0.6 0.5  --    TP 7.5 6.9 6.4  --      Estimated Glomerular Filtration Rate: 55 mL/min/1.73m2 (A) (result from lab).    Recent Labs   Lab 25  1152   TROPHS 12     Recent Labs   Lab 25  1152   PTP 14.5   INR 1.12      Microbiology  Hospital Encounter on 25   1. Urine Culture, Routine     Status: Abnormal    Collection Time: 25  1:19 PM    Specimen: Urine, clean catch   Result Value Ref Range    Urine Culture >100,000  CFU/ML Klebsiella pneumoniae (A) N/A       Susceptibility    Klebsiella pneumoniae -  (no method available)     Ampicillin >=32 Resistant      Ampicillin + Sulbactam 8 Sensitive      Cefazolin <=4 Sensitive      Ciprofloxacin <=0.25 Sensitive      Gentamicin <=1 Sensitive      Meropenem <=0.25 Sensitive      Levofloxacin <=0.12 Sensitive      Nitrofurantoin 128 Resistant      Piperacillin + Tazobactam <=4 Sensitive      Trimethoprim/Sulfa <=20 Sensitive      Imaging: Reviewed in Epic.    Medications: Scheduled Medications[1]    Assessment & Plan:      #Acute aphasia and metabolic encephalopathy, improving  -Rule out cva, CT neg, MRI pending (aware of patient's pacemaker)  -Symptoms possibly related to underlying UTI?  -EEG without seizure activity  -Neuro following   -PT/OT recommending HHC  -Previous echo 07/2024 without shunt  -PTA Eliquis dose to be adjusted to xarelto     #HTN  -Metoprolol, entresto, aldactone, three times per week lasix    #HLD  -Does not tolerate statins     #DM2  -A1c 6.9 as of May 2025  -ISS for now     #CAD sp PCI  #Ischemic cardiomyopathy with recovered EF  -ASA  -Entresto, metoprolol, aldactone, three times per week lasix    #Chronic afib s/p PPM  -Xarelto    #HOMAR on CKD3  -Improved     #Klebsiella UTI  -Ceftriaxone, switch to PO on discharge     #Mild elevation in LFTs  -No RUQ pain, LFTs trending down     #Hypothyroidism  -Levothyroxine     #Depression  -Duloxetine, zoloft    #GERD  -Pepcid      Manpreet Ingram,     Supplementary Documentation:     Quality:  DVT Mechanical Prophylaxis:   SCDs,    DVT Pharmacologic Prophylaxis   Medication    rivaroxaban (Xarelto) tab 15 mg      DVT Pharmacologic prophylaxis: Aspirin 162 mg       Code Status: Full Code  Mathis: External urinary catheter in place  YVON: 0-1 day    Discharge is dependent on: CVA workup  At this point Ms. Johnson is expected to be discharge to: Home    The 21st Century Cures Act makes medical notes like these available to  patients in the interest of transparency. Please be advised this is a medical document. Medical documents are intended to carry relevant information, facts as evident, and the clinical opinion of the practitioner. The medical note is intended as peer to peer communication and may appear blunt or direct. It is written in medical language and may contain abbreviations or verbiage that are unfamiliar.             [1]    DULoxetine  30 mg Oral Daily    famotidine  20 mg Oral Daily    levothyroxine  50 mcg Oral Before breakfast    cetirizine  5 mg Oral Nightly    sertraline  25 mg Oral Daily    sacubitril-valsartan  1 tablet Oral BID    aspirin  81 mg Oral Daily    rivaroxaban  15 mg Oral Daily with food    furosemide  40 mg Oral Once per day on Monday Wednesday Friday    metoprolol succinate ER  100 mg Oral Daily    spironolactone  25 mg Oral Daily    cefTRIAXone  1 g Intravenous Q24H    insulin aspart  1-10 Units Subcutaneous q6h

## 2025-05-13 ENCOUNTER — APPOINTMENT (OUTPATIENT)
Dept: MRI IMAGING | Facility: HOSPITAL | Age: 89
End: 2025-05-13
Payer: MEDICARE

## 2025-05-13 LAB
GLUCOSE BLD-MCNC: 114 MG/DL (ref 70–99)
GLUCOSE BLD-MCNC: 145 MG/DL (ref 70–99)
GLUCOSE BLD-MCNC: 191 MG/DL (ref 70–99)
GLUCOSE BLD-MCNC: 213 MG/DL (ref 70–99)
GLUCOSE BLD-MCNC: 215 MG/DL (ref 70–99)
POTASSIUM SERPL-SCNC: 4 MMOL/L (ref 3.5–5.1)

## 2025-05-13 PROCEDURE — 99233 SBSQ HOSP IP/OBS HIGH 50: CPT | Performed by: OTHER

## 2025-05-13 PROCEDURE — 99232 SBSQ HOSP IP/OBS MODERATE 35: CPT | Performed by: INTERNAL MEDICINE

## 2025-05-13 PROCEDURE — 99232 SBSQ HOSP IP/OBS MODERATE 35: CPT

## 2025-05-13 PROCEDURE — 70551 MRI BRAIN STEM W/O DYE: CPT

## 2025-05-13 RX ORDER — EZETIMIBE 10 MG/1
10 TABLET ORAL NIGHTLY
Status: DISCONTINUED | OUTPATIENT
Start: 2025-05-13 | End: 2025-05-14

## 2025-05-13 RX ORDER — EZETIMIBE 10 MG/1
10 TABLET ORAL NIGHTLY
Qty: 30 TABLET | Refills: 2 | Status: SHIPPED | OUTPATIENT
Start: 2025-05-13 | End: 2025-05-14

## 2025-05-13 NOTE — PROGRESS NOTES
Progress Note  Jamee Johnson Patient Status:  Observation    1935 MRN [id number]   Location [facility] 7NE-A Attending Manpreet Ingram, DO   Hosp Day # 0 PCP Angle Mccurdy MD     Subjective:  No cardiac events over night. No anginal symptoms. No aphagia. Await MRI Brain.     Objective:  BP (!) 156/92 (BP Location: Left arm)   Pulse 70   Temp 97.6 °F (36.4 °C) (Oral)   Resp 16   Wt 167 lb 12.3 oz (76.1 kg)   LMP  (LMP Unknown)   SpO2 97%   BMI 31.70 kg/m²     Telemetry: : 70 BPM.      Intake/Output:    Intake/Output Summary (Last 24 hours) at 2025 1000  Last data filed at 2025 0900  Gross per 24 hour   Intake 1185 ml   Output 2200 ml   Net -1015 ml       Last 3 Weights   25 1150 167 lb 12.3 oz (76.1 kg)   25 1226 155 lb (70.3 kg)   08/15/24 1900 148 lb (67.1 kg)       Labs:  Recent Labs   Lab 05/10/25  0556 25  0549 25  0547 25  0540   * 106* 108*  --    BUN 23 16 12  --    CREATSERUM 1.46* 1.35* 0.99  --    EGFRCR 34* 38* 55*  --    CA 9.8 9.0 9.1  --     144 140  --    K 4.2 4.2 3.7 4.0    109 111  --    CO2 27.0 29.0 22.0  --      Recent Labs   Lab 25  1152 05/10/25  0556   RBC 5.44* 4.92   HGB 16.5* 15.3   HCT 50.9* 47.2   MCV 93.6 95.9   MCH 30.3 31.1   MCHC 32.4 32.4   RDW 14.1 13.9   NEPRELIM 6.10 5.81   WBC 9.6 9.1   .0 237.0         Recent Labs   Lab 25  1152   TROPHS 12   25:  EKG:  : 70 BPM.   24: Echo:   1. Left ventricle: The cavity size was normal. Wall thickness was at the      upper limits of normal. Systolic function was normal. The estimated      ejection fraction was 60-65%, by visual assessment. No diagnostic      evidence for regional wall motion abnormalities. Unable to assess LV      diastolic function.   2. Right ventricle: Pacer wire noted in the right ventricle.   3. Left atrium: The left atrial volume was markedly increased.   4. Atrial septum: Agitated saline contrast study  showed no shunt, at      baseline or with provocation.   Impressions:  This study is a limited bubble study with a previous complete   study dated 7/15/2024: The bubble study is negative for a right to left   shunt.   *   5/9/25:  CT Brain;  1. No acute intracranial pathology.   2. 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   3. Global atrophy and chronic small vessel ischemic changes of the cerebral white matter.   4. Stable atherosclerotic changes with no hemodynamically significant stenosis or acute abnormality in CT angiography of the brain and neck.   5. Incompletely visualized large bulla in right middle lobe.       Review of Systems:   Constitutional: No fevers, chills, fatigue or night sweats.  ENT: No mouth pain, neck pain, running nose, headaches or swollen glands.  Skin: No rashes, pruritus or skin changes,  Respiratory: Denies cough, wheezing or shortness of breath.  CV: Denies chest pain, palpitations, orthopnea, PND or dizziness.  Musculoskeletal: No joint pain, stiffness or swelling.  GI: No nausea, vomiting or diarrhea. No blood in stools.  Neurologic: No seizures, tremors, weakness or numbness.     Physical Exam:  Gen: alert, oriented x 3, NAD  Heent: pupils equal, reactive. Mucous membranes moist.   Neck: no jvd  Cardiac: regular rate and rhythm, normal S1,S2, no murmur, gallop or rub.   Lungs: Clear upper, Dim bases.   Abd: soft, NT/ND +bs  Ext: no edema  Skin: Warm, dry  Neuro: No focal deficits    Medications:    Scheduled Medications[1]  Medication Infusions[2]      Assessment:  Acute Aphasia:  Upon admission.   CT Brain- Stable left Anterior communicating complex. No CVA seen.   LVEF:  60-65 %Patient's NPO status verified.  No Shunt on  previous echo.   MRI Brain pending.   CAD:  Hx PCI.  Trop neg. No anginal symptoms.   Preserved LVEF.   ASA, Toprol 100 mg statin.   Hx Recovered ISCM:    GDMT- Toprol 100 mg, Entresto 24-26 mg, Lasix 40 mg every day.    Persistent AF:  Remains AF controlled rates. Toprol 100 mg every day.   Xarelto 20 mg every day.  HTN:  Moderate control.   Dyslipidemia:    Elevated LFT's- normalizing.   Klebsiella UTI:  Rocephin.   Type 2DM  Hypothyroidism:      Plan:  Aphagia resolving.  CT Brain- Left anterior communicating complex.   Await Mri Brain today.   Klebsiella UTI.   Remains compensated. Continue GDMT.   AF - BB, Xarelto.     Plan of care discussed with patient, RN.    PATRICIA Solorzano  5/13/2025  10:00 AM         Patient seen and examined independently.  Note reviewed and labs reviewed. Agree with above assessment and plan.    Aphasia likely due to Sepsis/UTI  Hx of CVA  Permanent A Fib  Type II DM  HTN    Recommendations  Echo reviewed, no identifiable ASD/PFO  Currently resting, no overnight events  Continue BB, aspirin, xarelto  Cardiac monitoring    Chantel Mcfarland MD  Cardiologist  [facility]  5/13/2025 2:03 PM      Note to the patient: The 21st Century Cures Act makes medical notes like these available to patients in the interest of transparency. However, be advised that this is a medical document. It is intended as peer to peer communication. It is written in medical language and may contain abbreviations or verbiage that are unfamiliar. It may appear blunt or direct. Medical documents are intended to carry relevant information, facts as evident, and clinical opinion of the practitioner.     Disclaimer: Components of this note were documented using voice recognition system and are subject to errors not corrected at proofreading. Contact the author of this note for any clarifications.          [1]    ezetimibe  10 mg Oral Nightly    DULoxetine  30 mg Oral Daily    famotidine  20 mg Oral Daily    levothyroxine  50 mcg Oral Before breakfast    cetirizine  5 mg Oral Nightly    sertraline  25 mg Oral Daily    sacubitril-valsartan  1 tablet Oral BID    aspirin  81 mg Oral Daily    rivaroxaban  15 mg Oral Daily  with food    furosemide  40 mg Oral Once per day on Monday Wednesday Friday    metoprolol succinate ER  100 mg Oral Daily    spironolactone  25 mg Oral Daily    cefTRIAXone  1 g Intravenous Q24H    insulin aspart  1-10 Units Subcutaneous q6h   [2]

## 2025-05-13 NOTE — PLAN OF CARE
Assumed care at 0730  A&Ox3-4, VSS, up with 1-2 and walker   No complaints of pain   PT/OT eval complete   Voiding per purewick   MRI complete- results called to Dr. Persaud   Tolerating diet   Patient and family updated on POC   All questions answered   Call light within reach

## 2025-05-13 NOTE — PROGRESS NOTES
[facility]   part of [facility]     Hospitalist Progress Note     Jamee Johnson Patient Status:  Observation    1935 MRN [id number]   Location [facility] 7NE-A Attending Nohelia Burton,    Hosp Day # 0 PCP Angle Mccurdy MD     Chief Complaint: Aphasia    Subjective:     Patient has no complaints. Awaiting MRI brain.     Objective:    Review of Systems:   A comprehensive review of systems was completed; pertinent positive and negatives stated in subjective.    Vital signs:  Temp:  [97.6 °F (36.4 °C)-98.2 °F (36.8 °C)] 97.6 °F (36.4 °C)  Pulse:  [70-79] 73  Resp:  [15-17] 16  BP: (140-162)/(73-92) 149/82  SpO2:  [94 %-99 %] 97 %    Physical Exam:    General: No acute distress, awake and alert  Respiratory: No wheezes, no rhonchi  Cardiovascular: S1, S2, paced  Abdomen: Soft, Non-tender, non-distended, positive bowel sounds  Neuro: SILT, motor exam symmetrical. Do not appreciate significant aphasia  Extremities: No edema    Diagnostic Data:    Labs:  Recent Labs   Lab 25  1152 05/10/25  0556   WBC 9.6 9.1   HGB 16.5* 15.3   MCV 93.6 95.9   .0 237.0   INR 1.12  --      Recent Labs   Lab 25  1152 05/10/25  0556 25  0549 25  0547 25  0540   * 119* 106* 108*  --    BUN 26* 23 16 12  --    CREATSERUM 2.01* 1.46* 1.35* 0.99  --    CA 9.9 9.8 9.0 9.1  --    ALB 4.8 4.3 4.0  --   --     143 144 140  --    K 4.7 4.2 4.2 3.7 4.0    105 109 111  --    CO2 30.0 27.0 29.0 22.0  --    ALKPHO 107 101 89  --   --    AST 82* 68* 69*  --   --    * 151* 136*  --   --    BILT 0.5 0.6 0.5  --   --    TP 7.5 6.9 6.4  --   --      Estimated Glomerular Filtration Rate: 55 mL/min/1.73m2 (A) (result from lab).    Recent Labs   Lab 25  1152   TROPHS 12     Recent Labs   Lab 25  1152   PTP 14.5   INR 1.12      Microbiology  Hospital Encounter on 25   1. Urine Culture, Routine     Status: Abnormal    Collection Time: 25  1:19 PM     Specimen: Urine, clean catch   Result Value Ref Range    Urine Culture >100,000 CFU/ML Klebsiella pneumoniae (A) N/A       Susceptibility    Klebsiella pneumoniae -  (no method available)     Ampicillin >=32 Resistant      Ampicillin + Sulbactam 8 Sensitive      Cefazolin <=4 Sensitive      Ciprofloxacin <=0.25 Sensitive      Gentamicin <=1 Sensitive      Meropenem <=0.25 Sensitive      Levofloxacin <=0.12 Sensitive      Nitrofurantoin 128 Resistant      Piperacillin + Tazobactam <=4 Sensitive      Trimethoprim/Sulfa <=20 Sensitive      Imaging: Reviewed in Epic.    Medications: Scheduled Medications[1]    Assessment & Plan:      #Acute aphasia and metabolic encephalopathy, improving  -Rule out cva, CT neg, MRI pending (aware of patient's pacemaker)  -Symptoms possibly related to underlying UTI?  -EEG without seizure activity  -Neuro following   -PT/OT recommending [facility]  -Previous echo 07/2024 without shunt  -PTA Eliquis dose to be adjusted to xarelto     #HTN  -Metoprolol, entresto, aldactone, three times per week lasix    #HLD  -Does not tolerate statins     #DM2  -A1c 6.9 as of May 2025  -ISS for now     #CAD sp PCI  #Ischemic cardiomyopathy with recovered EF  -ASA  -Entresto, metoprolol, aldactone, three times per week lasix    #Chronic afib s/p PPM  -Xarelto    #HOMAR on CKD3  -Improved     #Klebsiella UTI  -Ceftriaxone, switch to PO on discharge     #Mild elevation in LFTs  -No RUQ pain, LFTs trending down     #Hypothyroidism  -Levothyroxine     #Depression  -Duloxetine, zoloft    #GERD  -Pepcid      Manpreet Ingram,     Supplementary Documentation:     Quality:  DVT Mechanical Prophylaxis:   SCDs,    DVT Pharmacologic Prophylaxis   Medication    rivaroxaban (Xarelto) tab 15 mg      DVT Pharmacologic prophylaxis: Aspirin 162 mg       Code Status: Full Code  Mathis: External urinary catheter in place  YVON: 0-1 day    Discharge is dependent on: CVA workup  At this point Ms. Johnson is expected to be discharge to:  Home    The 21st Century Cures Act makes medical notes like these available to patients in the interest of transparency. Please be advised this is a medical document. Medical documents are intended to carry relevant information, facts as evident, and the clinical opinion of the practitioner. The medical note is intended as peer to peer communication and may appear blunt or direct. It is written in medical language and may contain abbreviations or verbiage that are unfamiliar.             [1]    ezetimibe  10 mg Oral Nightly    DULoxetine  30 mg Oral Daily    famotidine  20 mg Oral Daily    levothyroxine  50 mcg Oral Before breakfast    cetirizine  5 mg Oral Nightly    sertraline  25 mg Oral Daily    sacubitril-valsartan  1 tablet Oral BID    aspirin  81 mg Oral Daily    rivaroxaban  15 mg Oral Daily with food    furosemide  40 mg Oral Once per day on Monday Wednesday Friday    metoprolol succinate ER  100 mg Oral Daily    spironolactone  25 mg Oral Daily    cefTRIAXone  1 g Intravenous Q24H    insulin aspart  1-10 Units Subcutaneous q6h

## 2025-05-13 NOTE — OCCUPATIONAL THERAPY NOTE
OCCUPATIONAL THERAPY TREATMENT NOTE - INPATIENT     Room Number: 7605/7605-A  Session: 1   Number of Visits to Meet Established Goals: 3    Presenting Problem: aphasia, MRI brain pending    HOME SITUATION  Type of Home: Assisted living facility ([facility])  Home Layout: One level  Lives With: Staff 24 hours     Toilet and Equipment: Comfort height toilet, Grab bar  Shower/Tub and Equipment: Walk-in shower, Grab bar, Shower chair     Patient Regularly Uses: Rolling walker     Prior Level of Function: Pt reports staff assist with LB dressing, bathing, toileting, walking to the bathroom with walker, and giving ride to the dining yu in a wheelchair. Pt is supposed to have assist for going to the bathroom, but at night she goes by herself.          ASSESSMENT   Patient demonstrates fair progress this session, goals remain in progress.    Patient continues to function below baseline with toileting and transfers.   Contributing factors to remaining limitations include decreased functional strength, decreased endurance, and impaired standing balance.  Next session anticipate patient to progress toileting.  Occupational Therapy will continue to follow patient for duration of hospitalization.    Patient continues to benefit from continued skilled OT services: at discharge to promote prior level of function and safety with additional support and return home with home health OT.     History: Patient is a 89 year old female admitted on 5/9/2025 with Presenting Problem: aphasia, MRI brain pending. Co-Morbidities : CVA, HTN, HLD, DM2, CKD3, hypothyroidism, depression, CAD s.p PCI, afib     TREATMENT SESSION:  Patient Start of Session: supine    FUNCTIONAL TRANSFER ASSESSMENT  Sit to Stand: Chair  Edge of Bed: Minimal Assist  Chair: Minimal Assist    BED MOBILITY  Supine to Sit : Minimal Assist    FUNCTIONAL ADL ASSESSMENT  Grooming Seated: Not Tested  LB Dressing Seated: Supervision (donning socks, sitting upright in bed,  HOB elevated)    EDUCATION PROVIDED  Patient Education : Plan of Care; Posture/Positioning  Patient's Response to Education: Requires Further Education    Equipment used: rw    Therapist comments: pleasant, independent donning socks, while sitting upright in bed.   Min A supine to sit.   Pt stood with PT, increased time and cueing provided for sequencing.   R knee discomfort. Seated rest breaks when ambulating.  Educated the patient about importance of continuing with seated UE ROM exercises. She typically participates in exercise classes at assisted living. Pt demonstrated understanding.     Patient End of Session: Up in chair, Needs met, Call light within reach, RN aware of session/findings, All patient questions and concerns addressed, Alarm set    PAIN ASSESSMENT  Rating: Unable to rate  Location: R knee  Management Techniques: Repositioning     OBJECTIVE  Precautions: Bed/chair alarm    AM-PAC ‘6-Clicks’ Inpatient Daily Activity Short Form  -   Putting on and taking off regular lower body clothing?: A Lot  -   Bathing (including washing, rinsing, drying)?: A Lot  -   Toileting, which includes using toilet, bedpan or urinal? : A Little  -   Putting on and taking off regular upper body clothing?: A Little  -   Taking care of personal grooming such as brushing teeth?: A Little  -   Eating meals?: None    AM-PAC Score:  Score: 17  Approx Degree of Impairment: 50.11%  Standardized Score (AM-PAC Scale): 37.26    PLAN     OT Treatment Plan: Balance activities, ADL training, Functional transfer training, UE strengthening/ROM, Endurance training, Patient/Family education, Patient/Family training, Equipment eval/education, Compensatory technique education  Rehab Potential : Good  Frequency: 3-5x/week    OT Goals:   Ongoing 5/13  ADL Goals   Patient will perform grooming: with supervision and while standing at sink  Patient will perform toileting: with supervision     Functional Transfer Goals  Patient will transfer to  toilet:  with supervision     UE Exercise Program Goal  Patient will be supervision with bilateral AROM HEP (home exercise program).     Therapist Goals  Phq9 as indicated pending MRI results    OT Session Time: 23 minutes  Self-Care Home Management: 8 minutes  Therapeutic Activity: 15 minutes

## 2025-05-13 NOTE — PROGRESS NOTES
[facility]  JONNA Neurology Progress Note    Jamee Johnson Patient Status:  Observation    1935 MRN [id number]   Location [facility] 7NE-A Attending Manpreet Ingram,    Hosp Day # 0 PCP Angle Mccurdy MD     CC: Aphasia, r/o stroke    Subjective:  Patient seen for a follow up visit today. Awake, alert and oriented x 4. Doing well, denies pain, feels her speech is back to normal. No expressive or receptive aphasia noted. Ate well for breakfast. Awaits MRI brain.     MEDICATIONS:  Prior to Admission Medications[1]  Current Hospital Medications[2]    REVIEW OF SYSTEMS:  A 10-point system was reviewed.  Pertinent positives and negatives are noted in HPI.      PHYSICAL EXAMINATION:  VITAL SIGNS: BP (!) 156/92 (BP Location: Left arm)   Pulse 70   Temp 97.6 °F (36.4 °C) (Oral)   Resp 16   Wt 167 lb 12.3 oz (76.1 kg)   LMP  (LMP Unknown)   SpO2 94%   BMI 31.70 kg/m²   GENERAL:  Patient is a 89 year old female in no acute distress.  HEENT:  Normocephalic, atraumatic  ABD: Soft, non tender  SKIN: Warm, dry, no rashes    NEUROLOGICAL:   Mental status: Oriented to person, place, situation and time   Speech: Fluent, no obvious aphasia or dysarthria  Memory and comprehension: Intact   Cranial Nerves: VFF, PERRL 3mm brisk, EOMI, no nystagmus, facial sensation intact, face symmetric, tongue midline, shoulder shrug equal, remainder CN intact  Motor: No drift, no focal arm or leg weakness. Motor exam is 5 out of 5 in all extremities.    Sensory: Intact to light touch  Coordination: FTN intact  Gait: Deferred      Imaging/Diagnostics:  XR CHEST AP PORTABLE  (CPT=71045)  Result Date: 2025  CONCLUSION:  No evidence of active cardiopulmonary disease.   LOCATION:  [place]      Dictated by (CST): Donnie Park MD on 2025 at 1:13 PM     Finalized by (CST): Donnie Park MD on 2025 at 1:13 PM       CT STROKE CTA BRAIN/CTA NECK (W IV)(CPT=70496/96931)  Result Date: 2025  CONCLUSION:  1. No  acute intracranial pathology. 2. 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 3. Global atrophy and chronic small vessel ischemic changes of the cerebral white matter. 4. Stable atherosclerotic changes with no hemodynamically significant stenosis or acute abnormality in CT angiography of the brain and neck. 5. Incompletely visualized large bulla in right middle lobe.   LOCATION:  [facility]   Dictated by (CST): Donnie Park MD on 5/09/2025 at 12:28 PM     Finalized by (CST): Donnie Park MD on 5/09/2025 at 12:47 PM       CT STROKE BRAIN (NO IV)(CPT=70450)  Result Date: 5/9/2025  CONCLUSION:  No acute intracranial pathology.  Critical results were called to Dr. Medina at 1207 hours on 5/9/2025.  There was appropriate read back.    LOCATION:  [facility]   Dictated by (CST): Donnie Park MD on 5/09/2025 at 12:05 PM     Finalized by (CST): Donnie Park MD on 5/09/2025 at 12:08 PM           Labs:  Recent Labs   Lab 05/09/25  1152 05/10/25  0556   RBC 5.44* 4.92   HGB 16.5* 15.3   HCT 50.9* 47.2   MCV 93.6 95.9   MCH 30.3 31.1   MCHC 32.4 32.4   RDW 14.1 13.9   NEPRELIM 6.10 5.81   WBC 9.6 9.1   .0 237.0         Recent Labs   Lab 05/10/25  0556 05/11/25  0549 05/12/25  0547 05/13/25  0540   * 106* 108*  --    BUN 23 16 12  --    CREATSERUM 1.46* 1.35* 0.99  --    EGFRCR 34* 38* 55*  --    CA 9.8 9.0 9.1  --     144 140  --    K 4.2 4.2 3.7 4.0    109 111  --    CO2 27.0 29.0 22.0  --      EEG REPORT;     Reason for Examination: Aphasia     Impression:  This is an Abnormal EEG. No focal, lateralized or generalized epileptiform activity seen.   Mild diffuse slowing into delta and theta range was noted.  This constellation of findings can be seen in encephalopathy due to metabolic/toxic etiology, medication effects or diffuse cerebral injury. Clinical correlation is recommended.     Pre-morbid mRS 0        Assessment and Plan:     An 89 years old female with:       Encephalopathy - likely metabolic etiology in a setting of current UTI and HOMAR. Acute ischemic stroke or focal seizure activity is also in the differential in a setting of HTN, HLD and afib, was on Eliquis. Now resolved. 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  MRI brain - pending(has PPM, cleared)   Stroke labs: A1c 6/9, lipid panel with , trigl 240. Not on statin, allergic. Currently on Xarelto for afib ( switched from Eliquis by Cardiology). Continue ASA 81 mg daily, pt is allergic to statins, will add ezetimibe 10 mg daily, for lipids management and stroke risk reduction.  Routine EEG 5/10 - mild diffuse slowing, no EFA  PT/OT/ST and rehab evals  Safety precautions  UTI - likely contributed to AMS, now back to baseline as being treated with abx, further management per Medicine. Supportive care with adequate nutrition and hydration.   Discussed with pt, discussed with nursing and dr. Persaud. Will continue to follow pending MRI brain.       Is this a shared or split note between Advanced Practice Provider and Physician? Yes       Rajwinder Carpenter APRDANIAL  [facility]  5/13/2025, 9:23 AM   Jah # [id number]      Impression/plan/MDM:  Patient seen and examined personally.  Investigations reviewed.    Encephalopathy secondary to metabolic etiology.  Patient back to baseline.  EEG did not show any epileptiform activity.  Concern for stroke.  CT scan of the head did not show any acute intracranial abnormality.  CTA of head and neck did not report LVO/critical stenosis.  , A1c 6.9.  MRI pending.  Seems less likely that patient had a stroke.  Patient currently on DOAC for paroxysmal atrial fibrillation.  Continue aspirin and statin for stroke prophylaxis along with DOAC.  Further recommendations regarding DOAC as per cardiology.           [1]   Prior to Admission Medications    Medication Sig    cefuroxime 500 MG Oral Tab Take 1 tablet (500 mg total) by mouth 2 (two) times daily for 3 days.    rivaroxaban 15 MG Oral Tab Take 1 tablet (15 mg total) by mouth daily with food.   [2]   Current Facility-Administered Medications   Medication Dose Route Frequency    DULoxetine (Cymbalta) DR cap 30 mg  30 mg Oral Daily    famotidine (Pepcid) tab 20 mg  20 mg Oral Daily    levothyroxine (Synthroid) tab 50 mcg  50 mcg Oral Before breakfast    cetirizine (ZyrTEC) tab 5 mg  5 mg Oral Nightly    sertraline (Zoloft) tab 25 mg  25 mg Oral Daily    sacubitril-valsartan (Entresto) 24-26 MG per tab 1 tablet  1 tablet Oral BID    aspirin chewable tab 81 mg  81 mg Oral Daily    rivaroxaban (Xarelto) tab 15 mg  15 mg Oral Daily with food    furosemide (Lasix) tab 40 mg  40 mg Oral Once per day on Monday Wednesday Friday    metoprolol succinate ER (Toprol XL) 24 hr tab 100 mg  100 mg Oral Daily    spironolactone (Aldactone) tab 25 mg  25 mg Oral Daily    acetaminophen (Tylenol) tab 650 mg  650 mg Oral Q4H PRN    Or    acetaminophen (Tylenol) rectal suppository 650 mg  650 mg Rectal Q4H PRN    labetalol (Trandate) 5 mg/mL injection 10 mg  10 mg Intravenous Q10 Min PRN    hydrALAzine (Apresoline) 20 mg/mL injection 10 mg  10 mg Intravenous Q2H PRN    ondansetron (Zofran) 4 MG/2ML injection 4 mg  4 mg Intravenous Q6H PRN    metoclopramide (Reglan) 5 mg/mL injection 5 mg  5 mg Intravenous Q8H PRN    acetaminophen (Tylenol Extra Strength) tab 500 mg  500 mg Oral Q4H PRN    melatonin tab 3 mg  3 mg Oral Nightly PRN    polyethylene glycol (PEG 3350) (Miralax) 17 g oral packet 17 g  17 g Oral Daily PRN    sennosides (Senokot) tab 17.2 mg  17.2 mg Oral Nightly PRN    bisacodyl (Dulcolax) 10 MG rectal suppository 10 mg  10 mg Rectal Daily PRN    cefTRIAXone (Rocephin) 1 g in sodium chloride 0.9% 100 mL IVPB-ADDV  1 g Intravenous Q24H    glucose (Dex4) 15 GM/59ML oral liquid 15 g  15 g Oral Q15 Min PRN    Or    glucose  (Glutose) 40% oral gel 15 g  15 g Oral Q15 Min PRN    Or    glucose-vitamin C (Dex-4) chewable tab 4 tablet  4 tablet Oral Q15 Min PRN    Or    dextrose 50% injection 50 mL  50 mL Intravenous Q15 Min PRN    Or    glucose (Dex4) 15 GM/59ML oral liquid 30 g  30 g Oral Q15 Min PRN    Or    glucose (Glutose) 40% oral gel 30 g  30 g Oral Q15 Min PRN    Or    glucose-vitamin C (Dex-4) chewable tab 8 tablet  8 tablet Oral Q15 Min PRN    insulin aspart (NovoLOG) 100 Units/mL FlexPen 1-10 Units  1-10 Units Subcutaneous q6h

## 2025-05-13 NOTE — CM/SW NOTE
SW consult for [facility] pricing, script sent to [facility]. Pt has $0 copay for medication. First 30 days to be supplied by [facility].     RN to call Mecca Mcgarry at [phone].     LEOPOLDO Fraser

## 2025-05-13 NOTE — PHYSICAL THERAPY NOTE
PHYSICAL THERAPY TREATMENT NOTE - INPATIENT    Room Number: 7605/7605-A     Session: 1     Number of Visits to Meet Established Goals: 5  History related to current admission: Patient is a 89 year old female admitted on 5/9/2025: Presents with difficulty speaking and L sided weakness (has residual L sided weakness from hx of CVA), MRI pending     ED  12/6/24 and 1/27/25: fall     HOME SITUATION  Type of Home: Assisted living facility ([facility])  Home Layout: One level                     Lives With: Staff 24 hours        Patient Regularly Uses: Rolling walker       Prior Level of [place]: Walks with RW and assist x1 at baseline, states is suppose to always have a staff member with her when she's walking to the bathroom but at night time she goes by herself   Presenting Problem: aphasia  Co-Morbidities : CVA, HTN, HLD, DM2, CKD3, hypothyroidism, depression, CAD s.p PCI, afib    PHYSICAL THERAPY ASSESSMENT   Patient demonstrates good  progress this session, goals  remain in progress.      Patient is requiring contact guard assist as a result of the following impairments: decreased functional strength, decreased endurance/aerobic capacity, and decreased muscular endurance.     Patient continues to function below baseline with gait.  Next session anticipate patient to progress gait.  Physical Therapy will continue to follow patient for duration of hospitalization.    Patient continues to benefit from continued skilled PT services: at discharge to promote prior level of function and safety with additional support and return home with home health PT.    PLAN DURING HOSPITALIZATION  Nursing Mobility Recommendation : 1 Assist  PT Device Recommendation: Rolling walker  PT Treatment Plan: Bed mobility, Body mechanics, Coordination, Endurance, Energy conservation, Patient education, Family education, Gait training, Range of motion, Neuromuscular re-educate, Strengthening, Transfer training, Balance  training  Frequency (Obs): 3-5x/week     CURRENT GOALS     Goal #1 Patient is able to demonstrate supine - sit EOB @ level: supervision      Goal #2 Patient is able to demonstrate transfers EOB to/from Chair/Wheelchair at assistance level: supervision      Goal #3 Patient is able to ambulate 150 feet with assist device: walker - rolling at assistance level: CGA      Goal #4     Goal #5     Goal #6     Goal Comments: Goals established on 5/10/2025     2025 all goals ongoing     SUBJECTIVE  \" I get help with that\"     OBJECTIVE  Precautions: Bed/chair alarm    WEIGHT BEARING RESTRICTION     PAIN ASSESSMENT   Ratin  Location: pt does not c.o pain  Management Techniques: Activity promotion, Body mechanics, Repositioning    BALANCE                                                                                                                       Static Sitting: Good  Dynamic Sitting: Good           Static Standing: Fair -  Dynamic Standing: Fair -    ACTIVITY TOLERANCE                         O2 WALK       AM-PAC '6-Clicks' INPATIENT SHORT FORM - BASIC MOBILITY  How much difficulty does the patient currently have...  Patient Difficulty: Turning over in bed (including adjusting bedclothes, sheets and blankets)?: None   Patient Difficulty: Sitting down on and standing up from a chair with arms (e.g., wheelchair, bedside commode, etc.): A Little   Patient Difficulty: Moving from lying on back to sitting on the side of the bed?: A Little   How much help from another person does the patient currently need...   Help from Another: Moving to and from a bed to a chair (including a wheelchair)?: A Little   Help from Another: Need to walk in hospital room?: A Little   Help from Another: Climbing 3-5 steps with a railing?: A Little     AM-PAC Score:  Raw Score: 19   Approx Degree of Impairment: 41.77%   Standardized Score (AM-PAC Scale): 45.44   CMS Modifier (G-Code): CK    FUNCTIONAL ABILITY STATUS  Gait Assessment    Functional Mobility/Gait Assessment  Gait Assistance: Contact guard assist  Distance (ft): 20-25  Assistive Device: Rolling walker  Pattern: Shuffle    Skilled Therapy Provided    Bed Mobility:  Rolling: SBA   Supine<>Sit: CGA   Sit<>Supine: NT     Transfer Mobility:  Sit<>Stand: CGA   Stand<>Sit: CGA   Gait: CGA    Therapist's Comments: Discussed case with RN prior to session initiation. Pt agreeable to participation in therapy. Gait belt donned for out of bed mobility. Cued for UE placement during STS transfer on arm rest of chair, facilitated inc anterior lean \"nose over toes\" as pt with weight too posteriorly during STS transfer. Participated in gait training with RW and chair follow. Improved endurance demonstrated today ambulating x2 distance compared to initial eval.         THERAPEUTIC EXERCISES  Lower Extremity Alternating marching  LAQ     Upper Extremity      Position Sitting     Repetitions   15   Sets   1     Patient End of Session: Up in chair, Needs met, Call light within reach, RN aware of session/findings, All patient questions and concerns addressed, Hospital anti-slip socks, Alarm set    PT Session Time: 23 minutes  Gait Trainin minutes  Therapeutic Activity:  minutes  Therapeutic Exercise: 10 minutes   Neuromuscular Re-education:  minutes

## 2025-05-13 NOTE — PLAN OF CARE
Assumed care at 1930,  Patient is alert and oriented X 4  On RA, SR on tele, Vital signs stable.  Neuros Dc'd   Plan for MRI in am  IVF discontinued  Up X 2 with a walker.  POC discussed with patient.

## 2025-05-14 VITALS
HEART RATE: 70 BPM | SYSTOLIC BLOOD PRESSURE: 148 MMHG | OXYGEN SATURATION: 97 % | RESPIRATION RATE: 16 BRPM | DIASTOLIC BLOOD PRESSURE: 77 MMHG | WEIGHT: 167.75 LBS | TEMPERATURE: 98 F | BODY MASS INDEX: 32 KG/M2

## 2025-05-14 LAB
ALBUMIN SERPL-MCNC: 3.8 G/DL (ref 3.2–4.8)
ALBUMIN/GLOB SERPL: 1.4 {RATIO} (ref 1–2)
ALP LIVER SERPL-CCNC: 91 U/L (ref 55–142)
ALT SERPL-CCNC: 81 U/L (ref 10–49)
ANION GAP SERPL CALC-SCNC: 7 MMOL/L (ref 0–18)
AST SERPL-CCNC: 43 U/L (ref ?–34)
BILIRUB SERPL-MCNC: 0.9 MG/DL (ref 0.2–1.1)
BUN BLD-MCNC: 12 MG/DL (ref 9–23)
CALCIUM BLD-MCNC: 9.2 MG/DL (ref 8.7–10.6)
CHLORIDE SERPL-SCNC: 105 MMOL/L (ref 98–112)
CO2 SERPL-SCNC: 20 MMOL/L (ref 21–32)
CREAT BLD-MCNC: 0.91 MG/DL (ref 0.55–1.02)
EGFRCR SERPLBLD CKD-EPI 2021: 61 ML/MIN/1.73M2 (ref 60–?)
GLOBULIN PLAS-MCNC: 2.8 G/DL (ref 2–3.5)
GLUCOSE BLD-MCNC: 104 MG/DL (ref 70–99)
GLUCOSE BLD-MCNC: 146 MG/DL (ref 70–99)
GLUCOSE BLD-MCNC: 166 MG/DL (ref 70–99)
OSMOLALITY SERPL CALC.SUM OF ELEC: 276 MOSM/KG (ref 275–295)
POTASSIUM SERPL-SCNC: 4.3 MMOL/L (ref 3.5–5.1)
PROT SERPL-MCNC: 6.6 G/DL (ref 5.7–8.2)
SODIUM SERPL-SCNC: 132 MMOL/L (ref 136–145)

## 2025-05-14 PROCEDURE — 99239 HOSP IP/OBS DSCHRG MGMT >30: CPT | Performed by: INTERNAL MEDICINE

## 2025-05-14 PROCEDURE — 99232 SBSQ HOSP IP/OBS MODERATE 35: CPT

## 2025-05-14 RX ORDER — CEFUROXIME AXETIL 500 MG/1
500 TABLET ORAL 2 TIMES DAILY
Qty: 6 TABLET | Refills: 0 | Status: SHIPPED | OUTPATIENT
Start: 2025-05-14 | End: 2025-05-17

## 2025-05-14 RX ORDER — EZETIMIBE 10 MG/1
10 TABLET ORAL NIGHTLY
Qty: 30 TABLET | Refills: 2 | Status: SHIPPED | OUTPATIENT
Start: 2025-05-14

## 2025-05-14 NOTE — SLP NOTE
SPEECH DAILY NOTE - INPATIENT    ASSESSMENT & PLAN   ASSESSMENT  Pt seen for dysphagia tx to assess tolerance with recommended diet, ensure proper utilization of aspiration precautions and provide pt/family education.  Patient received alert and oriented in bed waiting breakfast at time if my visit. She reported good tolerance of PO intake. SLP observed PO intake of thin liquids and regular solids. Bolus acceptance was adequate without evidence of anterior bolus loss. Mastication and AP bolus transit were thorough and efficient without evidence of oral residue. No overt s/s of aspiration observed and patient denied odynophagia and globus sensation across consistencies. Recommend patient continue a regular diet and thin liquids.   Communication evaluation completed previous visit and revealed intact expressive and receptive language. Patient feels her speech and language remains at baseline. No dysarthria noted. No further SLP services recommended at this time. However, service remains available for consult should patient status change.     Diet Recommendations - Solids: Regular  Diet Recommendations - Liquids: Thin Liquids       Aspiration Precautions: Upright position, Slow rate, Small bites       Patient Experiencing Pain: No                Treatment Plan  Treatment Plan/Recommendations: No further inpatient SLP service warranted    Interdisciplinary Communication: NA            GOALS  Goal #1 The patient will tolerate regular consistency and thin liquids without overt signs or symptoms of aspiration with 95 % accuracy over 1-2 session(s).  Met   Goal #2 The patient/family/caregiver will demonstrate understanding and implementation of aspiration precautions and swallow strategies independently over 1-2 session(s).     Met   Goal #3 Cognitive-linguistic evaluation  Met   Goal #4       Goal #5       Goal #6       Goal #7       Goal #8            FOLLOW UP  Follow Up Needed (Documentation Required): No  SLP Follow-up  Date: 05/13/25  Duration: 1 week    Session: 2    If you have any questions, please contact WALT Cid

## 2025-05-14 NOTE — DISCHARGE SUMMARY
[place] [facility]  DISCHARGE SUMMARY     Jamee Johnson Patient Status:  Observation    1935 MRN [id number]   Location [facility] 7NE-A Attending Dacia Paulson,    Hosp Day # 0 PCP Angle Mccurdy MD     Date of Admission: 2025  Date of Discharge:   25  Discharge Disposition: Home or Self Care    Discharge Diagnosis:  #Acute aphasia and metabolic encephalopathy, improving  #Acute CVA   #HTN  #HLD  #DM2  #CAD sp PCI  #Ischemic cardiomyopathy with recovered EF  #Chronic afib s/p PPM  #HOMAR on CKD3  #Klebsiella UTI  #Mild elevation in LFTs  #Hypothyroidism  #Depression  #GERD    History of Present Illness: (per Dr. Dasilva), Jamee Johnson is a 89 year old female with Pmhx CVA, HTN, HLD, DM2, CKD3, hypothyroidism, depression, CAD s.p PCI, afib presents to er w/ complaints of trouble speaking. Pt lives at assisted care facility and during AM rounds staff noted this issue. Daughter/son in law at the bedside. Pt cont to have difficulty finding her words. No other focal deficits appreciated. Pt otherwise denies any cp or sob, no fever, chills, n/v.      Brief Synopsis: admitted with acute aphasia. Neurology and cardiology consulted. She was treated supportively. Her MRI showed few scattered punctate infarcts in BL thalami. She was treated with empiric abx for UTI. Her medication regimen was optimized and she was discharged to home on course of PO abx with HH services with recommendation to f/u closely with PCP, neurology, and cardiology in outpt setting.    Lace+ Score: 67  59-90 High Risk  29-58 Medium Risk  0-28   Low Risk       TCM Follow-Up Recommendation:  LACE > 58: High Risk of readmission after discharge from the hospital.      Procedures during hospitalization:   none    Incidental or significant findings and recommendations (brief descriptions):  As above    Lab/Test results pending at Discharge:   none    Consultants:  Neurology  Cardiology     Discharge Medication List:      Discharge Medications        START taking these medications        Instructions Prescription details   cefuroxime 500 MG Tabs  Commonly known as: Ceftin      Take 1 tablet (500 mg total) by mouth 2 (two) times daily for 3 days.   Stop taking on: May 17, 2025  Quantity: 6 tablet  Refills: 0     ezetimibe 10 MG Tabs  Commonly known as: Zetia      Take 1 tablet (10 mg total) by mouth nightly.   Quantity: 30 tablet  Refills: 2     rivaroxaban 15 MG Tabs  Commonly known as: Xarelto      Take 1 tablet (15 mg total) by mouth daily with food.   Quantity: 30 tablet  Refills: 1            CHANGE how you take these medications        Instructions Prescription details   ibuprofen 600 MG Tabs  Commonly known as: Motrin  What changed: Another medication with the same name was removed. Continue taking this medication, and follow the directions you see here.      Take 1 tablet (600 mg total) by mouth 2 (two) times daily as needed for Pain.   Refills: 0            CONTINUE taking these medications        Instructions Prescription details   acetaminophen 500 MG Tabs  Commonly known as: Tylenol Extra Strength      Take 1 tablet (500 mg total) by mouth in the morning and 1 tablet (500 mg total) before bedtime.   Refills: 0     aspirin 81 MG Chew      Chew 1 tablet (81 mg total) by mouth in the morning.   Refills: 0     cyanocobalamin 1000 MCG Tabs  Commonly known as: Vitamin B12      Take 1 tablet (1,000 mcg total) by mouth at bedtime.   Refills: 0     DULoxetine 30 MG Cpep  Commonly known as: Cymbalta      Take 1 capsule (30 mg total) by mouth in the morning.   Refills: 0     famotidine 20 MG Tabs  Commonly known as: Pepcid      Take 1 tablet (20 mg total) by mouth in the morning and 1 tablet (20 mg total) before bedtime.   Refills: 0     furosemide 40 MG Tabs  Commonly known as: Lasix      Take 1 tablet (40 mg total) by mouth 3 (three) times a week. Monday, Wednesday, Friday   Refills: 0     guaiFENesin 100 MG/5 ML  Commonly known  as: Robitussin      Take 10 mL (200 mg total) by mouth every 4 (four) hours as needed.   Refills: 0     Jardiance 10 MG Tabs  Generic drug: empagliflozin      Take by mouth in the morning.   Refills: 0     levothyroxine 50 MCG Tabs  Commonly known as: Synthroid      Take 1 tablet (50 mcg total) by mouth before breakfast.   Refills: 0     loratadine 10 MG Tabs  Commonly known as: Claritin      Take 1 tablet (10 mg total) by mouth in the evening.   Refills: 0     magnesium oxide 400 MG Tabs  Commonly known as: Mag-Ox      Take 1 tablet (400 mg total) by mouth in the morning.   Refills: 0     metoprolol succinate  MG Tb24  Commonly known as: Toprol XL      Take 1 tablet (100 mg total) by mouth in the morning.   Refills: 0     multivitamin Chew      Chew 1 tablet by mouth at bedtime.   Refills: 0     nitroglycerin 0.4 MG Subl  Commonly known as: Nitrostat      Place 1 tablet (0.4 mg total) under the tongue every 5 (five) minutes as needed for Chest pain.   Refills: 0     nystatin 073622 UNIT/GM Powd      Apply 1 Application topically 3 (three) times daily. Perineal rash   Refills: 0     ondansetron 4 mg tablet  Commonly known as: Zofran      Take 1 tablet (4 mg total) by mouth 2 (two) times daily.   Refills: 0     potassium chloride 10 MEQ Tbcr  Commonly known as: Klor-Con M10      Take 2 tablets (20 mEq total) by mouth 3 (three) times a week. Monday, Wednesday, Friday   Refills: 0     sacubitril-valsartan 24-26 MG Tabs  Commonly known as: Entresto      Take 1 tablet by mouth 2 (two) times daily.   Refills: 0     sertraline 25 MG Tabs  Commonly known as: Zoloft      Take 1 tablet (25 mg total) by mouth in the evening.   Refills: 0     spironolactone 25 MG Tabs  Commonly known as: Aldactone      Take 1 tablet (25 mg total) by mouth in the morning.   Refills: 0     Vitamin D3 25 MCG (1000 UT) Caps      Take 1 tablet by mouth at bedtime.   Refills: 0            STOP taking these medications      acetaminophen-codeine  300-30 MG Tabs  Commonly known as: Tylenol #3        apixaban 2.5 MG Tabs  Commonly known as: Eliquis                  Where to Get Your Medications        These medications were sent to [facility] - [place], [place] - [address], [address] [phone], [phone]  [address], [address], [facility] [address]      Phone: [phone]   rivaroxaban 15 MG Tabs       Please  your prescriptions at the location directed by your doctor or nurse    Bring a paper prescription for each of these medications  cefuroxime 500 MG Tabs  ezetimibe 10 MG Tabs         [place] reviewed[place]    Follow-up appointment:   Jurgen Mccurdy MD  [address] [place] [address]-  [address]  [place] [address] [phone]    Follow up in 1 week(s)      Kingsley Aviles MD  [address]  [address]  [facility] [address] [phone]    Schedule an appointment as soon as possible for a visit in 1 month(s)  stroke follow up in 4-6 weeks    Alma Cruz APRN  [address]  [address]  [facility] [id number] [phone]    Follow up  Office will call you for follow up appt with Alma PARIS in 1-2 weeks. [phone].    Appointments for Next 30 Days 2025 - 2025      None            Vital signs:  Temp:  [97.5 °F (36.4 °C)-98.1 °F (36.7 °C)] 97.5 °F (36.4 °C)  Pulse:  [70] 70  Resp:  [16] 16  BP: (148-169)/(61-77) 148/77  SpO2:  [97 %-98 %] 97 %    Physical Exam:    General: No acute distress   Lungs: clear to auscultation  Cardiovascular: S1, S2  Abdomen: Soft    -----------------------------------------------------------------------------------------------  PATIENT DISCHARGE INSTRUCTIONS: See electronic chart    Dacia Paulson,     Total time spent on discharge plannin minutes     The  Cures Act makes medical notes like these available to patients in the interest of transparency. Please be advised this is a medical document. Medical documents are intended to carry relevant information, facts as evident,  and the clinical opinion of the practitioner. The medical note is intended as peer to peer communication and may appear blunt or direct. It is written in medical language and may contain abbreviations or verbiage that are unfamiliar.

## 2025-05-14 NOTE — PLAN OF CARE
Assumed care at 0700. Pt A/O x4. RA.  on tele. VSS.  Denies any pain. Neurologically stable. Up x1 w/walker.   All consuls cleared for discharge.    Discharge instructions reviewed with pt and pt daughter.  Pt discharged back to [facility].   Report called to receiving nurse at [facility].

## 2025-05-14 NOTE — PROGRESS NOTES
Progress Note  Jamee Johnson Patient Status:  Observation    1935 MRN [id number]   Location [facility] 7NE-A Attending Dacia Paulson, DO   Hosp Day # 0 PCP Angle Mccurdy MD     Subjective:  No cardiac events over night.     Objective:  /75 (BP Location: Left arm)   Pulse 70   Temp 97.9 °F (36.6 °C) (Oral)   Resp 16   Wt 167 lb 12.3 oz (76.1 kg)   LMP  (LMP Unknown)   SpO2 97%   BMI 31.70 kg/m²     Telemetry: AF 70 BPM.      Intake/Output:    Intake/Output Summary (Last 24 hours) at 2025 0849  Last data filed at 2025 0600  Gross per 24 hour   Intake 200 ml   Output 1000 ml   Net -800 ml       Last 3 Weights   25 1150 167 lb 12.3 oz (76.1 kg)   25 1226 155 lb (70.3 kg)   08/15/24 1900 148 lb (67.1 kg)       Labs:  Recent Labs   Lab 05/10/25  0556 25  0549 25  0547 25  0540   * 106* 108*  --    BUN 23 16 12  --    CREATSERUM 1.46* 1.35* 0.99  --    EGFRCR 34* 38* 55*  --    CA 9.8 9.0 9.1  --     144 140  --    K 4.2 4.2 3.7 4.0    109 111  --    CO2 27.0 29.0 22.0  --      Recent Labs   Lab 25  1152 05/10/25  0556   RBC 5.44* 4.92   HGB 16.5* 15.3   HCT 50.9* 47.2   MCV 93.6 95.9   MCH 30.3 31.1   MCHC 32.4 32.4   RDW 14.1 13.9   NEPRELIM 6.10 5.81   WBC 9.6 9.1   .0 237.0         Recent Labs   Lab 25  1152   TROPHS 12   25:  EKG:  : 70 BPM.   24: Echo:   1. Left ventricle: The cavity size was normal. Wall thickness was at the      upper limits of normal. Systolic function was normal. The estimated      ejection fraction was 60-65%, by visual assessment. No diagnostic      evidence for regional wall motion abnormalities. Unable to assess LV      diastolic function.   2. Right ventricle: Pacer wire noted in the right ventricle.   3. Left atrium: The left atrial volume was markedly increased.   4. Atrial septum: Agitated saline contrast study showed no shunt, at      baseline or with  provocation.   Impressions:  This study is a limited bubble study with a previous complete   study dated 7/15/2024: The bubble study is negative for a right to left   shunt.   *   5/9/25:  CT Brain;  1. No acute intracranial pathology.   2. 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   3. Global atrophy and chronic small vessel ischemic changes of the cerebral white matter.   4. Stable atherosclerotic changes with no hemodynamically significant stenosis or acute abnormality in CT angiography of the brain and neck.   5. Incompletely visualized large bulla in right middle lobe.       5/14/25:  MRI Brain:   1. There are a few scattered punctate foci of mildly restricted diffusion with associated T2/FLAIR hyperintensity in the bilateral thalami measuring up to 7 mm that likely represent areas of acute to subacute ischemia/infarction.  Clinical correlation   recommended.      2. Interval increase in moderate chronic microvascular ischemic changes in the cerebral white matter.  Scattered old lacunar infarcts in the basal ganglia and thalami.  Small old right cerebellar infarct noted.      3. Stable nonspecific diffusion hyperintense lesion in the left frontal bone with high T2 signal and low T1 signal remains incompletely characterized, with metastatic disease, myeloma, atypical hemangioma, or other etiologies not entirely excluded.  This    measures up to 10 x 5 mm.  Clinical correlation recommended.     Review of Systems:   Constitutional: No fevers, chills, fatigue or night sweats.  ENT: No mouth pain, neck pain, running nose, headaches or swollen glands.  Skin: No rashes, pruritus or skin changes,  Respiratory: Denies cough, wheezing or shortness of breath.  CV: Denies chest pain, palpitations, orthopnea, PND or dizziness.  Musculoskeletal: No joint pain, stiffness or swelling.  GI: No nausea, vomiting or diarrhea. No blood in stools.  Neurologic: No seizures, tremors, weakness or  numbness.      Physical Exam:  Gen: alert, oriented x 3, NAD  Heent: pupils equal, reactive. Mucous membranes moist.   Neck: no jvd  Cardiac: regular rate and rhythm, normal S1,S2, no murmur, gallop or rub.   Lungs: Clear upper, Dim bases.   Abd: soft, NT/ND +bs  Ext: no edema  Skin: Warm, dry  Neuro: No focal deficits     Medications:    Scheduled Medications[1]  Medication Infusions[2]    Assessment:  Acute Aphasia:  Upon admission.   5/14- MRI Brain- Old lacunar infarct, old cerebral infarct, Left frontal lesion as described.   CT Brain- Stable left Anterior communicating complex. No CVA seen.   LVEF:  60-65 %Patient's NPO status verified.  No Shunt on  previous echo.   CAD:  Hx PCI.  Trop neg. No anginal symptoms.   Preserved LVEF.   ASA, Toprol 100 mg statin.   Hx Recovered ISCM:    GDMT- Toprol 100 mg, Entresto 24-26 mg, Lasix 40 mg 3 X's Week.  Persistent AF:    Remains AF controlled rates. Toprol 100 mg every day.   Xarelto 20 mg every day.  HTN:  Moderate control.   Dyslipidemia:  Statin allergy. Zetia.   HOMAR/CKD Stage 3:  Repeat CMP.   Elevated LFT's- normalizing.   Klebsiella UTI:  Rocephin.   Type 2DM  Hypothyroidism:      Plan:  No further Aphagia. MRI Brain completed.  Old lacunar and cerebral infarcts.  Left fontal lobe lesion as described. Neurology following. ASA, Zetia.   Remains AF with controlled rates on Toprol and Xarelto.   Repeat CMP this am.   UTI- Abx.     Plan of care discussed with patient, KENNY.    PATRICIA Solorzano  5/14/2025  8:49 AM           Patient seen and examined independently.  Note reviewed and labs reviewed. Agree with above assessment and plan.    Aphasia likely due to Sepsis/UTI  CAD  Hx of Ischemic CM  Hx of CVA  Permanent A Fib  Type II DM  HTN    Recommendations  Echo reviewed, no identifiable ASD/PFO  Currently resting, no overnight events  Continue BB, aspirin, xarelto  Cardiac monitoring    Chantel Mcfarland MD  Cardiologist  [facility]  5/14/2025 4:16  PM      Note to the patient: The 21st Century Cures Act makes medical notes like these available to patients in the interest of transparency. However, be advised that this is a medical document. It is intended as peer to peer communication. It is written in medical language and may contain abbreviations or verbiage that are unfamiliar. It may appear blunt or direct. Medical documents are intended to carry relevant information, facts as evident, and clinical opinion of the practitioner.     Disclaimer: Components of this note were documented using voice recognition system and are subject to errors not corrected at proofreading. Contact the author of this note for any clarifications.          [1]    ezetimibe  10 mg Oral Nightly    DULoxetine  30 mg Oral Daily    famotidine  20 mg Oral Daily    levothyroxine  50 mcg Oral Before breakfast    cetirizine  5 mg Oral Nightly    sertraline  25 mg Oral Daily    sacubitril-valsartan  1 tablet Oral BID    aspirin  81 mg Oral Daily    rivaroxaban  15 mg Oral Daily with food    furosemide  40 mg Oral Once per day on Monday Wednesday Friday    metoprolol succinate ER  100 mg Oral Daily    spironolactone  25 mg Oral Daily    cefTRIAXone  1 g Intravenous Q24H    insulin aspart  1-10 Units Subcutaneous q6h   [2]

## 2025-05-14 NOTE — PROGRESS NOTES
[facility]  [facility]ONNA Neurology Progress Note    Jamee Johnson Patient Status:  Observation    1935 MRN [id number]   Location [facility] 7NE-A Attending Dacia Paulson, DO   Hosp Day # 0 PCP Angle Mccurdy MD     CC: Stroke    Subjective:  Patient seen for a follow up visit today. Awake, alert and oriented x 4. Completed MRI orly last night, positive for bilateral small strokes in thalami. Speech is about 80% better per patient, no new neurological deficits. Denies headache, diplopia, difficulty swallowing.        MEDICATIONS:  Prior to Admission Medications[1]  Current Hospital Medications[2]    REVIEW OF SYSTEMS:  A 10-point system was reviewed.  Pertinent positives and negatives are noted in HPI.      PHYSICAL EXAMINATION:  VITAL SIGNS: /75 (BP Location: Left arm)   Pulse 70   Temp 97.9 °F (36.6 °C) (Oral)   Resp 16   Wt 167 lb 12.3 oz (76.1 kg)   LMP  (LMP Unknown)   SpO2 97%   BMI 31.70 kg/m²   GENERAL:  Patient is a 89 year old female in no acute distress.  HEENT:  Normocephalic, atraumatic  ABD: Soft, non tender  SKIN: Warm, dry, no rashes    NEUROLOGICAL:     Mental status: Oriented to person, place, situation and time   Speech: Fluent, no obvious aphasia, mild intermittent dysarthria  Memory and comprehension: Mild short term memory impairment  Cranial Nerves: VFF, PERRL 3mm brisk, EOMI, no nystagmus, facial sensation intact, face symmetric, tongue midline, shoulder shrug equal, remainder CN intact  Motor: No drift, no focal arm or leg weakness. Motor exam is 5 out of 5 in all extremities.    Sensory: Intact to light touch  Coordination: FTN intact  Gait: Deferred    Imaging/Diagnostics:  MRI BRAIN (CPT=70551)  Result Date: 2025  CONCLUSION:   1. There are a few scattered punctate foci of mildly restricted diffusion with associated T2/FLAIR hyperintensity in the bilateral thalami measuring up to 7 mm that likely represent areas of acute to subacute  ischemia/infarction.  Clinical correlation recommended.  2. Interval increase in moderate chronic microvascular ischemic changes in the cerebral white matter.  Scattered old lacunar infarcts in the basal ganglia and thalami.  Small old right cerebellar infarct noted.  3. Stable nonspecific diffusion hyperintense lesion in the left frontal bone with high T2 signal and low T1 signal remains incompletely characterized, with metastatic disease, myeloma, atypical hemangioma, or other etiologies not entirely excluded.  This  measures up to 10 x 5 mm.  Clinical correlation recommended.  Please see above for further details.  Critical results were discussed with the patient's care nurse Shelbi at 1652 hours on 5/13/2025. Critical results were read back.  LOCATION:  [id number]   Dictated by (CST): Hiram Puentes MD on 5/13/2025 at 4:49 PM     Finalized by (CST): Hiram Puentes MD on 5/13/2025 at 4:54 PM       XR CHEST AP PORTABLE  (CPT=71045)  Result Date: 5/9/2025  CONCLUSION:  No evidence of active cardiopulmonary disease.   LOCATION:  [facility]      Dictated by (CST): Donnie Park MD on 5/09/2025 at 1:13 PM     Finalized by (CST): Donnie Park MD on 5/09/2025 at 1:13 PM       CT STROKE CTA BRAIN/CTA NECK (W IV)(CPT=70496/26690)  Result Date: 5/9/2025  CONCLUSION:  1. No acute intracranial pathology. 2. 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 3. Global atrophy and chronic small vessel ischemic changes of the cerebral white matter. 4. Stable atherosclerotic changes with no hemodynamically significant stenosis or acute abnormality in CT angiography of the brain and neck. 5. Incompletely visualized large bulla in right middle lobe.   LOCATION:  [facility]   Dictated by (CST): Donnie Park MD on 5/09/2025 at 12:28 PM     Finalized by (CST): Donnie Park MD on 5/09/2025 at 12:47 PM       CT STROKE BRAIN (NO IV)(CPT=70450)  Result Date: 5/9/2025  CONCLUSION:  No acute intracranial pathology.   Critical results were called to Dr. Medina at 1207 hours on 5/9/2025.  There was appropriate read back.    LOCATION:  [facility]   Dictated by (CST): Donnie Park MD on 5/09/2025 at 12:05 PM     Finalized by (CST): Donnie Park MD on 5/09/2025 at 12:08 PM           Labs:  Recent Labs   Lab 05/09/25  1152 05/10/25  0556   RBC 5.44* 4.92   HGB 16.5* 15.3   HCT 50.9* 47.2   MCV 93.6 95.9   MCH 30.3 31.1   MCHC 32.4 32.4   RDW 14.1 13.9   NEPRELIM 6.10 5.81   WBC 9.6 9.1   .0 237.0         Recent Labs   Lab 05/10/25  0556 05/11/25  0549 05/12/25  0547 05/13/25  0540   * 106* 108*  --    BUN 23 16 12  --    CREATSERUM 1.46* 1.35* 0.99  --    EGFRCR 34* 38* 55*  --    CA 9.8 9.0 9.1  --     144 140  --    K 4.2 4.2 3.7 4.0    109 111  --    CO2 27.0 29.0 22.0  --      Assessment and Plan:     An 89 years old female with:      Small acute to subacute bilateral thalami strokes - presented with aphasia,  in a setting of risk factors of HTN, HLD, previous strokes in 2024, and chronic fib, was on Eliquis at home. 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  MRI brain - a few scattered punctate foci of mildly restricted diffusion with associated T2/FLAIR hyperintensity in the bilateral thalami measuring up to 7 mm that likely represent areas of acute to subacute ischemia/infarction. Scattered old lacunar infarcts in the basal ganglia and thalami.  Small old right cerebellar infarct noted.   Stroke labs: A1c 6.9, lipid panel with , trigl 240. Not on statin, allergic. Currently on Xarelto for afib ( switched from Eliquis by Cardiology).   Continue ASA 81 mg daily, pt is allergic to statins, will add ezetimibe 10 mg daily, for lipids management and secondary stroke risk reduction.  PT/OT/ST and rehab evals  Safety precautions  Encephalopathy - secondary to strokes and UTI as well as HOMAR.  -  Routine EEG 5/10 - mild diffuse slowing, no EFA.  - resolved now.   UTI - likely contributed to AMS, now back to baseline as being treated with abx, further management per Medicine. Supportive care with adequate nutrition, sleep and hydration.   Discussed with pt, patient's daughter Lo over the phone, and with dr. Persaud. No further inpatient intervention indicated at this time from neurology point of view. Pt is to follow up in clinic for stroke follow up in 4-6 weeks.     Is this a shared or split note between Advanced Practice Provider and Physician? Yes       Rajwinder GOMEZ  [facility]  5/14/2025, 8:34 AM  [place] # [id number]            [1]   Prior to Admission Medications   Medication Sig    ezetimibe 10 MG Oral Tab Take 1 tablet (10 mg total) by mouth nightly.    rivaroxaban 15 MG Oral Tab Take 1 tablet (15 mg total) by mouth daily with food.    cefuroxime 500 MG Oral Tab Take 1 tablet (500 mg total) by mouth 2 (two) times daily for 3 days.   [2]   Current Facility-Administered Medications   Medication Dose Route Frequency    ezetimibe (Zetia) tab 10 mg  10 mg Oral Nightly    DULoxetine (Cymbalta) DR cap 30 mg  30 mg Oral Daily    famotidine (Pepcid) tab 20 mg  20 mg Oral Daily    levothyroxine (Synthroid) tab 50 mcg  50 mcg Oral Before breakfast    cetirizine (ZyrTEC) tab 5 mg  5 mg Oral Nightly    sertraline (Zoloft) tab 25 mg  25 mg Oral Daily    sacubitril-valsartan (Entresto) 24-26 MG per tab 1 tablet  1 tablet Oral BID    aspirin chewable tab 81 mg  81 mg Oral Daily    rivaroxaban (Xarelto) tab 15 mg  15 mg Oral Daily with food    furosemide (Lasix) tab 40 mg  40 mg Oral Once per day on Monday Wednesday Friday    metoprolol succinate ER (Toprol XL) 24 hr tab 100 mg  100 mg Oral Daily    spironolactone (Aldactone) tab 25 mg  25 mg Oral Daily    acetaminophen (Tylenol) tab 650 mg  650 mg Oral Q4H PRN    Or    acetaminophen (Tylenol) rectal suppository 650 mg  650 mg Rectal Q4H PRN     labetalol (Trandate) 5 mg/mL injection 10 mg  10 mg Intravenous Q10 Min PRN    hydrALAzine (Apresoline) 20 mg/mL injection 10 mg  10 mg Intravenous Q2H PRN    ondansetron (Zofran) 4 MG/2ML injection 4 mg  4 mg Intravenous Q6H PRN    metoclopramide (Reglan) 5 mg/mL injection 5 mg  5 mg Intravenous Q8H PRN    acetaminophen (Tylenol Extra Strength) tab 500 mg  500 mg Oral Q4H PRN    melatonin tab 3 mg  3 mg Oral Nightly PRN    polyethylene glycol (PEG 3350) (Miralax) 17 g oral packet 17 g  17 g Oral Daily PRN    sennosides (Senokot) tab 17.2 mg  17.2 mg Oral Nightly PRN    bisacodyl (Dulcolax) 10 MG rectal suppository 10 mg  10 mg Rectal Daily PRN    cefTRIAXone (Rocephin) 1 g in sodium chloride 0.9% 100 mL IVPB-ADDV  1 g Intravenous Q24H    glucose (Dex4) 15 GM/59ML oral liquid 15 g  15 g Oral Q15 Min PRN    Or    glucose (Glutose) 40% oral gel 15 g  15 g Oral Q15 Min PRN    Or    glucose-vitamin C (Dex-4) chewable tab 4 tablet  4 tablet Oral Q15 Min PRN    Or    dextrose 50% injection 50 mL  50 mL Intravenous Q15 Min PRN    Or    glucose (Dex4) 15 GM/59ML oral liquid 30 g  30 g Oral Q15 Min PRN    Or    glucose (Glutose) 40% oral gel 30 g  30 g Oral Q15 Min PRN    Or    glucose-vitamin C (Dex-4) chewable tab 8 tablet  8 tablet Oral Q15 Min PRN    insulin aspart (NovoLOG) 100 Units/mL FlexPen 1-10 Units  1-10 Units Subcutaneous q6h

## 2025-05-14 NOTE — CM/SW NOTE
05/14/25 1500   Discharge disposition   Expected discharge disposition [facility]   Post Acute Care Provider   ([facility] Mcgarry)   Discharge transportation Private car     Notified by RN pt cleared for DC. Family will transport pt back to [facility].     RN to call Me[facility] Mcgarry at [phone] for report.     LEOPOLDO Fraser

## 2025-05-15 ENCOUNTER — PATIENT OUTREACH (OUTPATIENT)
Dept: CASE MANAGEMENT | Age: 89
End: 2025-05-15

## 2025-05-15 NOTE — PROGRESS NOTES
Hospital follow up.    Patient discharged to [facility].     Reymundo Otoole MD  Neurology; Clinical Neurophysiology  [facility]  [address] [place] [address]  [phone]  Tuesday 7/1 @2    Confirmed with patient's daughter.    Closing encounter.

## 2025-05-15 NOTE — PROGRESS NOTES
Voicemail received; Patient's daughterLo returning call; patient's daughter would like to re-schedule the appointment  Called patient's daughter back    Dr Reymundo Otoole  Neurology  [address] [place] [id number]  [phone]  Patient has existing appointment Tuesday 07/01 @2:00pm - re-scheduled appointment for Tue 07/01 @1:00pm  Confirmed w/pt's daughterLo  Closing encounter

## 2025-07-01 ENCOUNTER — OFFICE VISIT (OUTPATIENT)
Dept: NEUROLOGY | Facility: CLINIC | Age: 89
End: 2025-07-01
Payer: MEDICARE

## 2025-07-01 VITALS
BODY MASS INDEX: 29.27 KG/M2 | DIASTOLIC BLOOD PRESSURE: 72 MMHG | SYSTOLIC BLOOD PRESSURE: 141 MMHG | RESPIRATION RATE: 16 BRPM | WEIGHT: 155 LBS | HEIGHT: 61 IN | HEART RATE: 85 BPM

## 2025-07-01 DIAGNOSIS — I10 BENIGN ESSENTIAL HTN: ICD-10-CM

## 2025-07-01 DIAGNOSIS — I63.9 CEREBROVASCULAR ACCIDENT (CVA), UNSPECIFIED MECHANISM (HCC): Primary | ICD-10-CM

## 2025-07-01 DIAGNOSIS — I48.91 ATRIAL FIBRILLATION, UNSPECIFIED TYPE (HCC): ICD-10-CM

## 2025-07-01 DIAGNOSIS — G93.40 ENCEPHALOPATHY, UNSPECIFIED TYPE: ICD-10-CM

## 2025-07-01 PROCEDURE — 99215 OFFICE O/P EST HI 40 MIN: CPT | Performed by: OTHER

## 2025-07-01 PROCEDURE — 1111F DSCHRG MED/CURRENT MED MERGE: CPT | Performed by: OTHER

## 2025-07-01 PROCEDURE — 1160F RVW MEDS BY RX/DR IN RCRD: CPT | Performed by: OTHER

## 2025-07-01 PROCEDURE — 1159F MED LIST DOCD IN RCRD: CPT | Performed by: OTHER

## 2025-07-01 RX ORDER — EZETIMIBE 10 MG/1
10 TABLET ORAL NIGHTLY
COMMUNITY
End: 2025-07-01

## 2025-07-01 NOTE — PROGRESS NOTES
[facility] Progress Note    HPI  Chief Complaint   Patient presents with    CVA     Follow up     Test Results     CT Stroke Brain 05/09/25 & MRI Brain 05/13/25 and labs    Hospital F/U     05/09/2025 due to stroke       Jamee Johnson is a 89 year old female, with PMHx including but not limited to Afib on anti-coagulation, CAD, s/p PCI, BiV ICD, CKD 3, previously hospitalized 7/2024 for stroke, who recently presented to ED 5/9/2025 with confusion and nausea. She was thought to have encephalopathy in setting of UTI, but MRI brain done showed possible restricted diffusion bilateral thalami - patient improved during admission with treatment of UTI and was already on Eliquis at time of possible stroke and she was switched to Xarelto by cardiology due to new TIA/stroke; EEG was done and showed no seizures or epileptiform activity, only showing diffuse slowing suggestive of encephalopathy.     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       She is in senior living facility and denies any new focal numbness/tingling/weakness; she has allergy to statin     Past Medical History[1]  Past Surgical History[2]  Family History[3]  Social Hx on file[4]    Allergies[5]    Medications - Current[6]    Review of Systems:  No chest pain or palpitations; otherwise as noted in HPI.    Exam:  /72 (BP Location: Right arm, Patient Position: Sitting, Cuff Size: adult)   Pulse 85   Resp 16   Ht 61\"   Wt 155 lb (70.3 kg)   LMP  (LMP Unknown)   BMI 29.29 kg/m²   Estimated body mass index is 29.29 kg/m² as calculated from the following:    Height as of this encounter: 61\".    Weight as of this encounter: 155 lb (70.3 kg).    Gen: well developed, well nourished, no acute distress  HEENT: normocephalic  Heart; normal S1/S2, regular rate and rhythm  Lungs: clear to auscultation bilaterally  Extremities: no edema,  peripheral pulses intact    Neck: supple, full range of motion; no carotid bruits    Fundoscopic Exam: optic discs sharp bilaterally    Neuro:  Mental status:  Orientation: Alert and oriented to person, place, time but appears confused - initially states month is February, then March, then June; knows year, thinks she is in [place]  Speech Fluent and conversational but slurred    Memory: 2 out of 3 on delayed recall  Can follow simple, complex and crossed commands     CN: PERRL, EOMI with no nystagmus, VFF, smile symmetric, sensation intact, tongue and palate midline, SCM intact, otherwise, CN 2-12 intact  Motor: 5/5 strength throughout, tone normal  DTR: 2+ symmetric throughout, toes downgoing bilaterally, no clonus  Sensory: intact to light touch throughout  Coord: FNF intact with no tremor or dysmetria; rapid alternating movements intact bilaterally  Romberg: deferred  Gait: in wheelchair    Labs:  Pre review in EMR from prior admission  Component      Latest Ref Rng 5/9/2025 5/10/2025   Cholesterol, Total      <200 mg/dL 224 (H)     HDL Cholesterol      40 - 59 mg/dL 37 (L)     Triglycerides      30 - 149 mg/dL 240 (H)     LDL Cholesterol Calc      <100 mg/dL 143 (H)     VLDL      0 - 30 mg/dL 45 (H)     NON-HDL CHOLESTEROL      <130 mg/dL 187 (H)     HEMOGLOBIN A1c      <5.7 %  6.9 (H)    ESTIMATED AVERAGE GLUCOSE      68 - 126 mg/dL  151 (H)       Legend:  (H) High  (L) Low  Imaging:  MRI BRAIN (CPT=70551)  Result Date: 5/13/2025  CONCLUSION:   1. There are a few scattered punctate foci of mildly restricted diffusion with associated T2/FLAIR hyperintensity in the bilateral thalami measuring up to 7 mm that likely represent areas of acute to subacute ischemia/infarction.  Clinical correlation recommended.  2. Interval increase in moderate chronic microvascular ischemic changes in the cerebral white matter.  Scattered old lacunar infarcts in the basal ganglia and thalami.  Small old right cerebellar infarct  noted.  3. Stable nonspecific diffusion hyperintense lesion in the left frontal bone with high T2 signal and low T1 signal remains incompletely characterized, with metastatic disease, myeloma, atypical hemangioma, or other etiologies not entirely excluded.  This  measures up to 10 x 5 mm.  Clinical correlation recommended.  Please see above for further details.  Critical results were discussed with the patient's care nurse Shelbi at 1652 hours on 5/13/2025. Critical results were read back.  LOCATION:  [id number]   Dictated by (CST): Hiram Puentes MD on 5/13/2025 at 4:49 PM     Finalized by (CST): Hiram Puentes MD on 5/13/2025 at 4:54 PM       XR CHEST AP PORTABLE  (CPT=71045)  Result Date: 5/9/2025  CONCLUSION:  No evidence of active cardiopulmonary disease.   LOCATION:  [facility]      Dictated by (CST): Donnie Park MD on 5/09/2025 at 1:13 PM     Finalized by (CST): Donnie Park MD on 5/09/2025 at 1:13 PM       CT STROKE CTA BRAIN/CTA NECK (W IV)(CPT=70496/82605)  Result Date: 5/9/2025  CONCLUSION:  1. No acute intracranial pathology. 2. 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 3. Global atrophy and chronic small vessel ischemic changes of the cerebral white matter. 4. Stable atherosclerotic changes with no hemodynamically significant stenosis or acute abnormality in CT angiography of the brain and neck. 5. Incompletely visualized large bulla in right middle lobe.   LOCATION:  [facility]   Dictated by (CST): Donnie Park MD on 5/09/2025 at 12:28 PM     Finalized by (CST): Donnie Park MD on 5/09/2025 at 12:47 PM       CT STROKE BRAIN (NO IV)(CPT=70450)  Result Date: 5/9/2025  CONCLUSION:  No acute intracranial pathology.  Critical results were called to Dr. Medina at 1207 hours on 5/9/2025.  There was appropriate read back.    LOCATION:  [facility]   Dictated by (CST): Donnie Park MD on 5/09/2025 at 12:05 PM     Finalized by (CST): Donnie Park MD on 5/09/2025 at 12:08 PM       CT  ABDOMEN+PELVIS(CONTRAST ONLY)(CPT=74177)  Result Date: 4/26/2025  CONCLUSION:  1. Abnormal appearance of the urinary bladder.  Appearance consistent with nonspecific cystitis. 2. Additional urothelial enhancement involving the proximal right renal collecting system.  This may reflect upper urinary tract infection.  Correlate for pyelonephritis. 3. Uncomplicated colonic diverticulosis. 4. Focal fluid adjacent/inferior to the left greater trochanter.  Differential considerations include bursitis and liquified hematoma.  5. Details as above.  Continued clinical correlation recommended.    LOCATION:  [facility]   Dictated by (CST): Marin Leone MD on 4/26/2025 at 3:34 PM     Finalized by (CST): Marin Leone MD on 4/26/2025 at 3:43 PM       Echo (7/19/2024)    1. Left ventricle: The cavity size was normal. Wall thickness was at the      upper limits of normal. Systolic function was normal. The estimated      ejection fraction was 60-65%, by visual assessment. No diagnostic      evidence for regional wall motion abnormalities. Unable to assess LV      diastolic function.   2. Right ventricle: Pacer wire noted in the right ventricle.   3. Left atrium: The left atrial volume was markedly increased.   4. Atrial septum: Agitated saline contrast study showed no shunt, at      baseline or with provocation.   Impressions:  This study is a limited bubble study with a previous complete   study dated 7/15/2024: The bubble study is negative for a right to left   shunt.              Impression/ Plan:  Jamee Johnson is a 89 year old female, with PMHx including but not limited to Afib on anti-coagulation, CAD, s/p PCI, BiV ICD, CKD 3, previously hospitalized 7/2024 for stroke, who recently presented to ED 5/9/2025 with confusion and nausea. She was thought to have encephalopathy in setting of UTI, but MRI brain done showed possible restricted diffusion bilateral thalami - patient improved during admission with treatment of UTI and was  already on Eliquis at time of possible stroke and she was switched to Xarelto by cardiology due to new TIA/stroke; EEG was done and showed no seizures or epileptiform activity, only showing diffuse slowing suggestive of encephalopathy.     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       She is in senior living facility and denies any new focal numbness/tingling/weakness; she has allergy to statin.      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     educated regarding stroke risk factors: Patient educated regarding risk factors for stroke, including importance of balanced mediterranean diet, as well as importance of blood glucose control, lipid control, and hypertension management, management of atrial fibrillation    She was also advised to report immediately to the emergency room should she experience any symptoms including, but not limited to the following: sudden onset of slurred speech, numbness/tingling/weakness on one side of the body, confusion, double vision, loss of vision, vertigo,       1. Cerebrovascular accident (CVA), unspecified mechanism (HCC)  As noted above     2. Encephalopathy, unspecified type  As noted above     3. Atrial fibrillation, unspecified type (HCC)  As noted above     4. Benign essential HTN  As noted above     Return in about 2 months (around 9/1/2025).    Reymundo Otoole MD, Neurology  [facility] [place]  Pager [phone]  7/1/2025               [1]   Past Medical  History:   Atrial fibrillation (HCC)    Calculus of kidney    CVA (cerebral vascular accident) (HCC)    Depression    Diabetes (HCC)    Esophageal reflux    High blood pressure    High cholesterol    HLD (hyperlipidemia)    HTN (hypertension)    Incontinence    Kidney stones    Neuropathy    Osteoarthritis    Stroke (HCC)    Visual impairment   [2]   Past Surgical History:  Procedure Laterality Date    Excis lumbar disk,one level      Knee replacement surgery      Pacemaker monitor      Removal gallbladder      Removal of kidney stone      Total abdom hysterectomy     [3]   Family History  Problem Relation Age of Onset    Hypertension Mother     Heart Disorder Mother     Diabetes Paternal Grandmother     Cancer Sister         kidney CA    Stroke Sister     Diabetes Paternal Aunt    [4]   Social History  Socioeconomic History    Marital status:    Tobacco Use    Smoking status: Never     Passive exposure: Never    Smokeless tobacco: Never   Vaping Use    Vaping status: Never Used   Substance and Sexual Activity    Alcohol use: Never    Drug use: Never   Other Topics Concern    Caffeine Concern Yes     Comment: 1 cup a day    Exercise No   [5]   Allergies  Allergen Reactions    Amoxicillin Coughing, ITCHING and RASH    Statins OTHER (SEE COMMENTS)     Gets extremely weak    Enalapril Coughing    Latex ITCHING    Sulfa Antibiotics RASH   [6]   Current Outpatient Medications:     ezetimibe 10 MG Oral Tab, Take 1 tablet (10 mg total) by mouth nightly., Disp: 30 tablet, Rfl: 2    rivaroxaban 15 MG Oral Tab, Take 1 tablet (15 mg total) by mouth daily with food., Disp: 30 tablet, Rfl: 1    sacubitril-valsartan 24-26 MG Oral Tab, Take 1 tablet by mouth 2 (two) times daily., Disp: , Rfl:     loratadine 10 MG Oral Tab, Take 1 tablet (10 mg total) by mouth in the evening., Disp: , Rfl:     Cholecalciferol (VITAMIN D3) 25 MCG (1000 UT) Oral Cap, Take 1 tablet by mouth at bedtime., Disp: , Rfl:     nitroglycerin 0.4 MG  Sublingual SL Tab, Place 1 tablet (0.4 mg total) under the tongue every 5 (five) minutes as needed for Chest pain., Disp: , Rfl:     guaiFENesin 100 MG/5 ML Oral, Take 10 mL (200 mg total) by mouth every 4 (four) hours as needed., Disp: , Rfl:     ibuprofen 600 MG Oral Tab, Take 1 tablet (600 mg total) by mouth 2 (two) times daily as needed for Pain., Disp: , Rfl:     Nystatin 857421 UNIT/GM External Powder, Apply 1 Application topically 3 (three) times daily. Perineal rash, Disp: , Rfl:     aspirin 81 MG Oral Chew Tab, Chew 1 tablet (81 mg total) by mouth in the morning., Disp: , Rfl:     ondansetron (ZOFRAN) 4 mg tablet, Take 1 tablet (4 mg total) by mouth 2 (two) times daily., Disp: , Rfl:     cyanocobalamin 1000 MCG Oral Tab, Take 1 tablet (1,000 mcg total) by mouth at bedtime., Disp: , Rfl:     DULoxetine 30 MG Oral Cap DR Particles, Take 1 capsule (30 mg total) by mouth in the morning., Disp: , Rfl:     famotidine 20 MG Oral Tab, Take 1 tablet (20 mg total) by mouth in the morning and 1 tablet (20 mg total) before bedtime., Disp: , Rfl:     furosemide 40 MG Oral Tab, Take 1 tablet (40 mg total) by mouth 3 (three) times a week. Monday, Wednesday, Friday, Disp: , Rfl:     empagliflozin (JARDIANCE) 10 MG Oral Tab, Take by mouth in the morning., Disp: , Rfl:     levothyroxine 50 MCG Oral Tab, Take 1 tablet (50 mcg total) by mouth before breakfast., Disp: , Rfl:     magnesium oxide 400 MG Oral Tab, Take 1 tablet (400 mg total) by mouth in the morning., Disp: , Rfl:     metoprolol succinate  MG Oral Tablet 24 Hr, Take 1 tablet (100 mg total) by mouth in the morning., Disp: , Rfl:     potassium chloride 10 MEQ Oral Tab CR, Take 2 tablets (20 mEq total) by mouth 3 (three) times a week. Monday, Wednesday, Friday, Disp: , Rfl:     sertraline 25 MG Oral Tab, Take 1 tablet (25 mg total) by mouth in the evening., Disp: , Rfl:     spironolactone 25 MG Oral Tab, Take 1 tablet (25 mg total) by mouth in the morning.,  Disp: , Rfl:     acetaminophen 500 MG Oral Tab, Take 1 tablet (500 mg total) by mouth in the morning and 1 tablet (500 mg total) before bedtime., Disp: , Rfl:     multivitamin Oral Chew Tab, Chew 1 tablet by mouth at bedtime., Disp: , Rfl:

## 2025-07-01 NOTE — PATIENT INSTRUCTIONS
Refill policies:    Allow 2-3 business days for refills; controlled substances may take longer.  Contact your pharmacy at least 5 days prior to running out of medication and have them send an electronic request or submit request through the “request refill” option in your [employer] account.  Refills are not addressed on weekends; covering physicians do not authorize routine medications on weekends.  No narcotics or controlled substances are refilled after noon on Fridays or by on call physicians.  By law, narcotics must be electronically prescribed.  A 30 day supply with no refills is the maximum allowed.  If your prescription is due for a refill, you may be due for a follow up appointment.  To best provide you care, patients receiving routine medications need to be seen at least once a year.  Patients receiving narcotic/controlled substance medications need to be seen at least once every 3 months.  In the event that your preferred pharmacy does not have the requested medication in stock (e.g. Backordered), it is your responsibility to find another pharmacy that has the requested medication available.  We will gladly send a new prescription to that pharmacy at your request.    Scheduling Tests:    If your physician has ordered radiology tests such as MRI or CT scans, please contact Central Scheduling at [phone] right away to schedule the test.  Once scheduled, the [facility] Centralized Referral Team will work with your insurance carrier to obtain pre-certification or prior authorization.  Depending on your insurance carrier, approval may take 3-10 days.  It is highly recommended patients assure they have received an authorization before having a test performed.  If test is done without insurance authorization, patient may be responsible for the entire amount billed.      Precertification and Prior Authorizations:  If your physician has recommended that you have a procedure or additional testing performed the [facility]  Centralized Referral Team will contact your insurance carrier to obtain pre-certification or prior authorization.    You are strongly encouraged to contact your insurance carrier to verify that your procedure/test has been approved and is a COVERED benefit.  Although the [facility] Centralized Referral Team does its due diligence, the insurance carrier gives the disclaimer that \"Although the procedure is authorized, this does not guarantee payment.\"    Ultimately the patient is responsible for payment.   Thank you for your understanding in this matter.  Paperwork Completion:  If you require FMLA or disability paperwork for your recovery, please make sure to either drop it off or have it faxed to our office at [phone]. Be sure the form has your name and date of birth on it.  The form will be faxed to our Forms Department and they will complete it for you.  There is a 25$ fee for all forms that need to be filled out.  Please be aware there is a 10-14 day turnaround time.  You will need to sign a release of information (KEIKO) form if your paperwork does not come with one.  You may call the Forms Department with any questions at [phone].  Their fax number is [phone].

## 2025-07-21 ENCOUNTER — APPOINTMENT (OUTPATIENT)
Dept: CT IMAGING | Facility: HOSPITAL | Age: 89
End: 2025-07-21
Attending: EMERGENCY MEDICINE
Payer: MEDICARE

## 2025-07-21 ENCOUNTER — HOSPITAL ENCOUNTER (EMERGENCY)
Facility: HOSPITAL | Age: 89
Discharge: HOME | End: 2025-07-21
Attending: EMERGENCY MEDICINE
Payer: MEDICARE

## 2025-07-21 ENCOUNTER — APPOINTMENT (OUTPATIENT)
Dept: GENERAL RADIOLOGY | Facility: HOSPITAL | Age: 89
End: 2025-07-21
Attending: EMERGENCY MEDICINE
Payer: MEDICARE

## 2025-07-21 VITALS
WEIGHT: 155 LBS | RESPIRATION RATE: 20 BRPM | SYSTOLIC BLOOD PRESSURE: 157 MMHG | HEART RATE: 70 BPM | TEMPERATURE: 98 F | BODY MASS INDEX: 29 KG/M2 | DIASTOLIC BLOOD PRESSURE: 72 MMHG | OXYGEN SATURATION: 100 %

## 2025-07-21 DIAGNOSIS — R41.0 CONFUSION: Primary | ICD-10-CM

## 2025-07-21 DIAGNOSIS — N30.00 ACUTE CYSTITIS WITHOUT HEMATURIA: ICD-10-CM

## 2025-07-21 LAB
ALBUMIN SERPL-MCNC: 4.4 G/DL (ref 3.2–4.8)
ALBUMIN/GLOB SERPL: 1.4 (ref 1–2)
ALP LIVER SERPL-CCNC: 97 U/L (ref 55–142)
ALT SERPL-CCNC: 29 U/L (ref 10–49)
ANION GAP SERPL CALC-SCNC: 7 MMOL/L (ref 0–18)
AST SERPL-CCNC: 20 U/L (ref ?–34)
BASOPHILS # BLD AUTO: 0.06 X10(3) UL (ref 0–0.2)
BASOPHILS NFR BLD AUTO: 0.8 %
BILIRUB SERPL-MCNC: 0.4 MG/DL (ref 0.2–1.1)
BILIRUB UR QL STRIP.AUTO: NEGATIVE
BUN BLD-MCNC: 18 MG/DL (ref 9–23)
CALCIUM BLD-MCNC: 9.7 MG/DL (ref 8.7–10.6)
CHLORIDE SERPL-SCNC: 100 MMOL/L (ref 98–112)
CO2 SERPL-SCNC: 28 MMOL/L (ref 21–32)
CREAT BLD-MCNC: 1.7 MG/DL (ref 0.55–1.02)
EGFRCR SERPLBLD CKD-EPI 2021: 29 ML/MIN/1.73M2 (ref 60–?)
EOSINOPHIL # BLD AUTO: 0.38 X10(3) UL (ref 0–0.7)
EOSINOPHIL NFR BLD AUTO: 4.8 %
ERYTHROCYTE [DISTWIDTH] IN BLOOD BY AUTOMATED COUNT: 12.9 %
GLOBULIN PLAS-MCNC: 3.2 G/DL (ref 2–3.5)
GLUCOSE BLD-MCNC: 103 MG/DL (ref 70–99)
GLUCOSE UR STRIP.AUTO-MCNC: 150 MG/DL
HCT VFR BLD AUTO: 44.9 % (ref 35–48)
HGB BLD-MCNC: 14.7 G/DL (ref 12–16)
HYALINE CASTS #/AREA URNS AUTO: PRESENT /LPF
IMM GRANULOCYTES # BLD AUTO: 0.04 X10(3) UL (ref 0–1)
IMM GRANULOCYTES NFR BLD: 0.5 %
KETONES UR STRIP.AUTO-MCNC: NEGATIVE MG/DL
LEUKOCYTE ESTERASE UR QL STRIP.AUTO: 500
LYMPHOCYTES # BLD AUTO: 2.53 X10(3) UL (ref 1–4)
LYMPHOCYTES NFR BLD AUTO: 32 %
MCH RBC QN AUTO: 30.8 PG (ref 26–34)
MCHC RBC AUTO-ENTMCNC: 32.7 G/DL (ref 31–37)
MCV RBC AUTO: 94.1 FL (ref 80–100)
MONOCYTES # BLD AUTO: 0.51 X10(3) UL (ref 0.1–1)
MONOCYTES NFR BLD AUTO: 6.4 %
NEUTROPHILS # BLD AUTO: 4.39 X10 (3) UL (ref 1.5–7.7)
NEUTROPHILS # BLD AUTO: 4.39 X10(3) UL (ref 1.5–7.7)
NEUTROPHILS NFR BLD AUTO: 55.5 %
NITRITE UR QL STRIP.AUTO: NEGATIVE
OSMOLALITY SERPL CALC.SUM OF ELEC: 282 MOSM/KG (ref 275–295)
PH UR STRIP.AUTO: 6 (ref 5–8)
PLATELET # BLD AUTO: 276 10(3)UL (ref 150–450)
POTASSIUM SERPL-SCNC: 4.7 MMOL/L (ref 3.5–5.1)
PROT SERPL-MCNC: 7.6 G/DL (ref 5.7–8.2)
PROT UR STRIP.AUTO-MCNC: NEGATIVE MG/DL
RBC # BLD AUTO: 4.77 X10(6)UL (ref 3.8–5.3)
RBC #/AREA URNS AUTO: >10 /HPF
SODIUM SERPL-SCNC: 135 MMOL/L (ref 136–145)
SP GR UR STRIP.AUTO: 1.01 (ref 1–1.03)
UROBILINOGEN UR STRIP.AUTO-MCNC: NORMAL MG/DL
WBC # BLD AUTO: 7.9 X10(3) UL (ref 4–11)
WBC #/AREA URNS AUTO: >50 /HPF

## 2025-07-21 PROCEDURE — 93005 ELECTROCARDIOGRAM TRACING: CPT

## 2025-07-21 PROCEDURE — 87186 SC STD MICRODIL/AGAR DIL: CPT | Performed by: EMERGENCY MEDICINE

## 2025-07-21 PROCEDURE — 85025 COMPLETE CBC W/AUTO DIFF WBC: CPT | Performed by: EMERGENCY MEDICINE

## 2025-07-21 PROCEDURE — 87086 URINE CULTURE/COLONY COUNT: CPT | Performed by: EMERGENCY MEDICINE

## 2025-07-21 PROCEDURE — 99285 EMERGENCY DEPT VISIT HI MDM: CPT

## 2025-07-21 PROCEDURE — 71045 X-RAY EXAM CHEST 1 VIEW: CPT | Performed by: EMERGENCY MEDICINE

## 2025-07-21 PROCEDURE — 93010 ELECTROCARDIOGRAM REPORT: CPT

## 2025-07-21 PROCEDURE — 87077 CULTURE AEROBIC IDENTIFY: CPT | Performed by: EMERGENCY MEDICINE

## 2025-07-21 PROCEDURE — 81001 URINALYSIS AUTO W/SCOPE: CPT | Performed by: EMERGENCY MEDICINE

## 2025-07-21 PROCEDURE — 96374 THER/PROPH/DIAG INJ IV PUSH: CPT

## 2025-07-21 PROCEDURE — 80053 COMPREHEN METABOLIC PANEL: CPT | Performed by: EMERGENCY MEDICINE

## 2025-07-21 PROCEDURE — 70450 CT HEAD/BRAIN W/O DYE: CPT | Performed by: EMERGENCY MEDICINE

## 2025-07-21 RX ORDER — CEPHALEXIN 500 MG/1
500 CAPSULE ORAL 2 TIMES DAILY
Qty: 14 CAPSULE | Refills: 0 | Status: SHIPPED | OUTPATIENT
Start: 2025-07-21 | End: 2025-07-28

## 2025-07-21 RX ORDER — CEPHALEXIN 500 MG/1
500 CAPSULE ORAL 4 TIMES DAILY
Qty: 28 CAPSULE | Refills: 0 | Status: SHIPPED | OUTPATIENT
Start: 2025-07-21 | End: 2025-07-28

## 2025-07-21 NOTE — ED INITIAL ASSESSMENT (HPI)
Pt to ER for confusion x 4 days. Per family, pt is normally A&O 3 but Pt has been A&O x1 for 4 days. Per family, pt has hx of UTI and was seen in May for same.

## 2025-07-22 ENCOUNTER — TELEPHONE (OUTPATIENT)
Dept: CASE MANAGEMENT | Facility: HOSPITAL | Age: 89
End: 2025-07-22

## 2025-07-22 LAB
Q-T INTERVAL: 458 MS
QRS DURATION: 122 MS
QTC CALCULATION (BEZET): 497 MS
R AXIS: 266 DEGREES
T AXIS: 44 DEGREES
VENTRICULAR RATE: 71 BPM

## 2025-07-22 NOTE — ED PROVIDER NOTES
Patient Seen in: [facility] Emergency Department        History  Chief Complaint   Patient presents with    Altered Mental Status     Stated Complaint:     Subjective:   HPI            Here with daughter at bedside.  Concerned about confusion and likely recurrent UTI.  Overnight she was dressed as if she is ready to go out.  She has had several episodes of confusion like this in the past that have been due to UTIs.  No fevers no chills.  Daughter also adds that she    Was here few months ago and had a stroke.  She is on Xarelto and is maintained anticoagulation.  No numbness, weakness, speech issues, balance issues.  No visual complaints.      Objective:     Past Medical History:    Atrial fibrillation (HCC)    Calculus of kidney    CVA (cerebral vascular accident) (HCC)    Depression    Diabetes (HCC)    Esophageal reflux    High blood pressure    High cholesterol    HLD (hyperlipidemia)    HTN (hypertension)    Incontinence    Kidney stones    Neuropathy    Osteoarthritis    Stroke (HCC)    Visual impairment              Past Surgical History:   Procedure Laterality Date    Excis lumbar disk,one level      Knee replacement surgery      Pacemaker monitor      Removal gallbladder      Removal of kidney stone      Total abdom hysterectomy                  No pertinent social history.                              Physical Exam    ED Triage Vitals [07/21/25 1700]   /78   Pulse 76   Resp 18   Temp 97.9 °F (36.6 °C)   Temp src Oral   SpO2 100 %   O2 Device None (Room air)       Current Vitals:   Vital Signs  BP: 157/72  Pulse: 70  Resp: 20  Temp: 97.9 °F (36.6 °C)  Temp src: Oral  MAP (mmHg): 98    Oxygen Therapy  SpO2: 100 %  O2 Device: None (Room air)              Physical Exam      Constitutional:  Appears well-developed and well-nourished. no Distress.  Head: Normocephalic and atraumatic.   Nose: Nose normal.   Eyes: EOM are normal. Pupils are equal, round, and reactive to light.   Neck: Normal range of  motion. Neck supple. No JVD present.   Cardiovascular: Normal rate and regular rhythm.    Pulmonary/Chest: Effort normal and breath sounds normal. No stridor.   Abdominal: Soft. There is no tenderness. There is no guarding.   Musculoskeletal: Exhibits no edema or tenderness.     Neurological: Awake alert oriented to person place but not time or situation    There are no cranial nerve deficits.    There is no nystagmus.  Speech is fluent and not slurred.   There is no word finding difficulty.    No neglect. No gaze  No visual field deficit.    No drift or sensation deficit in any extremity    Finger to nose intact.  Heel to shin intact.      Skin: Skin is warm and dry.   Psychiatric: Normal mood and affect. Thought content normal.           ED Course  Labs Reviewed   COMP METABOLIC PANEL (14) - Abnormal; Notable for the following components:       Result Value    Glucose 103 (*)     Sodium 135 (*)     Creatinine 1.70 (*)     eGFR-Cr 29 (*)     All other components within normal limits   URINALYSIS WITH CULTURE REFLEX - Abnormal; Notable for the following components:    Clarity Urine Turbid (*)     Glucose Urine 150 (*)     Blood Urine 1+ (*)     Leukocyte Esterase Urine 500 (*)     WBC Urine >50 (*)     RBC Urine >10 (*)     Bacteria Urine 1+ (*)     Squamous Epi. Cells Few (*)     Hyaline Casts Present (*)     All other components within normal limits   CBC WITH DIFFERENTIAL WITH PLATELET   RAINBOW DRAW BLUE   URINE CULTURE, ROUTINE     EKG    Rate, intervals and axes as noted on EKG Report.  Rate: 71  Rhythm: Sinus Rhythm  Reading: Sinus rhythm new acute ischemia               UA does suggest infection, slight HOMAR, CBC fine  CT BRAIN OR HEAD (CPT=70450)  Result Date: 7/21/2025  CONCLUSION: No acute intracranial process. Electronically Verified and Signed by Attending Radiologist: Miguelito Jasmine MD 7/21/2025 7:28 PM Workstation: [id number]    XR CHEST AP PORTABLE  (CPT=71045)  Result Date: 7/21/2025  CONCLUSION: There  is no evidence of active cardiopulmonary disease on this single portable chest radiograph. Electronically Verified and Signed by Attending Radiologist: Miguelito Jasmine MD 7/21/2025 5:54 PM Workstation: [id number]      I reviewed previous records, last urine culture  From 2025 showed Klebsiella susceptible to number of antibiotics including cephalosporins.                    MDM         Differential diagnoses considered: Life-threatening to cranial hemorrhage, encephalopathy, UTI, pneumonia, dehydration, also disturbance all considered    -UA does suggest UTI, should be susceptible to Keflex/Ancef.  Ancef given here.  Home with Keflex.    -Daughter at bedside requested urology follow-up given frequent UTIs, discussed with  who will help arrange this tomorrow    -History does not suggest acute stroke and this is unlikely while on Xarelto, however records do show that she had a small stroke while on Eliquis.      I visualized the radiology studies, my independent interpretation: No intracranial hemorrhage noted on CT scan of brain    *Discussion of ongoing management of this patient's care included:  regarding arranging close outpatient allergy follow-up.  *Comorbidities contributing to the complexity of decision making:  history of multiple UTIs and recent stroke  *External charts reviewed:  discharge summary from May 2025 reviewed.  Was admitted for trans neurological symptoms and was found to have stroke on MRI.  *Additional sources of history: n/a    Shared decision making was done by: patient, myself.          Medical Decision Making      Disposition and Plan     Clinical Impression:  1. Confusion    2. Acute cystitis without hematuria         Disposition:  Discharge  7/21/2025  7:39 pm    Follow-up:  Shen Romero MD  [address]  [address]  [place] [address]  [phone]    Follow up            Medications Prescribed:  Current Discharge Medication List        START taking these  medications    Details   !! cephALEXin 500 MG Oral Cap Take 1 capsule (500 mg total) by mouth 4 (four) times daily for 7 days.  Qty: 28 capsule, Refills: 0      !! cephALEXin 500 MG Oral Cap Take 1 capsule (500 mg total) by mouth 2 (two) times daily for 7 days.  Qty: 14 capsule, Refills: 0       !! - Potential duplicate medications found. Please discuss with provider.                Supplementary Documentation:

## 2025-07-22 NOTE — CM/SW NOTE
Scheduled urology appt for patient    AUSTIN Adams  7/24/25  11am  [address], [address]    Appt information give to daughter Lo

## 2025-07-22 NOTE — CM/SW NOTE
Dr. Medina confirmed correct Cefalexan dose is:    500mg BID x 7 days    Padma rene [facility] verbalized understanding

## (undated) DEVICE — SPONGE GAUZE 4X4IN RAYON POLY 4 PLY HI ABS NONWOVEN LINT

## (undated) DEVICE — MMIS - GUIDEWIRE STRT 4CM 260CM 1.5MM RDS J ROSEN TPR

## (undated) DEVICE — MMIS - GUIDEWIRE ACUITY WHISPER VIEW 190CM CS J CRV .014

## (undated) DEVICE — MMIS - DEVICE CMPR 24CM TR BAND REG RADL ART 2 BLN TRANS

## (undated) DEVICE — MMIS - SYSTEM WND IRR DBRD CLNSG IRRISEPT 0.05% CHG STRL

## (undated) DEVICE — MMIS - CABLE 2MM ALGTR PCNG

## (undated) DEVICE — MMIS - KIT INTRO 4FR 21GA 10CM 7CM .018IN STF DIL NTNL GW

## (undated) DEVICE — MMIS - PACK PACEMAKER

## (undated) DEVICE — Device

## (undated) DEVICE — MMIS - INTRODUCER SHTH 8FR 50CM 11CM GW PRELUDE PRO .035

## (undated) DEVICE — MMIS - GUIDEWIRE GLDWR 3CM 150CM ANG .035IN VASC NTNL PU

## (undated) DEVICE — MMIS - CATHETER SPHR NC + 3.5MM 15MM PTCA STRL LF BLN

## (undated) DEVICE — CATHETER 6FR 145D PGTL CRV 110CM 6 SH RADOPQ BRAID SUP TRQ

## (undated) DEVICE — MMIS - CATHETER 6FR 3DRC CRV 100CM LG INNER LUM RADOPQ

## (undated) DEVICE — GLOVE SURG 6.5 PROTEXIS PI MIC LF CRM PF SMTH BEAD CUFF STRL

## (undated) DEVICE — INTRODUCER SHTH 6FR 35MM 45CM RADOPQ HEMOSTASIS VLV BRTP SIL

## (undated) DEVICE — MMIS - INTRODUCER SHTH 7FR .092IN 13CM ROBUST VLV SPLT

## (undated) DEVICE — SYRINGE 50ML GRAD N-PYRG DEHP-FR PVC FREE STRL MED LF DISP

## (undated) DEVICE — STENT RONYX30030UX RESOLUTE ONYX 3.00X30
Type: IMPLANTABLE DEVICE | Site: LEFT ANTERIOR DESCENDING ARTERY | Status: NON-FUNCTIONAL
Brand: RESOLUTE ONYX™
Removed: 2022-01-28

## (undated) DEVICE — APPLICATOR 70% ISO ALC 2% CHG 10.5ML CHLRPRP HI-LT

## (undated) DEVICE — GLOVE SURG 8 PROTEXIS PI LF CRM PF BEAD CUFF STRL PLISPRN

## (undated) DEVICE — GUIDEWIRE INQWR 150CM 3MM RDS J CRV .035IN VASC

## (undated) DEVICE — MMIS - CATHETER 6FR JR5 CRV 100CM RADOPQ BRAID SLCT SUP

## (undated) DEVICE — MMIS - CATHETER 6FR JR4 CLASSIC CRV 100CM RADOPQ BRAID

## (undated) DEVICE — MMIS - CATHETER 6FR JL4 CRV 100CM RADOPQ BRAID SLCT SUP

## (undated) DEVICE — MMIS - INTRODUCER SHTH 4FR 50CM 11CM RED HUB SNPFT DIL

## (undated) DEVICE — GOWN SURG 2XL L3 REINFORCE SET IN SLV STRL LF DISP BLUE

## (undated) DEVICE — MMIS - GUIDEWIRE HTORQ VERSACORE 145CM .035IN VASC SS

## (undated) DEVICE — MMIS - PACK SURG EP

## (undated) DEVICE — CATHETER 6FR JR4 CLASSIC CRV 100CM RADOPQ BRAID SLCT SUP TRQ

## (undated) DEVICE — MMIS - KIT INTRO 4FR 21GA 10CM 4CM .018IN NTNL GW

## (undated) DEVICE — SYRINGE 150ML SPK ANGIO ILLUMENA

## (undated) DEVICE — INTRODUCER SHTH 6FR 50CM 11CM GRN HUB SNPFT DIL

## (undated) DEVICE — CATHETER 6FR JL4 CRV 100CM RADOPQ BRAID SLCT SUP TRQ ANGIO

## (undated) DEVICE — MMIS - CATHETER 6FR XB3.5 CRV 100CM VISTA BRTP XL LUM

## (undated) DEVICE — TUBING ANGIO 900 PSI 72IN HPRS CNRST INJ FX FEMALE CNCT

## (undated) DEVICE — MMIS - CATHETER 6FR 145D PGTL CRV 110CM 6 SH RADOPQ BRAID

## (undated) DEVICE — MMIS - CATHETER 7FR 115CM 3.5MM 2-5-2MM SPACE J CRV 10

## (undated) DEVICE — SUTURE PRMHND 2-0 FS 18IN SILK BRAID NABSB BLK 685G

## (undated) DEVICE — MMIS - CATHETER TREK SLIM SEAL 2.5MM 15MM RX ULTRA

## (undated) DEVICE — MMIS - INTRODUCER SHTH 6FR 50CM 11CM GRN HUB SNPFT DIL

## (undated) DEVICE — MMIS - INTRODUCER SHTH 9FR .118IN 13CM ROBUST VLV SPLT

## (undated) DEVICE — MMIS - GUIDEWIRE GLDWR 3CM 150CM ANG .035IN VASC STIFF

## (undated) DEVICE — DECANTER FLUID 9IN SET BAG

## (undated) DEVICE — MMIS - GUIDEWIRE INQWR 150CM 3MM RDS J CRV .035IN VASC

## (undated) DEVICE — STOPCOCK IV DSCFX SPIN-LOCK 4W 2 FEMALE LL DEHP-FR PORT CVR

## (undated) DEVICE — KIT PRSS MNTR 20IN TRNDCR ARM MOUNT RSRV TRUWAVE VAMP DISP

## (undated) DEVICE — MMIS - INTRODUCER SHTH 6FR 35MM 45CM KINK RST CANNULA

## (undated) DEVICE — SYRINGE 20ML GRAD STRL MED DISP LL

## (undated) DEVICE — MMIS - SS KIT 6FR 10CM FLEX STRAIGHT 0.021IN X 45CM

## (undated) DEVICE — MMIS - SET IRR TUBE SMARTABLATE LF STRL DISP

## (undated) DEVICE — MMIS - MONITOR CARELINK PATIENT BLUETOOTH

## (undated) DEVICE — GLOVE SURG 7.5 PROTEXIS PI LF CRM PF BEAD CUFF STRL PLISPRN

## (undated) DEVICE — MMIS - INTRODUCER LD 50CM 9FR 18GA WORLEY CSG BRAID CORE

## (undated) DEVICE — MMIS - GUIDEWIRE HTORQ BAL MDWT UNV 2 3CM 190CM J CRV

## (undated) DEVICE — DRESSING TRANS 4.75X4IN ADH HPOAL WTPRF TEGADERM PU STD STRL

## (undated) DEVICE — MMIS - CATHETER 6FR JL3.5 CRV 100CM RADOPQ BRAID SLCT SUP

## (undated) DEVICE — TUBING PRSS MNTR 24IN TRUWAVE MALE TO FEMALE CNCT TRNDCR

## (undated) DEVICE — KIT INTRO 4FR 21GA 10CM 7CM .018IN 40CM STIFFEN DIL GW COIL

## (undated) DEVICE — MMIS - VALVE 20 ATM COPLT HEMOSTASIS .096- IN 20ML INFL